# Patient Record
Sex: MALE | Race: BLACK OR AFRICAN AMERICAN | NOT HISPANIC OR LATINO | URBAN - METROPOLITAN AREA
[De-identification: names, ages, dates, MRNs, and addresses within clinical notes are randomized per-mention and may not be internally consistent; named-entity substitution may affect disease eponyms.]

---

## 2015-11-07 RX ORDER — NICOTINE POLACRILEX 2 MG
1 GUM BUCCAL
Qty: 0 | Refills: 0 | COMMUNITY
Start: 2015-11-07

## 2017-05-06 ENCOUNTER — EMERGENCY (EMERGENCY)
Facility: HOSPITAL | Age: 32
LOS: 1 days | Discharge: HOME | End: 2017-05-06
Attending: EMERGENCY MEDICINE | Admitting: EMERGENCY MEDICINE
Payer: MEDICAID

## 2017-05-06 VITALS
TEMPERATURE: 98 F | HEART RATE: 70 BPM | RESPIRATION RATE: 20 BRPM | DIASTOLIC BLOOD PRESSURE: 67 MMHG | OXYGEN SATURATION: 99 % | SYSTOLIC BLOOD PRESSURE: 127 MMHG

## 2017-05-06 VITALS
TEMPERATURE: 98 F | RESPIRATION RATE: 18 BRPM | HEIGHT: 68 IN | WEIGHT: 164.91 LBS | OXYGEN SATURATION: 99 % | DIASTOLIC BLOOD PRESSURE: 85 MMHG | HEART RATE: 92 BPM | SYSTOLIC BLOOD PRESSURE: 117 MMHG

## 2017-05-06 DIAGNOSIS — M25.552 PAIN IN LEFT HIP: ICD-10-CM

## 2017-05-06 DIAGNOSIS — D57.1 SICKLE-CELL DISEASE WITHOUT CRISIS: ICD-10-CM

## 2017-05-06 DIAGNOSIS — M25.551 PAIN IN RIGHT HIP: ICD-10-CM

## 2017-05-06 DIAGNOSIS — Z88.5 ALLERGY STATUS TO NARCOTIC AGENT: ICD-10-CM

## 2017-05-06 DIAGNOSIS — Z79.899 OTHER LONG TERM (CURRENT) DRUG THERAPY: ICD-10-CM

## 2017-05-06 DIAGNOSIS — M54.5 LOW BACK PAIN: ICD-10-CM

## 2017-05-06 LAB
ALBUMIN SERPL ELPH-MCNC: 2.9 G/DL — LOW (ref 3.4–5)
ALP SERPL-CCNC: 102 U/L — SIGNIFICANT CHANGE UP (ref 40–120)
ALT FLD-CCNC: 19 U/L — SIGNIFICANT CHANGE UP (ref 12–42)
ANION GAP SERPL CALC-SCNC: 10 MMOL/L — SIGNIFICANT CHANGE UP (ref 9–16)
AST SERPL-CCNC: 18 U/L — SIGNIFICANT CHANGE UP (ref 15–37)
BASOPHILS NFR BLD AUTO: 0.4 % — SIGNIFICANT CHANGE UP (ref 0–2)
BILIRUB SERPL-MCNC: 0.4 MG/DL — SIGNIFICANT CHANGE UP (ref 0.2–1.2)
BUN SERPL-MCNC: 11 MG/DL — SIGNIFICANT CHANGE UP (ref 7–23)
CALCIUM SERPL-MCNC: 9.2 MG/DL — SIGNIFICANT CHANGE UP (ref 8.5–10.5)
CHLORIDE SERPL-SCNC: 105 MMOL/L — SIGNIFICANT CHANGE UP (ref 96–108)
CO2 SERPL-SCNC: 25 MMOL/L — SIGNIFICANT CHANGE UP (ref 22–31)
CREAT SERPL-MCNC: 0.83 MG/DL — SIGNIFICANT CHANGE UP (ref 0.5–1.3)
EOSINOPHIL NFR BLD AUTO: 7.2 % — HIGH (ref 0–6)
GLUCOSE SERPL-MCNC: 89 MG/DL — SIGNIFICANT CHANGE UP (ref 70–99)
HCT VFR BLD CALC: 24 % — LOW (ref 39–50)
HGB BLD-MCNC: 8.1 G/DL — LOW (ref 13–17)
IMM GRANULOCYTES NFR BLD AUTO: 1.7 % — HIGH (ref 0–1.5)
LYMPHOCYTES # BLD AUTO: 25.3 % — SIGNIFICANT CHANGE UP (ref 13–44)
MCHC RBC-ENTMCNC: 22.9 PG — LOW (ref 27–34)
MCHC RBC-ENTMCNC: 33.8 G/DL — SIGNIFICANT CHANGE UP (ref 32–36)
MCV RBC AUTO: 67.8 FL — LOW (ref 80–100)
MONOCYTES NFR BLD AUTO: 5.3 % — SIGNIFICANT CHANGE UP (ref 2–14)
NEUTROPHILS NFR BLD AUTO: 60.1 % — SIGNIFICANT CHANGE UP (ref 43–77)
PLATELET # BLD AUTO: 139 K/UL — LOW (ref 150–400)
POTASSIUM SERPL-MCNC: 3.8 MMOL/L — SIGNIFICANT CHANGE UP (ref 3.5–5.3)
POTASSIUM SERPL-SCNC: 3.8 MMOL/L — SIGNIFICANT CHANGE UP (ref 3.5–5.3)
PROT SERPL-MCNC: 8.5 G/DL — HIGH (ref 6.4–8.2)
RBC # BLD: 3.54 M/UL — LOW (ref 4.2–5.8)
RBC # BLD: 3.54 M/UL — LOW (ref 4.2–5.8)
RBC # FLD: 20.8 % — HIGH (ref 10.3–16.9)
RETICS #: 145.5 K/UL — HIGH (ref 25–125)
RETICS/RBC NFR: 4.1 % — HIGH (ref 0.5–2.5)
SODIUM SERPL-SCNC: 140 MMOL/L — SIGNIFICANT CHANGE UP (ref 132–145)
WBC # BLD: 11.8 K/UL — HIGH (ref 3.8–10.5)
WBC # FLD AUTO: 11.8 K/UL — HIGH (ref 3.8–10.5)

## 2017-05-06 PROCEDURE — 99284 EMERGENCY DEPT VISIT MOD MDM: CPT | Mod: 25

## 2017-05-06 RX ORDER — ACETAMINOPHEN 500 MG
650 TABLET ORAL ONCE
Qty: 0 | Refills: 0 | Status: COMPLETED | OUTPATIENT
Start: 2017-05-06 | End: 2017-05-06

## 2017-05-06 RX ORDER — SODIUM CHLORIDE 9 MG/ML
1000 INJECTION INTRAMUSCULAR; INTRAVENOUS; SUBCUTANEOUS ONCE
Qty: 0 | Refills: 0 | Status: COMPLETED | OUTPATIENT
Start: 2017-05-06 | End: 2017-05-06

## 2017-05-06 RX ORDER — KETOROLAC TROMETHAMINE 30 MG/ML
60 SYRINGE (ML) INJECTION ONCE
Qty: 0 | Refills: 0 | Status: DISCONTINUED | OUTPATIENT
Start: 2017-05-06 | End: 2017-05-06

## 2017-05-06 RX ADMIN — Medication 60 MILLIGRAM(S): at 05:54

## 2017-05-06 RX ADMIN — Medication 650 MILLIGRAM(S): at 05:54

## 2017-05-06 NOTE — ED PROVIDER NOTE - PMH
Acute Chest Syndrome  2003 and possibly 2008, with intubation, exchange transfusion  Avascular Necrosis of Bone  hips  Personal History of PE (Pulmonary Embolism)  2008, post 6mo coumadin  Sickle Cell Anemia  SC

## 2017-05-06 NOTE — ED PROVIDER NOTE - MEDICAL DECISION MAKING DETAILS
Pt sleeping comfortably throughout ED stay. Labs reviewed; does not appear to be in crisis. Tylenol, Toradol and F/U with PMD this week. Strict return precautions reviewed with pt in which pt verbalizes understanding and agrees to.

## 2017-05-06 NOTE — ED ADULT TRIAGE NOTE - CHIEF COMPLAINT QUOTE
Walked in c/o lower back pain and b/l hip pain r/t his sickle cell, last took 8 mg dilaudid po with no relief. denies any sob/cp

## 2017-05-06 NOTE — ED PROVIDER NOTE - ATTENDING CONTRIBUTION TO CARE
Pt w hx of sickle cell anemia, here for MSK pains, Old chart review shows similar visits to our ED in the past for same. non focal exam. Labs done, baseline H/H, no significant increase in retic. Pt sleeping comfortably while in ED. no distress

## 2017-05-06 NOTE — ED PROVIDER NOTE - OBJECTIVE STATEMENT
32 y/o M with PMH of Sickle Cell Anemia, Acute Chest Syndrome, AVN of B/L Hips, PE, presents to ED c/o sickle cell pain to lower back and B/L hips x 1 day. States he took dilaudid 8mg PO at home without relief.     Denies fever, chills, dizziness, vision changes, CP, SOB, palpitations, abdo pain, N/V/D, focal numbness or weakness

## 2017-05-11 ENCOUNTER — EMERGENCY (EMERGENCY)
Facility: HOSPITAL | Age: 32
LOS: 1 days | Discharge: LEFT BEFORE DISCHARGE | End: 2017-05-11
Attending: EMERGENCY MEDICINE | Admitting: EMERGENCY MEDICINE

## 2017-05-11 VITALS
WEIGHT: 160.06 LBS | OXYGEN SATURATION: 99 % | DIASTOLIC BLOOD PRESSURE: 75 MMHG | SYSTOLIC BLOOD PRESSURE: 111 MMHG | HEIGHT: 68 IN | RESPIRATION RATE: 17 BRPM | HEART RATE: 94 BPM | TEMPERATURE: 99 F

## 2017-05-11 DIAGNOSIS — M25.552 PAIN IN LEFT HIP: ICD-10-CM

## 2017-05-11 DIAGNOSIS — M25.551 PAIN IN RIGHT HIP: ICD-10-CM

## 2017-05-11 NOTE — ED ADULT NURSE NOTE - CHIEF COMPLAINT QUOTE
Pt c/o bilateral hip and back pain since 8am. Pt states he took Dilaudid 8mg at 8am and 12pm with no relief.

## 2017-05-11 NOTE — ED ADULT NURSE NOTE - OBJECTIVE STATEMENT
Pt received aox3, states he is having a sickle cell crisis with pain to bilateral hips and back starting 8am this morning. Pt took 8mg dialudid at 8am and 12p with no relief. Pt states history of PE. Vitals stable. Pt denies CP, denies SOB. Awaiting provider.

## 2017-07-08 ENCOUNTER — INPATIENT (INPATIENT)
Facility: HOSPITAL | Age: 32
LOS: 2 days | Discharge: LEFT BEFORE DISCHARGE | End: 2017-07-11
Attending: INTERNAL MEDICINE | Admitting: INTERNAL MEDICINE
Payer: MEDICAID

## 2017-07-08 VITALS
SYSTOLIC BLOOD PRESSURE: 124 MMHG | HEART RATE: 88 BPM | RESPIRATION RATE: 16 BRPM | DIASTOLIC BLOOD PRESSURE: 82 MMHG | OXYGEN SATURATION: 100 % | TEMPERATURE: 99 F

## 2017-07-08 LAB
ALBUMIN SERPL ELPH-MCNC: 3.6 G/DL — SIGNIFICANT CHANGE UP (ref 3.3–5)
ALP SERPL-CCNC: 86 U/L — SIGNIFICANT CHANGE UP (ref 40–120)
ALT FLD-CCNC: 10 U/L — SIGNIFICANT CHANGE UP (ref 4–41)
ANISOCYTOSIS BLD QL: SIGNIFICANT CHANGE UP
AST SERPL-CCNC: 15 U/L — SIGNIFICANT CHANGE UP (ref 4–40)
BASOPHILS # BLD AUTO: 0.08 K/UL — SIGNIFICANT CHANGE UP (ref 0–0.2)
BASOPHILS NFR BLD AUTO: 0.5 % — SIGNIFICANT CHANGE UP (ref 0–2)
BASOPHILS NFR SPEC: 1.7 % — SIGNIFICANT CHANGE UP (ref 0–2)
BILIRUB SERPL-MCNC: 0.4 MG/DL — SIGNIFICANT CHANGE UP (ref 0.2–1.2)
BLASTS # FLD: 0 % — SIGNIFICANT CHANGE UP (ref 0–0)
BUN SERPL-MCNC: 14 MG/DL — SIGNIFICANT CHANGE UP (ref 7–23)
CALCIUM SERPL-MCNC: 9.4 MG/DL — SIGNIFICANT CHANGE UP (ref 8.4–10.5)
CHLORIDE SERPL-SCNC: 100 MMOL/L — SIGNIFICANT CHANGE UP (ref 98–107)
CK MB BLD-MCNC: 0.5 — SIGNIFICANT CHANGE UP (ref 0–2.5)
CK MB BLD-MCNC: 1 NG/ML — SIGNIFICANT CHANGE UP (ref 1–6.6)
CK SERPL-CCNC: 220 U/L — HIGH (ref 30–200)
CO2 SERPL-SCNC: 25 MMOL/L — SIGNIFICANT CHANGE UP (ref 22–31)
CREAT SERPL-MCNC: 0.86 MG/DL — SIGNIFICANT CHANGE UP (ref 0.5–1.3)
EOSINOPHIL # BLD AUTO: 0.35 K/UL — SIGNIFICANT CHANGE UP (ref 0–0.5)
EOSINOPHIL NFR BLD AUTO: 2.3 % — SIGNIFICANT CHANGE UP (ref 0–6)
EOSINOPHIL NFR FLD: 0.9 % — SIGNIFICANT CHANGE UP (ref 0–6)
GIANT PLATELETS BLD QL SMEAR: PRESENT — SIGNIFICANT CHANGE UP
GLUCOSE SERPL-MCNC: 76 MG/DL — SIGNIFICANT CHANGE UP (ref 70–99)
HCT VFR BLD CALC: 24.7 % — LOW (ref 39–50)
HGB BLD-MCNC: 8.4 G/DL — LOW (ref 13–17)
IMM GRANULOCYTES # BLD AUTO: 0.05 # — SIGNIFICANT CHANGE UP
IMM GRANULOCYTES NFR BLD AUTO: 0.3 % — SIGNIFICANT CHANGE UP (ref 0–1.5)
LYMPHOCYTES # BLD AUTO: 18.8 % — SIGNIFICANT CHANGE UP (ref 13–44)
LYMPHOCYTES # BLD AUTO: 2.9 K/UL — SIGNIFICANT CHANGE UP (ref 1–3.3)
LYMPHOCYTES NFR SPEC AUTO: 17.5 % — SIGNIFICANT CHANGE UP (ref 13–44)
MCHC RBC-ENTMCNC: 21.8 PG — LOW (ref 27–34)
MCHC RBC-ENTMCNC: 34 % — SIGNIFICANT CHANGE UP (ref 32–36)
MCV RBC AUTO: 64.2 FL — LOW (ref 80–100)
METAMYELOCYTES # FLD: 0 % — SIGNIFICANT CHANGE UP (ref 0–1)
MICROCYTES BLD QL: SIGNIFICANT CHANGE UP
MONOCYTES # BLD AUTO: 0.77 K/UL — SIGNIFICANT CHANGE UP (ref 0–0.9)
MONOCYTES NFR BLD AUTO: 5 % — SIGNIFICANT CHANGE UP (ref 2–14)
MONOCYTES NFR BLD: 0.9 % — LOW (ref 2–9)
MYELOCYTES NFR BLD: 0 % — SIGNIFICANT CHANGE UP (ref 0–0)
NEUTROPHIL AB SER-ACNC: 77.2 % — HIGH (ref 43–77)
NEUTROPHILS # BLD AUTO: 11.27 K/UL — HIGH (ref 1.8–7.4)
NEUTROPHILS NFR BLD AUTO: 73.1 % — SIGNIFICANT CHANGE UP (ref 43–77)
NEUTS BAND # BLD: 0 % — SIGNIFICANT CHANGE UP (ref 0–6)
NRBC # FLD: 0 — SIGNIFICANT CHANGE UP
OTHER - HEMATOLOGY %: 0 — SIGNIFICANT CHANGE UP
PLATELET # BLD AUTO: 470 K/UL — HIGH (ref 150–400)
PLATELET COUNT - ESTIMATE: NORMAL — SIGNIFICANT CHANGE UP
PMV BLD: 9.3 FL — SIGNIFICANT CHANGE UP (ref 7–13)
POIKILOCYTOSIS BLD QL AUTO: SIGNIFICANT CHANGE UP
POLYCHROMASIA BLD QL SMEAR: SLIGHT — SIGNIFICANT CHANGE UP
POTASSIUM SERPL-MCNC: 4.1 MMOL/L — SIGNIFICANT CHANGE UP (ref 3.5–5.3)
POTASSIUM SERPL-SCNC: 4.1 MMOL/L — SIGNIFICANT CHANGE UP (ref 3.5–5.3)
PROMYELOCYTES # FLD: 0 % — SIGNIFICANT CHANGE UP (ref 0–0)
PROT SERPL-MCNC: 8.8 G/DL — HIGH (ref 6–8.3)
RBC # BLD: 3.85 M/UL — LOW (ref 4.2–5.8)
RBC # FLD: 19.6 % — HIGH (ref 10.3–14.5)
RETICS #: 101.5 10X3/UL — HIGH (ref 17–73)
RETICS/RBC NFR: 2.7 % — HIGH (ref 0.5–2.5)
REVIEW TO FOLLOW: YES — SIGNIFICANT CHANGE UP
SODIUM SERPL-SCNC: 143 MMOL/L — SIGNIFICANT CHANGE UP (ref 135–145)
TARGETS BLD QL SMEAR: SIGNIFICANT CHANGE UP
TROPONIN T SERPL-MCNC: < 0.06 NG/ML — SIGNIFICANT CHANGE UP (ref 0–0.06)
VARIANT LYMPHS # BLD: 1.8 % — SIGNIFICANT CHANGE UP
WBC # BLD: 15.42 K/UL — HIGH (ref 3.8–10.5)
WBC # FLD AUTO: 15.42 K/UL — HIGH (ref 3.8–10.5)

## 2017-07-08 RX ORDER — KETOROLAC TROMETHAMINE 30 MG/ML
30 SYRINGE (ML) INJECTION ONCE
Qty: 0 | Refills: 0 | Status: DISCONTINUED | OUTPATIENT
Start: 2017-07-08 | End: 2017-07-08

## 2017-07-08 RX ORDER — SODIUM CHLORIDE 9 MG/ML
1000 INJECTION INTRAMUSCULAR; INTRAVENOUS; SUBCUTANEOUS ONCE
Qty: 0 | Refills: 0 | Status: COMPLETED | OUTPATIENT
Start: 2017-07-08 | End: 2017-07-08

## 2017-07-08 RX ORDER — DIPHENHYDRAMINE HCL 50 MG
50 CAPSULE ORAL ONCE
Qty: 0 | Refills: 0 | Status: COMPLETED | OUTPATIENT
Start: 2017-07-08 | End: 2017-07-08

## 2017-07-08 RX ORDER — HYDROMORPHONE HYDROCHLORIDE 2 MG/ML
2 INJECTION INTRAMUSCULAR; INTRAVENOUS; SUBCUTANEOUS ONCE
Qty: 0 | Refills: 0 | Status: DISCONTINUED | OUTPATIENT
Start: 2017-07-08 | End: 2017-07-08

## 2017-07-08 RX ADMIN — SODIUM CHLORIDE 1000 MILLILITER(S): 9 INJECTION INTRAMUSCULAR; INTRAVENOUS; SUBCUTANEOUS at 22:36

## 2017-07-08 RX ADMIN — Medication 30 MILLIGRAM(S): at 22:36

## 2017-07-08 RX ADMIN — HYDROMORPHONE HYDROCHLORIDE 200 MILLIGRAM(S): 2 INJECTION INTRAMUSCULAR; INTRAVENOUS; SUBCUTANEOUS at 23:26

## 2017-07-08 RX ADMIN — Medication 30 MILLIGRAM(S): at 22:50

## 2017-07-08 RX ADMIN — Medication 50 MILLIGRAM(S): at 23:26

## 2017-07-08 RX ADMIN — HYDROMORPHONE HYDROCHLORIDE 2 MILLIGRAM(S): 2 INJECTION INTRAMUSCULAR; INTRAVENOUS; SUBCUTANEOUS at 23:45

## 2017-07-08 NOTE — ED ADULT NURSE REASSESSMENT NOTE - NS ED NURSE REASSESS COMMENT FT1
pt. states still in pain, appears more comfortable after the toradol. followed-up from Pharmacy the Dilaudid drip. as per Pharmacist, they're still preparing the med. will  when ready. will continue to monitor

## 2017-07-08 NOTE — ED PROVIDER NOTE - ATTENDING CONTRIBUTION TO CARE
DR Bloch- Patient with pain ot managed with 8  of dilaudid every 2-4 hours, started today. Hx of acute chest 2015, doesn't feel the same. no fever. no cough no SOB. complex care note noted. Patient appears in acute pain, HEENT nml, lungs clear, mild right sided chest wall tenderness, s1s2, abd soft nontender. moving all extrem, pain on flexion of hips. no skin rashes

## 2017-07-08 NOTE — ED ADULT TRIAGE NOTE - CHIEF COMPLAINT QUOTE
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 pt has hx of acute chest. pt took 8 mg of Dilaudid and noon and 2 pm  without relief.  pt VSS NAD EKG performed in triage.

## 2017-07-08 NOTE — ED PROVIDER NOTE - OBJECTIVE STATEMENT
32M h/o hgb SS, AVN b/l hips and L shoulder p/w b/l hip pain and R-sided chest pain since 12pm today. The pain is reminiscent of prior VOC. No fevers, cough, SOB, or recent injury. Took PO dilaudid 8mg at 12pm and 2pm without relief. Hx of acute chest in 2001 and 2003, hx PE in 2008.  Hematology: Dr Francois Laguna  Home meds: dilaudid 8mg q 6 hrs PRN, folic acid multivitamin 32M h/o hgb SS, AVN b/l hips and L shoulder p/w b/l hip pain and R-sided chest pain since 12pm today. The pain is reminiscent of prior VOC. No fevers, cough, SOB, or recent injury. Took PO dilaudid 8mg at 12pm and 2pm without relief. Hx of acute chest in 2001 and 2003, hx PE in 2008. No calf pain/swelling, hemoptysis, or recent trauma/surgery/immobilization.   Hematology: Dr Francois Laguna  Home meds: dilaudid 8mg q 6 hrs PRN, folic acid multivitamin

## 2017-07-08 NOTE — ED ADULT NURSE NOTE - FAMILY HISTORY
<<-----Click on this checkbox to enter Family History Family history of sickle cell trait in father     Mother  Still living? Yes, Estimated age: Age Unknown  Family history of sickle cell trait in mother, Age at diagnosis: Age Unknown

## 2017-07-08 NOTE — ED PROVIDER NOTE - MEDICAL DECISION MAKING DETAILS
32M h/o Hgb SS p/w typical SCC. Low suspicion for acute chest or ACS. Presentation not consistent with PE (Wells score = 1.5 for prior PE). Plan: labs, retic count, CXR, EKG, fluids, pain meds, reassess. Aware of Complex Care Note recs.

## 2017-07-09 DIAGNOSIS — Z29.9 ENCOUNTER FOR PROPHYLACTIC MEASURES, UNSPECIFIED: ICD-10-CM

## 2017-07-09 DIAGNOSIS — R07.89 OTHER CHEST PAIN: ICD-10-CM

## 2017-07-09 DIAGNOSIS — D57.00 HB-SS DISEASE WITH CRISIS, UNSPECIFIED: ICD-10-CM

## 2017-07-09 DIAGNOSIS — D72.829 ELEVATED WHITE BLOOD CELL COUNT, UNSPECIFIED: ICD-10-CM

## 2017-07-09 PROCEDURE — 99223 1ST HOSP IP/OBS HIGH 75: CPT

## 2017-07-09 PROCEDURE — 71020: CPT | Mod: 26

## 2017-07-09 RX ORDER — HYDROMORPHONE HYDROCHLORIDE 2 MG/ML
2 INJECTION INTRAMUSCULAR; INTRAVENOUS; SUBCUTANEOUS EVERY 4 HOURS
Qty: 0 | Refills: 0 | Status: DISCONTINUED | OUTPATIENT
Start: 2017-07-09 | End: 2017-07-09

## 2017-07-09 RX ORDER — NALOXONE HYDROCHLORIDE 4 MG/.1ML
0.1 SPRAY NASAL
Qty: 0 | Refills: 0 | Status: DISCONTINUED | OUTPATIENT
Start: 2017-07-09 | End: 2017-07-11

## 2017-07-09 RX ORDER — HYDROMORPHONE HYDROCHLORIDE 2 MG/ML
2 INJECTION INTRAMUSCULAR; INTRAVENOUS; SUBCUTANEOUS
Qty: 0 | Refills: 0 | Status: DISCONTINUED | OUTPATIENT
Start: 2017-07-09 | End: 2017-07-09

## 2017-07-09 RX ORDER — HYDROMORPHONE HYDROCHLORIDE 2 MG/ML
2 INJECTION INTRAMUSCULAR; INTRAVENOUS; SUBCUTANEOUS ONCE
Qty: 0 | Refills: 0 | Status: DISCONTINUED | OUTPATIENT
Start: 2017-07-09 | End: 2017-07-09

## 2017-07-09 RX ORDER — HYDROMORPHONE HYDROCHLORIDE 2 MG/ML
30 INJECTION INTRAMUSCULAR; INTRAVENOUS; SUBCUTANEOUS
Qty: 0 | Refills: 0 | Status: DISCONTINUED | OUTPATIENT
Start: 2017-07-09 | End: 2017-07-10

## 2017-07-09 RX ORDER — SODIUM CHLORIDE 9 MG/ML
1000 INJECTION, SOLUTION INTRAVENOUS
Qty: 0 | Refills: 0 | Status: DISCONTINUED | OUTPATIENT
Start: 2017-07-09 | End: 2017-07-11

## 2017-07-09 RX ORDER — ONDANSETRON 8 MG/1
4 TABLET, FILM COATED ORAL EVERY 6 HOURS
Qty: 0 | Refills: 0 | Status: DISCONTINUED | OUTPATIENT
Start: 2017-07-09 | End: 2017-07-11

## 2017-07-09 RX ORDER — FOLIC ACID 0.8 MG
1 TABLET ORAL DAILY
Qty: 0 | Refills: 0 | Status: DISCONTINUED | OUTPATIENT
Start: 2017-07-09 | End: 2017-07-11

## 2017-07-09 RX ORDER — KETOROLAC TROMETHAMINE 30 MG/ML
30 SYRINGE (ML) INJECTION EVERY 8 HOURS
Qty: 0 | Refills: 0 | Status: DISCONTINUED | OUTPATIENT
Start: 2017-07-09 | End: 2017-07-10

## 2017-07-09 RX ORDER — HYDROMORPHONE HYDROCHLORIDE 2 MG/ML
1 INJECTION INTRAMUSCULAR; INTRAVENOUS; SUBCUTANEOUS
Qty: 0 | Refills: 0 | Status: DISCONTINUED | OUTPATIENT
Start: 2017-07-09 | End: 2017-07-09

## 2017-07-09 RX ORDER — HYDROMORPHONE HYDROCHLORIDE 2 MG/ML
4 INJECTION INTRAMUSCULAR; INTRAVENOUS; SUBCUTANEOUS ONCE
Qty: 0 | Refills: 0 | Status: DISCONTINUED | OUTPATIENT
Start: 2017-07-09 | End: 2017-07-09

## 2017-07-09 RX ORDER — DIPHENHYDRAMINE HCL 50 MG
50 CAPSULE ORAL EVERY 6 HOURS
Qty: 0 | Refills: 0 | Status: DISCONTINUED | OUTPATIENT
Start: 2017-07-09 | End: 2017-07-11

## 2017-07-09 RX ORDER — ENOXAPARIN SODIUM 100 MG/ML
40 INJECTION SUBCUTANEOUS EVERY 24 HOURS
Qty: 0 | Refills: 0 | Status: DISCONTINUED | OUTPATIENT
Start: 2017-07-09 | End: 2017-07-11

## 2017-07-09 RX ADMIN — HYDROMORPHONE HYDROCHLORIDE 200 MILLIGRAM(S): 2 INJECTION INTRAMUSCULAR; INTRAVENOUS; SUBCUTANEOUS at 04:00

## 2017-07-09 RX ADMIN — HYDROMORPHONE HYDROCHLORIDE 2 MILLIGRAM(S): 2 INJECTION INTRAMUSCULAR; INTRAVENOUS; SUBCUTANEOUS at 07:13

## 2017-07-09 RX ADMIN — SODIUM CHLORIDE 125 MILLILITER(S): 9 INJECTION, SOLUTION INTRAVENOUS at 16:39

## 2017-07-09 RX ADMIN — Medication 30 MILLIGRAM(S): at 14:01

## 2017-07-09 RX ADMIN — HYDROMORPHONE HYDROCHLORIDE 2 MILLIGRAM(S): 2 INJECTION INTRAMUSCULAR; INTRAVENOUS; SUBCUTANEOUS at 13:59

## 2017-07-09 RX ADMIN — Medication 50 MILLIGRAM(S): at 10:44

## 2017-07-09 RX ADMIN — HYDROMORPHONE HYDROCHLORIDE 200 MILLIGRAM(S): 2 INJECTION INTRAMUSCULAR; INTRAVENOUS; SUBCUTANEOUS at 01:30

## 2017-07-09 RX ADMIN — ENOXAPARIN SODIUM 40 MILLIGRAM(S): 100 INJECTION SUBCUTANEOUS at 11:26

## 2017-07-09 RX ADMIN — HYDROMORPHONE HYDROCHLORIDE 30 MILLILITER(S): 2 INJECTION INTRAMUSCULAR; INTRAVENOUS; SUBCUTANEOUS at 20:25

## 2017-07-09 RX ADMIN — HYDROMORPHONE HYDROCHLORIDE 30 MILLILITER(S): 2 INJECTION INTRAMUSCULAR; INTRAVENOUS; SUBCUTANEOUS at 16:34

## 2017-07-09 RX ADMIN — HYDROMORPHONE HYDROCHLORIDE 2 MILLIGRAM(S): 2 INJECTION INTRAMUSCULAR; INTRAVENOUS; SUBCUTANEOUS at 06:58

## 2017-07-09 RX ADMIN — HYDROMORPHONE HYDROCHLORIDE 2 MILLIGRAM(S): 2 INJECTION INTRAMUSCULAR; INTRAVENOUS; SUBCUTANEOUS at 14:14

## 2017-07-09 RX ADMIN — SODIUM CHLORIDE 125 MILLILITER(S): 9 INJECTION, SOLUTION INTRAVENOUS at 08:31

## 2017-07-09 RX ADMIN — HYDROMORPHONE HYDROCHLORIDE 2 MILLIGRAM(S): 2 INJECTION INTRAMUSCULAR; INTRAVENOUS; SUBCUTANEOUS at 10:56

## 2017-07-09 RX ADMIN — Medication 30 MILLIGRAM(S): at 14:16

## 2017-07-09 RX ADMIN — HYDROMORPHONE HYDROCHLORIDE 2 MILLIGRAM(S): 2 INJECTION INTRAMUSCULAR; INTRAVENOUS; SUBCUTANEOUS at 10:41

## 2017-07-09 RX ADMIN — HYDROMORPHONE HYDROCHLORIDE 4 MILLIGRAM(S): 2 INJECTION INTRAMUSCULAR; INTRAVENOUS; SUBCUTANEOUS at 01:49

## 2017-07-09 RX ADMIN — SODIUM CHLORIDE 100 MILLILITER(S): 9 INJECTION, SOLUTION INTRAVENOUS at 04:00

## 2017-07-09 RX ADMIN — HYDROMORPHONE HYDROCHLORIDE 2 MILLIGRAM(S): 2 INJECTION INTRAMUSCULAR; INTRAVENOUS; SUBCUTANEOUS at 04:15

## 2017-07-09 RX ADMIN — Medication 1 MILLIGRAM(S): at 11:26

## 2017-07-09 RX ADMIN — Medication 1 TABLET(S): at 11:26

## 2017-07-09 NOTE — ED ADULT NURSE REASSESSMENT NOTE - NS ED NURSE REASSESS COMMENT FT1
pt. a&ox3, appears in NAD at this time, states pain partially relieved. still c/o pain on b/l hips, denies any pain on chest. MD Gollogly made aware. will continue to monitor

## 2017-07-09 NOTE — H&P ADULT - NSHPSOCIALHISTORY_GEN_ALL_CORE
Single, 1 daughter  Lives with sister  Substance use: never used  Social ETOH use Single, 1 daughter  Lives with sister  Substance use: never used  Social ETOH use  Student

## 2017-07-09 NOTE — H&P ADULT - ASSESSMENT
29 y/o male with hx of sickle cell disease, acute chest syndrome in 2003/2008 requiring intubation and exchange transfusion, avascular necrosis of hips,  PE in 2008 (s/p 6 months of coumadin, per prior H&P) a/w sickle cell anemia crisis c/b reactive leukocytosis, b/l hip pain and Rt sided chest pain;

## 2017-07-09 NOTE — H&P ADULT - HISTORY OF PRESENT ILLNESS
29 y/o male with hx of sickle cell disease, acute chest syndrome in 2003/2008 requiring intubation and exchange transfusion, avascular necrosis of hips,  PE in 2008 (s/p 6 months of coumadin, per prior H&P) c/o b/l hip pain and Rt sided chest pain since 12pm today. The pain is reminiscent of prior VOC. Reports no fevers, cough, SOB, or recent injury. Took PO dilaudid 8mg at 12pm and 2pm without relief. No calf pain/swelling, hemoptysis, or recent trauma/surgery/immobilization.    ED course: NS 1L, Benadryl PO x 1 dose, multiple Dilaudid IVP doses;

## 2017-07-09 NOTE — PROGRESS NOTE ADULT - PROBLEM SELECTOR PLAN 1
-Now that patient on floor with start IV dilaudid PCA for better pain control  -c/w aggressive hydration, 1/2NS@125cc/hr  -c/w folate supplementation  -Incentive spirometer  -c/w bowel regimen  -Trend daily hemolysis labs

## 2017-07-09 NOTE — PROGRESS NOTE ADULT - ASSESSMENT
33 yo M with SCD, hx of acute chest syndrome in 2003/2008 requiring intubation and exchange transfusion, avascular necrosis of hips,  PE in 2008 (s/p 6 months of coumadin) p/w hip and right arm pain 2/2 sickle cell pain crisis.

## 2017-07-09 NOTE — H&P ADULT - FAMILY HISTORY
<<-----Click on this checkbox to enter Family History Family history of sickle cell trait in mother     Father  Still living? Unknown  Family history of sickle cell trait in father, Age at diagnosis: Age Unknown

## 2017-07-09 NOTE — ED ADULT NURSE REASSESSMENT NOTE - NS ED NURSE REASSESS COMMENT FT1
pt. asleep but arousable, appears in NAD at this time, still c/o pain. Dilaudid ordered from Pharmacy. spoke to Jose from Pharmacy and they will call back when Dilaudid IVPB is ready for pick-up. will continue to monitor

## 2017-07-09 NOTE — PROGRESS NOTE ADULT - SUBJECTIVE AND OBJECTIVE BOX
Patient is a 32y old  Male who presents with a chief complaint of Bilateral hips and Rt chest pain (09 Jul 2017 04:22)      SUBJECTIVE / OVERNIGHT EVENTS: No acute events. Patient still endorses pain in his right arm. Denies any CP, SOB, palpitations.    MEDICATIONS  (STANDING):  sodium chloride 0.45%. 1000 milliLiter(s) (125 mL/Hr) IV Continuous <Continuous>  enoxaparin Injectable 40 milliGRAM(s) SubCutaneous every 24 hours  multivitamin 1 Tablet(s) Oral daily  folic acid 1 milliGRAM(s) Oral daily  HYDROmorphone PCA (1 mG/mL) 30 milliLiter(s) PCA Continuous PCA Continuous  ketorolac   Injectable 30 milliGRAM(s) IV Push every 8 hours    MEDICATIONS  (PRN):  diphenhydrAMINE   Capsule 50 milliGRAM(s) Oral every 6 hours PRN Rash and/or Itching  naloxone Injectable 0.1 milliGRAM(s) IV Push every 3 minutes PRN For ANY of the following changes in patient status:  A. RR LESS THAN 10 breaths per minute, B. Oxygen saturation LESS THAN 90%, C. Sedation score of 6  ondansetron Injectable 4 milliGRAM(s) IV Push every 6 hours PRN Nausea      Vital Signs Last 24 Hrs  T(C): 36.6 (07-09-17 @ 14:01), Max: 37.2 (07-08-17 @ 21:05)  HR: 71 (07-09-17 @ 14:01) (68 - 93)  BP: 120/82 (07-09-17 @ 14:01) (113/74 - 126/71)  RR: 18 (07-09-17 @ 10:17) (16 - 19)  SpO2: 100% (07-09-17 @ 10:17) (99% - 100%)  CAPILLARY BLOOD GLUCOSE        I&O's Summary      PHYSICAL EXAM:  GENERAL: NAD, well-developed  HEAD:  Atraumatic, Normocephalic  EYES: EOMI, PERRLA, conjunctiva and sclera clear  NECK: Supple, No JVD  CHEST/LUNG: Clear to auscultation bilaterally; No wheeze  HEART: Regular rate and rhythm; No murmurs, rubs, or gallops  ABDOMEN: Soft, Nontender, Nondistended; Bowel sounds present  EXTREMITIES:  2+ Peripheral Pulses, No clubbing, cyanosis, or edema  PSYCH: AAOx3  NEUROLOGY: non-focal  SKIN: No rashes or lesions    LABS:                        8.4    15.42 )-----------( 470      ( 08 Jul 2017 22:20 )             24.7     07-08    143  |  100  |  14  ----------------------------<  76  4.1   |  25  |  0.86    Ca    9.4      08 Jul 2017 22:20    TPro  8.8<H>  /  Alb  3.6  /  TBili  0.4  /  DBili  x   /  AST  15  /  ALT  10  /  AlkPhos  86  07-08      CARDIAC MARKERS ( 08 Jul 2017 22:20 )  x     / < 0.06 ng/mL / 220 u/L / 1.00 ng/mL / x          RADIOLOGY & ADDITIONAL TESTS:    Imaging Personally Reviewed:  < from: Xray Chest 2 Views PA/Lat (07.09.17 @ 00:44) >  IMPRESSION:    There are bibasilar areas of linear atelectasis or scarring as on the   prior studies.  The cardiomediastinal silhouette is unremarkable.  The visualized osseous structures are unremarkable for age.      Consultant(s) Notes Reviewed:      Care Discussed with Consultants/Other Providers:    Assessment and Plan:

## 2017-07-09 NOTE — H&P ADULT - LYMPHATIC
supraclavicular L/anterior cervical L/posterior cervical R/posterior cervical L/supraclavicular R/anterior cervical R

## 2017-07-10 LAB
ALBUMIN SERPL ELPH-MCNC: 3.1 G/DL — LOW (ref 3.3–5)
ALP SERPL-CCNC: 73 U/L — SIGNIFICANT CHANGE UP (ref 40–120)
ALT FLD-CCNC: 8 U/L — SIGNIFICANT CHANGE UP (ref 4–41)
AST SERPL-CCNC: 15 U/L — SIGNIFICANT CHANGE UP (ref 4–40)
BILIRUB SERPL-MCNC: 0.3 MG/DL — SIGNIFICANT CHANGE UP (ref 0.2–1.2)
BUN SERPL-MCNC: 13 MG/DL — SIGNIFICANT CHANGE UP (ref 7–23)
CALCIUM SERPL-MCNC: 9 MG/DL — SIGNIFICANT CHANGE UP (ref 8.4–10.5)
CHLORIDE SERPL-SCNC: 103 MMOL/L — SIGNIFICANT CHANGE UP (ref 98–107)
CO2 SERPL-SCNC: 22 MMOL/L — SIGNIFICANT CHANGE UP (ref 22–31)
CREAT SERPL-MCNC: 0.69 MG/DL — SIGNIFICANT CHANGE UP (ref 0.5–1.3)
GLUCOSE SERPL-MCNC: 72 MG/DL — SIGNIFICANT CHANGE UP (ref 70–99)
LDH SERPL L TO P-CCNC: 187 U/L — SIGNIFICANT CHANGE UP (ref 135–225)
POTASSIUM SERPL-MCNC: 4.5 MMOL/L — SIGNIFICANT CHANGE UP (ref 3.5–5.3)
POTASSIUM SERPL-SCNC: 4.5 MMOL/L — SIGNIFICANT CHANGE UP (ref 3.5–5.3)
PROT SERPL-MCNC: 8 G/DL — SIGNIFICANT CHANGE UP (ref 6–8.3)
SODIUM SERPL-SCNC: 142 MMOL/L — SIGNIFICANT CHANGE UP (ref 135–145)

## 2017-07-10 PROCEDURE — 99233 SBSQ HOSP IP/OBS HIGH 50: CPT | Mod: GC

## 2017-07-10 RX ORDER — SENNA PLUS 8.6 MG/1
2 TABLET ORAL AT BEDTIME
Qty: 0 | Refills: 0 | Status: DISCONTINUED | OUTPATIENT
Start: 2017-07-10 | End: 2017-07-11

## 2017-07-10 RX ORDER — DOCUSATE SODIUM 100 MG
100 CAPSULE ORAL THREE TIMES A DAY
Qty: 0 | Refills: 0 | Status: DISCONTINUED | OUTPATIENT
Start: 2017-07-10 | End: 2017-07-11

## 2017-07-10 RX ORDER — NICOTINE POLACRILEX 2 MG
1 GUM BUCCAL DAILY
Qty: 0 | Refills: 0 | Status: DISCONTINUED | OUTPATIENT
Start: 2017-07-10 | End: 2017-07-11

## 2017-07-10 RX ORDER — HYDROMORPHONE HYDROCHLORIDE 2 MG/ML
30 INJECTION INTRAMUSCULAR; INTRAVENOUS; SUBCUTANEOUS
Qty: 0 | Refills: 0 | Status: DISCONTINUED | OUTPATIENT
Start: 2017-07-10 | End: 2017-07-11

## 2017-07-10 RX ORDER — PANTOPRAZOLE SODIUM 20 MG/1
40 TABLET, DELAYED RELEASE ORAL
Qty: 0 | Refills: 0 | Status: DISCONTINUED | OUTPATIENT
Start: 2017-07-10 | End: 2017-07-11

## 2017-07-10 RX ORDER — KETOROLAC TROMETHAMINE 30 MG/ML
15 SYRINGE (ML) INJECTION EVERY 6 HOURS
Qty: 0 | Refills: 0 | Status: DISCONTINUED | OUTPATIENT
Start: 2017-07-10 | End: 2017-07-11

## 2017-07-10 RX ADMIN — Medication 1 MILLIGRAM(S): at 11:34

## 2017-07-10 RX ADMIN — SODIUM CHLORIDE 125 MILLILITER(S): 9 INJECTION, SOLUTION INTRAVENOUS at 02:36

## 2017-07-10 RX ADMIN — Medication 50 MILLIGRAM(S): at 09:16

## 2017-07-10 RX ADMIN — Medication 1 PATCH: at 11:34

## 2017-07-10 RX ADMIN — Medication 50 MILLIGRAM(S): at 21:40

## 2017-07-10 RX ADMIN — HYDROMORPHONE HYDROCHLORIDE 30 MILLILITER(S): 2 INJECTION INTRAMUSCULAR; INTRAVENOUS; SUBCUTANEOUS at 20:15

## 2017-07-10 RX ADMIN — HYDROMORPHONE HYDROCHLORIDE 30 MILLILITER(S): 2 INJECTION INTRAMUSCULAR; INTRAVENOUS; SUBCUTANEOUS at 11:58

## 2017-07-10 RX ADMIN — Medication 50 MILLIGRAM(S): at 15:39

## 2017-07-10 RX ADMIN — ENOXAPARIN SODIUM 40 MILLIGRAM(S): 100 INJECTION SUBCUTANEOUS at 11:34

## 2017-07-10 RX ADMIN — HYDROMORPHONE HYDROCHLORIDE 30 MILLILITER(S): 2 INJECTION INTRAMUSCULAR; INTRAVENOUS; SUBCUTANEOUS at 08:29

## 2017-07-10 RX ADMIN — SODIUM CHLORIDE 125 MILLILITER(S): 9 INJECTION, SOLUTION INTRAVENOUS at 09:17

## 2017-07-10 RX ADMIN — SENNA PLUS 2 TABLET(S): 8.6 TABLET ORAL at 21:36

## 2017-07-10 RX ADMIN — Medication 1 TABLET(S): at 11:34

## 2017-07-10 RX ADMIN — Medication 100 MILLIGRAM(S): at 21:36

## 2017-07-10 NOTE — PROGRESS NOTE ADULT - SUBJECTIVE AND OBJECTIVE BOX
31 y/o male with hx of sickle cell disease, acute chest syndrome in 2003/2008 requiring intubation and exchange transfusion, avascular necrosis of hips,  PE in 2008 (s/p 6 months of coumadin, per prior H&P) a/w sickle cell anemia crisis c/b reactive leukocytosis, b/l hip pain and Rt sided chest pain presenting with b/l hip pain and R chest pain.    SUBJECTIVE / OVERNIGHT EVENTS:      MEDICATIONS  (STANDING):  sodium chloride 0.45%. 1000 milliLiter(s) (125 mL/Hr) IV Continuous <Continuous>  enoxaparin Injectable 40 milliGRAM(s) SubCutaneous every 24 hours  multivitamin 1 Tablet(s) Oral daily  folic acid 1 milliGRAM(s) Oral daily  HYDROmorphone PCA (1 mG/mL) 30 milliLiter(s) PCA Continuous PCA Continuous  ketorolac   Injectable 30 milliGRAM(s) IV Push every 8 hours    MEDICATIONS  (PRN):  diphenhydrAMINE   Capsule 50 milliGRAM(s) Oral every 6 hours PRN Rash and/or Itching  naloxone Injectable 0.1 milliGRAM(s) IV Push every 3 minutes PRN For ANY of the following changes in patient status:  A. RR LESS THAN 10 breaths per minute, B. Oxygen saturation LESS THAN 90%, C. Sedation score of 6  ondansetron Injectable 4 milliGRAM(s) IV Push every 6 hours PRN Nausea      Vital Signs Last 24 Hrs  T(C): 36.8 (07-10-17 @ 04:48), Max: 37.1 (07-09-17 @ 16:30)  HR: 59 (07-10-17 @ 04:48) (59 - 73)  BP: 130/60 (07-10-17 @ 04:48) (108/72 - 130/60)  RR: 17 (07-10-17 @ 04:48) (17 - 18)  SpO2: 100% (07-10-17 @ 04:48) (100% - 100%)  CAPILLARY BLOOD GLUCOSE        I&O's Summary    09 Jul 2017 07:01  -  10 Jul 2017 07:00  --------------------------------------------------------  IN: 0 mL / OUT: 400 mL / NET: -400 mL        PHYSICAL EXAM  GENERAL: NAD, well-developed  HEAD:  Atraumatic, Normocephalic  EYES: EOMI, PERRLA, conjunctiva and sclera clear  NECK: Supple, No JVD  CHEST/LUNG: Clear to auscultation bilaterally; No wheeze  HEART: Regular rate and rhythm; No murmurs, rubs, or gallops  ABDOMEN: Soft, Nontender, Nondistended; Bowel sounds present  EXTREMITIES:  2+ Peripheral Pulses, No clubbing, cyanosis, or edema  PSYCH: AAOx3  SKIN: No rashes or lesions    LABS:                        8.4    15.42 )-----------( 470      ( 08 Jul 2017 22:20 )             24.7     07-08    143  |  100  |  14  ----------------------------<  76  4.1   |  25  |  0.86    Ca    9.4      08 Jul 2017 22:20    TPro  8.8<H>  /  Alb  3.6  /  TBili  0.4  /  DBili  x   /  AST  15  /  ALT  10  /  AlkPhos  86  07-08      CARDIAC MARKERS ( 08 Jul 2017 22:20 )  x     / < 0.06 ng/mL / 220 u/L / 1.00 ng/mL / x              RADIOLOGY & ADDITIONAL TESTS:    Imaging Personally Reviewed:  Consultant(s) Notes Reviewed:    Care Discussed with Consultants/Other Providers: 31 y/o male with hx of sickle cell disease, acute chest syndrome in 2003/2008 requiring intubation and exchange transfusion, avascular necrosis of hips,  PE in 2008 (s/p 6 months of coumadin, per prior H&P) a/w sickle cell anemia crisis c/b reactive leukocytosis, b/l hip pain and Rt sided chest pain presenting with b/l hip pain and R chest pain.    SUBJECTIVE / OVERNIGHT EVENTS:  --pt requested a Dilaudid bolus, but hadn't used PCA pump  -Pt used PCA x 3 (0.2mg dilaudid IV x 3) with inadequate pain control  -Continues to have pain b/l hips 7/10 severity, mild pain R chest 4/10      MEDICATIONS  (STANDING):  sodium chloride 0.45%. 1000 milliLiter(s) (125 mL/Hr) IV Continuous <Continuous>  enoxaparin Injectable 40 milliGRAM(s) SubCutaneous every 24 hours  multivitamin 1 Tablet(s) Oral daily  folic acid 1 milliGRAM(s) Oral daily  HYDROmorphone PCA (1 mG/mL) 30 milliLiter(s) PCA Continuous PCA Continuous  ketorolac   Injectable 30 milliGRAM(s) IV Push every 8 hours    MEDICATIONS  (PRN):  diphenhydrAMINE   Capsule 50 milliGRAM(s) Oral every 6 hours PRN Rash and/or Itching  naloxone Injectable 0.1 milliGRAM(s) IV Push every 3 minutes PRN For ANY of the following changes in patient status:  A. RR LESS THAN 10 breaths per minute, B. Oxygen saturation LESS THAN 90%, C. Sedation score of 6  ondansetron Injectable 4 milliGRAM(s) IV Push every 6 hours PRN Nausea      Vital Signs Last 24 Hrs  T(C): 36.8 (07-10-17 @ 04:48), Max: 37.1 (07-09-17 @ 16:30)  HR: 59 (07-10-17 @ 04:48) (59 - 73)  BP: 130/60 (07-10-17 @ 04:48) (108/72 - 130/60)  RR: 17 (07-10-17 @ 04:48) (17 - 18)  SpO2: 100% (07-10-17 @ 04:48) (100% - 100%)  CAPILLARY BLOOD GLUCOSE        I&O's Summary    09 Jul 2017 07:01  -  10 Jul 2017 07:00  --------------------------------------------------------  IN: 0 mL / OUT: 400 mL / NET: -400 mL        PHYSICAL EXAM  GENERAL: uncomfortable, well-developed  HEAD:  Atraumatic, Normocephalic  EYES: EOMI, PERRLA, conjunctiva and sclera clear  NECK: Supple, No JVD  CHEST/LUNG: Clear to auscultation bilaterally; No wheeze, chest wall nontender  HEART: Regular rate and rhythm; No murmurs, rubs, or gallops  ABDOMEN: Soft, Nontender, Nondistended; Bowel sounds present  EXTREMITIES:  2+ Peripheral Pulses, No clubbing, cyanosis, or edema  PSYCH: AAOx3  SKIN: No rashes or lesions    LABS:                        8.4    15.42 )-----------( 470      ( 08 Jul 2017 22:20 )             24.7     07-08    143  |  100  |  14  ----------------------------<  76  4.1   |  25  |  0.86    Ca    9.4      08 Jul 2017 22:20    TPro  8.8<H>  /  Alb  3.6  /  TBili  0.4  /  DBili  x   /  AST  15  /  ALT  10  /  AlkPhos  86  07-08      CARDIAC MARKERS ( 08 Jul 2017 22:20 )  x     / < 0.06 ng/mL / 220 u/L / 1.00 ng/mL / x          EKG 7/8: NSR      RADIOLOGY & ADDITIONAL TESTS:    EXAM:  RAD CHEST PA LAT    PROCEDURE DATE:  Jul 9 2017     IMPRESSION:    There are bibasilar areas of linear atelectasis or scarring as on the   prior studies.  The cardiomediastinal silhouette is unremarkable.  The visualized osseous structures are unremarkable for age.      Consults: none 31 y/o male with hx of sickle cell disease, acute chest syndrome in 2003/2008 requiring intubation and exchange transfusion, avascular necrosis of hips,  PE in 2008 (s/p 6 months of coumadin, per prior H&P) a/w sickle cell anemia crisis c/b reactive leukocytosis, b/l hip pain and Rt sided chest pain presenting with b/l hip pain and R chest pain.    SUBJECTIVE / OVERNIGHT EVENTS:  -Rib pain gone with current pain med regimen, b/l hip pain feels a little better but still 7/10. Doesn't think toradol helps  -denies F/C, SOB, CP  -Last BM this AM was nl color and consistency      MEDICATIONS  (STANDING):  sodium chloride 0.45%. 1000 milliLiter(s) (125 mL/Hr) IV Continuous <Continuous>  enoxaparin Injectable 40 milliGRAM(s) SubCutaneous every 24 hours  multivitamin 1 Tablet(s) Oral daily  folic acid 1 milliGRAM(s) Oral daily  HYDROmorphone PCA (1 mG/mL) 30 milliLiter(s) PCA Continuous PCA Continuous  ketorolac   Injectable 30 milliGRAM(s) IV Push every 8 hours    MEDICATIONS  (PRN):  diphenhydrAMINE   Capsule 50 milliGRAM(s) Oral every 6 hours PRN Rash and/or Itching  naloxone Injectable 0.1 milliGRAM(s) IV Push every 3 minutes PRN For ANY of the following changes in patient status:  A. RR LESS THAN 10 breaths per minute, B. Oxygen saturation LESS THAN 90%, C. Sedation score of 6  ondansetron Injectable 4 milliGRAM(s) IV Push every 6 hours PRN Nausea      Vital Signs Last 24 Hrs  T(C): 36.8 (07-10-17 @ 04:48), Max: 37.1 (07-09-17 @ 16:30)  HR: 59 (07-10-17 @ 04:48) (59 - 73)  BP: 130/60 (07-10-17 @ 04:48) (108/72 - 130/60)  RR: 17 (07-10-17 @ 04:48) (17 - 18)  SpO2: 100% (07-10-17 @ 04:48) (100% - 100%)  CAPILLARY BLOOD GLUCOSE        I&O's Summary    09 Jul 2017 07:01  -  10 Jul 2017 07:00  --------------------------------------------------------  IN: 0 mL / OUT: 400 mL / NET: -400 mL        PHYSICAL EXAM  GENERAL: uncomfortable, well-developed  HEAD:  Atraumatic, Normocephalic  EYES: EOMI, PERRLA, conjunctiva and sclera clear  NECK: Supple, No JVD  CHEST/LUNG: Clear to auscultation bilaterally; No wheeze, chest wall nontender  HEART: Regular rate and rhythm; No murmurs, rubs, or gallops  ABDOMEN: Soft, Nontender, Nondistended; Bowel sounds present  EXTREMITIES:  2+ Peripheral Pulses, No clubbing, cyanosis, or edema  PSYCH: AAOx3  SKIN: No rashes or lesions    LABS:                        8.4    15.42 )-----------( 470      ( 08 Jul 2017 22:20 )             24.7     07-08    143  |  100  |  14  ----------------------------<  76  4.1   |  25  |  0.86    Ca    9.4      08 Jul 2017 22:20    TPro  8.8<H>  /  Alb  3.6  /  TBili  0.4  /  DBili  x   /  AST  15  /  ALT  10  /  AlkPhos  86  07-08     wnl    CARDIAC MARKERS ( 08 Jul 2017 22:20 )  x     / < 0.06 ng/mL / 220 u/L / 1.00 ng/mL / x            RADIOLOGY & ADDITIONAL TESTS:    EXAM:  RAD CHEST PA LAT    PROCEDURE DATE:  Jul 9 2017     IMPRESSION:    There are bibasilar areas of linear atelectasis or scarring as on the   prior studies.  The cardiomediastinal silhouette is unremarkable.  The visualized osseous structures are unremarkable for age.      Consults: none 31 y/o male with hx of sickle cell disease, acute chest syndrome in 2003/2008 requiring intubation and exchange transfusion, avascular necrosis of hips,  PE in 2008 (s/p 6 months of coumadin, per prior H&P) a/w sickle cell anemia crisis c/b reactive leukocytosis, b/l hip pain and Rt sided chest pain presenting with b/l hip pain and R chest pain.    SUBJECTIVE / OVERNIGHT EVENTS:  -pt requested a Dilaudid bolus, but hadn't used PCA pump  -Pt used PCA x 3 (0.2mg dilaudid IV x 3) with adequate pain control  -Continues to have pain b/l hips 7/10 severity, mild pain R chest 4/10    MEDICATIONS  (STANDING):  sodium chloride 0.45%. 1000 milliLiter(s) (125 mL/Hr) IV Continuous <Continuous>  enoxaparin Injectable 40 milliGRAM(s) SubCutaneous every 24 hours  multivitamin 1 Tablet(s) Oral daily  folic acid 1 milliGRAM(s) Oral daily  HYDROmorphone PCA (1 mG/mL) 30 milliLiter(s) PCA Continuous PCA Continuous  ketorolac   Injectable 30 milliGRAM(s) IV Push every 8 hours    MEDICATIONS  (PRN):  diphenhydrAMINE   Capsule 50 milliGRAM(s) Oral every 6 hours PRN Rash and/or Itching  naloxone Injectable 0.1 milliGRAM(s) IV Push every 3 minutes PRN For ANY of the following changes in patient status:  A. RR LESS THAN 10 breaths per minute, B. Oxygen saturation LESS THAN 90%, C. Sedation score of 6  ondansetron Injectable 4 milliGRAM(s) IV Push every 6 hours PRN Nausea      Vital Signs Last 24 Hrs  T(C): 36.8 (07-10-17 @ 04:48), Max: 37.1 (07-09-17 @ 16:30)  HR: 59 (07-10-17 @ 04:48) (59 - 73)  BP: 130/60 (07-10-17 @ 04:48) (108/72 - 130/60)  RR: 17 (07-10-17 @ 04:48) (17 - 18)  SpO2: 100% (07-10-17 @ 04:48) (100% - 100%)  CAPILLARY BLOOD GLUCOSE        I&O's Summary    09 Jul 2017 07:01  -  10 Jul 2017 07:00  --------------------------------------------------------  IN: 0 mL / OUT: 400 mL / NET: -400 mL        PHYSICAL EXAM  GENERAL: uncomfortable, well-developed  HEAD:  Atraumatic, Normocephalic  EYES: EOMI, PERRLA, conjunctiva and sclera clear  NECK: Supple, No JVD  CHEST/LUNG: Clear to auscultation bilaterally; No wheeze, chest wall nontender  HEART: Regular rate and rhythm; No murmurs, rubs, or gallops  ABDOMEN: Soft, Nontender, Nondistended; Bowel sounds present  EXTREMITIES:  2+ Peripheral Pulses, No clubbing, cyanosis, or edema  PSYCH: AAOx3  SKIN: No rashes or lesions    LABS:                        8.4    15.42 )-----------( 470      ( 08 Jul 2017 22:20 )             24.7     07-08    143  |  100  |  14  ----------------------------<  76  4.1   |  25  |  0.86    Ca    9.4      08 Jul 2017 22:20    TPro  8.8<H>  /  Alb  3.6  /  TBili  0.4  /  DBili  x   /  AST  15  /  ALT  10  /  AlkPhos  86  07-08     wnl    CARDIAC MARKERS ( 08 Jul 2017 22:20 )  x     / < 0.06 ng/mL / 220 u/L / 1.00 ng/mL / x            RADIOLOGY & ADDITIONAL TESTS:    EXAM:  RAD CHEST PA LAT    PROCEDURE DATE:  Jul 9 2017     IMPRESSION:    There are bibasilar areas of linear atelectasis or scarring as on the   prior studies.  The cardiomediastinal silhouette is unremarkable.  The visualized osseous structures are unremarkable for age.      Consults: none

## 2017-07-10 NOTE — PROGRESS NOTE ADULT - ASSESSMENT
33 y/o male with hx of sickle cell disease, acute chest syndrome in 2003/2008 requiring intubation and exchange transfusion, avascular necrosis of hips,  PE in 2008 (s/p 6 months of coumadin, per prior H&P) a/w sickle cell anemia crisis c/b reactive leukocytosis, b/l hip pain and Rt sided chest pain; 33 y/o male with hx of sickle cell disease, acute chest syndrome in 2003/2008 requiring intubation and exchange transfusion, avascular necrosis of hips, PE in 2008 (s/p 6 months of coumadin) presenting with sickle cell anemia crisis c/b reactive leukocytosis, b/l hip pain and Rt sided chest pain.

## 2017-07-10 NOTE — PROGRESS NOTE ADULT - PROBLEM SELECTOR PLAN 1
IV hydration 1/2 NS  Dilaudid IV PRN  Benadryl  PCA pump once on medicine floor   Hemolysis labs Pt presenting with severe pain of hips where already has avascular necrosis of hips. CXR showed no sign of acute chest syndrome as no b/l pulmonary edema.  -0.5 NS IVF @ 125 ml/hr  -Pain control: Dilaudid PCA, inc injection dose to 0.6ml, ketorolac 30mg IV q8h  -Breathing on RA Pt presenting with severe pain of hips where already has avascular necrosis of hips. CXR showed no sign of acute chest syndrome as no b/l pulmonary edema.  -0.5 NS IVF @ 125 ml/hr  -Pain control: Dilaudid PCA, inc injection dose to 0.6ml, ketorolac 30mg IV q8h  -Breathing on RA  -ppi as on standing nsaid  -bowel regimen senna and colace Pt presenting with severe pain of hips where already has avascular necrosis of hips. CXR showed no sign of acute chest syndrome as no b/l pulmonary edema.  -0.5 NS IVF @ 125 ml/hr  -Pain control: Dilaudid PCA (inj 0.5mg, 4h limit 8mg), ketorolac 30mg IV q8h  -Breathing on RA  -ppi as on standing nsaid  -bowel regimen senna and colace

## 2017-07-10 NOTE — PROGRESS NOTE ADULT - PROBLEM SELECTOR PLAN 2
Due to VOC  CXR - clear lungs, f/u official report  Screening EKG pending Low suspicion for acute chest syndrome as pt not hypoxic, no signs of it on CXR. CP likely musculoskeletal. Pt breathing on room air, HD stable.  -ctm

## 2017-07-11 ENCOUNTER — TRANSCRIPTION ENCOUNTER (OUTPATIENT)
Age: 32
End: 2017-07-11

## 2017-07-11 VITALS
OXYGEN SATURATION: 100 % | HEART RATE: 98 BPM | DIASTOLIC BLOOD PRESSURE: 86 MMHG | RESPIRATION RATE: 17 BRPM | SYSTOLIC BLOOD PRESSURE: 131 MMHG

## 2017-07-11 PROCEDURE — 99233 SBSQ HOSP IP/OBS HIGH 50: CPT | Mod: GC

## 2017-07-11 RX ADMIN — Medication 50 MILLIGRAM(S): at 06:22

## 2017-07-11 RX ADMIN — Medication 100 MILLIGRAM(S): at 06:18

## 2017-07-11 RX ADMIN — Medication 1 PATCH: at 13:11

## 2017-07-11 RX ADMIN — Medication 100 MILLIGRAM(S): at 13:11

## 2017-07-11 RX ADMIN — SODIUM CHLORIDE 125 MILLILITER(S): 9 INJECTION, SOLUTION INTRAVENOUS at 02:08

## 2017-07-11 RX ADMIN — Medication 1 TABLET(S): at 13:11

## 2017-07-11 RX ADMIN — PANTOPRAZOLE SODIUM 40 MILLIGRAM(S): 20 TABLET, DELAYED RELEASE ORAL at 06:18

## 2017-07-11 RX ADMIN — Medication 1 MILLIGRAM(S): at 13:11

## 2017-07-11 RX ADMIN — Medication 50 MILLIGRAM(S): at 13:10

## 2017-07-11 RX ADMIN — HYDROMORPHONE HYDROCHLORIDE 30 MILLILITER(S): 2 INJECTION INTRAMUSCULAR; INTRAVENOUS; SUBCUTANEOUS at 08:31

## 2017-07-11 RX ADMIN — HYDROMORPHONE HYDROCHLORIDE 30 MILLILITER(S): 2 INJECTION INTRAMUSCULAR; INTRAVENOUS; SUBCUTANEOUS at 09:10

## 2017-07-11 RX ADMIN — Medication 1 PATCH: at 13:00

## 2017-07-11 RX ADMIN — ENOXAPARIN SODIUM 40 MILLIGRAM(S): 100 INJECTION SUBCUTANEOUS at 13:10

## 2017-07-11 NOTE — DISCHARGE NOTE ADULT - CARE PLAN
Principal Discharge DX:	Sickle cell anemia with crisis  Goal:	Pain control  Instructions for follow-up, activity and diet:	Please followup with your primary care doctor within the next 1 week for further evaluation and management.

## 2017-07-11 NOTE — PROGRESS NOTE ADULT - PROBLEM SELECTOR PLAN 1
Pt presenting with severe pain of hips where already has avascular necrosis of hips. CXR showed no sign of acute chest syndrome as no b/l pulmonary edema.  -continue 0.5 NS IVF @ 125 ml/hr  -Pain control: Dilaudid PCA (inj 0.5mg, 4h limit 8mg), ketorolac 30mg IV q8h  -Breathing on RA  -ppi as on standing nsaid  -bowel regimen senna and colace Pt presenting with severe pain of hips where already has avascular necrosis of hips. CXR showed no sign of acute chest syndrome as no b/l pulmonary edema and pt not hypoxic; low suspicion of acute chest syndrome.  -continue 0.5 NS IVF @ 125 ml/hr  -Pain control: Dilaudid PCA (inj 0.5mg, 4h limit 8mg), ketorolac 30mg IV q8h  -Encourage use of O2 NC  -ppi as on standing nsaid  -bowel regimen senna and colace

## 2017-07-11 NOTE — DISCHARGE NOTE ADULT - PATIENT PORTAL LINK FT
“You can access the Follow[employer] Patient Portal, offered by [facility], by registering with the following website: [email or web address]”

## 2017-07-11 NOTE — DISCHARGE NOTE ADULT - ADDITIONAL INSTRUCTIONS
Please followup with your primary care doctor within the next 1 week for further evaluation and management.

## 2017-07-11 NOTE — PROGRESS NOTE ADULT - SUBJECTIVE AND OBJECTIVE BOX
Patient is a 32y old  Male who presents with a chief complaint of Bilateral hips and Rt chest pain (09 Jul 2017 04:22)      SUBJECTIVE / OVERNIGHT EVENTS:          MEDICATIONS  (STANDING):  sodium chloride 0.45%. 1000 milliLiter(s) (125 mL/Hr) IV Continuous <Continuous>  enoxaparin Injectable 40 milliGRAM(s) SubCutaneous every 24 hours  multivitamin 1 Tablet(s) Oral daily  folic acid 1 milliGRAM(s) Oral daily  nicotine -  14 mG/24Hr(s) Patch 1 patch Transdermal daily  HYDROmorphone PCA (1 mG/mL) 30 milliLiter(s) PCA Continuous PCA Continuous  pantoprazole    Tablet 40 milliGRAM(s) Oral before breakfast  docusate sodium 100 milliGRAM(s) Oral three times a day  senna 2 Tablet(s) Oral at bedtime    MEDICATIONS  (PRN):  diphenhydrAMINE   Capsule 50 milliGRAM(s) Oral every 6 hours PRN Rash and/or Itching  naloxone Injectable 0.1 milliGRAM(s) IV Push every 3 minutes PRN For ANY of the following changes in patient status:  A. RR LESS THAN 10 breaths per minute, B. Oxygen saturation LESS THAN 90%, C. Sedation score of 6  ondansetron Injectable 4 milliGRAM(s) IV Push every 6 hours PRN Nausea  ketorolac   Injectable 15 milliGRAM(s) IV Push every 6 hours PRN Moderate Pain (4 - 6)      Vital Signs Last 24 Hrs  T(C): 36.4 (07-11-17 @ 05:06), Max: 36.8 (07-10-17 @ 21:31)  HR: 65 (07-11-17 @ 05:06) (65 - 74)  BP: 120/67 (07-11-17 @ 05:06) (111/68 - 120/67)  RR: 18 (07-11-17 @ 05:06) (18 - 18)  SpO2: 100% (07-11-17 @ 05:06) (99% - 100%)  CAPILLARY BLOOD GLUCOSE        I&O's Summary    10 Jul 2017 07:01  -  11 Jul 2017 07:00  --------------------------------------------------------  IN: 0 mL / OUT: 650 mL / NET: -650 mL        PHYSICAL EXAM  GENERAL: NAD, well-developed  HEAD:  Atraumatic, Normocephalic  EYES: EOMI, PERRLA, conjunctiva and sclera clear  NECK: Supple, No JVD  CHEST/LUNG: Clear to auscultation bilaterally; No wheeze  HEART: Regular rate and rhythm; No murmurs, rubs, or gallops  ABDOMEN: Soft, Nontender, Nondistended; Bowel sounds present  EXTREMITIES:  2+ Peripheral Pulses, No clubbing, cyanosis, or edema  PSYCH: AAOx3  SKIN: No rashes or lesions    LABS:    07-10    142  |  103  |  13  ----------------------------<  72  4.5   |  22  |  0.69    Ca    9.0      10 Jul 2017 06:02    TPro  8.0  /  Alb  3.1<L>  /  TBili  0.3  /  DBili  x   /  AST  15  /  ALT  8   /  AlkPhos  73  07-10              RADIOLOGY & ADDITIONAL TESTS:     MICROBIOLOGY:    ANTIMICROBIALS:    CONSULTS: Patient is a 32y old  Male who presents with a chief complaint of Bilateral hips and Rt chest pain (09 Jul 2017 04:22)      SUBJECTIVE / OVERNIGHT EVENTS:  -pt refused AM labs  -R chest pain resolved, hip pain is better but still 7/10 severity  -used 10-11 pushes dilaudid PCA over a couple shifts (0.5mg dilaudid/push)  -denies F/C, CP, SOB    MEDICATIONS  (STANDING):  sodium chloride 0.45%. 1000 milliLiter(s) (125 mL/Hr) IV Continuous <Continuous>  enoxaparin Injectable 40 milliGRAM(s) SubCutaneous every 24 hours  multivitamin 1 Tablet(s) Oral daily  folic acid 1 milliGRAM(s) Oral daily  nicotine -  14 mG/24Hr(s) Patch 1 patch Transdermal daily  HYDROmorphone PCA (1 mG/mL) 30 milliLiter(s) PCA Continuous PCA Continuous  pantoprazole    Tablet 40 milliGRAM(s) Oral before breakfast  docusate sodium 100 milliGRAM(s) Oral three times a day  senna 2 Tablet(s) Oral at bedtime    MEDICATIONS  (PRN):  diphenhydrAMINE   Capsule 50 milliGRAM(s) Oral every 6 hours PRN Rash and/or Itching  naloxone Injectable 0.1 milliGRAM(s) IV Push every 3 minutes PRN For ANY of the following changes in patient status:  A. RR LESS THAN 10 breaths per minute, B. Oxygen saturation LESS THAN 90%, C. Sedation score of 6  ondansetron Injectable 4 milliGRAM(s) IV Push every 6 hours PRN Nausea  ketorolac   Injectable 15 milliGRAM(s) IV Push every 6 hours PRN Moderate Pain (4 - 6)      Vital Signs Last 24 Hrs  T(C): 36.4 (07-11-17 @ 05:06), Max: 36.8 (07-10-17 @ 21:31)  HR: 65 (07-11-17 @ 05:06) (65 - 74)  BP: 120/67 (07-11-17 @ 05:06) (111/68 - 120/67)  RR: 18 (07-11-17 @ 05:06) (18 - 18)  SpO2: 100% (07-11-17 @ 05:06) (99% - 100%)  CAPILLARY BLOOD GLUCOSE        I&O's Summary    10 Jul 2017 07:01  -  11 Jul 2017 07:00  --------------------------------------------------------  IN: 0 mL / OUT: 650 mL / NET: -650 mL      PHYSICAL EXAM  GENERAL: uncomfortable, well-developed  HEAD:  Atraumatic, Normocephalic  EYES: EOMI, PERRLA, conjunctiva and sclera clear  NECK: Supple, No JVD  CHEST/LUNG: Clear to auscultation bilaterally; No wheeze, chest wall nontender  HEART: Regular rate and rhythm; No murmurs, rubs, or gallops  ABDOMEN: Soft, Nontender, Nondistended; Bowel sounds present  EXTREMITIES:  2+ Peripheral Pulses, No clubbing, cyanosis, or edema, hips are TTP  PSYCH: AAOx3  SKIN: No rashes or lesions    LABS:    07-10    142  |  103  |  13  ----------------------------<  72  4.5   |  22  |  0.69    Ca    9.0      10 Jul 2017 06:02    TPro  8.0  /  Alb  3.1<L>  /  TBili  0.3  /  DBili  x   /  AST  15  /  ALT  8   /  AlkPhos  73  07-10      RADIOLOGY & ADDITIONAL TESTS: none     CONSULTS: none Patient is a 32y old  Male who presents with a chief complaint of Bilateral hips and Rt chest pain (09 Jul 2017 04:22)      SUBJECTIVE / OVERNIGHT EVENTS:  -pt refused AM labs  -R chest pain resolved, hip pain is better but still 7/10 severity  -used 10-11 inj dilaudid PCA over 4h window while awake (0.5mg dilaudid/push)  -denies F/C, CP, SOB    MEDICATIONS  (STANDING):  sodium chloride 0.45%. 1000 milliLiter(s) (125 mL/Hr) IV Continuous <Continuous>  enoxaparin Injectable 40 milliGRAM(s) SubCutaneous every 24 hours  multivitamin 1 Tablet(s) Oral daily  folic acid 1 milliGRAM(s) Oral daily  nicotine -  14 mG/24Hr(s) Patch 1 patch Transdermal daily  HYDROmorphone PCA (1 mG/mL) 30 milliLiter(s) PCA Continuous PCA Continuous  pantoprazole    Tablet 40 milliGRAM(s) Oral before breakfast  docusate sodium 100 milliGRAM(s) Oral three times a day  senna 2 Tablet(s) Oral at bedtime    MEDICATIONS  (PRN):  diphenhydrAMINE   Capsule 50 milliGRAM(s) Oral every 6 hours PRN Rash and/or Itching  naloxone Injectable 0.1 milliGRAM(s) IV Push every 3 minutes PRN For ANY of the following changes in patient status:  A. RR LESS THAN 10 breaths per minute, B. Oxygen saturation LESS THAN 90%, C. Sedation score of 6  ondansetron Injectable 4 milliGRAM(s) IV Push every 6 hours PRN Nausea  ketorolac   Injectable 15 milliGRAM(s) IV Push every 6 hours PRN Moderate Pain (4 - 6)      Vital Signs Last 24 Hrs  T(C): 36.4 (07-11-17 @ 05:06), Max: 36.8 (07-10-17 @ 21:31)  HR: 65 (07-11-17 @ 05:06) (65 - 74)  BP: 120/67 (07-11-17 @ 05:06) (111/68 - 120/67)  RR: 18 (07-11-17 @ 05:06) (18 - 18)  SpO2: 100% (07-11-17 @ 05:06) (99% - 100%)  CAPILLARY BLOOD GLUCOSE        I&O's Summary    10 Jul 2017 07:01  -  11 Jul 2017 07:00  --------------------------------------------------------  IN: 0 mL / OUT: 650 mL / NET: -650 mL      PHYSICAL EXAM  GENERAL: uncomfortable, well-developed  HEAD:  Atraumatic, Normocephalic  EYES: EOMI, PERRLA, conjunctiva and sclera clear  NECK: Supple, No JVD  CHEST/LUNG: Clear to auscultation bilaterally; No wheeze, chest wall nontender  HEART: Regular rate and rhythm; No murmurs, rubs, or gallops  ABDOMEN: Soft, Nontender, Nondistended; Bowel sounds present  EXTREMITIES:  2+ Peripheral Pulses, No clubbing, cyanosis, or edema, hips are TTP  PSYCH: AAOx3  SKIN: No rashes or lesions    LABS:    07-10    142  |  103  |  13  ----------------------------<  72  4.5   |  22  |  0.69    Ca    9.0      10 Jul 2017 06:02    TPro  8.0  /  Alb  3.1<L>  /  TBili  0.3  /  DBili  x   /  AST  15  /  ALT  8   /  AlkPhos  73  07-10      RADIOLOGY & ADDITIONAL TESTS: none     CONSULTS: none

## 2017-07-11 NOTE — PROGRESS NOTE ADULT - ASSESSMENT
31 y/o male with hx of sickle cell disease, acute chest syndrome in 2003/2008 requiring intubation and exchange transfusion, avascular necrosis of hips, PE in 2008 (s/p 6 months of coumadin) presenting with sickle cell crisis c/o b/l hip pain and Rt sided chest pain, acute chest syndrome ruled out.

## 2017-07-11 NOTE — DISCHARGE NOTE ADULT - MEDICATION SUMMARY - MEDICATIONS TO TAKE
I will START or STAY ON the medications listed below when I get home from the hospital:    Dilaudid 8 mg oral tablet  -- 1 tab(s) by mouth every 4 hours, As Needed - for mild pain, for moderate pain   -- Indication: For Sickle cell anemia with crisis    nicotine 7 mg/24 hr transdermal film, extended release  -- 1 patch by transdermal patch once a day  -- Indication: For Need for prophylactic measure    multivitamin  -- 1 tab(s) by mouth once a day  -- Indication: For Sickle cell anemia with crisis    folic acid 1 mg oral tablet  -- 1 tab(s) by mouth once a day  -- Indication: For Sickle cell anemia with crisis

## 2017-07-11 NOTE — CHART NOTE - NSCHARTNOTEFT_GEN_A_CORE
Patient insists on leaving the hospital AMA. I explained to the patient the risks of leaving AMA such as potential worsening of his sickle cell crisis and inadequate pain control. Patient signed AMA form and expressed understanding.    Mitzi Edwards, PGY1  [phone]

## 2017-07-11 NOTE — DISCHARGE NOTE ADULT - HOSPITAL COURSE
29 y/o male with hx of sickle cell disease, acute chest syndrome in 2003/2008 requiring intubation and exchange transfusion, avascular necrosis of hips,  PE in 2008 (s/p 6 months of coumadin, per prior H&P) c/o b/l hip pain and Rt sided chest pain since 12pm, 7/11. The pain is reminiscent of prior VOC. Reports no fevers, cough, SOB, or recent injury. 7/09. Pt took dilaudid 8mg PO at 12pm and 2pm without relief. He went to the ED. ED course: NS 1L, Benadryl PO x 1 dose, multiple Dilaudid IVP doses. CXR showed no acute process suggesting acute chest syndrome, and pt had no hypoxia. Pt was admitted for sickle cell crisis, and started on dilaudid PCA (4 hour limit 8mg) and ketorolac IV with improving pain control. On 7/11, pt insisted on leaving the hospital AMA to visit his daughter in the hospital. Pt was explained the risks of leaving AMA such as worsening of his condition or inadequate pain control and pt expressed understanding. AMA form signed. Pt left before he could be given discharge papers.

## 2017-07-11 NOTE — DISCHARGE NOTE ADULT - PLAN OF CARE
Pain control Please followup with your primary care doctor within the next 1 week for further evaluation and management.

## 2017-07-11 NOTE — PROGRESS NOTE ADULT - PROBLEM SELECTOR PLAN 2
Low suspicion for acute chest syndrome as pt not hypoxic, no signs of it on CXR. CP likely musculoskeletal. Pt breathing on room air, HD stable.  -ctm DVT ppx: Lovenox sq  GI: PPI

## 2017-07-11 NOTE — DISCHARGE NOTE ADULT - CONDITIONS AT DISCHARGE
Patient sign out AMA, no c/o pain at times of leaving, iv discontinue, pt refused to wait for paperwork.

## 2017-12-19 ENCOUNTER — INPATIENT (INPATIENT)
Facility: HOSPITAL | Age: 32
LOS: 3 days | Discharge: HOME | End: 2017-12-23
Attending: INTERNAL MEDICINE

## 2017-12-19 DIAGNOSIS — Z86.2 PERSONAL HISTORY OF DISEASES OF THE BLOOD AND BLOOD-FORMING ORGANS AND CERTAIN DISORDERS INVOLVING THE IMMUNE MECHANISM: ICD-10-CM

## 2017-12-27 DIAGNOSIS — F17.210 NICOTINE DEPENDENCE, CIGARETTES, UNCOMPLICATED: ICD-10-CM

## 2017-12-27 DIAGNOSIS — R07.9 CHEST PAIN, UNSPECIFIED: ICD-10-CM

## 2017-12-27 DIAGNOSIS — D57.01 HB-SS DISEASE WITH ACUTE CHEST SYNDROME: ICD-10-CM

## 2017-12-27 DIAGNOSIS — M87.9 OSTEONECROSIS, UNSPECIFIED: ICD-10-CM

## 2017-12-27 DIAGNOSIS — F11.20 OPIOID DEPENDENCE, UNCOMPLICATED: ICD-10-CM

## 2017-12-27 DIAGNOSIS — R00.1 BRADYCARDIA, UNSPECIFIED: ICD-10-CM

## 2018-01-26 ENCOUNTER — INPATIENT (INPATIENT)
Facility: HOSPITAL | Age: 33
LOS: 4 days | Discharge: HOME | End: 2018-01-31
Attending: INTERNAL MEDICINE

## 2018-02-02 DIAGNOSIS — D57.1 SICKLE-CELL DISEASE WITHOUT CRISIS: ICD-10-CM

## 2018-02-02 DIAGNOSIS — Z86.711 PERSONAL HISTORY OF PULMONARY EMBOLISM: ICD-10-CM

## 2018-02-02 DIAGNOSIS — M90.559: ICD-10-CM

## 2018-02-02 DIAGNOSIS — Z72.0 TOBACCO USE: ICD-10-CM

## 2018-02-02 DIAGNOSIS — M90.519: ICD-10-CM

## 2018-02-04 DIAGNOSIS — Z86.2 PERSONAL HISTORY OF DISEASES OF THE BLOOD AND BLOOD-FORMING ORGANS AND CERTAIN DISORDERS INVOLVING THE IMMUNE MECHANISM: ICD-10-CM

## 2018-02-28 NOTE — ED ADULT NURSE REASSESSMENT NOTE - NS ED NURSE REASSESS COMMENT FT1
pt. was c/o pain on b/l hip (9/10), MD was aware and pt. was given Dilaudid. upon reassessment, pt. is asleep but arousable, states pain partially relieved. IVF infusing at this time. will continue to monitor negative...

## 2018-11-21 NOTE — ED ADULT TRIAGE NOTE - PRO INTERPRETER NEED 2
Reason For Visit  ROSITA NGUYỄN is here today for a nurse visit for TB Placement.      Current Meds   1. No Reported Medications Recorded    Allergies  No Known Drug Allergies    Screening  TB Screening:   See scanned screening form in the patients chart.   Screening questions and answeres reviewed by the Provider.   Symptoms the patient presents with today: no symptoms   Reason TB test is being done today? work     Have you ever had a positive TB skin test or TB blood test? No.   Have you had a sever reaction to a TB skin test? No.   Have you ever taken medication for tuberculosis? No.   What country were you born in[place]   Have you traveled or lived ouside the [place] in the last 2 years? No.   Have you been in contact with someone who has TB disease? No.   Have you ever used injection drugs? No.   Do you have HIV/AIDS? No.   Do you have any diseases that could affect your immunes system such as cancer, leukemia or other? No.   Do you have diabetes? No.   Do you have severe kidney disease? No.   Are you underweight or do you have a disease which affects how you absorb food and nutrients? No.   Have you had the BCG vaccine? No.   Do you take any prescription medications? No.      Assessment   1. Encounter for preventive health examination (Z00.00)    Plan   1. PPD, tuberculin skin test; purified protein derivative solution, intradermal    Patient Instructions Patient here requesting TB placement  Tubersol Protein Solution 0.1 ML placed in left lower arm.   Return to Clinic in 2-3 days days for TB reading, 12/29/2017, after 1530 or 12/30/2017 before 1530 for TB reading.      Signatures   Electronically signed by : JOSE HERNANDEZ, ; Dec 27 2017  3:31PM CST     English

## 2019-03-29 ENCOUNTER — EMERGENCY (EMERGENCY)
Facility: HOSPITAL | Age: 34
LOS: 1 days | Discharge: HOME | End: 2019-03-29
Attending: EMERGENCY MEDICINE | Admitting: EMERGENCY MEDICINE
Payer: MEDICAID

## 2019-03-29 VITALS
RESPIRATION RATE: 18 BRPM | HEART RATE: 115 BPM | OXYGEN SATURATION: 99 % | DIASTOLIC BLOOD PRESSURE: 70 MMHG | SYSTOLIC BLOOD PRESSURE: 112 MMHG

## 2019-03-29 VITALS
WEIGHT: 160.06 LBS | DIASTOLIC BLOOD PRESSURE: 85 MMHG | TEMPERATURE: 98 F | HEIGHT: 68 IN | RESPIRATION RATE: 18 BRPM | SYSTOLIC BLOOD PRESSURE: 126 MMHG | HEART RATE: 137 BPM | OXYGEN SATURATION: 100 %

## 2019-03-29 PROCEDURE — 99283 EMERGENCY DEPT VISIT LOW MDM: CPT | Mod: 25

## 2019-03-29 PROCEDURE — 99283 EMERGENCY DEPT VISIT LOW MDM: CPT

## 2019-03-29 RX ORDER — HYDROMORPHONE HYDROCHLORIDE 2 MG/ML
16 INJECTION INTRAMUSCULAR; INTRAVENOUS; SUBCUTANEOUS ONCE
Qty: 0 | Refills: 0 | Status: DISCONTINUED | OUTPATIENT
Start: 2019-03-29 | End: 2019-03-29

## 2019-03-29 RX ADMIN — HYDROMORPHONE HYDROCHLORIDE 16 MILLIGRAM(S): 2 INJECTION INTRAMUSCULAR; INTRAVENOUS; SUBCUTANEOUS at 04:01

## 2019-03-29 RX ADMIN — HYDROMORPHONE HYDROCHLORIDE 16 MILLIGRAM(S): 2 INJECTION INTRAMUSCULAR; INTRAVENOUS; SUBCUTANEOUS at 03:32

## 2019-03-29 RX ADMIN — HYDROMORPHONE HYDROCHLORIDE 16 MILLIGRAM(S): 2 INJECTION INTRAMUSCULAR; INTRAVENOUS; SUBCUTANEOUS at 04:56

## 2019-03-29 RX ADMIN — HYDROMORPHONE HYDROCHLORIDE 16 MILLIGRAM(S): 2 INJECTION INTRAMUSCULAR; INTRAVENOUS; SUBCUTANEOUS at 04:25

## 2019-03-29 NOTE — ED PROVIDER NOTE - ATTENDING CONTRIBUTION TO CARE
33M hx sickle cell, AVN, c/o back and hip pain. pt states took dilaudid at home without relief.  no chest pain. no palipations. no SOB. no n/v.  no fevers.   gen- nad  heent- icteric, clear conj  cv -rrr  lungs -ctab  abd - soft, nt, nd  ext -wwp, +lower back TTP, ambulates with cane  neuro -aox3,  thurman  given oral pain medication, recommend f/u with hematologist

## 2019-03-29 NOTE — ED PROVIDER NOTE - OBJECTIVE STATEMENT
lower back  and hip pain secondary to SSD -> pain meds at home 8 mg dilauid without significant relief and to ED as does not want to " double up"  at home as runs out meds no fever no CP

## 2019-03-29 NOTE — ED ADULT TRIAGE NOTE - CHIEF COMPLAINT QUOTE
Pt states " I am having sickle pain in my back and hips." Pt denies trauma. Pt noted with fast heart rate, states that it is secondary to his pain. Denies CP/palpitations

## 2019-03-29 NOTE — ED ADULT NURSE NOTE - OBJECTIVE STATEMENT
pt c/o low back and bilateral hip pain since 8pm similar to his sickle cell pain.  took his dilaudid with no relief

## 2019-03-29 NOTE — ED PROVIDER NOTE - CLINICAL SUMMARY MEDICAL DECISION MAKING FREE TEXT BOX
PO dilaudid care + hydration encouraged with expectant management and f/u hematologist PO dilaudid care + hydration encouraged with expectant management and f/u hematologist ( appreciate tachy -> patient aware and requesting d/c )

## 2019-04-02 DIAGNOSIS — D57.1 SICKLE-CELL DISEASE WITHOUT CRISIS: ICD-10-CM

## 2019-04-02 DIAGNOSIS — Z79.899 OTHER LONG TERM (CURRENT) DRUG THERAPY: ICD-10-CM

## 2019-04-02 DIAGNOSIS — M25.552 PAIN IN LEFT HIP: ICD-10-CM

## 2019-04-02 DIAGNOSIS — M54.5 LOW BACK PAIN: ICD-10-CM

## 2019-04-02 DIAGNOSIS — Z79.891 LONG TERM (CURRENT) USE OF OPIATE ANALGESIC: ICD-10-CM

## 2019-04-02 DIAGNOSIS — M25.551 PAIN IN RIGHT HIP: ICD-10-CM

## 2019-04-02 DIAGNOSIS — M54.9 DORSALGIA, UNSPECIFIED: ICD-10-CM

## 2019-04-03 ENCOUNTER — INPATIENT (INPATIENT)
Facility: HOSPITAL | Age: 34
LOS: 7 days | Discharge: HOME | DRG: 812 | End: 2019-04-11
Attending: HOSPITALIST | Admitting: HOSPITALIST
Payer: COMMERCIAL

## 2019-04-03 VITALS
HEART RATE: 100 BPM | DIASTOLIC BLOOD PRESSURE: 84 MMHG | OXYGEN SATURATION: 100 % | HEIGHT: 68 IN | WEIGHT: 160.06 LBS | RESPIRATION RATE: 18 BRPM | TEMPERATURE: 98 F | SYSTOLIC BLOOD PRESSURE: 126 MMHG

## 2019-04-03 LAB
ALBUMIN SERPL ELPH-MCNC: 3.2 G/DL — LOW (ref 3.3–5.2)
ALP SERPL-CCNC: 111 U/L — SIGNIFICANT CHANGE UP (ref 40–120)
ALT FLD-CCNC: 15 U/L — SIGNIFICANT CHANGE UP
ANION GAP SERPL CALC-SCNC: 15 MMOL/L — SIGNIFICANT CHANGE UP (ref 5–17)
APPEARANCE UR: CLEAR — SIGNIFICANT CHANGE UP
AST SERPL-CCNC: 16 U/L — SIGNIFICANT CHANGE UP
BACTERIA # UR AUTO: ABNORMAL
BILIRUB SERPL-MCNC: 0.3 MG/DL — LOW (ref 0.4–2)
BILIRUB UR-MCNC: NEGATIVE — SIGNIFICANT CHANGE UP
BUN SERPL-MCNC: 15 MG/DL — SIGNIFICANT CHANGE UP (ref 8–20)
CALCIUM SERPL-MCNC: 9.4 MG/DL — SIGNIFICANT CHANGE UP (ref 8.6–10.2)
CHLORIDE SERPL-SCNC: 105 MMOL/L — SIGNIFICANT CHANGE UP (ref 98–107)
CO2 SERPL-SCNC: 21 MMOL/L — LOW (ref 22–29)
COLOR SPEC: YELLOW — SIGNIFICANT CHANGE UP
CREAT SERPL-MCNC: 0.61 MG/DL — SIGNIFICANT CHANGE UP (ref 0.5–1.3)
DIFF PNL FLD: ABNORMAL
EPI CELLS # UR: NEGATIVE — SIGNIFICANT CHANGE UP
GLUCOSE SERPL-MCNC: 96 MG/DL — SIGNIFICANT CHANGE UP (ref 70–115)
GLUCOSE UR QL: NEGATIVE MG/DL — SIGNIFICANT CHANGE UP
KETONES UR-MCNC: ABNORMAL
LDH SERPL L TO P-CCNC: 122 U/L — SIGNIFICANT CHANGE UP (ref 98–192)
LEUKOCYTE ESTERASE UR-ACNC: NEGATIVE — SIGNIFICANT CHANGE UP
NITRITE UR-MCNC: NEGATIVE — SIGNIFICANT CHANGE UP
PH UR: 6.5 — SIGNIFICANT CHANGE UP (ref 5–8)
POTASSIUM SERPL-MCNC: 3.3 MMOL/L — LOW (ref 3.5–5.3)
POTASSIUM SERPL-SCNC: 3.3 MMOL/L — LOW (ref 3.5–5.3)
PROT SERPL-MCNC: 8.7 G/DL — SIGNIFICANT CHANGE UP (ref 6.6–8.7)
PROT UR-MCNC: 30 MG/DL
RBC CASTS # UR COMP ASSIST: SIGNIFICANT CHANGE UP /HPF (ref 0–4)
SODIUM SERPL-SCNC: 141 MMOL/L — SIGNIFICANT CHANGE UP (ref 135–145)
SP GR SPEC: 1.01 — SIGNIFICANT CHANGE UP (ref 1.01–1.02)
UROBILINOGEN FLD QL: 1 MG/DL
WBC UR QL: SIGNIFICANT CHANGE UP

## 2019-04-03 PROCEDURE — 71045 X-RAY EXAM CHEST 1 VIEW: CPT | Mod: 26

## 2019-04-03 PROCEDURE — 99285 EMERGENCY DEPT VISIT HI MDM: CPT

## 2019-04-03 RX ORDER — HYDROMORPHONE HYDROCHLORIDE 2 MG/ML
1 INJECTION INTRAMUSCULAR; INTRAVENOUS; SUBCUTANEOUS ONCE
Qty: 0 | Refills: 0 | Status: DISCONTINUED | OUTPATIENT
Start: 2019-04-03 | End: 2019-04-03

## 2019-04-03 RX ORDER — SODIUM CHLORIDE 9 MG/ML
1000 INJECTION INTRAMUSCULAR; INTRAVENOUS; SUBCUTANEOUS ONCE
Qty: 0 | Refills: 0 | Status: COMPLETED | OUTPATIENT
Start: 2019-04-03 | End: 2019-04-03

## 2019-04-03 RX ORDER — SODIUM CHLORIDE 9 MG/ML
3 INJECTION INTRAMUSCULAR; INTRAVENOUS; SUBCUTANEOUS ONCE
Qty: 0 | Refills: 0 | Status: COMPLETED | OUTPATIENT
Start: 2019-04-03 | End: 2019-04-03

## 2019-04-03 RX ADMIN — HYDROMORPHONE HYDROCHLORIDE 1 MILLIGRAM(S): 2 INJECTION INTRAMUSCULAR; INTRAVENOUS; SUBCUTANEOUS at 22:05

## 2019-04-03 RX ADMIN — SODIUM CHLORIDE 3 MILLILITER(S): 9 INJECTION INTRAMUSCULAR; INTRAVENOUS; SUBCUTANEOUS at 22:18

## 2019-04-03 RX ADMIN — SODIUM CHLORIDE 1000 MILLILITER(S): 9 INJECTION INTRAMUSCULAR; INTRAVENOUS; SUBCUTANEOUS at 22:18

## 2019-04-03 RX ADMIN — HYDROMORPHONE HYDROCHLORIDE 1 MILLIGRAM(S): 2 INJECTION INTRAMUSCULAR; INTRAVENOUS; SUBCUTANEOUS at 23:59

## 2019-04-03 NOTE — ED PROVIDER NOTE - TIMING
none O-L Flap Text: The defect edges were debeveled with a #15 scalpel blade.  Given the location of the defect, shape of the defect and the proximity to free margins an O-L flap was deemed most appropriate.  Using a sterile surgical marker, an appropriate advancement flap was drawn incorporating the defect and placing the expected incisions within the relaxed skin tension lines where possible.    The area thus outlined was incised deep to adipose tissue with a #15 scalpel blade.  The skin margins were undermined to an appropriate distance in all directions utilizing iris scissors.

## 2019-04-03 NOTE — ED PROVIDER NOTE - CONSTITUTIONAL, MLM
normal... awake, alert, oriented to person, place, time/situation and in moderate distress and pain.

## 2019-04-03 NOTE — ED PROVIDER NOTE - OBJECTIVE STATEMENT
32 y/o M, with PMHx of, sickle chest disease, presents to ED complaining of pain crisis at his hips and back, which occurs 10 times a year. States that he gets this pain when it is cold out. Has an AVN at hip. Denies chest pain, n/v/d, and SOB, no fever or chills. 32 y/o M, with PMHx of, sickle cell disease, presents to ED complaining of pain in BL hips and back. States he believes this is a sickle cell crisis, which occurs 10 times a year. States that he gets this pain when it is cold out. Has an AVN at hip. Denies chest pain, n/v/d, and SOB, no fever or chills.

## 2019-04-03 NOTE — ED ADULT NURSE NOTE - CHPI ED NUR SYMPTOMS NEG
no vomiting/no dizziness/no weakness/no tingling/no decreased eating/drinking/no fever/no chills/no nausea

## 2019-04-03 NOTE — ED ADULT NURSE NOTE - OBJECTIVE STATEMENT
Patient presents to ED BIBA for sickle cell crisis. Pt reported " I took 6mg of Dilaudid and had no relief". Pt reports pain in lower back and bilateral hips.

## 2019-04-03 NOTE — ED PROVIDER NOTE - CARE PLAN
Principal Discharge DX:	Pain crisis  Secondary Diagnosis:	Sickle cell anemia  Secondary Diagnosis:	Avascular necrosis of bone

## 2019-04-03 NOTE — ED ADULT NURSE REASSESSMENT NOTE - NS ED NURSE REASSESS COMMENT FT1
multiple attempts at iv insertion by 2 different nurses unsuccessfully, MD Ye made aware of attempts.

## 2019-04-04 DIAGNOSIS — R52 PAIN, UNSPECIFIED: ICD-10-CM

## 2019-04-04 LAB
ANISOCYTOSIS BLD QL: SIGNIFICANT CHANGE UP
BASOPHILS # BLD AUTO: 0.1 K/UL — SIGNIFICANT CHANGE UP (ref 0–0.2)
BASOPHILS NFR BLD AUTO: 0.4 % — SIGNIFICANT CHANGE UP (ref 0–2)
BLD GP AB SCN SERPL QL: SIGNIFICANT CHANGE UP
DACRYOCYTES BLD QL SMEAR: SLIGHT — SIGNIFICANT CHANGE UP
ELLIPTOCYTES BLD QL SMEAR: SLIGHT — SIGNIFICANT CHANGE UP
EOSINOPHIL # BLD AUTO: 0.3 K/UL — SIGNIFICANT CHANGE UP (ref 0–0.5)
EOSINOPHIL NFR BLD AUTO: 2 % — SIGNIFICANT CHANGE UP (ref 0–6)
GIANT PLATELETS BLD QL SMEAR: PRESENT — SIGNIFICANT CHANGE UP
HCT VFR BLD CALC: 25.4 % — LOW (ref 42–52)
HGB BLD-MCNC: 8.5 G/DL — LOW (ref 14–18)
HYPERCHROMIA BLD QL AUTO: SLIGHT — SIGNIFICANT CHANGE UP
HYPOCHROMIA BLD QL: SLIGHT — SIGNIFICANT CHANGE UP
LYMPHOCYTES # BLD AUTO: 17 % — LOW (ref 20–55)
LYMPHOCYTES # BLD AUTO: 2.7 K/UL — SIGNIFICANT CHANGE UP (ref 1–4.8)
MACROCYTES BLD QL: SLIGHT — SIGNIFICANT CHANGE UP
MCHC RBC-ENTMCNC: 21.7 PG — LOW (ref 27–31)
MCHC RBC-ENTMCNC: 33.5 G/DL — SIGNIFICANT CHANGE UP (ref 32–36)
MCV RBC AUTO: 64.8 FL — LOW (ref 80–94)
MICROCYTES BLD QL: SLIGHT — SIGNIFICANT CHANGE UP
MONOCYTES # BLD AUTO: 0.9 K/UL — HIGH (ref 0–0.8)
MONOCYTES NFR BLD AUTO: 2 % — LOW (ref 3–10)
NEUTROPHILS # BLD AUTO: 13.5 K/UL — HIGH (ref 1.8–8)
NEUTROPHILS NFR BLD AUTO: 78 % — HIGH (ref 37–73)
OVALOCYTES BLD QL SMEAR: SLIGHT — SIGNIFICANT CHANGE UP
PLAT MORPH BLD: NORMAL — SIGNIFICANT CHANGE UP
PLATELET # BLD AUTO: 411 K/UL — HIGH (ref 150–400)
POIKILOCYTOSIS BLD QL AUTO: SLIGHT — SIGNIFICANT CHANGE UP
POLYCHROMASIA BLD QL SMEAR: SIGNIFICANT CHANGE UP
RBC # BLD: 3.92 M/UL — LOW (ref 4.6–6.2)
RBC # BLD: 3.92 M/UL — LOW (ref 4.6–6.2)
RBC # FLD: 23.6 % — HIGH (ref 11–15.6)
RBC BLD AUTO: ABNORMAL
RETICS #: 8.8 K/UL — LOW (ref 25–125)
RETICS/RBC NFR: 2.2 % — SIGNIFICANT CHANGE UP (ref 0.5–2.5)
SPHEROCYTES BLD QL SMEAR: SLIGHT — SIGNIFICANT CHANGE UP
TARGETS BLD QL SMEAR: SIGNIFICANT CHANGE UP
VARIANT LYMPHS # BLD: 1 % — SIGNIFICANT CHANGE UP (ref 0–6)
WBC # BLD: 17.5 K/UL — HIGH (ref 4.8–10.8)
WBC # FLD AUTO: 17.5 K/UL — HIGH (ref 4.8–10.8)

## 2019-04-04 PROCEDURE — 99223 1ST HOSP IP/OBS HIGH 75: CPT

## 2019-04-04 PROCEDURE — 12345: CPT | Mod: NC

## 2019-04-04 RX ORDER — SODIUM CHLORIDE 9 MG/ML
1000 INJECTION, SOLUTION INTRAVENOUS
Qty: 0 | Refills: 0 | Status: DISCONTINUED | OUTPATIENT
Start: 2019-04-04 | End: 2019-04-05

## 2019-04-04 RX ORDER — HYDROMORPHONE HYDROCHLORIDE 2 MG/ML
2 INJECTION INTRAMUSCULAR; INTRAVENOUS; SUBCUTANEOUS EVERY 4 HOURS
Qty: 0 | Refills: 0 | Status: DISCONTINUED | OUTPATIENT
Start: 2019-04-04 | End: 2019-04-09

## 2019-04-04 RX ORDER — DOCUSATE SODIUM 100 MG
100 CAPSULE ORAL
Qty: 0 | Refills: 0 | Status: DISCONTINUED | OUTPATIENT
Start: 2019-04-04 | End: 2019-04-11

## 2019-04-04 RX ORDER — APIXABAN 2.5 MG/1
5 TABLET, FILM COATED ORAL EVERY 12 HOURS
Qty: 0 | Refills: 0 | Status: DISCONTINUED | OUTPATIENT
Start: 2019-04-04 | End: 2019-04-11

## 2019-04-04 RX ORDER — POLYETHYLENE GLYCOL 3350 17 G/17G
17 POWDER, FOR SOLUTION ORAL DAILY
Qty: 0 | Refills: 0 | Status: DISCONTINUED | OUTPATIENT
Start: 2019-04-04 | End: 2019-04-11

## 2019-04-04 RX ORDER — NICOTINE POLACRILEX 2 MG
1 GUM BUCCAL DAILY
Qty: 0 | Refills: 0 | Status: DISCONTINUED | OUTPATIENT
Start: 2019-04-04 | End: 2019-04-04

## 2019-04-04 RX ORDER — HYDROMORPHONE HYDROCHLORIDE 2 MG/ML
1 INJECTION INTRAMUSCULAR; INTRAVENOUS; SUBCUTANEOUS EVERY 4 HOURS
Qty: 0 | Refills: 0 | Status: DISCONTINUED | OUTPATIENT
Start: 2019-04-04 | End: 2019-04-11

## 2019-04-04 RX ORDER — NICOTINE POLACRILEX 2 MG
1 GUM BUCCAL DAILY
Qty: 0 | Refills: 0 | Status: DISCONTINUED | OUTPATIENT
Start: 2019-04-04 | End: 2019-04-11

## 2019-04-04 RX ORDER — POTASSIUM CHLORIDE 20 MEQ
40 PACKET (EA) ORAL ONCE
Qty: 0 | Refills: 0 | Status: COMPLETED | OUTPATIENT
Start: 2019-04-04 | End: 2019-04-04

## 2019-04-04 RX ORDER — FOLIC ACID 0.8 MG
1 TABLET ORAL DAILY
Qty: 0 | Refills: 0 | Status: DISCONTINUED | OUTPATIENT
Start: 2019-04-04 | End: 2019-04-11

## 2019-04-04 RX ORDER — SENNA PLUS 8.6 MG/1
2 TABLET ORAL AT BEDTIME
Qty: 0 | Refills: 0 | Status: DISCONTINUED | OUTPATIENT
Start: 2019-04-04 | End: 2019-04-11

## 2019-04-04 RX ORDER — HYDROMORPHONE HYDROCHLORIDE 2 MG/ML
1 INJECTION INTRAMUSCULAR; INTRAVENOUS; SUBCUTANEOUS
Qty: 0 | Refills: 0 | COMMUNITY

## 2019-04-04 RX ADMIN — HYDROMORPHONE HYDROCHLORIDE 2 MILLIGRAM(S): 2 INJECTION INTRAMUSCULAR; INTRAVENOUS; SUBCUTANEOUS at 21:27

## 2019-04-04 RX ADMIN — Medication 1 PATCH: at 11:22

## 2019-04-04 RX ADMIN — Medication 40 MILLIEQUIVALENT(S): at 04:04

## 2019-04-04 RX ADMIN — HYDROMORPHONE HYDROCHLORIDE 2 MILLIGRAM(S): 2 INJECTION INTRAMUSCULAR; INTRAVENOUS; SUBCUTANEOUS at 04:20

## 2019-04-04 RX ADMIN — HYDROMORPHONE HYDROCHLORIDE 1 MILLIGRAM(S): 2 INJECTION INTRAMUSCULAR; INTRAVENOUS; SUBCUTANEOUS at 11:21

## 2019-04-04 RX ADMIN — HYDROMORPHONE HYDROCHLORIDE 2 MILLIGRAM(S): 2 INJECTION INTRAMUSCULAR; INTRAVENOUS; SUBCUTANEOUS at 17:00

## 2019-04-04 RX ADMIN — HYDROMORPHONE HYDROCHLORIDE 1 MILLIGRAM(S): 2 INJECTION INTRAMUSCULAR; INTRAVENOUS; SUBCUTANEOUS at 19:43

## 2019-04-04 RX ADMIN — SODIUM CHLORIDE 150 MILLILITER(S): 9 INJECTION, SOLUTION INTRAVENOUS at 04:32

## 2019-04-04 RX ADMIN — HYDROMORPHONE HYDROCHLORIDE 1 MILLIGRAM(S): 2 INJECTION INTRAMUSCULAR; INTRAVENOUS; SUBCUTANEOUS at 15:25

## 2019-04-04 RX ADMIN — HYDROMORPHONE HYDROCHLORIDE 2 MILLIGRAM(S): 2 INJECTION INTRAMUSCULAR; INTRAVENOUS; SUBCUTANEOUS at 22:02

## 2019-04-04 RX ADMIN — HYDROMORPHONE HYDROCHLORIDE 2 MILLIGRAM(S): 2 INJECTION INTRAMUSCULAR; INTRAVENOUS; SUBCUTANEOUS at 12:58

## 2019-04-04 RX ADMIN — HYDROMORPHONE HYDROCHLORIDE 1 MILLIGRAM(S): 2 INJECTION INTRAMUSCULAR; INTRAVENOUS; SUBCUTANEOUS at 15:40

## 2019-04-04 RX ADMIN — HYDROMORPHONE HYDROCHLORIDE 1 MILLIGRAM(S): 2 INJECTION INTRAMUSCULAR; INTRAVENOUS; SUBCUTANEOUS at 11:36

## 2019-04-04 RX ADMIN — Medication 100 MILLIGRAM(S): at 18:22

## 2019-04-04 RX ADMIN — Medication 1 PATCH: at 12:58

## 2019-04-04 RX ADMIN — SODIUM CHLORIDE 150 MILLILITER(S): 9 INJECTION, SOLUTION INTRAVENOUS at 13:15

## 2019-04-04 RX ADMIN — APIXABAN 5 MILLIGRAM(S): 2.5 TABLET, FILM COATED ORAL at 18:22

## 2019-04-04 RX ADMIN — HYDROMORPHONE HYDROCHLORIDE 2 MILLIGRAM(S): 2 INJECTION INTRAMUSCULAR; INTRAVENOUS; SUBCUTANEOUS at 04:05

## 2019-04-04 RX ADMIN — HYDROMORPHONE HYDROCHLORIDE 1 MILLIGRAM(S): 2 INJECTION INTRAMUSCULAR; INTRAVENOUS; SUBCUTANEOUS at 20:30

## 2019-04-04 RX ADMIN — HYDROMORPHONE HYDROCHLORIDE 2 MILLIGRAM(S): 2 INJECTION INTRAMUSCULAR; INTRAVENOUS; SUBCUTANEOUS at 08:42

## 2019-04-04 RX ADMIN — HYDROMORPHONE HYDROCHLORIDE 2 MILLIGRAM(S): 2 INJECTION INTRAMUSCULAR; INTRAVENOUS; SUBCUTANEOUS at 13:17

## 2019-04-04 RX ADMIN — Medication 1 PATCH: at 19:50

## 2019-04-04 RX ADMIN — APIXABAN 5 MILLIGRAM(S): 2.5 TABLET, FILM COATED ORAL at 05:19

## 2019-04-04 RX ADMIN — HYDROMORPHONE HYDROCHLORIDE 2 MILLIGRAM(S): 2 INJECTION INTRAMUSCULAR; INTRAVENOUS; SUBCUTANEOUS at 08:27

## 2019-04-04 NOTE — H&P ADULT - HISTORY OF PRESENT ILLNESS
33yoM hx sickle cell disease complicated by acute chest syndrome (2003, ?2008), requiring intubation and exchange transfusion, PE (2008), s/p coumadin, LUE DVT (10/2018), now on Eliquis presenting with worsening lower back pain and bilateral hip and leg pain.  Reports pain is characteristic of his prior sickle cell crisis and wasn't relieved with his PO dilaudid at home.  Pain is severe to the point where it's affecting his ability to ambulate and he required a cane to walk.  Denies any fevers, chills, chest pain, dyspnea, abdominal pain, nausea, vomiting. Received IV Dilaudid 1mg in ED with partial relief.  CXR on my read appears clear.  iSTOP confirms pt on dilaudid 8mg PO.

## 2019-04-04 NOTE — H&P ADULT - ASSESSMENT
33yoM hx sickle cell disease complicated by acute chest syndrome (2003, ?2008), requiring intubation and exchange transfusion, PE (2008), s/p coumadin, LUE DVT (10/2018), now on Eliquis presenting with worsening lower back pain and bilateral hip and leg pain, being admitted for vasoocclusive sickle cell crisis    #Vasoocclusive sickle cells crisis- Reticulocyte count low but does not always correlate with pain crisis.  Will start IV Dilaudid 2mg q4hr for moderate to severe pain with Dilaudid 1mg PRN for breakthrough IVF with 1/2NS.  Folic acid and MVI resumed.  Consider pain management consult in AM for possible PCA pump if symptoms not improved.  Hemolysis labs ordered for AM.    #Leukocytosis- suspect reactive from sickle cells crisis.  Afebrile, no signs of acute chest syndrome.  Repeat CBC in AM.  Monitoring off abx.  Check procalcitonin.     #Anemia- in setting of sickle cell disease. Hgb at 8, which has been baseline since 2017.  Folic acid resumed. Monitor.     #LUE DVT- Eliquis resumed.    #Hypokalemia- PO supplementation ordered.  Repeat BMP in AM.     #Smoker-  Nicotine patch ordered.    #Prophylactic measure- On Eliquis.

## 2019-04-04 NOTE — H&P ADULT - NSHPLABSRESULTS_GEN_ALL_CORE
8.5    17.5  )-----------( 411      ( 04 Apr 2019 00:14 )             25.4       04-03    141  |  105  |  15.0  ----------------------------<  96  3.3<L>   |  21.0<L>  |  0.61    Ca    9.4      03 Apr 2019 23:13    TPro  8.7  /  Alb  3.2<L>  /  TBili  0.3<L>  /  DBili  x   /  AST  16  /  ALT  15  /  AlkPhos  111  04-03      CXR (my read): no acute disease

## 2019-04-04 NOTE — H&P ADULT - NSHPPHYSICALEXAM_GEN_ALL_CORE
Vital Signs Last 24 Hrs  T(C): 36.9 (03 Apr 2019 16:56), Max: 36.9 (03 Apr 2019 16:56)  T(F): 98.5 (03 Apr 2019 16:56), Max: 98.5 (03 Apr 2019 16:56)  HR: 89 (04 Apr 2019 02:15) (89 - 100)  BP: 113/70 (04 Apr 2019 02:15) (113/70 - 126/84)  BP(mean): --  RR: 20 (04 Apr 2019 02:15) (18 - 20)  SpO2: 99% (04 Apr 2019 02:15) (99% - 100%)    GENERAL:  Tired appearing, appears somewhat uncomfortable due to pain   EYES:  Clear conjuctiva, extraocular movement intact  ENT: Moist mucous membranes  RESP:  Non-labored breathing pattern, lungs clear to ausculation   CV: Regular rate and rhythm, no murmurs appreciated, no lower extremity edema  GI: Soft, non-tender, non-distended  NEURO: Awake, alert, conversant, upper extremity strength 5/5, unable to assess leg strength as pt declines to move them due to pain  PSYCH: Calm, cooperative  SKIN: No rash or lesions, warm and dry

## 2019-04-04 NOTE — H&P ADULT - NSICDXPASTMEDICALHX_GEN_ALL_CORE_FT
PAST MEDICAL HISTORY:  Acute Chest Syndrome 2003 and possibly 2008, with intubation, exchange transfusion    Avascular Necrosis of Bone hips    Personal History of PE (Pulmonary Embolism) 2008, post 6mo coumadin    Sickle Cell Anemia SC

## 2019-04-04 NOTE — H&P ADULT - NSICDXFAMILYHX_GEN_ALL_CORE_FT
FAMILY HISTORY:  Family history of sickle cell trait in father  Family history of sickle cell trait in mother

## 2019-04-05 PROCEDURE — 99232 SBSQ HOSP IP/OBS MODERATE 35: CPT

## 2019-04-05 RX ORDER — SODIUM CHLORIDE 9 MG/ML
1000 INJECTION, SOLUTION INTRAVENOUS
Qty: 0 | Refills: 0 | Status: DISCONTINUED | OUTPATIENT
Start: 2019-04-05 | End: 2019-04-11

## 2019-04-05 RX ADMIN — HYDROMORPHONE HYDROCHLORIDE 2 MILLIGRAM(S): 2 INJECTION INTRAMUSCULAR; INTRAVENOUS; SUBCUTANEOUS at 11:18

## 2019-04-05 RX ADMIN — Medication 1 PATCH: at 08:08

## 2019-04-05 RX ADMIN — HYDROMORPHONE HYDROCHLORIDE 2 MILLIGRAM(S): 2 INJECTION INTRAMUSCULAR; INTRAVENOUS; SUBCUTANEOUS at 05:49

## 2019-04-05 RX ADMIN — APIXABAN 5 MILLIGRAM(S): 2.5 TABLET, FILM COATED ORAL at 17:10

## 2019-04-05 RX ADMIN — HYDROMORPHONE HYDROCHLORIDE 2 MILLIGRAM(S): 2 INJECTION INTRAMUSCULAR; INTRAVENOUS; SUBCUTANEOUS at 20:33

## 2019-04-05 RX ADMIN — APIXABAN 5 MILLIGRAM(S): 2.5 TABLET, FILM COATED ORAL at 05:50

## 2019-04-05 RX ADMIN — HYDROMORPHONE HYDROCHLORIDE 2 MILLIGRAM(S): 2 INJECTION INTRAMUSCULAR; INTRAVENOUS; SUBCUTANEOUS at 01:13

## 2019-04-05 RX ADMIN — HYDROMORPHONE HYDROCHLORIDE 1 MILLIGRAM(S): 2 INJECTION INTRAMUSCULAR; INTRAVENOUS; SUBCUTANEOUS at 09:20

## 2019-04-05 RX ADMIN — HYDROMORPHONE HYDROCHLORIDE 1 MILLIGRAM(S): 2 INJECTION INTRAMUSCULAR; INTRAVENOUS; SUBCUTANEOUS at 08:20

## 2019-04-05 RX ADMIN — Medication 1 TABLET(S): at 11:24

## 2019-04-05 RX ADMIN — Medication 1 PATCH: at 11:29

## 2019-04-05 RX ADMIN — Medication 1 PATCH: at 11:24

## 2019-04-05 RX ADMIN — HYDROMORPHONE HYDROCHLORIDE 2 MILLIGRAM(S): 2 INJECTION INTRAMUSCULAR; INTRAVENOUS; SUBCUTANEOUS at 15:39

## 2019-04-05 RX ADMIN — HYDROMORPHONE HYDROCHLORIDE 2 MILLIGRAM(S): 2 INJECTION INTRAMUSCULAR; INTRAVENOUS; SUBCUTANEOUS at 16:39

## 2019-04-05 RX ADMIN — HYDROMORPHONE HYDROCHLORIDE 2 MILLIGRAM(S): 2 INJECTION INTRAMUSCULAR; INTRAVENOUS; SUBCUTANEOUS at 02:03

## 2019-04-05 RX ADMIN — HYDROMORPHONE HYDROCHLORIDE 2 MILLIGRAM(S): 2 INJECTION INTRAMUSCULAR; INTRAVENOUS; SUBCUTANEOUS at 06:02

## 2019-04-05 RX ADMIN — HYDROMORPHONE HYDROCHLORIDE 1 MILLIGRAM(S): 2 INJECTION INTRAMUSCULAR; INTRAVENOUS; SUBCUTANEOUS at 21:55

## 2019-04-05 RX ADMIN — HYDROMORPHONE HYDROCHLORIDE 2 MILLIGRAM(S): 2 INJECTION INTRAMUSCULAR; INTRAVENOUS; SUBCUTANEOUS at 01:27

## 2019-04-05 RX ADMIN — SODIUM CHLORIDE 150 MILLILITER(S): 9 INJECTION, SOLUTION INTRAVENOUS at 17:11

## 2019-04-05 RX ADMIN — Medication 100 MILLIGRAM(S): at 05:49

## 2019-04-05 RX ADMIN — SENNA PLUS 2 TABLET(S): 8.6 TABLET ORAL at 21:40

## 2019-04-05 RX ADMIN — HYDROMORPHONE HYDROCHLORIDE 1 MILLIGRAM(S): 2 INJECTION INTRAMUSCULAR; INTRAVENOUS; SUBCUTANEOUS at 21:40

## 2019-04-05 RX ADMIN — Medication 1 MILLIGRAM(S): at 11:24

## 2019-04-05 RX ADMIN — Medication 100 MILLIGRAM(S): at 17:12

## 2019-04-05 RX ADMIN — HYDROMORPHONE HYDROCHLORIDE 2 MILLIGRAM(S): 2 INJECTION INTRAMUSCULAR; INTRAVENOUS; SUBCUTANEOUS at 12:18

## 2019-04-05 RX ADMIN — Medication 1 PATCH: at 20:22

## 2019-04-05 RX ADMIN — HYDROMORPHONE HYDROCHLORIDE 2 MILLIGRAM(S): 2 INJECTION INTRAMUSCULAR; INTRAVENOUS; SUBCUTANEOUS at 20:18

## 2019-04-06 PROCEDURE — 99233 SBSQ HOSP IP/OBS HIGH 50: CPT

## 2019-04-06 RX ADMIN — HYDROMORPHONE HYDROCHLORIDE 2 MILLIGRAM(S): 2 INJECTION INTRAMUSCULAR; INTRAVENOUS; SUBCUTANEOUS at 23:00

## 2019-04-06 RX ADMIN — SODIUM CHLORIDE 150 MILLILITER(S): 9 INJECTION, SOLUTION INTRAVENOUS at 05:12

## 2019-04-06 RX ADMIN — HYDROMORPHONE HYDROCHLORIDE 1 MILLIGRAM(S): 2 INJECTION INTRAMUSCULAR; INTRAVENOUS; SUBCUTANEOUS at 16:19

## 2019-04-06 RX ADMIN — HYDROMORPHONE HYDROCHLORIDE 2 MILLIGRAM(S): 2 INJECTION INTRAMUSCULAR; INTRAVENOUS; SUBCUTANEOUS at 17:51

## 2019-04-06 RX ADMIN — HYDROMORPHONE HYDROCHLORIDE 2 MILLIGRAM(S): 2 INJECTION INTRAMUSCULAR; INTRAVENOUS; SUBCUTANEOUS at 13:42

## 2019-04-06 RX ADMIN — Medication 1 PATCH: at 08:31

## 2019-04-06 RX ADMIN — HYDROMORPHONE HYDROCHLORIDE 1 MILLIGRAM(S): 2 INJECTION INTRAMUSCULAR; INTRAVENOUS; SUBCUTANEOUS at 21:00

## 2019-04-06 RX ADMIN — HYDROMORPHONE HYDROCHLORIDE 2 MILLIGRAM(S): 2 INJECTION INTRAMUSCULAR; INTRAVENOUS; SUBCUTANEOUS at 22:22

## 2019-04-06 RX ADMIN — Medication 1 MILLIGRAM(S): at 11:28

## 2019-04-06 RX ADMIN — Medication 1 PATCH: at 20:29

## 2019-04-06 RX ADMIN — HYDROMORPHONE HYDROCHLORIDE 2 MILLIGRAM(S): 2 INJECTION INTRAMUSCULAR; INTRAVENOUS; SUBCUTANEOUS at 10:28

## 2019-04-06 RX ADMIN — APIXABAN 5 MILLIGRAM(S): 2.5 TABLET, FILM COATED ORAL at 17:57

## 2019-04-06 RX ADMIN — HYDROMORPHONE HYDROCHLORIDE 1 MILLIGRAM(S): 2 INJECTION INTRAMUSCULAR; INTRAVENOUS; SUBCUTANEOUS at 20:23

## 2019-04-06 RX ADMIN — HYDROMORPHONE HYDROCHLORIDE 2 MILLIGRAM(S): 2 INJECTION INTRAMUSCULAR; INTRAVENOUS; SUBCUTANEOUS at 00:58

## 2019-04-06 RX ADMIN — HYDROMORPHONE HYDROCHLORIDE 2 MILLIGRAM(S): 2 INJECTION INTRAMUSCULAR; INTRAVENOUS; SUBCUTANEOUS at 05:27

## 2019-04-06 RX ADMIN — HYDROMORPHONE HYDROCHLORIDE 2 MILLIGRAM(S): 2 INJECTION INTRAMUSCULAR; INTRAVENOUS; SUBCUTANEOUS at 05:12

## 2019-04-06 RX ADMIN — Medication 100 MILLIGRAM(S): at 05:12

## 2019-04-06 RX ADMIN — SODIUM CHLORIDE 150 MILLILITER(S): 9 INJECTION, SOLUTION INTRAVENOUS at 20:28

## 2019-04-06 RX ADMIN — HYDROMORPHONE HYDROCHLORIDE 2 MILLIGRAM(S): 2 INJECTION INTRAMUSCULAR; INTRAVENOUS; SUBCUTANEOUS at 14:42

## 2019-04-06 RX ADMIN — Medication 1 TABLET(S): at 11:28

## 2019-04-06 RX ADMIN — APIXABAN 5 MILLIGRAM(S): 2.5 TABLET, FILM COATED ORAL at 05:11

## 2019-04-06 RX ADMIN — HYDROMORPHONE HYDROCHLORIDE 2 MILLIGRAM(S): 2 INJECTION INTRAMUSCULAR; INTRAVENOUS; SUBCUTANEOUS at 09:28

## 2019-04-06 RX ADMIN — HYDROMORPHONE HYDROCHLORIDE 1 MILLIGRAM(S): 2 INJECTION INTRAMUSCULAR; INTRAVENOUS; SUBCUTANEOUS at 17:00

## 2019-04-06 RX ADMIN — Medication 1 PATCH: at 11:29

## 2019-04-06 RX ADMIN — HYDROMORPHONE HYDROCHLORIDE 2 MILLIGRAM(S): 2 INJECTION INTRAMUSCULAR; INTRAVENOUS; SUBCUTANEOUS at 18:40

## 2019-04-06 RX ADMIN — Medication 1 PATCH: at 11:27

## 2019-04-06 RX ADMIN — SENNA PLUS 2 TABLET(S): 8.6 TABLET ORAL at 20:28

## 2019-04-06 RX ADMIN — HYDROMORPHONE HYDROCHLORIDE 1 MILLIGRAM(S): 2 INJECTION INTRAMUSCULAR; INTRAVENOUS; SUBCUTANEOUS at 07:00

## 2019-04-06 NOTE — PROGRESS NOTE ADULT - ASSESSMENT
33yoM hx sickle cell disease complicated by acute chest syndrome (2003, ?2008), requiring intubation and exchange transfusion, PE (2008), s/p coumadin, LUE DVT (10/2018), now on Eliquis presenting with worsening lower back pain and bilateral hip and leg pain.  Reports pain is characteristic of his prior sickle cell crisis and wasn't relieved with his PO dilaudid at home.  Pain is severe to the point where it's affecting his ability to ambulate and he required a cane to walk.  Denies any fevers, chills, chest pain, dyspnea, abdominal pain, nausea, vomiting. Received IV Dilaudid 1mg in ED with partial relief.  CXR on my read appears clear.  iSTOP confirms pt on dilaudid 8mg PO. (04 Apr 2019 03:48)    > Acute Sickle Cell Crisis - continue  IVF, pain control with Dilaudid.  Suplemental oxygen. O2.     > Chronic LUE DVT	- Continue anticoagulation with apixaban    > Smoker - Nicotine Patch.  Counselled to t to quit smoking.  I outlined the extensive negative impact of smoking on quality of life and the numerous consequences of continued smoking including lung disease, heart attack, stroke, vascular disease, malignancy, increased mortality, etc.  The patient understood these effects and agreed on smoking cessation.    Supportive care

## 2019-04-07 PROCEDURE — 99233 SBSQ HOSP IP/OBS HIGH 50: CPT

## 2019-04-07 RX ADMIN — Medication 1 PATCH: at 12:02

## 2019-04-07 RX ADMIN — Medication 1 PATCH: at 12:01

## 2019-04-07 RX ADMIN — HYDROMORPHONE HYDROCHLORIDE 1 MILLIGRAM(S): 2 INJECTION INTRAMUSCULAR; INTRAVENOUS; SUBCUTANEOUS at 21:00

## 2019-04-07 RX ADMIN — SODIUM CHLORIDE 150 MILLILITER(S): 9 INJECTION, SOLUTION INTRAVENOUS at 03:07

## 2019-04-07 RX ADMIN — HYDROMORPHONE HYDROCHLORIDE 2 MILLIGRAM(S): 2 INJECTION INTRAMUSCULAR; INTRAVENOUS; SUBCUTANEOUS at 21:34

## 2019-04-07 RX ADMIN — Medication 1 TABLET(S): at 12:01

## 2019-04-07 RX ADMIN — Medication 1 PATCH: at 19:30

## 2019-04-07 RX ADMIN — SENNA PLUS 2 TABLET(S): 8.6 TABLET ORAL at 21:33

## 2019-04-07 RX ADMIN — Medication 100 MILLIGRAM(S): at 17:24

## 2019-04-07 RX ADMIN — HYDROMORPHONE HYDROCHLORIDE 2 MILLIGRAM(S): 2 INJECTION INTRAMUSCULAR; INTRAVENOUS; SUBCUTANEOUS at 08:26

## 2019-04-07 RX ADMIN — HYDROMORPHONE HYDROCHLORIDE 2 MILLIGRAM(S): 2 INJECTION INTRAMUSCULAR; INTRAVENOUS; SUBCUTANEOUS at 17:24

## 2019-04-07 RX ADMIN — HYDROMORPHONE HYDROCHLORIDE 2 MILLIGRAM(S): 2 INJECTION INTRAMUSCULAR; INTRAVENOUS; SUBCUTANEOUS at 22:00

## 2019-04-07 RX ADMIN — HYDROMORPHONE HYDROCHLORIDE 2 MILLIGRAM(S): 2 INJECTION INTRAMUSCULAR; INTRAVENOUS; SUBCUTANEOUS at 17:39

## 2019-04-07 RX ADMIN — HYDROMORPHONE HYDROCHLORIDE 2 MILLIGRAM(S): 2 INJECTION INTRAMUSCULAR; INTRAVENOUS; SUBCUTANEOUS at 02:48

## 2019-04-07 RX ADMIN — APIXABAN 5 MILLIGRAM(S): 2.5 TABLET, FILM COATED ORAL at 06:10

## 2019-04-07 RX ADMIN — HYDROMORPHONE HYDROCHLORIDE 1 MILLIGRAM(S): 2 INJECTION INTRAMUSCULAR; INTRAVENOUS; SUBCUTANEOUS at 16:06

## 2019-04-07 RX ADMIN — HYDROMORPHONE HYDROCHLORIDE 1 MILLIGRAM(S): 2 INJECTION INTRAMUSCULAR; INTRAVENOUS; SUBCUTANEOUS at 16:21

## 2019-04-07 RX ADMIN — SODIUM CHLORIDE 150 MILLILITER(S): 9 INJECTION, SOLUTION INTRAVENOUS at 08:26

## 2019-04-07 RX ADMIN — Medication 100 MILLIGRAM(S): at 06:10

## 2019-04-07 RX ADMIN — HYDROMORPHONE HYDROCHLORIDE 1 MILLIGRAM(S): 2 INJECTION INTRAMUSCULAR; INTRAVENOUS; SUBCUTANEOUS at 20:09

## 2019-04-07 RX ADMIN — HYDROMORPHONE HYDROCHLORIDE 2 MILLIGRAM(S): 2 INJECTION INTRAMUSCULAR; INTRAVENOUS; SUBCUTANEOUS at 13:20

## 2019-04-07 RX ADMIN — Medication 1 PATCH: at 07:59

## 2019-04-07 RX ADMIN — HYDROMORPHONE HYDROCHLORIDE 2 MILLIGRAM(S): 2 INJECTION INTRAMUSCULAR; INTRAVENOUS; SUBCUTANEOUS at 01:33

## 2019-04-07 RX ADMIN — SODIUM CHLORIDE 150 MILLILITER(S): 9 INJECTION, SOLUTION INTRAVENOUS at 22:45

## 2019-04-07 RX ADMIN — HYDROMORPHONE HYDROCHLORIDE 1 MILLIGRAM(S): 2 INJECTION INTRAMUSCULAR; INTRAVENOUS; SUBCUTANEOUS at 06:13

## 2019-04-07 RX ADMIN — Medication 1 MILLIGRAM(S): at 12:01

## 2019-04-07 RX ADMIN — APIXABAN 5 MILLIGRAM(S): 2.5 TABLET, FILM COATED ORAL at 17:24

## 2019-04-07 RX ADMIN — HYDROMORPHONE HYDROCHLORIDE 2 MILLIGRAM(S): 2 INJECTION INTRAMUSCULAR; INTRAVENOUS; SUBCUTANEOUS at 08:41

## 2019-04-08 PROCEDURE — 99233 SBSQ HOSP IP/OBS HIGH 50: CPT

## 2019-04-08 RX ADMIN — Medication 1 PATCH: at 14:54

## 2019-04-08 RX ADMIN — Medication 100 MILLIGRAM(S): at 18:33

## 2019-04-08 RX ADMIN — APIXABAN 5 MILLIGRAM(S): 2.5 TABLET, FILM COATED ORAL at 18:38

## 2019-04-08 RX ADMIN — SENNA PLUS 2 TABLET(S): 8.6 TABLET ORAL at 21:14

## 2019-04-08 RX ADMIN — HYDROMORPHONE HYDROCHLORIDE 2 MILLIGRAM(S): 2 INJECTION INTRAMUSCULAR; INTRAVENOUS; SUBCUTANEOUS at 01:38

## 2019-04-08 RX ADMIN — Medication 1 MILLIGRAM(S): at 14:55

## 2019-04-08 RX ADMIN — HYDROMORPHONE HYDROCHLORIDE 1 MILLIGRAM(S): 2 INJECTION INTRAMUSCULAR; INTRAVENOUS; SUBCUTANEOUS at 13:35

## 2019-04-08 RX ADMIN — HYDROMORPHONE HYDROCHLORIDE 1 MILLIGRAM(S): 2 INJECTION INTRAMUSCULAR; INTRAVENOUS; SUBCUTANEOUS at 22:58

## 2019-04-08 RX ADMIN — HYDROMORPHONE HYDROCHLORIDE 1 MILLIGRAM(S): 2 INJECTION INTRAMUSCULAR; INTRAVENOUS; SUBCUTANEOUS at 13:20

## 2019-04-08 RX ADMIN — HYDROMORPHONE HYDROCHLORIDE 2 MILLIGRAM(S): 2 INJECTION INTRAMUSCULAR; INTRAVENOUS; SUBCUTANEOUS at 02:00

## 2019-04-08 RX ADMIN — HYDROMORPHONE HYDROCHLORIDE 2 MILLIGRAM(S): 2 INJECTION INTRAMUSCULAR; INTRAVENOUS; SUBCUTANEOUS at 20:09

## 2019-04-08 RX ADMIN — SODIUM CHLORIDE 150 MILLILITER(S): 9 INJECTION, SOLUTION INTRAVENOUS at 06:02

## 2019-04-08 RX ADMIN — HYDROMORPHONE HYDROCHLORIDE 2 MILLIGRAM(S): 2 INJECTION INTRAMUSCULAR; INTRAVENOUS; SUBCUTANEOUS at 15:14

## 2019-04-08 RX ADMIN — HYDROMORPHONE HYDROCHLORIDE 2 MILLIGRAM(S): 2 INJECTION INTRAMUSCULAR; INTRAVENOUS; SUBCUTANEOUS at 06:03

## 2019-04-08 RX ADMIN — SODIUM CHLORIDE 150 MILLILITER(S): 9 INJECTION, SOLUTION INTRAVENOUS at 20:10

## 2019-04-08 RX ADMIN — HYDROMORPHONE HYDROCHLORIDE 1 MILLIGRAM(S): 2 INJECTION INTRAMUSCULAR; INTRAVENOUS; SUBCUTANEOUS at 23:58

## 2019-04-08 RX ADMIN — HYDROMORPHONE HYDROCHLORIDE 2 MILLIGRAM(S): 2 INJECTION INTRAMUSCULAR; INTRAVENOUS; SUBCUTANEOUS at 10:15

## 2019-04-08 RX ADMIN — Medication 1 PATCH: at 19:00

## 2019-04-08 RX ADMIN — HYDROMORPHONE HYDROCHLORIDE 2 MILLIGRAM(S): 2 INJECTION INTRAMUSCULAR; INTRAVENOUS; SUBCUTANEOUS at 21:09

## 2019-04-08 RX ADMIN — HYDROMORPHONE HYDROCHLORIDE 2 MILLIGRAM(S): 2 INJECTION INTRAMUSCULAR; INTRAVENOUS; SUBCUTANEOUS at 10:30

## 2019-04-08 RX ADMIN — Medication 100 MILLIGRAM(S): at 06:02

## 2019-04-08 RX ADMIN — Medication 1 TABLET(S): at 14:55

## 2019-04-08 RX ADMIN — HYDROMORPHONE HYDROCHLORIDE 2 MILLIGRAM(S): 2 INJECTION INTRAMUSCULAR; INTRAVENOUS; SUBCUTANEOUS at 15:29

## 2019-04-08 RX ADMIN — APIXABAN 5 MILLIGRAM(S): 2.5 TABLET, FILM COATED ORAL at 06:02

## 2019-04-08 RX ADMIN — HYDROMORPHONE HYDROCHLORIDE 2 MILLIGRAM(S): 2 INJECTION INTRAMUSCULAR; INTRAVENOUS; SUBCUTANEOUS at 06:20

## 2019-04-08 NOTE — PROGRESS NOTE ADULT - ASSESSMENT
33yoM hx sickle cell disease complicated by acute chest syndrome (2003, ?2008), requiring intubation and exchange transfusion, PE (2008), s/p coumadin, LUE DVT (10/2018), now on Eliquis presenting with worsening lower back pain and bilateral hip and leg pain.  Reports pain is characteristic of his prior sickle cell crisis and wasn't relieved with his PO dilaudid at home.  Pain is severe to the point where it's affecting his ability to ambulate and he required a cane to walk.  Denies any fevers, chills, chest pain, dyspnea, abdominal pain, nausea, vomiting. Received IV Dilaudid 1mg in ED with partial relief.  CXR on my read appears clear.  iSTOP confirms pt on dilaudid 8mg PO. (04 Apr 2019 03:48)    > Acute Sickle Cell Crisis - continue  IVF, pain control with Dilaudid.  Suplemental oxygen. O2.     > Chronic LUE DVT	- Continue anticoagulation with apixaban    > Smoker - Nicotine Patch.  Counselled to t to quit smoking.  The patient understood these effects and agreed on smoking cessation.    Supportive care

## 2019-04-09 LAB
ANION GAP SERPL CALC-SCNC: 13 MMOL/L — SIGNIFICANT CHANGE UP (ref 5–17)
BUN SERPL-MCNC: 14 MG/DL — SIGNIFICANT CHANGE UP (ref 8–20)
CALCIUM SERPL-MCNC: 8.9 MG/DL — SIGNIFICANT CHANGE UP (ref 8.6–10.2)
CHLORIDE SERPL-SCNC: 102 MMOL/L — SIGNIFICANT CHANGE UP (ref 98–107)
CO2 SERPL-SCNC: 23 MMOL/L — SIGNIFICANT CHANGE UP (ref 22–29)
CREAT SERPL-MCNC: 0.53 MG/DL — SIGNIFICANT CHANGE UP (ref 0.5–1.3)
GLUCOSE SERPL-MCNC: 86 MG/DL — SIGNIFICANT CHANGE UP (ref 70–115)
HCT VFR BLD CALC: 28.5 % — LOW (ref 42–52)
HGB BLD-MCNC: 9.3 G/DL — LOW (ref 14–18)
INR BLD: 1.44 RATIO — HIGH (ref 0.88–1.16)
MCHC RBC-ENTMCNC: 21.4 PG — LOW (ref 27–31)
MCHC RBC-ENTMCNC: 32.6 G/DL — SIGNIFICANT CHANGE UP (ref 32–36)
MCV RBC AUTO: 65.7 FL — LOW (ref 80–94)
PLATELET # BLD AUTO: 394 K/UL — SIGNIFICANT CHANGE UP (ref 150–400)
POTASSIUM SERPL-MCNC: 4 MMOL/L — SIGNIFICANT CHANGE UP (ref 3.5–5.3)
POTASSIUM SERPL-SCNC: 4 MMOL/L — SIGNIFICANT CHANGE UP (ref 3.5–5.3)
PROTHROM AB SERPL-ACNC: 16.7 SEC — HIGH (ref 10–12.9)
RBC # BLD: 4.34 M/UL — LOW (ref 4.6–6.2)
RBC # FLD: 22.9 % — HIGH (ref 11–15.6)
SODIUM SERPL-SCNC: 138 MMOL/L — SIGNIFICANT CHANGE UP (ref 135–145)
WBC # BLD: 8.4 K/UL — SIGNIFICANT CHANGE UP (ref 4.8–10.8)
WBC # FLD AUTO: 8.4 K/UL — SIGNIFICANT CHANGE UP (ref 4.8–10.8)

## 2019-04-09 PROCEDURE — 99233 SBSQ HOSP IP/OBS HIGH 50: CPT

## 2019-04-09 RX ORDER — HYDROMORPHONE HYDROCHLORIDE 2 MG/ML
8 INJECTION INTRAMUSCULAR; INTRAVENOUS; SUBCUTANEOUS EVERY 6 HOURS
Qty: 0 | Refills: 0 | Status: DISCONTINUED | OUTPATIENT
Start: 2019-04-09 | End: 2019-04-10

## 2019-04-09 RX ADMIN — Medication 1 PATCH: at 13:05

## 2019-04-09 RX ADMIN — HYDROMORPHONE HYDROCHLORIDE 2 MILLIGRAM(S): 2 INJECTION INTRAMUSCULAR; INTRAVENOUS; SUBCUTANEOUS at 06:23

## 2019-04-09 RX ADMIN — Medication 1 MILLIGRAM(S): at 11:52

## 2019-04-09 RX ADMIN — Medication 100 MILLIGRAM(S): at 18:19

## 2019-04-09 RX ADMIN — HYDROMORPHONE HYDROCHLORIDE 8 MILLIGRAM(S): 2 INJECTION INTRAMUSCULAR; INTRAVENOUS; SUBCUTANEOUS at 13:40

## 2019-04-09 RX ADMIN — Medication 100 MILLIGRAM(S): at 04:46

## 2019-04-09 RX ADMIN — HYDROMORPHONE HYDROCHLORIDE 1 MILLIGRAM(S): 2 INJECTION INTRAMUSCULAR; INTRAVENOUS; SUBCUTANEOUS at 05:45

## 2019-04-09 RX ADMIN — HYDROMORPHONE HYDROCHLORIDE 8 MILLIGRAM(S): 2 INJECTION INTRAMUSCULAR; INTRAVENOUS; SUBCUTANEOUS at 21:02

## 2019-04-09 RX ADMIN — HYDROMORPHONE HYDROCHLORIDE 8 MILLIGRAM(S): 2 INJECTION INTRAMUSCULAR; INTRAVENOUS; SUBCUTANEOUS at 13:41

## 2019-04-09 RX ADMIN — APIXABAN 5 MILLIGRAM(S): 2.5 TABLET, FILM COATED ORAL at 04:46

## 2019-04-09 RX ADMIN — Medication 1 PATCH: at 17:35

## 2019-04-09 RX ADMIN — SODIUM CHLORIDE 150 MILLILITER(S): 9 INJECTION, SOLUTION INTRAVENOUS at 02:46

## 2019-04-09 RX ADMIN — SODIUM CHLORIDE 150 MILLILITER(S): 9 INJECTION, SOLUTION INTRAVENOUS at 18:19

## 2019-04-09 RX ADMIN — HYDROMORPHONE HYDROCHLORIDE 8 MILLIGRAM(S): 2 INJECTION INTRAMUSCULAR; INTRAVENOUS; SUBCUTANEOUS at 22:02

## 2019-04-09 RX ADMIN — HYDROMORPHONE HYDROCHLORIDE 2 MILLIGRAM(S): 2 INJECTION INTRAMUSCULAR; INTRAVENOUS; SUBCUTANEOUS at 07:00

## 2019-04-09 RX ADMIN — HYDROMORPHONE HYDROCHLORIDE 1 MILLIGRAM(S): 2 INJECTION INTRAMUSCULAR; INTRAVENOUS; SUBCUTANEOUS at 08:56

## 2019-04-09 RX ADMIN — HYDROMORPHONE HYDROCHLORIDE 2 MILLIGRAM(S): 2 INJECTION INTRAMUSCULAR; INTRAVENOUS; SUBCUTANEOUS at 10:39

## 2019-04-09 RX ADMIN — Medication 1 PATCH: at 11:52

## 2019-04-09 RX ADMIN — HYDROMORPHONE HYDROCHLORIDE 1 MILLIGRAM(S): 2 INJECTION INTRAMUSCULAR; INTRAVENOUS; SUBCUTANEOUS at 04:45

## 2019-04-09 RX ADMIN — SENNA PLUS 2 TABLET(S): 8.6 TABLET ORAL at 21:02

## 2019-04-09 RX ADMIN — HYDROMORPHONE HYDROCHLORIDE 2 MILLIGRAM(S): 2 INJECTION INTRAMUSCULAR; INTRAVENOUS; SUBCUTANEOUS at 00:37

## 2019-04-09 RX ADMIN — APIXABAN 5 MILLIGRAM(S): 2.5 TABLET, FILM COATED ORAL at 18:19

## 2019-04-09 RX ADMIN — SODIUM CHLORIDE 150 MILLILITER(S): 9 INJECTION, SOLUTION INTRAVENOUS at 07:49

## 2019-04-09 RX ADMIN — Medication 1 PATCH: at 07:47

## 2019-04-09 RX ADMIN — Medication 1 TABLET(S): at 11:52

## 2019-04-09 RX ADMIN — HYDROMORPHONE HYDROCHLORIDE 2 MILLIGRAM(S): 2 INJECTION INTRAMUSCULAR; INTRAVENOUS; SUBCUTANEOUS at 01:37

## 2019-04-09 NOTE — PHYSICAL THERAPY INITIAL EVALUATION ADULT - CRITERIA FOR SKILLED THERAPEUTIC INTERVENTIONS
impairments found/predicted duration of therapy intervention/rehab potential/functional limitations in following categories/therapy frequency/anticipated discharge recommendation

## 2019-04-09 NOTE — PROGRESS NOTE ADULT - ASSESSMENT
· Assessment	  33yoM hx sickle cell disease complicated by acute chest syndrome (2003, ?2008), requiring intubation and exchange transfusion, PE (2008), s/p coumadin, LUE DVT (10/2018), now on Eliquis presenting with worsening lower back pain and bilateral hip and leg pain.  Reports pain is characteristic of his prior sickle cell crisis and wasn't relieved with his PO dilaudid at home.  Pain is severe to the point where it's affecting his ability to ambulate and he required a cane to walk.  Received IV Dilaudid 1mg in ED with partial relief.  CXR on my read appears clear.  iSTOP confirms pt on dilaudid 8mg PO. (04 Apr 2019 03:48)      Impact of smoking on quality of life and the numerous consequences of continued smoking including lung disease, heart attack, stroke, vascular disease, malignancy, increased mortality, etc.  The patient understood these effects and agreed on smoking cessation.    Supportive care     > Acute Sickle Cell Crisis - continue  IVF, pain control with dilaudid 8mg PO with breakthrough prn.  Supplemental oxygen. O2.     > Chronic LUE DVT- Continue anticoagulation with apixaban    >Disp - Pain control. PT following, stairs TBD.

## 2019-04-09 NOTE — PHYSICAL THERAPY INITIAL EVALUATION ADULT - GAIT PATTERN USED, PT EVAL
pt declining further ambulation due to pain and fatigue despite encouragement, antalgic gait pattern, decreased gait velocity, verbal cues for energy conservation techniques

## 2019-04-09 NOTE — PHYSICAL THERAPY INITIAL EVALUATION ADULT - PERTINENT HX OF CURRENT PROBLEM, REHAB EVAL
pt presents to [facility] due to sickle cell crisis, anemia, lower back and rubio hip pain

## 2019-04-10 PROCEDURE — 99232 SBSQ HOSP IP/OBS MODERATE 35: CPT

## 2019-04-10 RX ORDER — HYDROMORPHONE HYDROCHLORIDE 2 MG/ML
8 INJECTION INTRAMUSCULAR; INTRAVENOUS; SUBCUTANEOUS EVERY 4 HOURS
Qty: 0 | Refills: 0 | Status: DISCONTINUED | OUTPATIENT
Start: 2019-04-10 | End: 2019-04-11

## 2019-04-10 RX ADMIN — Medication 1 TABLET(S): at 11:19

## 2019-04-10 RX ADMIN — HYDROMORPHONE HYDROCHLORIDE 8 MILLIGRAM(S): 2 INJECTION INTRAMUSCULAR; INTRAVENOUS; SUBCUTANEOUS at 16:13

## 2019-04-10 RX ADMIN — HYDROMORPHONE HYDROCHLORIDE 8 MILLIGRAM(S): 2 INJECTION INTRAMUSCULAR; INTRAVENOUS; SUBCUTANEOUS at 07:30

## 2019-04-10 RX ADMIN — HYDROMORPHONE HYDROCHLORIDE 8 MILLIGRAM(S): 2 INJECTION INTRAMUSCULAR; INTRAVENOUS; SUBCUTANEOUS at 21:25

## 2019-04-10 RX ADMIN — APIXABAN 5 MILLIGRAM(S): 2.5 TABLET, FILM COATED ORAL at 06:49

## 2019-04-10 RX ADMIN — HYDROMORPHONE HYDROCHLORIDE 8 MILLIGRAM(S): 2 INJECTION INTRAMUSCULAR; INTRAVENOUS; SUBCUTANEOUS at 11:18

## 2019-04-10 RX ADMIN — Medication 1 MILLIGRAM(S): at 11:19

## 2019-04-10 RX ADMIN — Medication 1 PATCH: at 11:19

## 2019-04-10 RX ADMIN — Medication 100 MILLIGRAM(S): at 18:33

## 2019-04-10 RX ADMIN — APIXABAN 5 MILLIGRAM(S): 2.5 TABLET, FILM COATED ORAL at 18:33

## 2019-04-10 RX ADMIN — HYDROMORPHONE HYDROCHLORIDE 8 MILLIGRAM(S): 2 INJECTION INTRAMUSCULAR; INTRAVENOUS; SUBCUTANEOUS at 06:49

## 2019-04-10 RX ADMIN — Medication 100 MILLIGRAM(S): at 06:49

## 2019-04-10 RX ADMIN — HYDROMORPHONE HYDROCHLORIDE 8 MILLIGRAM(S): 2 INJECTION INTRAMUSCULAR; INTRAVENOUS; SUBCUTANEOUS at 20:25

## 2019-04-10 RX ADMIN — Medication 1 PATCH: at 18:33

## 2019-04-10 RX ADMIN — Medication 1 PATCH: at 08:53

## 2019-04-10 RX ADMIN — SENNA PLUS 2 TABLET(S): 8.6 TABLET ORAL at 21:29

## 2019-04-10 RX ADMIN — Medication 1 PATCH: at 11:18

## 2019-04-10 RX ADMIN — HYDROMORPHONE HYDROCHLORIDE 8 MILLIGRAM(S): 2 INJECTION INTRAMUSCULAR; INTRAVENOUS; SUBCUTANEOUS at 11:20

## 2019-04-11 ENCOUNTER — TRANSCRIPTION ENCOUNTER (OUTPATIENT)
Age: 34
End: 2019-04-11

## 2019-04-11 VITALS — WEIGHT: 159.84 LBS

## 2019-04-11 PROCEDURE — 99239 HOSP IP/OBS DSCHRG MGMT >30: CPT

## 2019-04-11 RX ORDER — NICOTINE POLACRILEX 2 MG
1 GUM BUCCAL
Qty: 14 | Refills: 0 | OUTPATIENT
Start: 2019-04-11 | End: 2019-04-24

## 2019-04-11 RX ORDER — NICOTINE POLACRILEX 2 MG
1 GUM BUCCAL
Qty: 14 | Refills: 0
Start: 2019-04-11 | End: 2019-04-24

## 2019-04-11 RX ADMIN — HYDROMORPHONE HYDROCHLORIDE 8 MILLIGRAM(S): 2 INJECTION INTRAMUSCULAR; INTRAVENOUS; SUBCUTANEOUS at 04:58

## 2019-04-11 RX ADMIN — HYDROMORPHONE HYDROCHLORIDE 8 MILLIGRAM(S): 2 INJECTION INTRAMUSCULAR; INTRAVENOUS; SUBCUTANEOUS at 09:09

## 2019-04-11 RX ADMIN — Medication 1 PATCH: at 07:56

## 2019-04-11 RX ADMIN — HYDROMORPHONE HYDROCHLORIDE 8 MILLIGRAM(S): 2 INJECTION INTRAMUSCULAR; INTRAVENOUS; SUBCUTANEOUS at 00:58

## 2019-04-11 RX ADMIN — Medication 100 MILLIGRAM(S): at 04:58

## 2019-04-11 RX ADMIN — Medication 1 PATCH: at 13:13

## 2019-04-11 RX ADMIN — HYDROMORPHONE HYDROCHLORIDE 8 MILLIGRAM(S): 2 INJECTION INTRAMUSCULAR; INTRAVENOUS; SUBCUTANEOUS at 05:30

## 2019-04-11 RX ADMIN — Medication 1 MILLIGRAM(S): at 13:13

## 2019-04-11 RX ADMIN — HYDROMORPHONE HYDROCHLORIDE 8 MILLIGRAM(S): 2 INJECTION INTRAMUSCULAR; INTRAVENOUS; SUBCUTANEOUS at 13:14

## 2019-04-11 RX ADMIN — HYDROMORPHONE HYDROCHLORIDE 8 MILLIGRAM(S): 2 INJECTION INTRAMUSCULAR; INTRAVENOUS; SUBCUTANEOUS at 00:23

## 2019-04-11 RX ADMIN — HYDROMORPHONE HYDROCHLORIDE 8 MILLIGRAM(S): 2 INJECTION INTRAMUSCULAR; INTRAVENOUS; SUBCUTANEOUS at 13:12

## 2019-04-11 RX ADMIN — Medication 1 PATCH: at 13:14

## 2019-04-11 RX ADMIN — APIXABAN 5 MILLIGRAM(S): 2.5 TABLET, FILM COATED ORAL at 04:59

## 2019-04-11 RX ADMIN — Medication 1 TABLET(S): at 13:13

## 2019-04-11 NOTE — DIETITIAN INITIAL EVALUATION ADULT. - OTHER INFO
Pt admitted with pain crisis, avascular necrosis of bone and sickle cell anemia. Pt h/o PE and acute chest syndrome. Met with pt at bedside, pt reports his wt has been stable PTA. Pt reports a good appetite and po intake >75% of all meals at this time. Pt denies chewing/swallowing difficulties. Pt reports eating well PTA. Pt did not have any questions for me at this time. RD to remain available.

## 2019-04-11 NOTE — PROGRESS NOTE ADULT - ASSESSMENT
· Assessment	  33yoM hx sickle cell disease complicated by acute chest syndrome (2003, ?2008), requiring intubation and exchange transfusion, PE (2008), s/p coumadin, LUE DVT (10/2018), now on Eliquis presenting with worsening lower back pain and bilateral hip and leg pain.  Reports pain is characteristic of his prior sickle cell crisis and wasn't relieved with his PO dilaudid at home.  Pain is severe to the point where it's affecting his ability to ambulate and he required a cane to walk.  Received IV Dilaudid 1mg in ED with partial relief.  iSTOP confirms pt on dilaudid 8mg PO. (04 Apr 2019 03:48)      Impact of smoking on quality of life and the numerous consequences of continued smoking including lung disease, heart attack, stroke, vascular disease, malignancy, increased mortality, etc.  The patient understood these effects and agreed on smoking cessation.    Supportive care     > Acute Sickle Cell Crisis - continue  IVF, pain control with dilaudid 8mg PO with breakthrough prn.  Supplemental oxygen. O2.     > Chronic LUE DVT- Continue anticoagulation with apixaban    >Disp - Discharge to home today.

## 2019-04-11 NOTE — PROGRESS NOTE ADULT - REASON FOR ADMISSION
vasoocclusive crisis

## 2019-04-11 NOTE — DISCHARGE NOTE PROVIDER - HOSPITAL COURSE
33yoM hx sickle cell disease complicated by acute chest syndrome (2003, ?2008), requiring intubation and exchange transfusion, PE (2008), s/p coumadin, LUE DVT (10/2018), now on Eliquis presenting with worsening lower back pain and bilateral hip and leg pain.  Reports pain is characteristic of his prior sickle cell crisis and wasn't relieved with his PO dilaudid at home.  Pain is severe to the point where it's affecting his ability to ambulate and he required a cane to walk.  Received IV Dilaudid 1mg in ED with partial relief.  iSTOP confirms pt on Dilaudid 8mg PO. Patient received IVF and pain medications IV.  Pain controlled and transitioned to home doses.  PT consulted and recommended Home.  Patient stable for discharge to [facility] today.

## 2019-04-11 NOTE — DISCHARGE NOTE PROVIDER - NSDCCPCAREPLAN_GEN_ALL_CORE_FT
PRINCIPAL DISCHARGE DIAGNOSIS  Diagnosis: Pain crisis  Assessment and Plan of Treatment: Continue current medications as prescribed.   Follow up with primary doctor.      SECONDARY DISCHARGE DIAGNOSES  Diagnosis: Avascular necrosis of bone  Assessment and Plan of Treatment:     Diagnosis: Sickle cell anemia  Assessment and Plan of Treatment: Continue current medications as prescribed.  Follow up with primary doctor.

## 2019-04-11 NOTE — DISCHARGE NOTE NURSING/CASE MANAGEMENT/SOCIAL WORK - NSDCDPATPORTLINK_GEN_ALL_CORE
You can access the [facility] Patient Portal, offered by [facility], by registering with the following website: [email or web address]

## 2019-04-11 NOTE — PROGRESS NOTE ADULT - SUBJECTIVE AND OBJECTIVE BOX
CC: SSD in painful crisis.    HPI:  33yoM hx sickle cell disease complicated by acute chest syndrome (2003, ?2008), requiring intubation and exchange transfusion, PE (2008), s/p coumadin, LUE DVT (10/2018), now on Eliquis presenting with worsening lower back pain and bilateral hip and leg pain.  Reports pain is characteristic of his prior sickle cell crisis and wasn't relieved with his PO dilaudid at home.  Pain is severe to the point where it's affecting his ability to ambulate and he required a cane to walk.  Denies any fevers, chills, chest pain, dyspnea, abdominal pain, nausea, vomiting. Received IV Dilaudid 1mg in ED with partial relief.  CXR on my read appears clear.  iSTOP confirms pt on dilaudid 8mg PO. (04 Apr 2019 03:48)    REVIEW OF SYSTEMS:    Patient denied fever, chills, abdominal pain, nausea, vomiting, cough, shortness of breath, chest pain or palpitations    Vital Signs Last 24 Hrs  T(C): 37 (06 Apr 2019 09:49), Max: 37 (06 Apr 2019 09:49)  T(F): 98.6 (06 Apr 2019 09:49), Max: 98.6 (06 Apr 2019 09:49)  HR: 58 (06 Apr 2019 09:49) (58 - 75)  BP: 120/76 (06 Apr 2019 09:49) (117/73 - 120/76)  BP(mean): --  RR: 18 (06 Apr 2019 09:49) (18 - 18)  SpO2: 100% (05 Apr 2019 23:45) (100% - 100%)I&O's Summary    05 Apr 2019 07:01  -  06 Apr 2019 07:00  --------------------------------------------------------  IN: 150 mL / OUT: 0 mL / NET: 150 mL      PHYSICAL EXAM:  GENERAL: NAD, well-groomed  HEENT: PERRL, +EOMI, anicteric, no [place]  NECK: Supple, No JVD   CHEST/LUNG: CTA bilaterally; Normal effort  HEART: S1S2 Normal intensity, no murmurs, gallops or rubs noted  ABDOMEN: Soft, BS Normoactive, NT, ND, no HSM noted  EXTREMITIES:  2+ radial and DP pulses noted, no clubbing, cyanosis, or edema noted, Limited mobility   SKIN: No rashes or lesions noted  NEURO: A&Ox3, no focal deficits noted, CN II-XII intact  PSYCH: normal mood and affect; insight/judgement appropriate  LABS:              RADIOLOGY & ADDITIONAL TESTS:    MEDICATIONS:  MEDICATIONS  (STANDING):  apixaban 5 milliGRAM(s) Oral every 12 hours  docusate sodium 100 milliGRAM(s) Oral two times a day  folic acid 1 milliGRAM(s) Oral daily  multivitamin 1 Tablet(s) Oral daily  nicotine - 21 mG/24Hr(s) Patch 1 patch Transdermal daily  senna 2 Tablet(s) Oral at bedtime  sodium chloride 0.45%. 1000 milliLiter(s) (150 mL/Hr) IV Continuous <Continuous>    MEDICATIONS  (PRN):  HYDROmorphone  Injectable 2 milliGRAM(s) IV Push every 4 hours PRN Moderate to Severe Pain  HYDROmorphone  Injectable 1 milliGRAM(s) IV Push every 4 hours PRN Breakthrough Pain  polyethylene glycol 3350 17 Gram(s) Oral daily PRN Constipation
CC: vasoocclusive crisis (09 Apr 2019 12:08)    HPI:  33yoM hx sickle cell disease complicated by acute chest syndrome (2003, ?2008), requiring intubation and exchange transfusion, PE (2008), s/p coumadin, LUE DVT (10/2018), now on Eliquis presented with worsening lower back pain and bilateral hip and leg pain.  Reports pain is characteristic of his prior sickle cell crisis and wasn't relieved with his PO dilaudid at home.  Pain is severe to the point where it's affecting his ability to ambulate and he required a cane to walk.   Received IV Dilaudid 1mg in ED with partial relief.  iSTOP confirms pt on dilaudid 8mg PO. (04 Apr 2019 03:48)    INTERVAL HPI/OVERNIGHT EVENTS: Patient seen and examined lying in bed.  Pain controlled.  Patient denies any headache, dizziness, SOB, CP, abdominal pain, nausea, vomiting, dysuria.  Other ROS viewed and are negative.    Vital Signs Last 24 Hrs  T(C): 36.2 (11 Apr 2019 08:35), Max: 36.6 (11 Apr 2019 00:41)  T(F): 97.2 (11 Apr 2019 08:35), Max: 97.8 (11 Apr 2019 00:41)  HR: 52 (11 Apr 2019 08:35) (52 - 82)  BP: 110/68 (11 Apr 2019 08:35) (107/68 - 113/68)  BP(mean): --  RR: 18 (11 Apr 2019 08:35) (18 - 18)  SpO2: 100% (11 Apr 2019 08:35) (97% - 100%)  I&O's Detail    PHYSICAL EXAM:  GENERAL: NAD, well-groomed, well-developed  HEAD:  Atraumatic, Normocephalic  NECK: Supple, No JVD, Normal thyroid  NERVOUS SYSTEM:  Alert & Oriented X3, Good concentration; Motor Strength 5/5 B/L upper and lower extremities  CHEST/LUNG: Clear to auscultation bilaterally; No rales, rhonchi, wheezing, or rubs  HEART: Regular rate and rhythm; No murmurs, rubs, or gallops  ABDOMEN: Soft, Nontender, Nondistended; Bowel sounds present  EXTREMITIES:  2+ Peripheral Pulses, No clubbing, cyanosis, or edema        MEDICATIONS  (STANDING):  apixaban 5 milliGRAM(s) Oral every 12 hours  docusate sodium 100 milliGRAM(s) Oral two times a day  folic acid 1 milliGRAM(s) Oral daily  multivitamin 1 Tablet(s) Oral daily  nicotine - 21 mG/24Hr(s) Patch 1 patch Transdermal daily  senna 2 Tablet(s) Oral at bedtime  sodium chloride 0.45%. 1000 milliLiter(s) (150 mL/Hr) IV Continuous <Continuous>    MEDICATIONS  (PRN):  HYDROmorphone   Tablet 8 milliGRAM(s) Oral every 4 hours PRN Severe Pain (7 - 10)  HYDROmorphone  Injectable 1 milliGRAM(s) IV Push every 4 hours PRN Breakthrough Pain  polyethylene glycol 3350 17 Gram(s) Oral daily PRN Constipation
CC: SSD in painful crisis.  Patient said low back pain is resolving.  Cristian thigh pains persist.  Left upper ext DVT.   HPI:  33yoM hx sickle cell disease complicated by acute chest syndrome (2003, ?2008), requiring intubation and exchange transfusion, PE (2008), s/p coumadin, LUE DVT (10/2018), now on Eliquis presenting with worsening lower back pain and bilateral hip and leg pain.  Reports pain is characteristic of his prior sickle cell crisis and wasn't relieved with his PO dilaudid at home.  Pain is severe to the point where it's affecting his ability to ambulate and he required a cane to walk.  Denies any fevers, chills, chest pain, dyspnea, abdominal pain, nausea, vomiting. Received IV Dilaudid 1mg in ED with partial relief.  CXR on my read appears clear.  iSTOP confirms pt on dilaudid 8mg PO. (04 Apr 2019 03:48)    REVIEW OF SYSTEMS:    Patient denied fever, chills, abdominal pain, nausea, vomiting, cough, shortness of breath, chest pain or palpitations    Vital Signs Last 24 Hrs  T(C): 36.7 (08 Apr 2019 08:00), Max: 36.8 (07 Apr 2019 15:39)  T(F): 98 (08 Apr 2019 08:00), Max: 98.2 (07 Apr 2019 15:39)  HR: 53 (08 Apr 2019 08:00) (50 - 53)  BP: 129/77 (08 Apr 2019 08:00) (122/68 - 134/70)  BP(mean): --  RR: 18 (08 Apr 2019 08:00) (18 - 18)  SpO2: 98% (08 Apr 2019 08:00) (98% - 100%)I&O's Summary    08 Apr 2019 07:01  -  08 Apr 2019 15:05  --------------------------------------------------------  IN: 450 mL / OUT: 500 mL / NET: -50 mL      PHYSICAL EXAM:  GENERAL: NAD, well-groomed  HEENT: PERRL, +EOMI, anicteric, no [place]  NECK: Supple, No JVD   CHEST/LUNG: CTA bilaterally; Normal effort  HEART: S1S2 Normal intensity, no murmurs, gallops or rubs noted  ABDOMEN: Soft, BS Normoactive, NT, ND, no HSM noted  EXTREMITIES:  Left upper ext 2+ radial and DP pulses noted, no clubbing, cyanosis, or edema noted, FROM x 4  SKIN: No rashes or lesions noted  NEURO: A&Ox3, no focal deficits noted, CN II-XII intact  PSYCH: normal mood and affect; insight/judgement appropriate  LABS:              RADIOLOGY & ADDITIONAL TESTS:    MEDICATIONS:  MEDICATIONS  (STANDING):  apixaban 5 milliGRAM(s) Oral every 12 hours  docusate sodium 100 milliGRAM(s) Oral two times a day  folic acid 1 milliGRAM(s) Oral daily  multivitamin 1 Tablet(s) Oral daily  nicotine - 21 mG/24Hr(s) Patch 1 patch Transdermal daily  senna 2 Tablet(s) Oral at bedtime  sodium chloride 0.45%. 1000 milliLiter(s) (150 mL/Hr) IV Continuous <Continuous>    MEDICATIONS  (PRN):  HYDROmorphone  Injectable 2 milliGRAM(s) IV Push every 4 hours PRN Moderate to Severe Pain  HYDROmorphone  Injectable 1 milliGRAM(s) IV Push every 4 hours PRN Breakthrough Pain  polyethylene glycol 3350 17 Gram(s) Oral daily PRN Constipation
CC: Sickle cell disease in painful crisis. Modest response to pain control medication .   HPI:  33yoM hx sickle cell disease complicated by acute chest syndrome (2003, ?2008), requiring intubation and exchange transfusion, PE (2008), s/p coumadin, LUE DVT (10/2018), now on Eliquis presenting with worsening lower back pain and bilateral hip and leg pain.  Reports pain is characteristic of his prior sickle cell crisis and wasn't relieved with his PO dilaudid at home.  Pain is severe to the point where it's affecting his ability to ambulate and he required a cane to walk.  Denies any fevers, chills, chest pain, dyspnea, abdominal pain, nausea, vomiting. Received IV Dilaudid 1mg in ED with partial relief.  CXR on my read appears clear.  iSTOP confirms pt on dilaudid 8mg PO. (04 Apr 2019 03:48)    REVIEW OF SYSTEMS:    Patient denied fever, chills, abdominal pain, nausea, vomiting, cough, shortness of breath, chest pain or palpitations    Vital Signs Last 24 Hrs  T(C): 36.6 (07 Apr 2019 08:02), Max: 36.9 (06 Apr 2019 16:24)  T(F): 97.8 (07 Apr 2019 08:02), Max: 98.4 (06 Apr 2019 16:24)  HR: 57 (07 Apr 2019 08:02) (57 - 66)  BP: 120/71 (07 Apr 2019 08:02) (105/69 - 121/71)  BP(mean): --  RR: 18 (07 Apr 2019 08:02) (18 - 18)  SpO2: 100% (07 Apr 2019 08:02) (97% - 100%)I&O's Summary    06 Apr 2019 07:01  -  07 Apr 2019 07:00  --------------------------------------------------------  IN: 1500 mL / OUT: 2200 mL / NET: -700 mL      PHYSICAL EXAM:  GENERAL: NAD,   HEENT: PERRL, +EOMI, anicteric, no [place]  NECK: Supple, No JVD   CHEST/LUNG: CTA bilaterally; Normal effort  HEART: S1S2 Normal intensity, no murmurs, gallops or rubs noted  ABDOMEN: Soft, BS Normoactive, NT, ND, no HSM noted  EXTREMITIES:  2+ radial and DP pulses noted, no clubbing, cyanosis, or edema noted, Limited mobility   SKIN: No rashes or lesions noted  NEURO: A&Ox3, no focal deficits noted, CN II-XII intact  PSYCH: normal mood and affect; insight/judgement appropriate  LABS:              RADIOLOGY & ADDITIONAL TESTS:    MEDICATIONS:  MEDICATIONS  (STANDING):  apixaban 5 milliGRAM(s) Oral every 12 hours  docusate sodium 100 milliGRAM(s) Oral two times a day  folic acid 1 milliGRAM(s) Oral daily  multivitamin 1 Tablet(s) Oral daily  nicotine - 21 mG/24Hr(s) Patch 1 patch Transdermal daily  senna 2 Tablet(s) Oral at bedtime  sodium chloride 0.45%. 1000 milliLiter(s) (150 mL/Hr) IV Continuous <Continuous>    MEDICATIONS  (PRN):  HYDROmorphone  Injectable 2 milliGRAM(s) IV Push every 4 hours PRN Moderate to Severe Pain  HYDROmorphone  Injectable 1 milliGRAM(s) IV Push every 4 hours PRN Breakthrough Pain  polyethylene glycol 3350 17 Gram(s) Oral daily PRN Constipation
CC: vasoocclusive crisis (08 Apr 2019 15:04)    HPI:  33yoM hx sickle cell disease complicated by acute chest syndrome (2003, ?2008), requiring intubation and exchange transfusion, PE (2008), s/p coumadin, LUE DVT (10/2018), now on Eliquis presenting with worsening lower back pain and bilateral hip and leg pain.  Reports pain is characteristic of his prior sickle cell crisis and wasn't relieved with his PO dilaudid at home.  Pain is severe to the point where it's affecting his ability to ambulate and he required a cane to walk.  Received IV Dilaudid 1mg in ED with partial relief.  CXR on my read appears clear.  iSTOP confirms pt on dilaudid 8mg PO. (04 Apr 2019 03:48)    INTERVAL HPI/OVERNIGHT EVENTS: no overnight events . Patient reports low back pain is resolving patients reports pain level 7/10.patient states his pain is in his bilateral hips .      Vital Signs Last 24 Hrs  T(C): 36.8 (09 Apr 2019 08:46), Max: 36.9 (08 Apr 2019 23:10)  T(F): 98.2 (09 Apr 2019 08:46), Max: 98.5 (08 Apr 2019 23:10)  HR: 46 (09 Apr 2019 08:46) (46 - 63)  BP: 144/87 (09 Apr 2019 08:46) (112/67 - 144/87)  BP(mean): --  RR: 18 (09 Apr 2019 08:46) (18 - 18)  SpO2: 96% (08 Apr 2019 23:10) (96% - 100%)  I&O's Detail    08 Apr 2019 07:01  -  09 Apr 2019 07:00  --------------------------------------------------------  IN:    sodium chloride 0.45%.: 2250 mL  Total IN: 2250 mL    OUT:    Voided: 2700 mL  Total OUT: 2700 mL    Total NET: -450 mL      PHYSICAL EXAM:  GENERAL: NAD, well-groomed  HEENT: PERRL,   NECK: Supple, No JVD   CHEST/LUNG: CTA bilaterally; Normal effort  HEART: S1,s2,, no murmurs, gallops or rubs noted  ABDOMEN: Soft, BS present in all areas   EXTREMITIES:  Left upper ext 2+ radial and DP pulses noted, no clubbing, cyanosis, or edema noted, FROM x 4  SKIN: No rashes or lesions noted  NEURO: A&Ox3, no focal deficits noted,  PSYCH: normal mood and affect                               9.3    8.4   )-----------( 394      ( 09 Apr 2019 11:22 )             28.5     09 Apr 2019 11:22    LABS:    138    |  102    |  14.0   ----------------------------<  86     4.0     |  23.0   |  0.53     Ca    8.9        09 Apr 2019 11:22      PT/INR - ( 09 Apr 2019 11:21 )   PT: 16.7 sec;   INR: 1.44 ratio          MEDICATIONS  (STANDING):  apixaban 5 milliGRAM(s) Oral every 12 hours  docusate sodium 100 milliGRAM(s) Oral two times a day  folic acid 1 milliGRAM(s) Oral daily  multivitamin 1 Tablet(s) Oral daily  nicotine - 21 mG/24Hr(s) Patch 1 patch Transdermal daily  senna 2 Tablet(s) Oral at bedtime  sodium chloride 0.45%. 1000 milliLiter(s) (150 mL/Hr) IV Continuous <Continuous>    MEDICATIONS  (PRN):  HYDROmorphone   Tablet 8 milliGRAM(s) Oral every 6 hours PRN Severe Pain (7 - 10)  HYDROmorphone  Injectable 1 milliGRAM(s) IV Push every 4 hours PRN Breakthrough Pain  polyethylene glycol 3350 17 Gram(s) Oral daily PRN Constipation      RADIOLOGY & ADDITIONAL TESTS:
CHIEF COMPLAINT/INTERVAL HISTORY:  Pt. seen and evaluated for sickle cell crisis.  Pt. reports having pain in lower back and hips which is common for his sickle cell crisis.  Pt. reports dilaudid is helping control his pain.     REVIEW OF SYSTEMS:  No fever, CP, SOB, or abdominal pain.     Vital Signs Last 24 Hrs  T(C): 36.7 (2019 11:42), Max: 36.9 (2019 16:56)  T(F): 98 (2019 11:42), Max: 98.5 (2019 16:56)  HR: 45 (2019 11:42) (45 - 100)  BP: 112/69 (2019 11:42) (111/76 - 126/84)  BP(mean): --  RR: 18 (2019 11:42) (18 - 20)  SpO2: 100% (2019 11:42) (99% - 100%)    PHYSICAL EXAM:  GENERAL: NAD  HEENT: EOMI, hearing normal, conjunctiva and sclera clear  Chest: CTA bilaterally, no wheezing  CV: S1S2, RRR,   GI: soft, +BS, NT/ND  Musculoskeletal: no edema  Psychiatric: affect nL, mood nL  Skin: warm and dry    LABS:                        8.5    17.5  )-----------( 411      ( 2019 00:14 )             25.4         141  |  105  |  15.0  ----------------------------<  96  3.3<L>   |  21.0<L>  |  0.61    Ca    9.4      2019 23:13    TPro  8.7  /  Alb  3.2<L>  /  TBili  0.3<L>  /  DBili  x   /  AST  16  /  ALT  15  /  AlkPhos  111  -03      Urinalysis Basic - ( 2019 23:39 )    Color: Yellow / Appearance: Clear / S.010 / pH: x  Gluc: x / Ketone: Trace  / Bili: Negative / Urobili: 1 mg/dL   Blood: x / Protein: 30 mg/dL / Nitrite: Negative   Leuk Esterase: Negative / RBC: 0-2 /HPF / WBC 0-2   Sq Epi: x / Non Sq Epi: Negative / Bacteria: Few        Assessment and Plan:  33yoM hx sickle cell disease complicated by acute chest syndrome (, ?), requiring intubation and exchange transfusion, PE (), s/p coumadin, LUE DVT (10/2018), now on Eliquis presenting with worsening lower back pain and bilateral hip and leg pain, being admitted for vasoocclusive sickle cell crisis  -Sickle cell crisis:  continue Dilaudid PRN and 1/2NS@150cc/hr x 24 hours.  Continue MVI and folic acid.   -Leukocytosis:  likely reactive 2/2 sickle cell crisis.    -Anemia:  stable at baseline hbg 8.    -LUE DVT:  continue Eliquis 5mg PO Q12h  -Hypokalemia:  s/p KCl supplement  -Smoker:  continue nicotine 21mg patch daily  -VTE ppx:  On Eliquis
Patient: VALERIY TOLEDO [id number] 33y Male                           Internal Medicine Hospitalist Progress Note    Initial HPI:  33yoM hx sickle cell disease complicated by acute chest syndrome (, ?), requiring intubation and exchange transfusion, PE (), s/p coumadin, LUE DVT (10/2018), now on Eliquis presenting with worsening lower back pain and bilateral hip and leg pain.  Reports pain is characteristic of his prior sickle cell crisis and wasn't relieved with his PO dilaudid at home.  Pain is severe to the point where it's affecting his ability to ambulate and he required a cane to walk.  Denies any fevers, chills, chest pain, dyspnea, abdominal pain, nausea, vomiting. Received IV Dilaudid 1mg in ED with partial relief.  CXR on my read appears clear.  iSTOP confirms pt on dilaudid 8mg PO. (2019 03:48)    Interval History:  Started on IVF and pain control.  Reports some improvement in control.  No SOB.  No chest pain / palpitations.  + BM.  No additional complaints.     ____________________PHYSICAL EXAM:  Vitals reviewed as indicated below  GENERAL:  NAD Alert and Oriented x 3   HEENT: NCAT  CARDIOVASCULAR:  S1, S2  LUNGS: CTAB  ABDOMEN:  soft, (-) tenderness, (-) distension, (+) bowel sounds, (-) guarding, (-) rebound (-) rigidity  EXTREMITIES:  no cyanosis / clubbing / edema.   ____________________      BACKGROUND:  HEALTH ISSUES - PROBLEM Dx:        Allergies    No Known Allergies    Intolerances      PAST MEDICAL & SURGICAL HISTORY:  Avascular Necrosis of Bone: hips  Acute Chest Syndrome:  and possibly , with intubation, exchange transfusion  Personal History of PE (Pulmonary Embolism): , post 6mo coumadin  Sickle Cell Anemia: SC  No Past Surgical History        VITALS:  Vital Signs Last 24 Hrs  T(C): 37.3 (2019 14:02), Max: 37.3 (2019 14:02)  T(F): 99.1 (2019 14:02), Max: 99.1 (2019 14:02)  HR: 78 (2019 14:02) (65 - 81)  BP: 124/74 (2019 14:02) (108/74 - 124/74)  BP(mean): --  RR: 18 (2019 14:02) (18 - 18)  SpO2: 100% (2019 14:02) (98% - 100%) Daily     Daily   CAPILLARY BLOOD GLUCOSE        I&O's Summary        LABS:                        8.5    17.5  )-----------( 411      ( 2019 00:14 )             25.4     04-    141  |  105  |  15.0  ----------------------------<  96  3.3<L>   |  21.0<L>  |  0.61    Ca    9.4      2019 23:13    TPro  8.7  /  Alb  3.2<L>  /  TBili  0.3<L>  /  DBili  x   /  AST  16  /  ALT  15  /  AlkPhos  111  04-03      LIVER FUNCTIONS - ( 2019 23:13 )  Alb: 3.2 g/dL / Pro: 8.7 g/dL / ALK PHOS: 111 U/L / ALT: 15 U/L / AST: 16 U/L / GGT: x           Urinalysis Basic - ( 2019 23:39 )    Color: Yellow / Appearance: Clear / S.010 / pH: x  Gluc: x / Ketone: Trace  / Bili: Negative / Urobili: 1 mg/dL   Blood: x / Protein: 30 mg/dL / Nitrite: Negative   Leuk Esterase: Negative / RBC: 0-2 /HPF / WBC 0-2   Sq Epi: x / Non Sq Epi: Negative / Bacteria: Few              MEDICATIONS:  MEDICATIONS  (STANDING):  apixaban 5 milliGRAM(s) Oral every 12 hours  docusate sodium 100 milliGRAM(s) Oral two times a day  folic acid 1 milliGRAM(s) Oral daily  multivitamin 1 Tablet(s) Oral daily  nicotine - 21 mG/24Hr(s) Patch 1 patch Transdermal daily  senna 2 Tablet(s) Oral at bedtime  sodium chloride 0.45%. 1000 milliLiter(s) (150 mL/Hr) IV Continuous <Continuous>    MEDICATIONS  (PRN):  HYDROmorphone  Injectable 2 milliGRAM(s) IV Push every 4 hours PRN Moderate to Severe Pain  HYDROmorphone  Injectable 1 milliGRAM(s) IV Push every 4 hours PRN Breakthrough Pain  polyethylene glycol 3350 17 Gram(s) Oral daily PRN Constipation

## 2019-04-22 ENCOUNTER — EMERGENCY (EMERGENCY)
Facility: HOSPITAL | Age: 34
LOS: 1 days | Discharge: HOME | End: 2019-04-22
Attending: EMERGENCY MEDICINE | Admitting: EMERGENCY MEDICINE
Payer: MEDICAID

## 2019-04-22 VITALS
HEART RATE: 124 BPM | TEMPERATURE: 98 F | SYSTOLIC BLOOD PRESSURE: 133 MMHG | OXYGEN SATURATION: 99 % | DIASTOLIC BLOOD PRESSURE: 75 MMHG | WEIGHT: 160.06 LBS | RESPIRATION RATE: 18 BRPM | HEIGHT: 68 IN

## 2019-04-22 DIAGNOSIS — Z79.899 OTHER LONG TERM (CURRENT) DRUG THERAPY: ICD-10-CM

## 2019-04-22 DIAGNOSIS — D57.00 HB-SS DISEASE WITH CRISIS, UNSPECIFIED: ICD-10-CM

## 2019-04-22 DIAGNOSIS — F17.200 NICOTINE DEPENDENCE, UNSPECIFIED, UNCOMPLICATED: ICD-10-CM

## 2019-04-22 DIAGNOSIS — M54.5 LOW BACK PAIN: ICD-10-CM

## 2019-04-22 DIAGNOSIS — Z79.891 LONG TERM (CURRENT) USE OF OPIATE ANALGESIC: ICD-10-CM

## 2019-04-22 PROCEDURE — 99283 EMERGENCY DEPT VISIT LOW MDM: CPT | Mod: 25

## 2019-04-22 RX ORDER — HYDROMORPHONE HYDROCHLORIDE 2 MG/ML
8 INJECTION INTRAMUSCULAR; INTRAVENOUS; SUBCUTANEOUS ONCE
Qty: 0 | Refills: 0 | Status: DISCONTINUED | OUTPATIENT
Start: 2019-04-22 | End: 2019-04-22

## 2019-04-22 RX ORDER — DIPHENHYDRAMINE HCL 50 MG
50 CAPSULE ORAL ONCE
Qty: 0 | Refills: 0 | Status: COMPLETED | OUTPATIENT
Start: 2019-04-22 | End: 2019-04-22

## 2019-04-22 RX ORDER — ACETAMINOPHEN 500 MG
1000 TABLET ORAL ONCE
Qty: 0 | Refills: 0 | Status: COMPLETED | OUTPATIENT
Start: 2019-04-22 | End: 2019-04-22

## 2019-04-22 RX ADMIN — Medication 1000 MILLIGRAM(S): at 23:43

## 2019-04-22 RX ADMIN — Medication 50 MILLIGRAM(S): at 23:43

## 2019-04-22 RX ADMIN — HYDROMORPHONE HYDROCHLORIDE 8 MILLIGRAM(S): 2 INJECTION INTRAMUSCULAR; INTRAVENOUS; SUBCUTANEOUS at 23:42

## 2019-04-22 NOTE — ED ADULT NURSE NOTE - OBJECTIVE STATEMENT
Patient AOX4 with hx of sickle cell c/o of lower back pain unrelieved by home pain medications. Denies CP/SOB. Speaking in full, complete sentences.

## 2019-04-23 VITALS
SYSTOLIC BLOOD PRESSURE: 104 MMHG | OXYGEN SATURATION: 100 % | RESPIRATION RATE: 16 BRPM | HEART RATE: 77 BPM | DIASTOLIC BLOOD PRESSURE: 66 MMHG | TEMPERATURE: 98 F

## 2019-04-23 PROCEDURE — 99283 EMERGENCY DEPT VISIT LOW MDM: CPT

## 2019-04-23 RX ORDER — HYDROMORPHONE HYDROCHLORIDE 2 MG/ML
8 INJECTION INTRAMUSCULAR; INTRAVENOUS; SUBCUTANEOUS ONCE
Qty: 0 | Refills: 0 | Status: DISCONTINUED | OUTPATIENT
Start: 2019-04-23 | End: 2019-04-23

## 2019-04-23 RX ADMIN — Medication 1000 MILLIGRAM(S): at 00:00

## 2019-04-23 RX ADMIN — HYDROMORPHONE HYDROCHLORIDE 8 MILLIGRAM(S): 2 INJECTION INTRAMUSCULAR; INTRAVENOUS; SUBCUTANEOUS at 00:50

## 2019-04-23 RX ADMIN — HYDROMORPHONE HYDROCHLORIDE 8 MILLIGRAM(S): 2 INJECTION INTRAMUSCULAR; INTRAVENOUS; SUBCUTANEOUS at 00:00

## 2019-04-23 RX ADMIN — HYDROMORPHONE HYDROCHLORIDE 8 MILLIGRAM(S): 2 INJECTION INTRAMUSCULAR; INTRAVENOUS; SUBCUTANEOUS at 02:30

## 2019-04-23 RX ADMIN — HYDROMORPHONE HYDROCHLORIDE 8 MILLIGRAM(S): 2 INJECTION INTRAMUSCULAR; INTRAVENOUS; SUBCUTANEOUS at 02:15

## 2019-04-23 RX ADMIN — HYDROMORPHONE HYDROCHLORIDE 8 MILLIGRAM(S): 2 INJECTION INTRAMUSCULAR; INTRAVENOUS; SUBCUTANEOUS at 00:35

## 2019-04-23 NOTE — ED ADULT NURSE REASSESSMENT NOTE - NS ED NURSE REASSESS COMMENT FT1
report from Wilfrid ESCOBAR. pt is sleeping comfortably, respirations are symmetrical and unlabored. will monitor and reassess

## 2019-04-23 NOTE — ED PROVIDER NOTE - OBJECTIVE STATEMENT
34 y/o M w/hx SS w/frequent ED visits for SS crisis pain, hx acute chest, chronic hip pain, p/w exacerbation of his SS-type hip and lower back pain. Denies numbness/tingling/weakness. No trauma/falls. Normal stooling. No urinary sx/incontinence/hesitation/incomplete voiding. No fever/chills. Took 8gm PO dilaudid 2x today, noted only mild improvement, so presented to ED for eval and tx. No CP or SOB.

## 2019-04-23 NOTE — ED PROVIDER NOTE - CLINICAL SUMMARY MEDICAL DECISION MAKING FREE TEXT BOX
Pain well controlled in ED, HDS and comfortable, was able to sleep several hours in ED, ambulatory w/out assistance. Has appropriate f/u with heme-onc. Given return precautions and anticipatory guidance.

## 2019-04-23 NOTE — ED PROVIDER NOTE - PHYSICAL EXAMINATION
VITAL SIGNS: I have reviewed nursing notes and confirm.  CONSTITUTIONAL: Well-developed; well-nourished; in no acute distress.  SKIN: Agree with RN documentation regarding decubitus evaluation. Remainder of skin exam is warm and dry, no acute rash.  HEAD: Normocephalic; atraumatic.  EYES: PERRL, EOM intact; conjunctiva and sclera clear.  ENT: No nasal discharge; airway clear.  NECK: Supple; non tender.  CARD: S1, S2 normal; no murmurs, gallops, or rubs. Regular rate and rhythm.  RESP: No wheezes, rales or rhonchi.  ABD: Normal bowel sounds; soft; non-distended; non-tender  EXT: Normal ROM. No clubbing, cyanosis or edema, non-ttp all ext, pain elicited with flexing hips b/l though no pain w/micromovements or rolling  LYMPH: No acute cervical adenopathy.  NEURO: Alert, oriented. Grossly unremarkable.  PSYCH: Cooperative, appropriate.

## 2019-04-23 NOTE — ED PROVIDER NOTE - NSFOLLOWUPINSTRUCTIONS_ED_ALL_ED_FT
Log Out.    [employer] CareNotes®     :  [facility]             SICKLE CELL CRISIS - AfterCare(R) Instructions(ER/ED)     Sickle Cell Crisis    WHAT YOU NEED TO KNOW:    A sickle cell crisis is a painful episode that occurs in people who have sickle cell anemia. It happens when sickle-shaped red blood cells (RBCs) block blood vessels. Blood and oxygen cannot get to tissues, causing pain. A sickle cell crisis can also damage your tissues and cause organ failure, such liver or kidney failure. A sickle cell crisis can become life-threatening.    DISCHARGE INSTRUCTIONS:    Call 911 for any of the following:     You have shortness of breath or chest pain.      You are a man and have an erection that is painful and does not go away.       You lose vision in one or both eyes.    Return to the emergency department if:     You have a fever.      You feel like you cannot cope with your pain, or you feel like hurting yourself.       You have behavior changes, a seizure, or faint.       You have abdominal pain, nausea, or vomiting.      You have a headache that is worse or different from those that you have had in the past.       You have new weakness or numbness in your arm, leg, or face.      You have new pain in any part of your body.      Your urine is dark and you are urinating less than usual or not at all.       You are dizzy, lightheaded, or faint.     Contact your healthcare provider if:     You have any new signs or symptoms.       You have blood in your urine.       You are constipated or you have diarrhea.       You have changes in your vision.       You have increased fatigue.       You plan to travel by airplane or to a high [facility].       You have questions or concerns about your condition or care.    Medicines: You may need any of the following:     Prescription pain medicine may be given. Ask how to take this medicine safely. Medicine may also be given to decrease sickling of your RBCs. You may also need medicine to treat or prevent a bacterial infection.       NSAIDs, such as ibuprofen, help decrease swelling, pain, and fever. This medicine is available with or without a doctor's order. NSAIDs can cause stomach bleeding or kidney problems in certain people. If you take blood thinner medicine, always ask your healthcare provider if NSAIDs are safe for you. Always read the medicine label and follow directions.      Acetaminophen decreases pain and fever. It is available without a doctor's order. Ask how much to take and how often to take it. Follow directions. Acetaminophen can cause liver damage if not taken correctly.      Take your medicine as directed. Contact your healthcare provider if you think your medicine is not helping or if you have side effects. Tell him or her if you are allergic to any medicine. Keep a list of the medicines, vitamins, and herbs you take. Include the amounts, and when and why you take them. Bring the list or the pill bottles to follow-up visits. Carry your medicine list with you in case of an emergency.    Medical alert identification: Wear medical alert jewelry or carry a card that says you have sickle cell anemia. Ask your healthcare provider where to get these items. Medical Alert Jewelry         Prevent a sickle cell crisis:     Take vitamins and minerals as directed. Folic acid can help prevent blood vessel problems that can occur with sickle cell anemia. Zinc may decrease how often you have pain.       Drink liquids as directed. Dehydration can increase your risk for a sickle cell crisis. Ask how much liquid to drink each day and which liquids are best for you.      Balance rest and exercise. Rest during a sickle cell crisis. Over time, increase your activity to a moderate amount. Exercise regularly. Avoid exercise or activities that can cause injury, such as football. Ask about the best exercise plan for you.       Stay out of the cold. Do not go quickly from a warm place to a cold place. Do not go swimming in cold water. Stay warm in the winter.      Do not smoke cigarettes or drink alcohol. These increase your risk for a sickle cell crisis. Nicotine and other chemicals in cigarettes and cigars can cause lung damage. Ask your healthcare provider for information if you currently smoke and need help to quit. E-cigarettes or smokeless tobacco still contain nicotine. Talk to your healthcare provider before you use these products.       Ask about vaccinations you need. Vaccinations can help prevent a viral infection that may lead to a sickle cell crisis. Get a flu shot every year as directed. You may need a pneumonia vaccine every 5 years.     Follow up with your healthcare provider as directed: You may need ongoing screening for conditions that can develop because of sickle cell disease. Examples include kidney disease, hypertension (high blood pressure), retinopathy (eye problems), and problems with your lungs. Write down your questions so you remember to ask them during your visits.

## 2019-04-23 NOTE — ED ADULT NURSE REASSESSMENT NOTE - NS ED NURSE REASSESS COMMENT FT1
pt reports back and b/l hip pain reduced to a level 4/10 on the pain scale which is tolerable to the patient. dc papers reviewed. ambulatory w cane.

## 2019-04-29 ENCOUNTER — EMERGENCY (EMERGENCY)
Facility: HOSPITAL | Age: 34
LOS: 1 days | Discharge: HOME | End: 2019-04-29
Attending: EMERGENCY MEDICINE | Admitting: EMERGENCY MEDICINE
Payer: MEDICAID

## 2019-04-29 VITALS
HEIGHT: 66 IN | HEART RATE: 91 BPM | SYSTOLIC BLOOD PRESSURE: 134 MMHG | OXYGEN SATURATION: 99 % | TEMPERATURE: 98 F | WEIGHT: 139.99 LBS | RESPIRATION RATE: 20 BRPM | DIASTOLIC BLOOD PRESSURE: 83 MMHG

## 2019-04-29 VITALS
TEMPERATURE: 98 F | DIASTOLIC BLOOD PRESSURE: 85 MMHG | HEART RATE: 78 BPM | OXYGEN SATURATION: 99 % | RESPIRATION RATE: 18 BRPM | SYSTOLIC BLOOD PRESSURE: 128 MMHG

## 2019-04-29 PROCEDURE — 99283 EMERGENCY DEPT VISIT LOW MDM: CPT | Mod: 25

## 2019-04-29 PROCEDURE — 99283 EMERGENCY DEPT VISIT LOW MDM: CPT

## 2019-04-29 RX ORDER — HYDROMORPHONE HYDROCHLORIDE 2 MG/ML
16 INJECTION INTRAMUSCULAR; INTRAVENOUS; SUBCUTANEOUS ONCE
Qty: 0 | Refills: 0 | Status: DISCONTINUED | OUTPATIENT
Start: 2019-04-29 | End: 2019-04-29

## 2019-04-29 RX ADMIN — HYDROMORPHONE HYDROCHLORIDE 16 MILLIGRAM(S): 2 INJECTION INTRAMUSCULAR; INTRAVENOUS; SUBCUTANEOUS at 07:08

## 2019-04-29 RX ADMIN — HYDROMORPHONE HYDROCHLORIDE 16 MILLIGRAM(S): 2 INJECTION INTRAMUSCULAR; INTRAVENOUS; SUBCUTANEOUS at 05:58

## 2019-04-29 NOTE — ED PROVIDER NOTE - PMH
Acute Chest Syndrome  2003 and possibly 2008, with intubation, exchange transfusion  Avascular Necrosis of Bone  hips  DVT (deep venous thrombosis)    Personal History of PE (Pulmonary Embolism)  2008, post 6mo coumadin  Sickle Cell Anemia  [place]

## 2019-04-29 NOTE — ED ADULT TRIAGE NOTE - CHIEF COMPLAINT QUOTE
my sickle cell is flaring up;  w/hip and back pains- took Dilaudid 8mg po @ 8pm and another Dilaudid 8 mg po @ 9pm last night with no relief

## 2019-04-29 NOTE — ED PROVIDER NOTE - OBJECTIVE STATEMENT
33yoM hx sickle cell disease complicated by acute chest syndrome (2003, ?2008), requiring intubation and exchange transfusion, PE (2008), s/p coumadin, LUE DVT (10/2018), now on Eliquis presenting with worsening lower back pain and bilateral hip and leg pain. 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  No cp, palpitations, sob, fever, cough, abd pain, + nl po's.  Pain similar to his typical pain crises.

## 2019-04-29 NOTE — ED ADULT TRIAGE NOTE - BMI (KG/M2)
Date:  4/10/2018    Medication:  buprenorphine-naLOXone (SUBOXONE FILM) 8-2 MG sublingual film    Message to Prescriber: NEW PHARMACY     PA Company Name:  [employer] Phone/Fax Number:  [phone]    Pharmacy Name:  [facility]     Pharmacy Phone/Fax Number:  [phone]    Prior Auth (PA) form requested from insurance:  Yes  [x]    No  []     Demographics complete on Prior Auth (PA).  Yes  [x]    No  []     Prior Auth (PA) form placed in provider's (MD/NP) mailbox:  Yes  [x]    No  []     Date Prior Auth (PA) form placed in provider's (MD/NP) mailbox:  04-10-18    Date Prior Auth (PA) faxed to insurance company/pharmacy:  0         
RN completed PA and gave to PSAR to fax back to appropriate place.    Medication: Suboxone 8-2 mg SL film, place 0.5-1 film under the tongue daily.     Last appointment: 03/28/2018    Next appointment: 06/27/2018    PDMP query done for the patient:  []  No early refills: Per PDMP, 03/07/2018, 01/30/2018, 12/28/2017, 11/27/2017, 10/27/2017  [x] No prescriptions from outside providers for substances not previously addressed at MD appointments.  [x]  Patient consistently using one pharmacy for refills    Verified current dose as last ordered at last MD appointment above        
22.6

## 2019-04-29 NOTE — ED ADULT NURSE NOTE - NSIMPLEMENTINTERV_GEN_ALL_ED
Implemented All Universal Safety Interventions:  [place] to call system. Call bell, personal items and telephone within reach. Instruct patient to call for assistance. Room bathroom lighting operational. Non-slip footwear when patient is off stretcher. Physically safe environment: no spills, clutter or unnecessary equipment. Stretcher in lowest position, wheels locked, appropriate side rails in place.

## 2019-04-29 NOTE — ED PROVIDER NOTE - NSFOLLOWUPINSTRUCTIONS_ED_ALL_ED_FT
Sickle Cell Pain    Please continue your normal medications.  Rest.  Drink plenty of fluids.  Follow up with your pmd and/or hematologist.

## 2019-04-30 ENCOUNTER — EMERGENCY (EMERGENCY)
Facility: HOSPITAL | Age: 34
LOS: 1 days | Discharge: HOME | End: 2019-04-30
Admitting: EMERGENCY MEDICINE

## 2019-04-30 VITALS
SYSTOLIC BLOOD PRESSURE: 119 MMHG | HEART RATE: 98 BPM | RESPIRATION RATE: 18 BRPM | DIASTOLIC BLOOD PRESSURE: 75 MMHG | WEIGHT: 160.06 LBS | OXYGEN SATURATION: 99 % | HEIGHT: 68 IN | TEMPERATURE: 98 F

## 2019-05-03 ENCOUNTER — EMERGENCY (EMERGENCY)
Facility: HOSPITAL | Age: 34
LOS: 1 days | Discharge: HOME | End: 2019-05-03
Attending: EMERGENCY MEDICINE | Admitting: EMERGENCY MEDICINE
Payer: MEDICAID

## 2019-05-03 VITALS
WEIGHT: 160.06 LBS | HEIGHT: 68 IN | DIASTOLIC BLOOD PRESSURE: 71 MMHG | RESPIRATION RATE: 19 BRPM | OXYGEN SATURATION: 99 % | HEART RATE: 98 BPM | SYSTOLIC BLOOD PRESSURE: 127 MMHG | TEMPERATURE: 99 F

## 2019-05-03 VITALS
SYSTOLIC BLOOD PRESSURE: 122 MMHG | OXYGEN SATURATION: 99 % | HEART RATE: 84 BPM | RESPIRATION RATE: 18 BRPM | DIASTOLIC BLOOD PRESSURE: 70 MMHG | TEMPERATURE: 98 F

## 2019-05-03 DIAGNOSIS — D57.1 SICKLE-CELL DISEASE WITHOUT CRISIS: ICD-10-CM

## 2019-05-03 DIAGNOSIS — D57.00 HB-SS DISEASE WITH CRISIS, UNSPECIFIED: ICD-10-CM

## 2019-05-03 DIAGNOSIS — Z79.891 LONG TERM (CURRENT) USE OF OPIATE ANALGESIC: ICD-10-CM

## 2019-05-03 DIAGNOSIS — Z79.899 OTHER LONG TERM (CURRENT) DRUG THERAPY: ICD-10-CM

## 2019-05-03 DIAGNOSIS — M25.552 PAIN IN LEFT HIP: ICD-10-CM

## 2019-05-03 DIAGNOSIS — Z87.891 PERSONAL HISTORY OF NICOTINE DEPENDENCE: ICD-10-CM

## 2019-05-03 DIAGNOSIS — M54.5 LOW BACK PAIN: ICD-10-CM

## 2019-05-03 DIAGNOSIS — Z79.01 LONG TERM (CURRENT) USE OF ANTICOAGULANTS: ICD-10-CM

## 2019-05-03 PROBLEM — I82.409 ACUTE EMBOLISM AND THROMBOSIS OF UNSPECIFIED DEEP VEINS OF UNSPECIFIED LOWER EXTREMITY: Chronic | Status: ACTIVE | Noted: 2019-04-30

## 2019-05-03 PROCEDURE — 99283 EMERGENCY DEPT VISIT LOW MDM: CPT

## 2019-05-03 PROCEDURE — 99283 EMERGENCY DEPT VISIT LOW MDM: CPT | Mod: 25

## 2019-05-03 RX ORDER — HYDROMORPHONE HYDROCHLORIDE 2 MG/ML
16 INJECTION INTRAMUSCULAR; INTRAVENOUS; SUBCUTANEOUS ONCE
Qty: 0 | Refills: 0 | Status: DISCONTINUED | OUTPATIENT
Start: 2019-05-03 | End: 2019-05-03

## 2019-05-03 RX ORDER — HYDROMORPHONE HYDROCHLORIDE 2 MG/ML
8 INJECTION INTRAMUSCULAR; INTRAVENOUS; SUBCUTANEOUS ONCE
Qty: 0 | Refills: 0 | Status: DISCONTINUED | OUTPATIENT
Start: 2019-05-03 | End: 2019-05-03

## 2019-05-03 RX ADMIN — HYDROMORPHONE HYDROCHLORIDE 8 MILLIGRAM(S): 2 INJECTION INTRAMUSCULAR; INTRAVENOUS; SUBCUTANEOUS at 05:00

## 2019-05-03 RX ADMIN — HYDROMORPHONE HYDROCHLORIDE 16 MILLIGRAM(S): 2 INJECTION INTRAMUSCULAR; INTRAVENOUS; SUBCUTANEOUS at 04:23

## 2019-05-03 NOTE — ED PROVIDER NOTE - ATTENDING CONTRIBUTION TO CARE
33M hx sickle cell, c/o lower back pain and hip pain. states typical of crisis pain.  took dilaudid at home without relief.  no chest pain. no SOB. no fevers.   gen- nad  heent- ncat, clear conj  cv -rrr  lungs -ctab  abd - soft, nt, nd  ext -wwp  neuro -aox3, steady gait, thurman  given po pain medication, no focal neuro deficits.

## 2019-05-03 NOTE — ED PROVIDER NOTE - CLINICAL SUMMARY MEDICAL DECISION MAKING FREE TEXT BOX
PO protocol meds for patient well known to Ed _> no apparent acute change in status and stressed outpatient pain management MD follow up

## 2019-05-03 NOTE — ED PROVIDER NOTE - NSCAREINITIATED _GEN_ER
H/O abdominal hysterectomy    History of cholecystectomy    Status post transcatheter aortic valve replacement (TAVR) using bioprosthesis
Merced Mensah)

## 2019-05-03 NOTE — ED ADULT TRIAGE NOTE - OTHER COMPLAINTS
CC of sickle cell pain lower back and hips x yesterday took Dilaudid 8 mg PO 2000H yesterday but gave no relief. denies fevers.

## 2019-05-04 DIAGNOSIS — Z79.01 LONG TERM (CURRENT) USE OF ANTICOAGULANTS: ICD-10-CM

## 2019-05-04 DIAGNOSIS — Z79.899 OTHER LONG TERM (CURRENT) DRUG THERAPY: ICD-10-CM

## 2019-05-04 DIAGNOSIS — M25.559 PAIN IN UNSPECIFIED HIP: ICD-10-CM

## 2019-05-04 DIAGNOSIS — M54.5 LOW BACK PAIN: ICD-10-CM

## 2019-05-04 DIAGNOSIS — Z79.891 LONG TERM (CURRENT) USE OF OPIATE ANALGESIC: ICD-10-CM

## 2019-05-07 NOTE — ED PROVIDER NOTE - CPE EDP EYES NORM
normal... Epidermal Autograft Text: The defect edges were debeveled with a #15 scalpel blade.  Given the location of the defect, shape of the defect and the proximity to free margins an epidermal autograft was deemed most appropriate.  Using a sterile surgical marker, the primary defect shape was transferred to the donor site. The epidermal graft was then harvested.  The skin graft was then placed in the primary defect and oriented appropriately.

## 2019-06-01 ENCOUNTER — EMERGENCY (EMERGENCY)
Facility: HOSPITAL | Age: 34
LOS: 1 days | Discharge: HOME | End: 2019-06-01
Admitting: EMERGENCY MEDICINE
Payer: MEDICAID

## 2019-06-01 VITALS
SYSTOLIC BLOOD PRESSURE: 132 MMHG | RESPIRATION RATE: 19 BRPM | HEIGHT: 68 IN | OXYGEN SATURATION: 99 % | DIASTOLIC BLOOD PRESSURE: 71 MMHG | WEIGHT: 160.06 LBS | TEMPERATURE: 98 F | HEART RATE: 109 BPM

## 2019-06-01 DIAGNOSIS — Z79.01 LONG TERM (CURRENT) USE OF ANTICOAGULANTS: ICD-10-CM

## 2019-06-01 DIAGNOSIS — Z79.891 LONG TERM (CURRENT) USE OF OPIATE ANALGESIC: ICD-10-CM

## 2019-06-01 DIAGNOSIS — D57.00 HB-SS DISEASE WITH CRISIS, UNSPECIFIED: ICD-10-CM

## 2019-06-01 DIAGNOSIS — Z79.899 OTHER LONG TERM (CURRENT) DRUG THERAPY: ICD-10-CM

## 2019-06-01 PROCEDURE — 99283 EMERGENCY DEPT VISIT LOW MDM: CPT | Mod: 25

## 2019-06-01 PROCEDURE — 99283 EMERGENCY DEPT VISIT LOW MDM: CPT

## 2019-06-01 RX ORDER — HYDROMORPHONE HYDROCHLORIDE 2 MG/ML
16 INJECTION INTRAMUSCULAR; INTRAVENOUS; SUBCUTANEOUS ONCE
Refills: 0 | Status: DISCONTINUED | OUTPATIENT
Start: 2019-06-01 | End: 2019-06-01

## 2019-06-01 RX ORDER — HYDROMORPHONE HYDROCHLORIDE 2 MG/ML
8 INJECTION INTRAMUSCULAR; INTRAVENOUS; SUBCUTANEOUS ONCE
Refills: 0 | Status: DISCONTINUED | OUTPATIENT
Start: 2019-06-01 | End: 2019-06-01

## 2019-06-01 RX ORDER — KETOROLAC TROMETHAMINE 30 MG/ML
30 SYRINGE (ML) INJECTION ONCE
Refills: 0 | Status: DISCONTINUED | OUTPATIENT
Start: 2019-06-01 | End: 2019-06-01

## 2019-06-01 RX ADMIN — HYDROMORPHONE HYDROCHLORIDE 8 MILLIGRAM(S): 2 INJECTION INTRAMUSCULAR; INTRAVENOUS; SUBCUTANEOUS at 07:18

## 2019-06-01 RX ADMIN — HYDROMORPHONE HYDROCHLORIDE 16 MILLIGRAM(S): 2 INJECTION INTRAMUSCULAR; INTRAVENOUS; SUBCUTANEOUS at 07:00

## 2019-06-01 RX ADMIN — HYDROMORPHONE HYDROCHLORIDE 8 MILLIGRAM(S): 2 INJECTION INTRAMUSCULAR; INTRAVENOUS; SUBCUTANEOUS at 07:12

## 2019-06-01 RX ADMIN — HYDROMORPHONE HYDROCHLORIDE 16 MILLIGRAM(S): 2 INJECTION INTRAMUSCULAR; INTRAVENOUS; SUBCUTANEOUS at 05:56

## 2019-06-01 NOTE — ED PROVIDER NOTE - MUSCULOSKELETAL, MLM
Spine appears normal,  generalized tenderness to b/l hip joints, b/l upper thighs, diffuse LS spine tenderness,

## 2019-06-01 NOTE — ED PROVIDER NOTE - CLINICAL SUMMARY MEDICAL DECISION MAKING FREE TEXT BOX
32 yo male with h/o sickle cell disease, in the Er due to b/l hip and lower back pain. Typical for his crisis pains. afebrile, ambulatory, no pain to his chest or abdomen, no SOB, lungs-CTA. plan : pain management, re-evaluate. anticipate d/ home of improved.

## 2019-06-01 NOTE — ED PROVIDER NOTE - NSFOLLOWUPINSTRUCTIONS_ED_ALL_ED_FT
I have discussed the discharge plan with the patient. The patient agrees with the plan, as discussed.  The patient understands Emergency Department diagnosis is a preliminary diagnosis often based on limited information and that the patient must adhere to the follow-up plan as discussed.  The patient understands that if the symptoms worsen or if prescribed medications do not have the desired/planned effect that the patient may return to the Emergency Department at any time for further evaluation and treatment.      Sickle Cell Anemia, Adult  Image   Sickle cell anemia is a condition in which red blood cells have an abnormal “sickle” shape. Red blood cells carry oxygen through the body. Sickle-shaped red blood cells do not live as long as normal red blood cells. They also clump together and block blood from flowing through the blood vessels. This condition prevents the body from getting enough oxygen. Sickle cell anemia causes organ damage and pain. It also increases the risk of infection.    What are the causes?  This condition is caused by a gene that is passed from parent to child (inherited). Receiving two copies of the gene causes the disease. Receiving one copy causes the "trait," which means that symptoms are milder or not present.    What increases the risk?  This condition is more likely to develop if your ancestors were from [place], the [place], [place] or [place], the [place], [place], or the [place].    What are the signs or symptoms?  Symptoms of this condition include:  Episodes of pain (crises), especially in the hands and feet, joints, back, chest, or abdomen. The pain can be triggered by:  An illness, especially if there is dehydration.  Doing an activity with great effort (overexertion).  Exposure to extreme temperature changes.  High altitude.  Fatigue.  Shortness of breath or difficulty breathing.  Dizziness.  Pale skin or yellowed skin (jaundice).  Frequent bacterial infections.  Pain and swelling in the hands and feet (hand-food syndrome).  Prolonged, painful erection of the penis (priapism).   Acute chest syndrome. Symptoms of this include:  Chest pain.  Fever.  Cough.  Fast breathing.  Stroke.  Decreased activity.  Loss of appetite.  Change in behavior.  Headaches.  Seizures.  Vision changes.  Skin ulcers.  Heart disease.  High blood pressure.  Gallstones.  Liver and kidney problems.  How is this diagnosed?  This condition is diagnosed with blood tests that check for the gene that causes this condition.    How is this treated?  There is no cure for most cases of this condition. Treatment focuses on managing your symptoms and preventing complications of the disease. Your health care provider will work with you to identify the best treatment options for you based on an assessment of your condition. Treatment may include:  Medicines, including:  Pain medicines.  Antibiotic medicines for infection.  Medicines to increase the production of a protein in red blood cells that helps carry oxygen in the body (hemoglobin).  Fluids to treat pain and swelling.  Oxygen to treat acute chest syndrome.  Blood transfusions to treat symptoms such as fatigue, stroke, and acute chest syndrome.  Massage and physical therapy for pain.  Regular tests to monitor your condition, such as blood tests, X-rays, CT scans, MRI scans, ultrasounds, and lung function tests. These should be done every 3–12 months, depending on your age.  Hematopoietic stem cell transplant. This is a procedure to replace abnormal stem cells with healthy stem cells from a donor's bone marrow. Stem cells are cells that can develop into blood cells, and bone marrow is the spongy tissue inside the bones.  Follow these instructions at home:  Medicines     Take over-the-counter and prescription medicines only as told by your health care provider.  If you were prescribed an antibiotic medicine, take it as told by your health care provider. Do not stop taking the antibiotic even if you start to feel better.  If you develop a fever, do not take medicines to reduce the fever right away. This could cover up another problem. Notify your health care provider.  Managing pain, stiffness, and swelling     Try these methods to help ease your pain:  Using a heating pad.  Taking a warm bath.  Distracting yourself, such as by watching TV.  Eating and drinking     Drink enough fluid to keep your urine clear or pale yellow. Drink more in hot weather and during exercise.  Limit or avoid drinking alcohol.  Eat a balanced and nutritious diet. Eat plenty of fruits, vegetables, whole grains, and lean protein.  Take vitamins and supplements as directed by your health care provider.  Traveling     When traveling, keep these with you:  Your medical information.  The names of your health care providers.  Your medicines.  If you have to travel by air, ask about precautions you should take.  Activity     Get plenty of rest.  Avoid activities that will lower your oxygen levels, such as exercising vigorously.  General instructions     Do not use any products that contain nicotine or tobacco, such as cigarettes and e-cigarettes. They lower blood oxygen levels. If you need help quitting, ask your health care provider.  Consider wearing a medical alert bracelet.  Avoid high altitudes.  Avoid extreme temperatures and extreme temperature changes.  Keep all follow-up visits as told by your health care provider. This is important.  Contact a health care provider if:  You develop joint pain.  Your feet or hands swell or have pain.  You have fatigue.  Get help right away if:  You have symptoms of infection. These include:  Fever.  Chills.  Extreme tiredness.  Irritability.  Poor eating.  Vomiting.  You feel dizzy or faint.  You have new abdominal pain, especially on the left side near the stomach area.  You develop priapism.  You have numbness in your arms or legs or have trouble moving them.  You have trouble talking.  You develop pain that cannot be controlled with medicine.  You become short of breath.  You have rapid breathing.  You have a persistent cough.  You have pain in your chest.  You develop a severe headache or stiff neck.  You feel bloated without eating or after eating a small amount of food.  Your skin is pale.  You suddenly lose vision.  Summary  Sickle cell anemia is a condition in which red blood cells have an abnormal “sickle” shape. This disease can cause organ damage and chronic pain, and it can raise your risk of infection.  Sickle cell anemia is a genetic disorder.  Treatment focuses on managing your symptoms and preventing complications of the disease.  Get medical help right away if you have any signs of infection, such as a fever.  This information is not intended to replace advice given to you by your health care provider. Make sure you discuss any questions you have with your health care provider.    Document Released: 03/28/2007 Document Revised: 01/23/2018 Document Reviewed: 01/23/2018  [employer] Interactive Patient Education © 2019 [employer]        Log Out.    [employer] CareNotes®     :  [facility]             SICKLE CELL CRISIS - AfterCare(R) Instructions(ER/ED)     Sickle Cell Crisis    WHAT YOU NEED TO KNOW:    A sickle cell crisis is a painful episode that occurs in people who have sickle cell anemia. It happens when sickle-shaped red blood cells (RBCs) block blood vessels. Blood and oxygen cannot get to tissues, causing pain. A sickle cell crisis can also damage your tissues and cause organ failure, such liver or kidney failure. A sickle cell crisis can become life-threatening.    DISCHARGE INSTRUCTIONS:    Call 911 for any of the following:     You have shortness of breath or chest pain.      You are a man and have an erection that is painful and does not go away.       You lose vision in one or both eyes.    Return to the emergency department if:     You have a fever.      You feel like you cannot cope with your pain, or you feel like hurting yourself.       You have behavior changes, a seizure, or faint.       You have abdominal pain, nausea, or vomiting.      You have a headache that is worse or different from those that you have had in the past.       You have new weakness or numbness in your arm, leg, or face.      You have new pain in any part of your body.      Your urine is dark and you are urinating less than usual or not at all.       You are dizzy, lightheaded, or faint.     Contact your healthcare provider if:     You have any new signs or symptoms.       You have blood in your urine.       You are constipated or you have diarrhea.       You have changes in your vision.       You have increased fatigue.       You plan to travel by airplane or to a high elevation.       You have questions or concerns about your condition or care.    Medicines: You may need any of the following:     Prescription pain medicine may be given. Ask how to take this medicine safely. Medicine may also be given to decrease sickling of your RBCs. You may also need medicine to treat or prevent a bacterial infection.       NSAIDs, such as ibuprofen, help decrease swelling, pain, and fever. This medicine is available with or without a doctor's order. NSAIDs can cause stomach bleeding or kidney problems in certain people. If you take blood thinner medicine, always ask your healthcare provider if NSAIDs are safe for you. Always read the medicine label and follow directions.      Acetaminophen decreases pain and fever. It is available without a doctor's order. Ask how much to take and how often to take it. Follow directions. Acetaminophen can cause liver damage if not taken correctly.      Take your medicine as directed. Contact your healthcare provider if you think your medicine is not helping or if you have side effects. Tell him or her if you are allergic to any medicine. Keep a list of the medicines, vitamins, and herbs you take. Include the amounts, and when and why you take them. Bring the list or the pill bottles to follow-up visits. Carry your medicine list with you in case of an emergency.    Medical alert identification: Wear medical alert jewelry or carry a card that says you have sickle cell anemia. Ask your healthcare provider where to get these items. Medical Alert Jewelry         Prevent a sickle cell crisis:     Take vitamins and minerals as directed. Folic acid can help prevent blood vessel problems that can occur with sickle cell anemia. Zinc may decrease how often you have pain.       Drink liquids as directed. Dehydration can increase your risk for a sickle cell crisis. Ask how much liquid to drink each day and which liquids are best for you.      Balance rest and exercise. Rest during a sickle cell crisis. Over time, increase your activity to a moderate amount. Exercise regularly. Avoid exercise or activities that can cause injury, such as football. Ask about the best exercise plan for you.       Stay out of the cold. Do not go quickly from a warm place to a cold place. Do not go swimming in cold water. Stay warm in the winter.      Do not smoke cigarettes or drink alcohol. These increase your risk for a sickle cell crisis. Nicotine and other chemicals in cigarettes and cigars can cause lung damage. Ask your healthcare provider for information if you currently smoke and need help to quit. E-cigarettes or smokeless tobacco still contain nicotine. Talk to your healthcare provider before you use these products.       Ask about vaccinations you need. Vaccinations can help prevent a viral infection that may lead to a sickle cell crisis. Get a flu shot every year as directed. You may need a pneumonia vaccine every 5 years.     Follow up with your healthcare provider as directed: You may need ongoing screening for conditions that can develop because of sickle cell disease. Examples include kidney disease, hypertension (high blood pressure), retinopathy (eye problems), and problems with your lungs. Write down your questions so you remember to ask them during your visits.

## 2019-06-01 NOTE — ED ADULT TRIAGE NOTE - OTHER COMPLAINTS
CC of sickle cell pain bilateral hips and lower back x today midnight, took Dilaudid PO 8mg 0100H today but to no relief.

## 2019-06-01 NOTE — ED PROVIDER NOTE - OBJECTIVE STATEMENT
33 year old male with history of Sickle Cell Anemia presents to ED with concern for hip pain consistent with what he typically experiences with sick cell anemia flare.  He denies associated fever, chills, chest pain, abdominal pain, inability to ambulate, trauma or additional acute complaints or concerns at this time.  Patient states he took 8 mg PO Dilaudid earlier today and when it did not improved symptoms, decided to come to ED for more intensive pain management.  Patient states he last required ED visit last month at another area ED and was given 12 mg Dilaudid before symptom improvement. 33 year old male with history of Sickle Cell Anemia presents to ED with concern for hip pain consistent with what he typically experiences with sick cell anemia flare.  He denies associated fever, chills, chest pain, abdominal pain, inability to ambulate, trauma or additional acute complaints or concerns at this time.  Patient states he took 8 mg PO Dilaudid earlier today and when it did not improved symptoms, decided to come to ED for more intensive pain management.

## 2019-06-01 NOTE — ED ADULT NURSE NOTE - OBJECTIVE STATEMENT
pt c/o pain in low back and bilateral hips consistent with sickle cell crisis.  last took po dilaudid at 1am with no effect.

## 2019-06-06 ENCOUNTER — EMERGENCY (EMERGENCY)
Facility: HOSPITAL | Age: 34
LOS: 1 days | Discharge: HOME | End: 2019-06-06
Attending: EMERGENCY MEDICINE | Admitting: EMERGENCY MEDICINE
Payer: MEDICAID

## 2019-06-06 VITALS
DIASTOLIC BLOOD PRESSURE: 73 MMHG | RESPIRATION RATE: 18 BRPM | HEART RATE: 106 BPM | OXYGEN SATURATION: 100 % | WEIGHT: 160.94 LBS | SYSTOLIC BLOOD PRESSURE: 122 MMHG | HEIGHT: 68 IN | TEMPERATURE: 99 F

## 2019-06-06 VITALS
DIASTOLIC BLOOD PRESSURE: 74 MMHG | SYSTOLIC BLOOD PRESSURE: 120 MMHG | TEMPERATURE: 99 F | OXYGEN SATURATION: 100 % | HEART RATE: 100 BPM | RESPIRATION RATE: 18 BRPM

## 2019-06-06 PROCEDURE — 99283 EMERGENCY DEPT VISIT LOW MDM: CPT

## 2019-06-06 PROCEDURE — 99283 EMERGENCY DEPT VISIT LOW MDM: CPT | Mod: 25

## 2019-06-06 RX ORDER — HYDROMORPHONE HYDROCHLORIDE 2 MG/ML
8 INJECTION INTRAMUSCULAR; INTRAVENOUS; SUBCUTANEOUS ONCE
Refills: 0 | Status: DISCONTINUED | OUTPATIENT
Start: 2019-06-06 | End: 2019-06-06

## 2019-06-06 RX ORDER — IBUPROFEN 200 MG
600 TABLET ORAL ONCE
Refills: 0 | Status: COMPLETED | OUTPATIENT
Start: 2019-06-06 | End: 2019-06-06

## 2019-06-06 RX ORDER — HYDROMORPHONE HYDROCHLORIDE 2 MG/ML
16 INJECTION INTRAMUSCULAR; INTRAVENOUS; SUBCUTANEOUS ONCE
Refills: 0 | Status: DISCONTINUED | OUTPATIENT
Start: 2019-06-06 | End: 2019-06-06

## 2019-06-06 RX ADMIN — HYDROMORPHONE HYDROCHLORIDE 8 MILLIGRAM(S): 2 INJECTION INTRAMUSCULAR; INTRAVENOUS; SUBCUTANEOUS at 05:50

## 2019-06-06 RX ADMIN — Medication 600 MILLIGRAM(S): at 05:05

## 2019-06-06 RX ADMIN — Medication 600 MILLIGRAM(S): at 04:05

## 2019-06-06 RX ADMIN — HYDROMORPHONE HYDROCHLORIDE 16 MILLIGRAM(S): 2 INJECTION INTRAMUSCULAR; INTRAVENOUS; SUBCUTANEOUS at 05:05

## 2019-06-06 RX ADMIN — HYDROMORPHONE HYDROCHLORIDE 16 MILLIGRAM(S): 2 INJECTION INTRAMUSCULAR; INTRAVENOUS; SUBCUTANEOUS at 04:05

## 2019-06-06 NOTE — ED ADULT NURSE NOTE - NSIMPLEMENTINTERV_GEN_ALL_ED
Implemented All Universal Safety Interventions:  [place] to call system. Call bell, personal items and telephone within reach. Instruct patient to call for assistance. Room bathroom lighting operational. Non-slip footwear when patient is off stretcher. Physically safe environment: no spills, clutter or unnecessary equipment. Stretcher in lowest position, wheels locked, appropriate side rails in place.

## 2019-06-06 NOTE — ED PROVIDER NOTE - CLINICAL SUMMARY MEDICAL DECISION MAKING FREE TEXT BOX
sickle cell crisis pain, lower back pain and hip pain, no focal neuro deficits  -po pain policy, dilaudid, motrin

## 2019-06-06 NOTE — ED PROVIDER NOTE - OBJECTIVE STATEMENT
33M hx SCD, DVT, AVN, c/o lower back pain and b/l hip pain. pt states pain typical of crisis pain. states took dilaudid 8mg without relief.  pt states likely from sitting on long bus ride and the rain. no fevers. no LE swelling. no chest pain, no SOb. no vomiting. no incontinence.

## 2019-06-06 NOTE — ED ADULT NURSE NOTE - CHPI ED NUR SYMPTOMS NEG
no tingling/no motor function loss/no neck tenderness/no numbness/no bowel dysfunction/no constipation/no difficulty bearing weight

## 2019-06-06 NOTE — ED PROVIDER NOTE - PROGRESS NOTE DETAILS
pt resting comfortably, recommend f/u with hematologist  I have discussed the discharge plan with the patient. The patient agrees with the plan, as discussed.  The patient understands Emergency Department diagnosis is a preliminary diagnosis often based on limited information and that the patient must adhere to the follow-up plan as discussed.  The patient understands that if the symptoms worsen the patient may return to the Emergency Department at any time for further evaluation and treatment.

## 2019-06-10 DIAGNOSIS — M54.5 LOW BACK PAIN: ICD-10-CM

## 2019-06-10 DIAGNOSIS — D57.219 SICKLE-CELL/HB-C DISEASE WITH CRISIS, UNSPECIFIED: ICD-10-CM

## 2019-06-23 ENCOUNTER — EMERGENCY (EMERGENCY)
Facility: HOSPITAL | Age: 34
LOS: 1 days | Discharge: HOME | End: 2019-06-23
Attending: EMERGENCY MEDICINE | Admitting: EMERGENCY MEDICINE
Payer: MEDICAID

## 2019-06-23 VITALS
RESPIRATION RATE: 18 BRPM | OXYGEN SATURATION: 100 % | DIASTOLIC BLOOD PRESSURE: 77 MMHG | TEMPERATURE: 99 F | WEIGHT: 160.94 LBS | HEIGHT: 68 IN | SYSTOLIC BLOOD PRESSURE: 118 MMHG | HEART RATE: 129 BPM

## 2019-06-23 VITALS
DIASTOLIC BLOOD PRESSURE: 74 MMHG | OXYGEN SATURATION: 100 % | HEART RATE: 90 BPM | SYSTOLIC BLOOD PRESSURE: 123 MMHG | RESPIRATION RATE: 18 BRPM

## 2019-06-23 DIAGNOSIS — M54.5 LOW BACK PAIN: ICD-10-CM

## 2019-06-23 DIAGNOSIS — D57.00 HB-SS DISEASE WITH CRISIS, UNSPECIFIED: ICD-10-CM

## 2019-06-23 PROCEDURE — 99283 EMERGENCY DEPT VISIT LOW MDM: CPT

## 2019-06-23 RX ORDER — HYDROMORPHONE HYDROCHLORIDE 2 MG/ML
16 INJECTION INTRAMUSCULAR; INTRAVENOUS; SUBCUTANEOUS ONCE
Refills: 0 | Status: DISCONTINUED | OUTPATIENT
Start: 2019-06-23 | End: 2019-06-23

## 2019-06-23 RX ORDER — IBUPROFEN 200 MG
600 TABLET ORAL ONCE
Refills: 0 | Status: COMPLETED | OUTPATIENT
Start: 2019-06-23 | End: 2019-06-23

## 2019-06-23 RX ADMIN — HYDROMORPHONE HYDROCHLORIDE 16 MILLIGRAM(S): 2 INJECTION INTRAMUSCULAR; INTRAVENOUS; SUBCUTANEOUS at 23:36

## 2019-06-23 RX ADMIN — Medication 600 MILLIGRAM(S): at 23:37

## 2019-06-23 NOTE — ED ADULT NURSE NOTE - OBJECTIVE STATEMENT
pt to ER w/ report of sickle cell crisis, w/ pain in lower back and hip rubio.  Pt reports taking his po pain medication today w/o relief.  Pt denies cp/sob/f/c/n/v.  Breathing unlabored, skin warm and dry. Will continue to monitor.

## 2019-06-23 NOTE — ED PROVIDER NOTE - CLINICAL SUMMARY MEDICAL DECISION MAKING FREE TEXT BOX
sickle cell pain, multiple similar ed visits for the same, no evidence of acs or other life threatening dz on exam, tx with PO meds with success, tolerating PO and stable for DC.

## 2019-06-23 NOTE — ED PROVIDER NOTE - OBJECTIVE STATEMENT
34M with a hx SCD, DVT, AVN, who p/w c/o lower back pain and b/l hip pain c/w his sickle cell pain crisis. He thinks having the AC on all day triggered his pain. he is followed at [facility]. he took 8mg for dilaudid PO 11 hours ago and another 8mg 1 hour after that but he is still in pain. Also took motrin 11 hours ago. No f/c, no cp/sob, no n/v. no LE swelling. no chest pain, no SOb. no vomiting. no incontinence.

## 2019-06-23 NOTE — ED PROVIDER NOTE - NSFOLLOWUPINSTRUCTIONS_ED_ALL_ED_FT
Sickle Cell Disease    WHAT YOU NEED TO KNOW:    Sickle cell disease (SCD) causes your RBCs to be sickle (crescent) shaped. The sickle shape is caused by abnormal hemoglobin attached to the RBC. Hemoglobin carries oxygen to all tissues in your body. Sickle-shaped RBCs can get stuck to the walls of blood vessels. This can stop or slow blood flow, and prevent oxygen from getting to tissues. When this happens, it is called a sickle cell crisis. SCD may also cause low red blood cell (RBC) levels (anemia).     DISCHARGE INSTRUCTIONS:    Call 911 or have someone else call for any of the following:     You have any of the following signs of a stroke:   Numbness or drooping on one side of your face       Weakness in an arm or leg      Confusion or difficulty speaking      Dizziness, a severe headache, or vision loss      You have any of the following signs of a heart attack:   Squeezing, pressure, or pain in your chest      You may also have any of the following:   Discomfort or pain in your back, neck, jaw, stomach, or arm      Shortness of breath      Nausea or vomiting      Lightheadedness or a sudden cold sweat      You have a seizure.       You lose vision in one or both eyes.      You lose consciousness or cannot be woken.     Seek care immediately if:     You feel lightheaded, short of breath, and have chest pain.      You cough up blood.      You have a fever of 100.4°F (38°C) or higher.      You have a severe headache.       Your pain does not get better after you take pain medicine.      Your arm or leg is painful, red, and larger than usual.       You feel like you can no longer cope with your pain, or feel like harming yourself.       You have abdominal pain, nausea, and vomiting.      You are a man and have an erection that is painful and does not go away after 4 hours.       You cannot think clearly, or you feel like you are going to faint.      Your urine is dark, or you are urinating less than usual or not at all.       You see blood in your urine.       Your eyes or skin are yellow.       You have a cold or the flu.       You have a cough or are wheezing.     Contact your healthcare provider if:     You are more tired than usual during the day.      You are constipated or have diarrhea.      Your vision has changed in one or both eyes.       You feel anxious or depressed.       You have new or worse swelling in your joints.       You have an open sore on your skin that will not heal.       You are pregnant or think you are pregnant.       You have questions or concerns about your condition or care.    Medicines: You may need any of the following:     Antibiotics may be given to prevent a bacterial infection.       Hydroxyurea helps your body make red blood cells that are less likely to sickle. This may help decrease your pain and prevent sickle cell crisis.      Folic acid helps your body may healthy red blood cells.      Prescription pain medicine may be given. Ask your healthcare provider how to take this medicine safely. Some prescription pain medicines contain acetaminophen. Do not take other medicines that contain acetaminophen without talking to your healthcare provider. Too much acetaminophen may cause liver damage. Prescription pain medicine may cause constipation. Ask your healthcare provider how to prevent or treat constipation.       NSAIDs, such as ibuprofen, help decrease swelling, pain, and fever. This medicine is available with or without a doctor's order. NSAIDs can cause stomach bleeding or kidney problems in certain people. If you take blood thinner medicine, always ask your healthcare provider if NSAIDs are safe for you. Always read the medicine label and follow directions.      Acetaminophen decreases pain and fever. It is available without a doctor's order. Ask how much to take and how often to take it. Follow directions. Read the labels of all other medicines you are using to see if they also contain acetaminophen, or ask your doctor or pharmacist. Acetaminophen can cause liver damage if not taken correctly. Do not use more than 4 grams (4,000 milligrams) total of acetaminophen in one day.       Take your medicine as directed. Contact your healthcare provider if you think your medicine is not helping or if you have side effects. Tell him or her if you are allergic to any medicine. Keep a list of the medicines, vitamins, and herbs you take. Include the amounts, and when and why you take them. Bring the list or the pill bottles to follow-up visits. Carry your medicine list with you in case of an emergency.    Self-care:     Apply heat to areas of pain. Use a heating pad or take a warm bath. Do this for 20 to 30 minutes every 2 hours for as many days as directed.       Gently massage areas where you feel pain. A professional massage may also help with pain.       Balance rest and exercise. Rest during a sickle cell crisis. Over time, increase your activity. Exercise may help decrease pain. Take breaks during exercise and drink plenty of water. Ask your healthcare provider which activities are safe for you.       Get acupuncture treatment. Acupuncture may help decrease pain and help you relax. Ask your healthcare provider for more information about acupuncture.       Eat healthy foods. Healthy foods will improve your overall health and make it easier to manage SCD. Examples of healthy foods include fruits, vegetables, whole-grain breads, low-fat dairy products, beans, lean meats, and fish. Ask if you need to be on a special diet.     Prevent a sickle cell crisis: A sickle cell crisis may be caused by illness, changes in temperature, stress, dehydration, or being at high altitudes. Do the following to help prevent a sickle cell crisis:     Drink liquids as directed. Dehydration can increase your risk for a sickle cell crisis. Ask how much liquid to drink each day and which liquids are best for you.      Avoid quick changes in temperature. Do not go quickly from a warm place to a cold place. Get in a pool slowly instead of jumping in. Dress in light clothing in the summer and warm clothing in the winter.       Ask about vaccinations. Vaccinations can help prevent a viral infection. Get a flu shot every year as directed. You may also need a pneumonia vaccine.      Wash your hands frequently. Handwashing can help prevent illness. Wash your hands before you prepare or eat food, and after you use the bathroom.       Do not smoke. Nicotine and other chemicals in cigarettes and cigars can cause lung damage and sickle cell crisis. Ask your healthcare provider for information if you currently smoke and need help to quit. E-cigarettes or smokeless tobacco still contain nicotine. Talk to your healthcare provider before you use these products.      Limit or do not drink alcohol. Alcohol can cause dehydration and increase your risk for sickle cell crisis. If you drink alcohol, also drink plenty of water.       Manage your stress. Your healthcare provider may recommend relaxation techniques and deep breathing exercises to help decrease your stress. Your healthcare provider may recommend you talk to someone about your stress or anxiety, such as a counselor or a trusted friend.       Do not travel in an unpressurized plane or travel to high altitudes. These environments are low in oxygen and may cause a sickle cell crisis.     Wear medical alert identification: Wear medical alert jewelry or carry a card that says you have sickle cell anemia. Ask your healthcare provider where to get these items. Medical Alert Jewelry         What you need to know about family planning and pregnancy:     If you do not want to become pregnant, your healthcare provider may recommend birth control pills that contain only progestin. The pills will prevent pregnancy and make your periods lighter. Lighter periods may help treat low RBC levels.       Talk to your healthcare provider before you get pregnant. SCD increases a woman's risk for problems, such as a miscarriage and having a baby that weighs less than normal. You will need close monitoring during pregnancy. You may need to take certain vitamins and have 1 or more blood transfusions during pregnancy.       You will pass a gene for sickle cell disease to your child. Your partner should be tested for the sickle cell gene. This information can help predict your child's risk for sickle cell disease.     Follow up with your healthcare provider as directed: You may need ongoing screening for conditions that can develop because of sickle cell disease. Examples include kidney disease, hypertension (high blood pressure), retinopathy (eye problems), and problems with your lungs. Write down your questions so you remember to ask them during your visits.

## 2019-06-23 NOTE — ED ADULT NURSE NOTE - NSIMPLEMENTINTERV_GEN_ALL_ED
Implemented All Universal Safety Interventions:  [place] to call system. Call bell, personal items and telephone within reach. Instruct patient to call for assistance. Room bathroom lighting operational. Non-slip footwear when patient is off stretcher. Physically safe environment: no spills, clutter or unnecessary equipment. Stretcher in lowest position, wheels locked, appropriate side rails in place.

## 2019-06-24 PROCEDURE — 99283 EMERGENCY DEPT VISIT LOW MDM: CPT

## 2019-06-24 RX ORDER — HYDROMORPHONE HYDROCHLORIDE 2 MG/ML
8 INJECTION INTRAMUSCULAR; INTRAVENOUS; SUBCUTANEOUS ONCE
Refills: 0 | Status: DISCONTINUED | OUTPATIENT
Start: 2019-06-24 | End: 2019-06-24

## 2019-06-24 RX ADMIN — HYDROMORPHONE HYDROCHLORIDE 8 MILLIGRAM(S): 2 INJECTION INTRAMUSCULAR; INTRAVENOUS; SUBCUTANEOUS at 01:16

## 2019-06-30 ENCOUNTER — EMERGENCY (EMERGENCY)
Facility: HOSPITAL | Age: 34
LOS: 1 days | Discharge: HOME | End: 2019-06-30
Attending: EMERGENCY MEDICINE | Admitting: EMERGENCY MEDICINE
Payer: COMMERCIAL

## 2019-06-30 VITALS
WEIGHT: 160.94 LBS | TEMPERATURE: 99 F | OXYGEN SATURATION: 99 % | HEIGHT: 65 IN | HEART RATE: 99 BPM | SYSTOLIC BLOOD PRESSURE: 137 MMHG | RESPIRATION RATE: 19 BRPM | DIASTOLIC BLOOD PRESSURE: 81 MMHG

## 2019-06-30 PROCEDURE — 99283 EMERGENCY DEPT VISIT LOW MDM: CPT

## 2019-06-30 RX ORDER — IBUPROFEN 200 MG
600 TABLET ORAL ONCE
Refills: 0 | Status: COMPLETED | OUTPATIENT
Start: 2019-06-30 | End: 2019-06-30

## 2019-06-30 RX ORDER — HYDROMORPHONE HYDROCHLORIDE 2 MG/ML
16 INJECTION INTRAMUSCULAR; INTRAVENOUS; SUBCUTANEOUS ONCE
Refills: 0 | Status: DISCONTINUED | OUTPATIENT
Start: 2019-06-30 | End: 2019-06-30

## 2019-06-30 RX ORDER — DIPHENHYDRAMINE HCL 50 MG
50 CAPSULE ORAL ONCE
Refills: 0 | Status: COMPLETED | OUTPATIENT
Start: 2019-06-30 | End: 2019-06-30

## 2019-06-30 RX ORDER — HYDROMORPHONE HYDROCHLORIDE 2 MG/ML
8 INJECTION INTRAMUSCULAR; INTRAVENOUS; SUBCUTANEOUS ONCE
Refills: 0 | Status: DISCONTINUED | OUTPATIENT
Start: 2019-06-30 | End: 2019-06-30

## 2019-06-30 RX ORDER — METOCLOPRAMIDE HCL 10 MG
10 TABLET ORAL ONCE
Refills: 0 | Status: COMPLETED | OUTPATIENT
Start: 2019-06-30 | End: 2019-06-30

## 2019-06-30 RX ADMIN — Medication 600 MILLIGRAM(S): at 05:52

## 2019-06-30 RX ADMIN — Medication 50 MILLIGRAM(S): at 05:00

## 2019-06-30 RX ADMIN — Medication 600 MILLIGRAM(S): at 05:02

## 2019-06-30 RX ADMIN — Medication 10 MILLIGRAM(S): at 05:02

## 2019-06-30 RX ADMIN — HYDROMORPHONE HYDROCHLORIDE 8 MILLIGRAM(S): 2 INJECTION INTRAMUSCULAR; INTRAVENOUS; SUBCUTANEOUS at 05:51

## 2019-06-30 RX ADMIN — HYDROMORPHONE HYDROCHLORIDE 16 MILLIGRAM(S): 2 INJECTION INTRAMUSCULAR; INTRAVENOUS; SUBCUTANEOUS at 05:02

## 2019-06-30 RX ADMIN — HYDROMORPHONE HYDROCHLORIDE 16 MILLIGRAM(S): 2 INJECTION INTRAMUSCULAR; INTRAVENOUS; SUBCUTANEOUS at 05:52

## 2019-06-30 NOTE — ED ADULT TRIAGE NOTE - OTHER COMPLAINTS
CC of sickle cell pain bilat hips and back x yesterday took dilaudid 8mg 2000H yesterday but to no avail.

## 2019-06-30 NOTE — ED PROVIDER NOTE - OBJECTIVE STATEMENT
34yom with pmh of SCD, DVT, AVN to b/l hips and L shoulder c/o lower back pain and b/l hip pain c/w his sickle cell pain crisis c/o b/l hip and low back pain since 8pm last night. Pain typical of his crisis. Pt took 8mg of dilaudid at 8pm last night and tried to put some warm water on himself and wait it out but his pain did not improved. Pt states he did not want to double up on his meds because he didn't want to run out. Denies fever, chills, cough, cp, sob, abd pain, n/v. Pt states he has an appointment with his doctor at [facility] on Friday.

## 2019-06-30 NOTE — ED PROVIDER NOTE - PHYSICAL EXAMINATION
CONSTITUTIONAL: Well-appearing; well-nourished; in no apparent distress.   HEAD: Normocephalic; atraumatic.   EYES: PERRL; EOM intact; conjunctiva and sclera clear  ENT: normal nose; no rhinorrhea; normal pharynx with no erythema or lesions.   NECK: Supple; non-tender; no LAD  CARDIOVASCULAR: Normal S1, S2; no murmurs, rubs, or gallops. Regular rate and rhythm.   RESPIRATORY: Breathing easily; breath sounds clear and equal bilaterally; no wheezes, rhonchi, or rales.  GI: Soft; non-distended; non-tender; no palpable organomegaly.   MSK: FROM at all extremities, +tenderness to low back and b/l hips   EXT: No cyanosis or edema; N/V intact  SKIN: Normal for age and race; warm; dry; good turgor; no apparent lesions or rash.   NEURO: A & O x 3; face symmetric; grossly unremarkable.   PSYCHOLOGICAL: The patient’s mood and manner are appropriate.

## 2019-06-30 NOTE — ED ADULT NURSE NOTE - OBJECTIVE STATEMENT
33 y/o male c/o hip and low back pain. Pt reports he is having sickle cell crisis. Pt reports pain all over but more so to his back and hips. Pt is observed to walk w/ cane. Pt denies CP or SOB at this time. Pt appears sleepy and has eyes closed while awaiting medical evaluation. Pt speaks clear, MAEx4, ambulates steady, unlabored breathing. Abd soft ntnd. Skin dry warm.

## 2019-06-30 NOTE — ED PROVIDER NOTE - ATTENDING CONTRIBUTION TO CARE
35 yo male h/o sickle cell (+ chest crisis, last admit/rbc Feb or March, baseline hgb ~ 7), avn bilat hips, c/o sickle crisis w pain in shoulders, hips, legs, and back typical of his sickle pain.  No relief of pain w his dilaudid 8, did not double his dose bc he was concerned about running out so came to ed.  No cp, sob, uri sx, fever, cough, abd pain, n/v/d.  Pt trying to drink fluids.  Pain 10/10.  No numbness or weakness.  Pt follows w heme in [place].  Well appearing, nad, nc/at, lung cta, heart reg, abd soft, nt, ext no gross deformity, no gross neuro deficits   Pt w sickle cell pain, no sx concerning for chest crisis; plan pain meds, po fluids, reassess.

## 2019-06-30 NOTE — ED PROVIDER NOTE - CLINICAL SUMMARY MEDICAL DECISION MAKING FREE TEXT BOX
34yom with pmh of SCD, DVT, AVN to b/l hips and L shoulder c/o lower back pain and b/l hip pain c/w his sickle cell pain crisis c/o b/l hip and low back pain since 8pm last night. Pain typical of his crisis. Pt took 8mg of dilaudid at 8pm last night and tried to put some warm water on himself and wait it out but his pain did not improved. Pt states he did not want to double up on his meds because he didn't want to run out. Denies fever, chills, cough, cp, sob, abd pain, n/v. Pt states he has an appointment with his doctor at [facility] on Friday. Well appearing. VSS. NAD. +tenderness to low back and b/l hips

## 2019-07-03 ENCOUNTER — EMERGENCY (EMERGENCY)
Facility: HOSPITAL | Age: 34
LOS: 1 days | Discharge: HOME | End: 2019-07-03
Attending: EMERGENCY MEDICINE | Admitting: EMERGENCY MEDICINE
Payer: MEDICAID

## 2019-07-03 VITALS
HEIGHT: 68 IN | SYSTOLIC BLOOD PRESSURE: 128 MMHG | TEMPERATURE: 99 F | RESPIRATION RATE: 18 BRPM | WEIGHT: 160.94 LBS | DIASTOLIC BLOOD PRESSURE: 83 MMHG | HEART RATE: 95 BPM | OXYGEN SATURATION: 100 %

## 2019-07-03 VITALS
OXYGEN SATURATION: 100 % | HEART RATE: 88 BPM | DIASTOLIC BLOOD PRESSURE: 76 MMHG | SYSTOLIC BLOOD PRESSURE: 117 MMHG | RESPIRATION RATE: 16 BRPM | TEMPERATURE: 98 F

## 2019-07-03 DIAGNOSIS — D57.1 SICKLE-CELL DISEASE WITHOUT CRISIS: ICD-10-CM

## 2019-07-03 DIAGNOSIS — M54.9 DORSALGIA, UNSPECIFIED: ICD-10-CM

## 2019-07-03 PROCEDURE — 99283 EMERGENCY DEPT VISIT LOW MDM: CPT

## 2019-07-03 RX ORDER — HYDROMORPHONE HYDROCHLORIDE 2 MG/ML
8 INJECTION INTRAMUSCULAR; INTRAVENOUS; SUBCUTANEOUS ONCE
Refills: 0 | Status: DISCONTINUED | OUTPATIENT
Start: 2019-07-03 | End: 2019-07-03

## 2019-07-03 RX ADMIN — HYDROMORPHONE HYDROCHLORIDE 8 MILLIGRAM(S): 2 INJECTION INTRAMUSCULAR; INTRAVENOUS; SUBCUTANEOUS at 02:09

## 2019-07-03 RX ADMIN — HYDROMORPHONE HYDROCHLORIDE 8 MILLIGRAM(S): 2 INJECTION INTRAMUSCULAR; INTRAVENOUS; SUBCUTANEOUS at 01:45

## 2019-07-03 RX ADMIN — HYDROMORPHONE HYDROCHLORIDE 8 MILLIGRAM(S): 2 INJECTION INTRAMUSCULAR; INTRAVENOUS; SUBCUTANEOUS at 01:04

## 2019-07-03 NOTE — ED ADULT NURSE NOTE - NSIMPLEMENTINTERV_GEN_ALL_ED
Implemented All Universal Safety Interventions:  [place] to call system. Call bell, personal items and telephone within reach. Instruct patient to call for assistance. Room bathroom lighting operational. Non-slip footwear when patient is off stretcher. Physically safe environment: no spills, clutter or unnecessary equipment. Stretcher in lowest position, wheels locked, appropriate side rails in place.

## 2019-07-03 NOTE — ED PROVIDER NOTE - CLINICAL SUMMARY MEDICAL DECISION MAKING FREE TEXT BOX
no appreciable change from known baseline presentation -> PO protocol for patient with SSD -> d/c with improved status

## 2019-07-03 NOTE — ED ADULT NURSE NOTE - CHPI ED NUR SYMPTOMS NEG
no decreased eating/drinking/no chills/no vomiting/no dizziness/no fever/no weakness/no nausea/no tingling

## 2019-07-03 NOTE — ED PROVIDER NOTE - OBJECTIVE STATEMENT
reports back and joint pain typical of sickle cell pain -> denies cp or fever History limited by no exacting time frame of pain espoused or clarified

## 2019-07-03 NOTE — ED ADULT NURSE NOTE - OBJECTIVE STATEMENT
Presents to ED for b/l hip pain.  Patient reports chronic pain but worsening today.  Denies any numbness, tingling, loss of sensation to b/l legs, CP, abd pain, SOB, fevers, or chills.  Patient was able to walk into ED with no difficulty.  Sitting in chair with no signs or symptoms of distress noted.

## 2019-07-03 NOTE — ED PROVIDER NOTE - ATTENDING CONTRIBUTION TO CARE
33 y/o m with sickle cell anemia presents with pain crisis.  Reviewed hpi and pe as documented by pa.  PO protocol for pain mgmt of SCD initiated in ED with improvement in pain status and pt requesting discharge.  Pt instructed to follow up with hematologist.

## 2019-07-04 DIAGNOSIS — M54.5 LOW BACK PAIN: ICD-10-CM

## 2019-07-04 DIAGNOSIS — D57.00 HB-SS DISEASE WITH CRISIS, UNSPECIFIED: ICD-10-CM

## 2019-07-17 ENCOUNTER — EMERGENCY (EMERGENCY)
Facility: HOSPITAL | Age: 34
LOS: 1 days | Discharge: HOME | End: 2019-07-17
Attending: EMERGENCY MEDICINE | Admitting: EMERGENCY MEDICINE
Payer: MEDICAID

## 2019-07-17 VITALS
HEIGHT: 68 IN | HEART RATE: 129 BPM | SYSTOLIC BLOOD PRESSURE: 116 MMHG | OXYGEN SATURATION: 95 % | TEMPERATURE: 99 F | RESPIRATION RATE: 16 BRPM | DIASTOLIC BLOOD PRESSURE: 78 MMHG | WEIGHT: 162.04 LBS

## 2019-07-17 VITALS
SYSTOLIC BLOOD PRESSURE: 110 MMHG | TEMPERATURE: 98 F | OXYGEN SATURATION: 99 % | DIASTOLIC BLOOD PRESSURE: 70 MMHG | HEART RATE: 87 BPM | RESPIRATION RATE: 19 BRPM

## 2019-07-17 DIAGNOSIS — M54.5 LOW BACK PAIN: ICD-10-CM

## 2019-07-17 DIAGNOSIS — D57.00 HB-SS DISEASE WITH CRISIS, UNSPECIFIED: ICD-10-CM

## 2019-07-17 PROCEDURE — 99284 EMERGENCY DEPT VISIT MOD MDM: CPT

## 2019-07-17 PROCEDURE — 99283 EMERGENCY DEPT VISIT LOW MDM: CPT

## 2019-07-17 RX ORDER — IBUPROFEN 200 MG
600 TABLET ORAL ONCE
Refills: 0 | Status: COMPLETED | OUTPATIENT
Start: 2019-07-17 | End: 2019-07-17

## 2019-07-17 RX ORDER — HYDROMORPHONE HYDROCHLORIDE 2 MG/ML
8 INJECTION INTRAMUSCULAR; INTRAVENOUS; SUBCUTANEOUS ONCE
Refills: 0 | Status: DISCONTINUED | OUTPATIENT
Start: 2019-07-17 | End: 2019-07-17

## 2019-07-17 RX ORDER — HYDROMORPHONE HYDROCHLORIDE 2 MG/ML
16 INJECTION INTRAMUSCULAR; INTRAVENOUS; SUBCUTANEOUS ONCE
Refills: 0 | Status: DISCONTINUED | OUTPATIENT
Start: 2019-07-17 | End: 2019-07-17

## 2019-07-17 RX ADMIN — HYDROMORPHONE HYDROCHLORIDE 8 MILLIGRAM(S): 2 INJECTION INTRAMUSCULAR; INTRAVENOUS; SUBCUTANEOUS at 22:00

## 2019-07-17 RX ADMIN — Medication 600 MILLIGRAM(S): at 19:24

## 2019-07-17 RX ADMIN — HYDROMORPHONE HYDROCHLORIDE 16 MILLIGRAM(S): 2 INJECTION INTRAMUSCULAR; INTRAVENOUS; SUBCUTANEOUS at 20:12

## 2019-07-17 RX ADMIN — HYDROMORPHONE HYDROCHLORIDE 16 MILLIGRAM(S): 2 INJECTION INTRAMUSCULAR; INTRAVENOUS; SUBCUTANEOUS at 19:25

## 2019-07-17 RX ADMIN — HYDROMORPHONE HYDROCHLORIDE 8 MILLIGRAM(S): 2 INJECTION INTRAMUSCULAR; INTRAVENOUS; SUBCUTANEOUS at 21:53

## 2019-07-17 RX ADMIN — Medication 600 MILLIGRAM(S): at 20:12

## 2019-07-17 NOTE — ED ADULT NURSE REASSESSMENT NOTE - NS ED NURSE REASSESS COMMENT FT1
pt. resting quietly, lt. recumbent, assessment on-going, awaiting dispo.
pt. medicated for pain as ordered, provided with apple juice, per request, awaiting further orders
pt. now asking to be d/c home, Dr. Bryant aware
report was received from off-going RN, wil, resting at present, awaiting dispo.

## 2019-07-17 NOTE — ED PROVIDER NOTE - CLINICAL SUMMARY MEDICAL DECISION MAKING FREE TEXT BOX
34M PMH DVT/PE (no longer on AC), sickle cell (hx acute chest yrs ago), b/l hip AVN p/w pain to b/l lower back and b/l hips for sseveral hrs, similar to multiple prior episodes attributed to sickle crisis, ran out of home pain meds, no other systemic symptoms. Tachycardic (pt attributes this to pain), other vitals wnl. Exam as above.  ddx: Likely sickle crisis.  Pt prefers po meds and no labs w/ hope of going home. States he gets 16dilaudid PO.   Will reassess.

## 2019-07-17 NOTE — ED PROVIDER NOTE - OBJECTIVE STATEMENT
34M PMH DVT/PE (no longer on AC), sickle cell (hx acute chest yrs ago), b/l hip AVN p/w pain to b/l lower back and b/l hips. Began around noon, ran out oh home dilaudid (though states he has refill already sent in for tomorrow), pain is similar to multiple prior episodes attributed to sickle crisis. May have been triggered by weather. Denies SOB/CP, URI symptoms, focal weakness/numbness, NVD, abd pain, urinary complaints.

## 2019-07-17 NOTE — ED PROVIDER NOTE - PHYSICAL EXAMINATION
no LE edema, normal equal distal pulses.  b/l hip ROM slightly lmtd 2/2 pain, no joint warmth, no overlying skin changes. no spinal ttp. neck FROM, strength 5/5. no bony ttp.

## 2019-07-17 NOTE — ED PROVIDER NOTE - PROGRESS NOTE DETAILS
[place]: HR improved, currently sleeping, will reassess. [place]: Feeling much better, wants to go home, comfortable for dc.

## 2019-07-17 NOTE — ED PROVIDER NOTE - NSFOLLOWUPINSTRUCTIONS_ED_ALL_ED_FT
Can take tylenol or motrin every 6hrs as needed for pain.  Can take home medications as prescribed for more severe pain.  Stay well hydrated.  Return for fevers, persistent vomit, uncontrolled pain, worsening breathing, worsening lightheaded, spreading redness.  Follow up with primary doctor within 1-2 days.

## 2019-07-21 ENCOUNTER — EMERGENCY (EMERGENCY)
Facility: HOSPITAL | Age: 34
LOS: 1 days | Discharge: HOME | End: 2019-07-21
Attending: EMERGENCY MEDICINE | Admitting: EMERGENCY MEDICINE
Payer: MEDICAID

## 2019-07-21 VITALS
TEMPERATURE: 98 F | HEART RATE: 99 BPM | RESPIRATION RATE: 16 BRPM | DIASTOLIC BLOOD PRESSURE: 67 MMHG | OXYGEN SATURATION: 99 % | SYSTOLIC BLOOD PRESSURE: 115 MMHG

## 2019-07-21 VITALS
TEMPERATURE: 99 F | DIASTOLIC BLOOD PRESSURE: 91 MMHG | HEART RATE: 123 BPM | SYSTOLIC BLOOD PRESSURE: 149 MMHG | WEIGHT: 162.04 LBS | OXYGEN SATURATION: 98 % | HEIGHT: 68 IN | RESPIRATION RATE: 16 BRPM

## 2019-07-21 DIAGNOSIS — M54.5 LOW BACK PAIN: ICD-10-CM

## 2019-07-21 DIAGNOSIS — Z79.891 LONG TERM (CURRENT) USE OF OPIATE ANALGESIC: ICD-10-CM

## 2019-07-21 DIAGNOSIS — D57.219 SICKLE-CELL/HB-C DISEASE WITH CRISIS, UNSPECIFIED: ICD-10-CM

## 2019-07-21 DIAGNOSIS — Z79.899 OTHER LONG TERM (CURRENT) DRUG THERAPY: ICD-10-CM

## 2019-07-21 PROCEDURE — 99283 EMERGENCY DEPT VISIT LOW MDM: CPT

## 2019-07-21 PROCEDURE — 99284 EMERGENCY DEPT VISIT MOD MDM: CPT

## 2019-07-21 RX ORDER — HYDROMORPHONE HYDROCHLORIDE 2 MG/ML
16 INJECTION INTRAMUSCULAR; INTRAVENOUS; SUBCUTANEOUS ONCE
Refills: 0 | Status: DISCONTINUED | OUTPATIENT
Start: 2019-07-21 | End: 2019-07-21

## 2019-07-21 RX ORDER — IBUPROFEN 200 MG
600 TABLET ORAL ONCE
Refills: 0 | Status: COMPLETED | OUTPATIENT
Start: 2019-07-21 | End: 2019-07-21

## 2019-07-21 RX ORDER — HYDROMORPHONE HYDROCHLORIDE 2 MG/ML
8 INJECTION INTRAMUSCULAR; INTRAVENOUS; SUBCUTANEOUS ONCE
Refills: 0 | Status: DISCONTINUED | OUTPATIENT
Start: 2019-07-21 | End: 2019-07-21

## 2019-07-21 RX ADMIN — HYDROMORPHONE HYDROCHLORIDE 16 MILLIGRAM(S): 2 INJECTION INTRAMUSCULAR; INTRAVENOUS; SUBCUTANEOUS at 21:35

## 2019-07-21 RX ADMIN — Medication 600 MILLIGRAM(S): at 21:35

## 2019-07-21 RX ADMIN — HYDROMORPHONE HYDROCHLORIDE 8 MILLIGRAM(S): 2 INJECTION INTRAMUSCULAR; INTRAVENOUS; SUBCUTANEOUS at 23:35

## 2019-07-21 NOTE — ED PROVIDER NOTE - CLINICAL SUMMARY MEDICAL DECISION MAKING FREE TEXT BOX
exacerbation of typical sickle cell pain in back/ hips.  no fever to suggest infection.  denies trauma.  given dilaudid/ ibuprofen po with improvement.  requested additional dose and discharge.  to f/u with pmd

## 2019-07-21 NOTE — ED PROVIDER NOTE - MUSCULOSKELETAL, MLM
Spine appears normal, range of motion is not limited, pain with rom of hips and in low back.  gait non ataxic

## 2019-07-21 NOTE — ED PROVIDER NOTE - OBJECTIVE STATEMENT
history of sickle cell here with pain in low back and hips today.  Typical of prior pain.  Denies fever/chills, cough, shortness of breath, weakness.  Says he took dilaudid 8mg at home this afternoon without relief.  No trama.

## 2019-07-21 NOTE — ED PROVIDER NOTE - NSFOLLOWUPINSTRUCTIONS_ED_ALL_ED_FT
Please see your primary care provider in 2-3 days.  Call for appointment.  Return to the ER if symptoms worsen or other concerns.    Sickle Cell Crisis    WHAT YOU NEED TO KNOW:    A sickle cell crisis is a painful episode that occurs in people who have sickle cell anemia. It happens when sickle-shaped red blood cells (RBCs) block blood vessels. Blood and oxygen cannot get to tissues, causing pain. A sickle cell crisis can also damage your tissues and cause organ failure, such liver or kidney failure. A sickle cell crisis can become life-threatening.    DISCHARGE INSTRUCTIONS:    Call 911 for any of the following:     You have shortness of breath or chest pain.      You are a man and have an erection that is painful and does not go away.       You lose vision in one or both eyes.    Return to the emergency department if:     You have a fever.      You feel like you cannot cope with your pain, or you feel like hurting yourself.       You have behavior changes, a seizure, or faint.       You have abdominal pain, nausea, or vomiting.      You have a headache that is worse or different from those that you have had in the past.       You have new weakness or numbness in your arm, leg, or face.      You have new pain in any part of your body.      Your urine is dark and you are urinating less than usual or not at all.       You are dizzy, lightheaded, or faint.     Contact your healthcare provider if:     You have any new signs or symptoms.       You have blood in your urine.       You are constipated or you have diarrhea.       You have changes in your vision.       You have increased fatigue.       You plan to travel by airplane or to a high elevation.       You have questions or concerns about your condition or care.    Medicines: You may need any of the following:     Prescription pain medicine may be given. Ask how to take this medicine safely. Medicine may also be given to decrease sickling of your RBCs. You may also need medicine to treat or prevent a bacterial infection.       NSAIDs, such as ibuprofen, help decrease swelling, pain, and fever. This medicine is available with or without a doctor's order. NSAIDs can cause stomach bleeding or kidney problems in certain people. If you take blood thinner medicine, always ask your healthcare provider if NSAIDs are safe for you. Always read the medicine label and follow directions.      Acetaminophen decreases pain and fever. It is available without a doctor's order. Ask how much to take and how often to take it. Follow directions. Acetaminophen can cause liver damage if not taken correctly.      Take your medicine as directed. Contact your healthcare provider if you think your medicine is not helping or if you have side effects. Tell him or her if you are allergic to any medicine. Keep a list of the medicines, vitamins, and herbs you take. Include the amounts, and when and why you take them. Bring the list or the pill bottles to follow-up visits. Carry your medicine list with you in case of an emergency.    Medical alert identification: Wear medical alert jewelry or carry a card that says you have sickle cell anemia. Ask your healthcare provider where to get these items. Medical Alert Jewelry         Prevent a sickle cell crisis:     Take vitamins and minerals as directed. Folic acid can help prevent blood vessel problems that can occur with sickle cell anemia. Zinc may decrease how often you have pain.       Drink liquids as directed. Dehydration can increase your risk for a sickle cell crisis. Ask how much liquid to drink each day and which liquids are best for you.      Balance rest and exercise. Rest during a sickle cell crisis. Over time, increase your activity to a moderate amount. Exercise regularly. Avoid exercise or activities that can cause injury, such as football. Ask about the best exercise plan for you.       Stay out of the cold. Do not go quickly from a warm place to a cold place. Do not go swimming in cold water. Stay warm in the winter.      Do not smoke cigarettes or drink alcohol. These increase your risk for a sickle cell crisis. Nicotine and other chemicals in cigarettes and cigars can cause lung damage. Ask your healthcare provider for information if you currently smoke and need help to quit. E-cigarettes or smokeless tobacco still contain nicotine. Talk to your healthcare provider before you use these products.       Ask about vaccinations you need. Vaccinations can help prevent a viral infection that may lead to a sickle cell crisis. Get a flu shot every year as directed. You may need a pneumonia vaccine every 5 years.     Follow up with your healthcare provider as directed: You may need ongoing screening for conditions that can develop because of sickle cell disease. Examples include kidney disease, hypertension (high blood pressure), retinopathy (eye problems), and problems with your lungs. Write down your questions so you remember to ask them during your visits.

## 2019-08-02 ENCOUNTER — EMERGENCY (EMERGENCY)
Facility: HOSPITAL | Age: 34
LOS: 1 days | Discharge: HOME | End: 2019-08-02
Attending: EMERGENCY MEDICINE | Admitting: EMERGENCY MEDICINE
Payer: MEDICAID

## 2019-08-02 VITALS
HEIGHT: 68 IN | OXYGEN SATURATION: 97 % | WEIGHT: 160.94 LBS | SYSTOLIC BLOOD PRESSURE: 144 MMHG | TEMPERATURE: 98 F | DIASTOLIC BLOOD PRESSURE: 80 MMHG | RESPIRATION RATE: 18 BRPM | HEART RATE: 102 BPM

## 2019-08-02 PROCEDURE — 99283 EMERGENCY DEPT VISIT LOW MDM: CPT

## 2019-08-02 RX ORDER — HYDROMORPHONE HYDROCHLORIDE 2 MG/ML
16 INJECTION INTRAMUSCULAR; INTRAVENOUS; SUBCUTANEOUS ONCE
Refills: 0 | Status: DISCONTINUED | OUTPATIENT
Start: 2019-08-02 | End: 2019-08-02

## 2019-08-02 RX ADMIN — HYDROMORPHONE HYDROCHLORIDE 16 MILLIGRAM(S): 2 INJECTION INTRAMUSCULAR; INTRAVENOUS; SUBCUTANEOUS at 02:28

## 2019-08-02 NOTE — ED ADULT NURSE NOTE - NSIMPLEMENTINTERV_GEN_ALL_ED
Implemented All Universal Safety Interventions:  [place] to call system. Call bell, personal items and telephone within reach. Instruct patient to call for assistance. Room bathroom lighting operational. Non-slip footwear when patient is off stretcher. Physically safe environment: no spills, clutter or unnecessary equipment. Stretcher in lowest position, wheels locked, appropriate side rails in place.

## 2019-08-02 NOTE — ED PROVIDER NOTE - OBJECTIVE STATEMENT
reports exacerbation of sickle cell pain tonight albeit no fever no CP stating typical sickle cell exacerbation

## 2019-08-02 NOTE — ED PROVIDER NOTE - CPE EDP MUSC NORM
"Chief Complaint   Patient presents with     Derm Problem     c/o cyst on right great toe, has been there x 3 weeks, hurts to the touch and just hurts, rates pain 5/10, has increased in size       Initial /60 (BP Location: Left arm, Patient Position: Chair, Cuff Size: Adult Regular)  Pulse 79  Temp 97.8  F (36.6  C) (Tympanic)  Resp 18  Ht 5' 2\" (1.575 m)  Wt 115 lb (52.2 kg)  LMP 08/10/2013  BMI 21.03 kg/m2 Estimated body mass index is 21.03 kg/(m^2) as calculated from the following:    Height as of this encounter: 5' 2\" (1.575 m).    Weight as of this encounter: 115 lb (52.2 kg).  Medication Reconciliation: complete   Crystal Polo    " normal...

## 2019-08-02 NOTE — ED PROVIDER NOTE - ATTENDING CONTRIBUTION TO CARE
34M hx sickle cell, DVT/PE, c/o back pain and hip pain. pt states typical of crisis pain. no chest pain, no SOB, no fevers. no vomiting.  gen- nad  heent- ncat  cv -rrr  lungs -ctab  abd - soft, nt, nd  ext -wwp  neuro -aox3, steady gait, thurman  given po dilaudid, recommend pmd f/u

## 2019-08-06 DIAGNOSIS — D57.1 SICKLE-CELL DISEASE WITHOUT CRISIS: ICD-10-CM

## 2019-08-08 ENCOUNTER — EMERGENCY (EMERGENCY)
Facility: HOSPITAL | Age: 34
LOS: 1 days | Discharge: HOME | End: 2019-08-08
Attending: EMERGENCY MEDICINE | Admitting: EMERGENCY MEDICINE
Payer: MEDICAID

## 2019-08-08 VITALS
SYSTOLIC BLOOD PRESSURE: 123 MMHG | WEIGHT: 160.94 LBS | TEMPERATURE: 99 F | RESPIRATION RATE: 16 BRPM | OXYGEN SATURATION: 99 % | HEART RATE: 109 BPM | DIASTOLIC BLOOD PRESSURE: 71 MMHG

## 2019-08-08 PROCEDURE — 99283 EMERGENCY DEPT VISIT LOW MDM: CPT

## 2019-08-08 RX ORDER — HYDROMORPHONE HYDROCHLORIDE 2 MG/ML
16 INJECTION INTRAMUSCULAR; INTRAVENOUS; SUBCUTANEOUS ONCE
Refills: 0 | Status: DISCONTINUED | OUTPATIENT
Start: 2019-08-08 | End: 2019-08-08

## 2019-08-08 RX ORDER — IBUPROFEN 200 MG
600 TABLET ORAL ONCE
Refills: 0 | Status: COMPLETED | OUTPATIENT
Start: 2019-08-08 | End: 2019-08-08

## 2019-08-08 RX ADMIN — HYDROMORPHONE HYDROCHLORIDE 16 MILLIGRAM(S): 2 INJECTION INTRAMUSCULAR; INTRAVENOUS; SUBCUTANEOUS at 01:10

## 2019-08-08 RX ADMIN — Medication 600 MILLIGRAM(S): at 01:10

## 2019-08-08 NOTE — ED PROVIDER NOTE - PHYSICAL EXAMINATION
VITAL SIGNS: I have reviewed nursing notes and confirm.  CONSTITUTIONAL: Well-developed; well-nourished; in no acute distress.  SKIN: Skin exam is warm and dry, no acute rash.  HEAD: Normocephalic; atraumatic.  EYES: PERRL, EOM intact; conjunctiva and sclera clear.  ENT: No nasal discharge; airway clear.  NECK: Supple; non tender.  CARD: S1, S2 normal; no murmurs, gallops, or rubs. Regular rate and rhythm.  RESP: No wheezes, rales or rhonchi.  ABD: Normal bowel sounds; soft; non-distended; non-tender  EXT: Normal ROM. No clubbing, cyanosis or edema.  NEURO: Alert, oriented. Grossly unremarkable. Gait intact  PSYCH: Cooperative, appropriate.

## 2019-08-08 NOTE — ED PROVIDER NOTE - OBJECTIVE STATEMENT
35 y/o M w/hx SS, AV necrosis b/l hips, p/w low back and hips today.  Typical of prior pain.  Denies fever/chills, cough, shortness of breath, weakness.  Says he took dilaudid 8mg at home this afternoon without relief.  No trama. No CP/SOB.

## 2019-08-08 NOTE — ED ADULT NURSE NOTE - OBJECTIVE STATEMENT
Patient to the Ed with complaint of BL hip pain and lower back pain, has hx of sickle cell disease took at home at 8pm 8mg dilaudid.

## 2019-08-08 NOTE — ED ADULT TRIAGE NOTE - CHIEF COMPLAINT QUOTE
pt with hx sickle cell complaining of b/l hip and lower back pain since 8pm denies trauma/ injury numbness/ tingling reports he took 8mg Dilaudid PO at 8pm without relief

## 2019-08-08 NOTE — ED ADULT NURSE NOTE - NSIMPLEMENTINTERV_GEN_ALL_ED
Implemented All Universal Safety Interventions:  [place] to call system. Call bell, personal items and telephone within reach. Instruct patient to call for assistance. Room bathroom lighting operational. Non-slip footwear when patient is off stretcher. Physically safe environment: no spills, clutter or unnecessary equipment. Stretcher in lowest position, wheels locked, appropriate side rails in place.

## 2019-08-08 NOTE — ED PROVIDER NOTE - NSFOLLOWUPINSTRUCTIONS_ED_ALL_ED_FT
Log Out.    [employer] CareNotes®     :  [facility]             SICKLE CELL CRISIS - AfterCare(R) Instructions(ER/ED)     Sickle Cell Crisis    WHAT YOU NEED TO KNOW:    A sickle cell crisis is a painful episode that occurs in people who have sickle cell anemia. It happens when sickle-shaped red blood cells (RBCs) block blood vessels. Blood and oxygen cannot get to tissues, causing pain. A sickle cell crisis can also damage your tissues and cause organ failure, such liver or kidney failure. A sickle cell crisis can become life-threatening.    DISCHARGE INSTRUCTIONS:    Call 911 for any of the following:     You have shortness of breath or chest pain.      You are a man and have an erection that is painful and does not go away.       You lose vision in one or both eyes.    Return to the emergency department if:     You have a fever.      You feel like you cannot cope with your pain, or you feel like hurting yourself.       You have behavior changes, a seizure, or faint.       You have abdominal pain, nausea, or vomiting.      You have a headache that is worse or different from those that you have had in the past.       You have new weakness or numbness in your arm, leg, or face.      You have new pain in any part of your body.      Your urine is dark and you are urinating less than usual or not at all.       You are dizzy, lightheaded, or faint.     Contact your healthcare provider if:     You have any new signs or symptoms.       You have blood in your urine.       You are constipated or you have diarrhea.       You have changes in your vision.       You have increased fatigue.       You plan to travel by airplane or to a high [facility].       You have questions or concerns about your condition or care.    Medicines: You may need any of the following:     Prescription pain medicine may be given. Ask how to take this medicine safely. Medicine may also be given to decrease sickling of your RBCs. You may also need medicine to treat or prevent a bacterial infection.       NSAIDs, such as ibuprofen, help decrease swelling, pain, and fever. This medicine is available with or without a doctor's order. NSAIDs can cause stomach bleeding or kidney problems in certain people. If you take blood thinner medicine, always ask your healthcare provider if NSAIDs are safe for you. Always read the medicine label and follow directions.      Acetaminophen decreases pain and fever. It is available without a doctor's order. Ask how much to take and how often to take it. Follow directions. Acetaminophen can cause liver damage if not taken correctly.      Take your medicine as directed. Contact your healthcare provider if you think your medicine is not helping or if you have side effects. Tell him or her if you are allergic to any medicine. Keep a list of the medicines, vitamins, and herbs you take. Include the amounts, and when and why you take them. Bring the list or the pill bottles to follow-up visits. Carry your medicine list with you in case of an emergency.    Medical alert identification: Wear medical alert jewelry or carry a card that says you have sickle cell anemia. Ask your healthcare provider where to get these items. Medical Alert Jewelry         Prevent a sickle cell crisis:     Take vitamins and minerals as directed. Folic acid can help prevent blood vessel problems that can occur with sickle cell anemia. Zinc may decrease how often you have pain.       Drink liquids as directed. Dehydration can increase your risk for a sickle cell crisis. Ask how much liquid to drink each day and which liquids are best for you.      Balance rest and exercise. Rest during a sickle cell crisis. Over time, increase your activity to a moderate amount. Exercise regularly. Avoid exercise or activities that can cause injury, such as football. Ask about the best exercise plan for you.       Stay out of the cold. Do not go quickly from a warm place to a cold place. Do not go swimming in cold water. Stay warm in the winter.      Do not smoke cigarettes or drink alcohol. These increase your risk for a sickle cell crisis. Nicotine and other chemicals in cigarettes and cigars can cause lung damage. Ask your healthcare provider for information if you currently smoke and need help to quit. E-cigarettes or smokeless tobacco still contain nicotine. Talk to your healthcare provider before you use these products.       Ask about vaccinations you need. Vaccinations can help prevent a viral infection that may lead to a sickle cell crisis. Get a flu shot every year as directed. You may need a pneumonia vaccine every 5 years.     Follow up with your healthcare provider as directed: You may need ongoing screening for conditions that can develop because of sickle cell disease. Examples include kidney disease, hypertension (high blood pressure), retinopathy (eye problems), and problems with your lungs. Write down your questions so you remember to ask them during your visits.

## 2019-08-12 DIAGNOSIS — D57.00 HB-SS DISEASE WITH CRISIS, UNSPECIFIED: ICD-10-CM

## 2019-08-12 DIAGNOSIS — M25.551 PAIN IN RIGHT HIP: ICD-10-CM

## 2019-08-14 ENCOUNTER — EMERGENCY (EMERGENCY)
Facility: HOSPITAL | Age: 34
LOS: 1 days | Discharge: HOME | End: 2019-08-14
Attending: EMERGENCY MEDICINE | Admitting: EMERGENCY MEDICINE
Payer: MEDICAID

## 2019-08-14 VITALS
WEIGHT: 160.94 LBS | DIASTOLIC BLOOD PRESSURE: 81 MMHG | SYSTOLIC BLOOD PRESSURE: 138 MMHG | TEMPERATURE: 98 F | OXYGEN SATURATION: 98 % | RESPIRATION RATE: 18 BRPM | HEART RATE: 109 BPM | HEIGHT: 68 IN

## 2019-08-14 VITALS
HEART RATE: 82 BPM | OXYGEN SATURATION: 99 % | RESPIRATION RATE: 18 BRPM | DIASTOLIC BLOOD PRESSURE: 72 MMHG | TEMPERATURE: 98 F | SYSTOLIC BLOOD PRESSURE: 119 MMHG

## 2019-08-14 PROCEDURE — 99283 EMERGENCY DEPT VISIT LOW MDM: CPT

## 2019-08-14 RX ORDER — IBUPROFEN 200 MG
600 TABLET ORAL ONCE
Refills: 0 | Status: COMPLETED | OUTPATIENT
Start: 2019-08-14 | End: 2019-08-14

## 2019-08-14 RX ORDER — HYDROMORPHONE HYDROCHLORIDE 2 MG/ML
16 INJECTION INTRAMUSCULAR; INTRAVENOUS; SUBCUTANEOUS ONCE
Refills: 0 | Status: DISCONTINUED | OUTPATIENT
Start: 2019-08-14 | End: 2019-08-14

## 2019-08-14 RX ORDER — HYDROMORPHONE HYDROCHLORIDE 2 MG/ML
8 INJECTION INTRAMUSCULAR; INTRAVENOUS; SUBCUTANEOUS ONCE
Refills: 0 | Status: DISCONTINUED | OUTPATIENT
Start: 2019-08-14 | End: 2019-08-14

## 2019-08-14 RX ADMIN — HYDROMORPHONE HYDROCHLORIDE 8 MILLIGRAM(S): 2 INJECTION INTRAMUSCULAR; INTRAVENOUS; SUBCUTANEOUS at 09:48

## 2019-08-14 RX ADMIN — Medication 600 MILLIGRAM(S): at 07:59

## 2019-08-14 RX ADMIN — HYDROMORPHONE HYDROCHLORIDE 16 MILLIGRAM(S): 2 INJECTION INTRAMUSCULAR; INTRAVENOUS; SUBCUTANEOUS at 07:29

## 2019-08-14 RX ADMIN — Medication 600 MILLIGRAM(S): at 07:29

## 2019-08-14 RX ADMIN — HYDROMORPHONE HYDROCHLORIDE 16 MILLIGRAM(S): 2 INJECTION INTRAMUSCULAR; INTRAVENOUS; SUBCUTANEOUS at 07:59

## 2019-08-14 NOTE — ED ADULT TRIAGE NOTE - CHIEF COMPLAINT QUOTE
presents to ED for sickle cell pain.  reports pain to b/l hips and lower back which began at 12am today.  patient took dilaudid 8mg po as prescribed with no relief.

## 2019-08-14 NOTE — ED PROVIDER NOTE - NSFOLLOWUPINSTRUCTIONS_ED_ALL_ED_FT
Follow up with your doctor this week as scheduled.    Sickle Cell Anemia, Adult  Image   Sickle cell anemia is a condition in which red blood cells have an abnormal “sickle” shape. Red blood cells carry oxygen through the body. Sickle-shaped red blood cells do not live as long as normal red blood cells. They also clump together and block blood from flowing through the blood vessels. This condition prevents the body from getting enough oxygen. Sickle cell anemia causes organ damage and pain. It also increases the risk of infection.    What are the causes?  This condition is caused by a gene that is passed from parent to child (inherited). Receiving two copies of the gene causes the disease. Receiving one copy causes the "trait," which means that symptoms are milder or not present.    What increases the risk?  This condition is more likely to develop if your ancestors were from [place], the [place], [place] or [place], the [place], [place], or the [place].    What are the signs or symptoms?  Symptoms of this condition include:  Episodes of pain (crises), especially in the hands and feet, joints, back, chest, or abdomen. The pain can be triggered by:  An illness, especially if there is dehydration.  Doing an activity with great effort (overexertion).  Exposure to extreme temperature changes.  High altitude.  Fatigue.  Shortness of breath or difficulty breathing.  Dizziness.  Pale skin or yellowed skin (jaundice).  Frequent bacterial infections.  Pain and swelling in the hands and feet (hand-food syndrome).  Prolonged, painful erection of the penis (priapism).   Acute chest syndrome. Symptoms of this include:  Chest pain.  Fever.  Cough.  Fast breathing.  Stroke.  Decreased activity.  Loss of appetite.  Change in behavior.  Headaches.  Seizures.  Vision changes.  Skin ulcers.  Heart disease.  High blood pressure.  Gallstones.  Liver and kidney problems.  How is this diagnosed?  This condition is diagnosed with blood tests that check for the gene that causes this condition.    How is this treated?  There is no cure for most cases of this condition. Treatment focuses on managing your symptoms and preventing complications of the disease. Your health care provider will work with you to identify the best treatment options for you based on an assessment of your condition. Treatment may include:  Medicines, including:  Pain medicines.  Antibiotic medicines for infection.  Medicines to increase the production of a protein in red blood cells that helps carry oxygen in the body (hemoglobin).  Fluids to treat pain and swelling.  Oxygen to treat acute chest syndrome.  Blood transfusions to treat symptoms such as fatigue, stroke, and acute chest syndrome.  Massage and physical therapy for pain.  Regular tests to monitor your condition, such as blood tests, X-rays, CT scans, MRI scans, ultrasounds, and lung function tests. These should be done every 3–12 months, depending on your age.  Hematopoietic stem cell transplant. This is a procedure to replace abnormal stem cells with healthy stem cells from a donor's bone marrow. Stem cells are cells that can develop into blood cells, and bone marrow is the spongy tissue inside the bones.  Follow these instructions at home:  Medicines     Take over-the-counter and prescription medicines only as told by your health care provider.  If you were prescribed an antibiotic medicine, take it as told by your health care provider. Do not stop taking the antibiotic even if you start to feel better.  If you develop a fever, do not take medicines to reduce the fever right away. This could cover up another problem. Notify your health care provider.  Managing pain, stiffness, and swelling     Try these methods to help ease your pain:  Using a heating pad.  Taking a warm bath.  Distracting yourself, such as by watching TV.  Eating and drinking     Drink enough fluid to keep your urine clear or pale yellow. Drink more in hot weather and during exercise.  Limit or avoid drinking alcohol.  Eat a balanced and nutritious diet. Eat plenty of fruits, vegetables, whole grains, and lean protein.  Take vitamins and supplements as directed by your health care provider.  Traveling     When traveling, keep these with you:  Your medical information.  The names of your health care providers.  Your medicines.  If you have to travel by air, ask about precautions you should take.  Activity     Get plenty of rest.  Avoid activities that will lower your oxygen levels, such as exercising vigorously.  General instructions     Do not use any products that contain nicotine or tobacco, such as cigarettes and e-cigarettes. They lower blood oxygen levels. If you need help quitting, ask your health care provider.  Consider wearing a medical alert bracelet.  Avoid high altitudes.  Avoid extreme temperatures and extreme temperature changes.  Keep all follow-up visits as told by your health care provider. This is important.  Contact a health care provider if:  You develop joint pain.  Your feet or hands swell or have pain.  You have fatigue.  Get help right away if:  You have symptoms of infection. These include:  Fever.  Chills.  Extreme tiredness.  Irritability.  Poor eating.  Vomiting.  You feel dizzy or faint.  You have new abdominal pain, especially on the left side near the stomach area.  You develop priapism.  You have numbness in your arms or legs or have trouble moving them.  You have trouble talking.  You develop pain that cannot be controlled with medicine.  You become short of breath.  You have rapid breathing.  You have a persistent cough.  You have pain in your chest.  You develop a severe headache or stiff neck.  You feel bloated without eating or after eating a small amount of food.  Your skin is pale.  You suddenly lose vision.  Summary  Sickle cell anemia is a condition in which red blood cells have an abnormal “sickle” shape. This disease can cause organ damage and chronic pain, and it can raise your risk of infection.  Sickle cell anemia is a genetic disorder.  Treatment focuses on managing your symptoms and preventing complications of the disease.  Get medical help right away if you have any signs of infection, such as a fever.  This information is not intended to replace advice given to you by your health care provider. Make sure you discuss any questions you have with your health care provider.    Document Released: 03/28/2007 Document Revised: 01/23/2018 Document Reviewed: 01/23/2018  [employer] Interactive Patient Education © 2019 [employer]

## 2019-08-14 NOTE — ED PROVIDER NOTE - OBJECTIVE STATEMENT
33 yo m with hx of sickle cell disease, avn of bilateral hips, hx of PE no longer on eliquis (taken off in April 2019 after 6 months) presents with typical sickle cell pain including lower back pain and lower extremity pain.  Denies fever, chills, chest pain, shortness of breath, cough.  Pt states he ran out of his dilaudid.  [facility] checked and pt prescribed 30 days on 7/18/19.  Pt states he has appt with Dr. Laguna this Friday.

## 2019-08-14 NOTE — ED PROVIDER NOTE - CLINICAL SUMMARY MEDICAL DECISION MAKING FREE TEXT BOX
Pt with typical sickle cell pain requesting pain meds.  Pt denies fever, chills, chest pain, infectious symptoms.  Requests dilaudid 16mg and ibuprofen. Has appt with heme on Friday with Dr. Laguna. Pt with typical sickle cell pain requesting pain meds.  Pt denies fever, chills, chest pain, infectious symptoms.  Requests dilaudid 16mg and ibuprofen. Has appt with heme on Friday with Dr. Laguna.  Pt feels better after two doses of pain meds in ED.

## 2019-08-14 NOTE — ED ADULT NURSE REASSESSMENT NOTE - NS ED NURSE REASSESS COMMENT FT1
patient refused new shift VS. Wanted breakfast and is eating cereal .
pt received in a chair, awake, appears comfortable, breakfast provided.
Patient asleep in bed. Breathing easy on room air. No signs of pain noted.

## 2019-08-14 NOTE — ED ADULT NURSE NOTE - OBJECTIVE STATEMENT
Received a 34 year old male with a chief complaint of Received a 34 year old male with a chief complaint of pain to the bilateral lower extremities. Patient reports taking PO medications at home and reports no relief  with symptoms.

## 2019-08-18 DIAGNOSIS — M54.5 LOW BACK PAIN: ICD-10-CM

## 2019-08-18 DIAGNOSIS — D57.1 SICKLE-CELL DISEASE WITHOUT CRISIS: ICD-10-CM

## 2019-08-24 ENCOUNTER — EMERGENCY (EMERGENCY)
Facility: HOSPITAL | Age: 34
LOS: 1 days | Discharge: HOME | End: 2019-08-24
Attending: EMERGENCY MEDICINE | Admitting: EMERGENCY MEDICINE
Payer: MEDICAID

## 2019-08-24 VITALS
TEMPERATURE: 98 F | HEART RATE: 96 BPM | DIASTOLIC BLOOD PRESSURE: 73 MMHG | OXYGEN SATURATION: 100 % | RESPIRATION RATE: 18 BRPM | SYSTOLIC BLOOD PRESSURE: 123 MMHG

## 2019-08-24 VITALS
SYSTOLIC BLOOD PRESSURE: 129 MMHG | OXYGEN SATURATION: 99 % | RESPIRATION RATE: 17 BRPM | HEART RATE: 100 BPM | DIASTOLIC BLOOD PRESSURE: 70 MMHG | TEMPERATURE: 99 F

## 2019-08-24 DIAGNOSIS — M25.551 PAIN IN RIGHT HIP: ICD-10-CM

## 2019-08-24 DIAGNOSIS — D57.00 HB-SS DISEASE WITH CRISIS, UNSPECIFIED: ICD-10-CM

## 2019-08-24 DIAGNOSIS — Z79.01 LONG TERM (CURRENT) USE OF ANTICOAGULANTS: ICD-10-CM

## 2019-08-24 PROCEDURE — 99283 EMERGENCY DEPT VISIT LOW MDM: CPT

## 2019-08-24 RX ORDER — HYDROMORPHONE HYDROCHLORIDE 2 MG/ML
16 INJECTION INTRAMUSCULAR; INTRAVENOUS; SUBCUTANEOUS ONCE
Refills: 0 | Status: DISCONTINUED | OUTPATIENT
Start: 2019-08-24 | End: 2019-08-24

## 2019-08-24 RX ORDER — IBUPROFEN 200 MG
800 TABLET ORAL ONCE
Refills: 0 | Status: COMPLETED | OUTPATIENT
Start: 2019-08-24 | End: 2019-08-24

## 2019-08-24 RX ORDER — HYDROMORPHONE HYDROCHLORIDE 2 MG/ML
8 INJECTION INTRAMUSCULAR; INTRAVENOUS; SUBCUTANEOUS ONCE
Refills: 0 | Status: DISCONTINUED | OUTPATIENT
Start: 2019-08-24 | End: 2019-08-24

## 2019-08-24 RX ADMIN — HYDROMORPHONE HYDROCHLORIDE 16 MILLIGRAM(S): 2 INJECTION INTRAMUSCULAR; INTRAVENOUS; SUBCUTANEOUS at 17:09

## 2019-08-24 RX ADMIN — HYDROMORPHONE HYDROCHLORIDE 16 MILLIGRAM(S): 2 INJECTION INTRAMUSCULAR; INTRAVENOUS; SUBCUTANEOUS at 18:30

## 2019-08-24 RX ADMIN — Medication 800 MILLIGRAM(S): at 18:30

## 2019-08-24 RX ADMIN — Medication 800 MILLIGRAM(S): at 17:10

## 2019-08-24 RX ADMIN — HYDROMORPHONE HYDROCHLORIDE 8 MILLIGRAM(S): 2 INJECTION INTRAMUSCULAR; INTRAVENOUS; SUBCUTANEOUS at 18:38

## 2019-08-24 NOTE — ED PROVIDER NOTE - OBJECTIVE STATEMENT
35 y/o m hx sickle cell presents stating having pain to b/l hips and low back which is consistent with his sickle cell pain.  Pt stating he thinks his AC was too high which exacerbated his pain, took 8mg dilaudid at home without improvement and came to ED.  Denies CP, SOB, fever, chills, trauma, numbness/tingling to ext, weakness, all other ROS negative.

## 2019-08-24 NOTE — ED ADULT TRIAGE NOTE - ARRIVAL INFO ADDITIONAL COMMENTS
Pt walked into ED with c/o of pain in the lower back, b/l I  pain. Onset was 12 noon. took 8mg Dilaudid at 12 noon with no relief. Pt has hx of AVN of b/l hips. Denies CP, SOB, fever, chills

## 2019-08-24 NOTE — ED PROVIDER NOTE - ATTENDING CONTRIBUTION TO CARE
33 yo male w history of SS anemia presents to ED with concern for low back and bilateral hip pain - consistent with his typical sickle cell pain.  He took PO dilaudid and does not feel that his pain is improved.  No fever or chills.  No CP, no SOB.  On my face to face ED eval, patient is non toxic in appearance.  HRRR, lungs clear.  Abd soft, NT/ND.  Ambulating without difficulty in ED.  Patient will be given pain meds, re evaluated.  Will dispo accordingly.

## 2019-08-24 NOTE — ED PROVIDER NOTE - NSFOLLOWUPINSTRUCTIONS_ED_ALL_ED_FT
Sickle Cell Disease    AMBULATORY CARE:    Sickle cell disease (SCD) causes your RBCs to be sickle (crescent) shaped. The sickle shape is caused by abnormal hemoglobin attached to the RBC. Hemoglobin carries oxygen to all tissues in your body. Sickle-shaped RBCs can get stuck to the walls of blood vessels. This can stop or slow blood flow, and prevent oxygen from getting to tissues. When this happens, it is called a sickle cell crisis. SCD may also cause low red blood cell (RBC) levels (anemia).     Common symptoms include the following: The following symptoms may come and go, or may happen during a sickle cell crisis:     Low energy      Pain anywhere in your body      Pale skin      Headaches      Shortness of breath    Call 911 or have someone else call for any of the following:     You have any of the following signs of a stroke:   Numbness or drooping on one side of your face       Weakness in an arm or leg      Confusion or difficulty speaking      Dizziness, a severe headache, or vision loss      You have any of the following signs of a heart attack:   Squeezing, pressure, or pain in your chest      You may also have any of the following:   Discomfort or pain in your back, neck, jaw, stomach, or arm      Shortness of breath      Nausea or vomiting      Lightheadedness or a sudden cold sweat      You have a seizure.       You lose vision in one or both eyes.      You lose consciousness or cannot be woken.     Seek care immediately if:     You feel lightheaded, short of breath, and have chest pain.      You cough up blood.      You have a fever of 100.4°F (38°C) or higher.      You have a severe headache.       Your pain does not get better after you take pain medicine.      Your arm or leg is painful, red, and larger than usual.       You feel like you can no longer cope with your pain, or feel like harming yourself.       You have abdominal pain, nausea, and vomiting.      You are a man and have an erection that is painful and does not go away after 4 hours.       You cannot think clearly, or you feel like you are going to faint.      Your urine is dark, or you are urinating less than usual or not at all.       You see blood in your urine.       Your eyes or skin are yellow.       You have a cold or the flu.       You have a cough or are wheezing.     Contact your healthcare provider if:     You are more tired than usual during the day.      You are constipated or have diarrhea.      Your vision has changed in one or both eyes.       You feel anxious or depressed.       You have new or worse swelling in your joints.       You have an open sore on your skin that will not heal.       You are pregnant or think you are pregnant.       You have questions or concerns about your condition or care.    Risks of SCD: SCD increases your risk for the following:     Infection      Breathing problems      Damage to organs such as the heart, liver, kidney, bone marrow, or spleen      Stroke, heart attack, or blood clots in your lungs or limbs      Eye problems      Problems with your joints      Open sores on your legs      Depression    Treatment for sickle cell anemia may include any of the following:     Medicines may be given to decrease pain or sickling of your RBCs. You may also need medicine to treat or prevent a bacterial infection.       A blood transfusion increases the number of healthy RBCs in your blood.       Surgery may be done to remove your spleen or gallbladder. Surgery may be needed if RBCs often get stuck in your spleen, or you have gallstones.       A stem cell transplant, also called a bone marrow transplant, helps your body make new, healthy blood cells.     Self-care:     Apply heat to areas of pain. Use a heating pad or take a warm bath. Do this for 20 to 30 minutes every 2 hours for as many days as directed.       Gently massage areas where you feel pain. A professional massage may also help with pain.       Balance rest and exercise. Rest during a sickle cell crisis. Over time, increase your activity. Exercise may help decrease pain. Take breaks during exercise and drink plenty of water. Ask your healthcare provider which activities are safe for you.       Get acupuncture treatment. Acupuncture may help decrease pain and help you relax. Ask your healthcare provider for more information about acupuncture.       Eat healthy foods. Healthy foods will improve your overall health and make it easier to manage SCD. Examples of healthy foods include fruits, vegetables, whole-grain breads, low-fat dairy products, beans, lean meats, and fish. Ask if you need to be on a special diet.     Prevent a sickle cell crisis: A sickle cell crisis may be caused by illness, changes in temperature, stress, dehydration, or being at high altitudes. Do the following to help prevent a sickle cell crisis:     Drink liquids as directed. Dehydration can increase your risk for a sickle cell crisis. Ask how much liquid to drink each day and which liquids are best for you.      Avoid quick changes in temperature. Do not go quickly from a warm place to a cold place. Get in a pool slowly instead of jumping in. Dress in light clothing in the summer and warm clothing in the winter.       Ask about vaccinations. Vaccinations can help prevent a viral infection. Get a flu shot every year as directed. You may also need a pneumonia vaccine.      Wash your hands frequently. Handwashing can help prevent illness. Wash your hands before you prepare or eat food, and after you use the bathroom.       Do not smoke. Nicotine and other chemicals in cigarettes and cigars can cause lung damage and sickle cell crisis. Ask your healthcare provider for information if you currently smoke and need help to quit. E-cigarettes or smokeless tobacco still contain nicotine. Talk to your healthcare provider before you use these products.      Limit or do not drink alcohol. Alcohol can cause dehydration and increase your risk for sickle cell crisis. If you drink alcohol, also drink plenty of water.       Manage your stress. Your healthcare provider may recommend relaxation techniques and deep breathing exercises to help decrease your stress. Your healthcare provider may recommend you talk to someone about your stress or anxiety, such as a counselor or a trusted friend.       Do not travel in an unpressurized plane or travel to high altitudes. These environments are low in oxygen and may cause a sickle cell crisis.     Wear medical alert identification: Wear medical alert jewelry or carry a card that says you have sickle cell anemia. Ask your healthcare provider where to get these items. Medical Alert Jewelry         What you need to know about family planning and pregnancy:     If you do not want to become pregnant, your healthcare provider may recommend birth control pills that contain only progestin. The pills will prevent pregnancy and make your periods lighter. Lighter periods may help treat low RBC levels.       Talk to your healthcare provider before you get pregnant. SCD increases a woman's risk for problems, such as a miscarriage and having a baby that weighs less than normal. You will need close monitoring during pregnancy. You may need to take certain vitamins and have 1 or more blood transfusions during pregnancy.       You will pass a gene for sickle cell disease to your child. Your partner should be tested for the sickle cell gene. This information can help predict your child's risk for sickle cell disease.     Follow up with your healthcare provider as directed: You may need ongoing screening for conditions that can develop because of sickle cell disease. Examples include kidney disease, hypertension (high blood pressure), retinopathy (eye problems), and problems with your lungs. Write down your questions so you remember to ask them during your visits.

## 2019-08-24 NOTE — ED PROVIDER NOTE - CLINICAL SUMMARY MEDICAL DECISION MAKING FREE TEXT BOX
35 y/o m hx sickle cell presents with low back and b/l hip pain; vss, no sx to suggest acute chest syndrome, given pain meds per oral protocol with improvement, oral hydration, will d/c to f/u with his hematologist.

## 2019-08-24 NOTE — ED ADULT NURSE NOTE - OBJECTIVE STATEMENT
Pt presents to ED c/o hip pain. 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 Pt took 8mg dilaudid PO at noon which did not help. Pt denies f/c, cp/sob. Pt presents tearful though in NAD speaking full sentences ambulatory with cane through triage.

## 2019-08-25 ENCOUNTER — EMERGENCY (EMERGENCY)
Facility: HOSPITAL | Age: 34
LOS: 1 days | Discharge: HOME | End: 2019-08-25
Admitting: EMERGENCY MEDICINE
Payer: MEDICAID

## 2019-08-25 VITALS
HEART RATE: 84 BPM | DIASTOLIC BLOOD PRESSURE: 75 MMHG | SYSTOLIC BLOOD PRESSURE: 117 MMHG | TEMPERATURE: 98 F | OXYGEN SATURATION: 100 % | RESPIRATION RATE: 18 BRPM

## 2019-08-25 VITALS
TEMPERATURE: 98 F | RESPIRATION RATE: 20 BRPM | HEART RATE: 113 BPM | OXYGEN SATURATION: 99 % | HEIGHT: 68 IN | WEIGHT: 160.94 LBS | SYSTOLIC BLOOD PRESSURE: 119 MMHG | DIASTOLIC BLOOD PRESSURE: 71 MMHG

## 2019-08-25 PROCEDURE — 99283 EMERGENCY DEPT VISIT LOW MDM: CPT

## 2019-08-25 RX ORDER — IBUPROFEN 200 MG
600 TABLET ORAL ONCE
Refills: 0 | Status: COMPLETED | OUTPATIENT
Start: 2019-08-25 | End: 2019-08-25

## 2019-08-25 RX ORDER — HYDROMORPHONE HYDROCHLORIDE 2 MG/ML
16 INJECTION INTRAMUSCULAR; INTRAVENOUS; SUBCUTANEOUS ONCE
Refills: 0 | Status: DISCONTINUED | OUTPATIENT
Start: 2019-08-25 | End: 2019-08-25

## 2019-08-25 RX ADMIN — Medication 600 MILLIGRAM(S): at 10:29

## 2019-08-25 RX ADMIN — HYDROMORPHONE HYDROCHLORIDE 16 MILLIGRAM(S): 2 INJECTION INTRAMUSCULAR; INTRAVENOUS; SUBCUTANEOUS at 10:28

## 2019-08-25 NOTE — ED PROVIDER NOTE - NSFOLLOWUPINSTRUCTIONS_ED_ALL_ED_FT
Follow up with your physician in 1-2 days.     [facility] HospitalogTraditional ChineseVietnamese    Sickle Cell Anemia, Adult  Image   Sickle cell anemia is a condition in which red blood cells have an abnormal “sickle” shape. Red blood cells carry oxygen through the body. Sickle-shaped red blood cells do not live as long as normal red blood cells. They also clump together and block blood from flowing through the blood vessels. This condition prevents the body from getting enough oxygen. Sickle cell anemia causes organ damage and pain. It also increases the risk of infection.    What are the causes?  This condition is caused by a gene that is passed from parent to child (inherited). Receiving two copies of the gene causes the disease. Receiving one copy causes the "trait," which means that symptoms are milder or not present.    What increases the risk?  This condition is more likely to develop if your ancestors were from [place], the [place], [place] or [place], the [place], [place], or the [place].    What are the signs or symptoms?  Symptoms of this condition include:  Episodes of pain (crises), especially in the hands and feet, joints, back, chest, or abdomen. The pain can be triggered by:  An illness, especially if there is dehydration.  Doing an activity with great effort (overexertion).  Exposure to extreme temperature changes.  High altitude.  Fatigue.  Shortness of breath or difficulty breathing.  Dizziness.  Pale skin or yellowed skin (jaundice).  Frequent bacterial infections.  Pain and swelling in the hands and feet (hand-food syndrome).  Prolonged, painful erection of the penis (priapism).   Acute chest syndrome. Symptoms of this include:  Chest pain.  Fever.  Cough.  Fast breathing.  Stroke.  Decreased activity.  Loss of appetite.  Change in behavior.  Headaches.  Seizures.  Vision changes.  Skin ulcers.  Heart disease.  High blood pressure.  Gallstones.  Liver and kidney problems.  How is this diagnosed?  This condition is diagnosed with blood tests that check for the gene that causes this condition.    How is this treated?  There is no cure for most cases of this condition. Treatment focuses on managing your symptoms and preventing complications of the disease. Your health care provider will work with you to identify the best treatment options for you based on an assessment of your condition. Treatment may include:  Medicines, including:  Pain medicines.  Antibiotic medicines for infection.  Medicines to increase the production of a protein in red blood cells that helps carry oxygen in the body (hemoglobin).  Fluids to treat pain and swelling.  Oxygen to treat acute chest syndrome.  Blood transfusions to treat symptoms such as fatigue, stroke, and acute chest syndrome.  Massage and physical therapy for pain.  Regular tests to monitor your condition, such as blood tests, X-rays, CT scans, MRI scans, ultrasounds, and lung function tests. These should be done every 3–12 months, depending on your age.  Hematopoietic stem cell transplant. This is a procedure to replace abnormal stem cells with healthy stem cells from a donor's bone marrow. Stem cells are cells that can develop into blood cells, and bone marrow is the spongy tissue inside the bones.  Follow these instructions at home:  Medicines     Take over-the-counter and prescription medicines only as told by your health care provider.  If you were prescribed an antibiotic medicine, take it as told by your health care provider. Do not stop taking the antibiotic even if you start to feel better.  If you develop a fever, do not take medicines to reduce the fever right away. This could cover up another problem. Notify your health care provider.  Managing pain, stiffness, and swelling     Try these methods to help ease your pain:  Using a heating pad.  Taking a warm bath.  Distracting yourself, such as by watching TV.  Eating and drinking     Drink enough fluid to keep your urine clear or pale yellow. Drink more in hot weather and during exercise.  Limit or avoid drinking alcohol.  Eat a balanced and nutritious diet. Eat plenty of fruits, vegetables, whole grains, and lean protein.  Take vitamins and supplements as directed by your health care provider.  Traveling     When traveling, keep these with you:  Your medical information.  The names of your health care providers.  Your medicines.  If you have to travel by air, ask about precautions you should take.  Activity     Get plenty of rest.  Avoid activities that will lower your oxygen levels, such as exercising vigorously.  General instructions     Do not use any products that contain nicotine or tobacco, such as cigarettes and e-cigarettes. They lower blood oxygen levels. If you need help quitting, ask your health care provider.  Consider wearing a medical alert bracelet.  Avoid high altitudes.  Avoid extreme temperatures and extreme temperature changes.  Keep all follow-up visits as told by your health care provider. This is important.  Contact a health care provider if:  You develop joint pain.  Your feet or hands swell or have pain.  You have fatigue.  Get help right away if:  You have symptoms of infection. These include:  Fever.  Chills.  Extreme tiredness.  Irritability.  Poor eating.  Vomiting.  You feel dizzy or faint.  You have new abdominal pain, especially on the left side near the stomach area.  You develop priapism.  You have numbness in your arms or legs or have trouble moving them.  You have trouble talking.  You develop pain that cannot be controlled with medicine.  You become short of breath.  You have rapid breathing.  You have a persistent cough.  You have pain in your chest.  You develop a severe headache or stiff neck.  You feel bloated without eating or after eating a small amount of food.  Your skin is pale.  You suddenly lose vision.  Summary  Sickle cell anemia is a condition in which red blood cells have an abnormal “sickle” shape. This disease can cause organ damage and chronic pain, and it can raise your risk of infection.  Sickle cell anemia is a genetic disorder.  Treatment focuses on managing your symptoms and preventing complications of the disease.  Get medical help right away if you have any signs of infection, such as a fever.  This information is not intended to replace advice given to you by your health care provider. Make sure you discuss any questions you have with your health care provider.    Document Released: 03/28/2007 Document Revised: 01/23/2018 Document Reviewed: 01/23/2018  [employer] Patient Education © 2019 [employer]

## 2019-08-25 NOTE — ED ADULT NURSE NOTE - OBJECTIVE STATEMENT
Patient is a 35yo male reporting bilateral hip and lower back pain that started 1 hour ago. Reports pain feels related to sickle-cell. Ambulating with a cane due to pain. Denies injury, chest pain, shortness of breath, abdominal pain, n/v/d.

## 2019-08-25 NOTE — ED PROVIDER NOTE - PHYSICAL EXAMINATION
+ tenderness to b/l hips. no edema. no erythema. no bruising. FROM. distal NVI. back non tender. no deformity. strength 5/5 b/l LE. distal sensation intact.

## 2019-08-25 NOTE — ED PROVIDER NOTE - CLINICAL SUMMARY MEDICAL DECISION MAKING FREE TEXT BOX
b/l hip and low back pain consistent with previous sickle cell pain. tachy likely due to pain. improved with repeat. neuro exam intact. pain meds given in ED. f/u with hematology

## 2019-08-25 NOTE — ED PROVIDER NOTE - OBJECTIVE STATEMENT
35 y/o male with sickle cell disease, DVT/PE no longer on anticoagulation c/o low back pain and b/l hip pain x this am. pt seen for same yesterday in ED. pt states he left his pain meds on [place]. no trauma. similar pain in past. no fever or chills. no n/v/d. no cp, sob, abd pain. no incontinence. no numbness, tingling or weakness.

## 2019-08-29 DIAGNOSIS — D57.00 HB-SS DISEASE WITH CRISIS, UNSPECIFIED: ICD-10-CM

## 2019-08-29 DIAGNOSIS — M54.5 LOW BACK PAIN: ICD-10-CM

## 2019-09-04 ENCOUNTER — EMERGENCY (EMERGENCY)
Facility: HOSPITAL | Age: 34
LOS: 1 days | Discharge: HOME | End: 2019-09-04
Attending: EMERGENCY MEDICINE | Admitting: EMERGENCY MEDICINE
Payer: MEDICAID

## 2019-09-04 VITALS
OXYGEN SATURATION: 99 % | TEMPERATURE: 99 F | RESPIRATION RATE: 15 BRPM | HEART RATE: 88 BPM | SYSTOLIC BLOOD PRESSURE: 118 MMHG | DIASTOLIC BLOOD PRESSURE: 72 MMHG

## 2019-09-04 VITALS
WEIGHT: 160.94 LBS | RESPIRATION RATE: 18 BRPM | HEIGHT: 68 IN | SYSTOLIC BLOOD PRESSURE: 116 MMHG | OXYGEN SATURATION: 100 % | HEART RATE: 104 BPM | DIASTOLIC BLOOD PRESSURE: 74 MMHG | TEMPERATURE: 99 F

## 2019-09-04 PROCEDURE — 99283 EMERGENCY DEPT VISIT LOW MDM: CPT

## 2019-09-04 RX ORDER — HYDROMORPHONE HYDROCHLORIDE 2 MG/ML
16 INJECTION INTRAMUSCULAR; INTRAVENOUS; SUBCUTANEOUS ONCE
Refills: 0 | Status: DISCONTINUED | OUTPATIENT
Start: 2019-09-04 | End: 2019-09-04

## 2019-09-04 RX ORDER — IBUPROFEN 200 MG
600 TABLET ORAL ONCE
Refills: 0 | Status: COMPLETED | OUTPATIENT
Start: 2019-09-04 | End: 2019-09-04

## 2019-09-04 NOTE — ED ADULT NURSE NOTE - NSIMPLEMENTINTERV_GEN_ALL_ED
Implemented All Fall Risk Interventions:  [place] to call system. Call bell, personal items and telephone within reach. Instruct patient to call for assistance. Room bathroom lighting operational. Non-slip footwear when patient is off stretcher. Physically safe environment: no spills, clutter or unnecessary equipment. Stretcher in lowest position, wheels locked, appropriate side rails in place. Provide visual cue, wrist band, yellow gown, etc. Monitor gait and stability. Monitor for mental status changes and reorient to person, place, and time. Review medications for side effects contributing to fall risk. Reinforce activity limits and safety measures with patient and family.

## 2019-09-04 NOTE — ED ADULT NURSE NOTE - OBJECTIVE STATEMENT
Received a 34 year old male with a chief complaint of Lower extremity pain. Patient denies any trauma. [facility] to be able to ambulate with cane independently. Patient with a PMH of Sickle Cell. Patient reports that he took his won prescription of Dilaudid PO and Tyelnol and reports no relief in his discomfort. Patient initial encounter - received asleep. Easy to awake. Patient immediately asked for food upon waking up.

## 2019-09-04 NOTE — ED ADULT TRIAGE NOTE - CHIEF COMPLAINT QUOTE
Pt states "I have b/l hip and back pain since this am. I am in sickle cell crisis. I had a fever of 101.2 at 4pm and I took 2 tylenol".

## 2019-09-05 PROCEDURE — 99283 EMERGENCY DEPT VISIT LOW MDM: CPT

## 2019-09-05 RX ADMIN — Medication 600 MILLIGRAM(S): at 00:01

## 2019-09-05 RX ADMIN — HYDROMORPHONE HYDROCHLORIDE 16 MILLIGRAM(S): 2 INJECTION INTRAMUSCULAR; INTRAVENOUS; SUBCUTANEOUS at 00:01

## 2019-09-05 NOTE — ED PROVIDER NOTE - PATIENT PORTAL LINK FT
You can access the FollowMyHealth Patient Portal offered by [facility] by registering at the following website: [email or web address]. By joining [employer]’s FollowMyHealth portal, you will also be able to view your health information using other applications (apps) compatible with our system.

## 2019-09-05 NOTE — ED PROVIDER NOTE - OBJECTIVE STATEMENT
34M hx sickle cell disease, b/l AVN of hips, PE/DVT, c/o b/l hip pain and lower back pain. pt states ongoing since the morning.  states took dilaudid 8mg at home without relief. states had fever in the afternoon and took tylenol, however afebrile currently.  no chest pain. no cough, no SOB. no n/v/d. no abd pain. no LE swelling. no trauma. states pain worse with movement.  states pain feels typical of his sickle cell crisis pain.

## 2019-09-05 NOTE — ED PROVIDER NOTE - CLINICAL SUMMARY MEDICAL DECISION MAKING FREE TEXT BOX
b/l hip pain, lower back pain, TTP, pt with frequent visits for similar complaints.  home reported fever, however afebrile in ED  -po pain medication policy

## 2019-09-05 NOTE — ED ADULT NURSE REASSESSMENT NOTE - NS ED NURSE REASSESS COMMENT FT1
Patient ordered for discharge to home. Patient reports that he needs to leave because his daughter is in the hospital. Patient refused to have vitals taken and walked to exit.

## 2019-09-06 ENCOUNTER — EMERGENCY (EMERGENCY)
Facility: HOSPITAL | Age: 34
LOS: 1 days | Discharge: HOME | End: 2019-09-06
Attending: EMERGENCY MEDICINE | Admitting: EMERGENCY MEDICINE
Payer: MEDICAID

## 2019-09-06 VITALS
TEMPERATURE: 99 F | RESPIRATION RATE: 17 BRPM | DIASTOLIC BLOOD PRESSURE: 76 MMHG | HEART RATE: 92 BPM | OXYGEN SATURATION: 100 % | SYSTOLIC BLOOD PRESSURE: 115 MMHG

## 2019-09-06 VITALS
HEIGHT: 68 IN | RESPIRATION RATE: 19 BRPM | SYSTOLIC BLOOD PRESSURE: 130 MMHG | DIASTOLIC BLOOD PRESSURE: 77 MMHG | HEART RATE: 103 BPM | TEMPERATURE: 99 F | WEIGHT: 160.94 LBS

## 2019-09-06 PROCEDURE — 99283 EMERGENCY DEPT VISIT LOW MDM: CPT

## 2019-09-06 RX ORDER — IBUPROFEN 200 MG
600 TABLET ORAL ONCE
Refills: 0 | Status: COMPLETED | OUTPATIENT
Start: 2019-09-06 | End: 2019-09-06

## 2019-09-06 RX ORDER — HYDROMORPHONE HYDROCHLORIDE 2 MG/ML
16 INJECTION INTRAMUSCULAR; INTRAVENOUS; SUBCUTANEOUS ONCE
Refills: 0 | Status: DISCONTINUED | OUTPATIENT
Start: 2019-09-06 | End: 2019-09-06

## 2019-09-06 RX ADMIN — HYDROMORPHONE HYDROCHLORIDE 16 MILLIGRAM(S): 2 INJECTION INTRAMUSCULAR; INTRAVENOUS; SUBCUTANEOUS at 04:58

## 2019-09-06 RX ADMIN — Medication 600 MILLIGRAM(S): at 04:23

## 2019-09-06 RX ADMIN — Medication 600 MILLIGRAM(S): at 04:58

## 2019-09-06 RX ADMIN — HYDROMORPHONE HYDROCHLORIDE 16 MILLIGRAM(S): 2 INJECTION INTRAMUSCULAR; INTRAVENOUS; SUBCUTANEOUS at 04:23

## 2019-09-06 NOTE — ED PROVIDER NOTE - PATIENT PORTAL LINK FT
You can access the FollowMyHealth Patient Portal offered by [facility] by registering at the following website: [email or web address]. By joining [employer]’s FollowMyHealth portal, you will also be able to view your health information using other applications (apps) compatible with our system.

## 2019-09-06 NOTE — ED PROVIDER NOTE - PROGRESS NOTE DETAILS
recommend f/u with pain mgmt  I have discussed the discharge plan with the patient. The patient agrees with the plan, as discussed.  The patient understands Emergency Department diagnosis is a preliminary diagnosis often based on limited information and that the patient must adhere to the follow-up plan as discussed.  The patient understands that if the symptoms worsen the patient may return to the Emergency Department at any time for further evaluation and treatment.

## 2019-09-06 NOTE — ED PROVIDER NOTE - OBJECTIVE STATEMENT
34M hx sickle cell, AVN of hips b/l, c/o typical sickle cell crisis pain. pt states pain to both hips and lower back. pt was in ED yesterday for same pain. no fever today. no chest pain. no SOB. states took dilaudid 8mg at home without relief.

## 2019-09-06 NOTE — ED ADULT NURSE NOTE - OBJECTIVE STATEMENT
Patient to the ED with complaint of b/l hip and lower back pain took dilaudid 8mg PO around 8 pm with no relief.  HX of sickle cell disease.

## 2019-09-10 ENCOUNTER — EMERGENCY (EMERGENCY)
Facility: HOSPITAL | Age: 34
LOS: 1 days | Discharge: HOME | End: 2019-09-10
Attending: EMERGENCY MEDICINE | Admitting: EMERGENCY MEDICINE
Payer: MEDICAID

## 2019-09-10 VITALS
TEMPERATURE: 98 F | SYSTOLIC BLOOD PRESSURE: 125 MMHG | WEIGHT: 160.94 LBS | HEART RATE: 97 BPM | OXYGEN SATURATION: 100 % | RESPIRATION RATE: 18 BRPM | DIASTOLIC BLOOD PRESSURE: 76 MMHG

## 2019-09-10 DIAGNOSIS — D57.00 HB-SS DISEASE WITH CRISIS, UNSPECIFIED: ICD-10-CM

## 2019-09-10 DIAGNOSIS — M25.551 PAIN IN RIGHT HIP: ICD-10-CM

## 2019-09-10 DIAGNOSIS — M54.5 LOW BACK PAIN: ICD-10-CM

## 2019-09-10 PROCEDURE — 99283 EMERGENCY DEPT VISIT LOW MDM: CPT

## 2019-09-10 RX ORDER — HYDROMORPHONE HYDROCHLORIDE 2 MG/ML
16 INJECTION INTRAMUSCULAR; INTRAVENOUS; SUBCUTANEOUS ONCE
Refills: 0 | Status: DISCONTINUED | OUTPATIENT
Start: 2019-09-10 | End: 2019-09-10

## 2019-09-10 RX ORDER — IBUPROFEN 200 MG
600 TABLET ORAL ONCE
Refills: 0 | Status: COMPLETED | OUTPATIENT
Start: 2019-09-10 | End: 2019-09-10

## 2019-09-10 RX ADMIN — Medication 600 MILLIGRAM(S): at 23:49

## 2019-09-10 RX ADMIN — HYDROMORPHONE HYDROCHLORIDE 16 MILLIGRAM(S): 2 INJECTION INTRAMUSCULAR; INTRAVENOUS; SUBCUTANEOUS at 23:48

## 2019-09-10 NOTE — ED ADULT TRIAGE NOTE - CHIEF COMPLAINT QUOTE
pt. with pmh of SC, c/o hips and back pain since 4pm, pt. on 8mg of dilaudid at home, last dose at 4pm, no relief.

## 2019-09-10 NOTE — ED ADULT NURSE NOTE - NSIMPLEMENTINTERV_GEN_ALL_ED
Implemented All Universal Safety Interventions:  [place] to call system. Call bell, personal items and telephone within reach. Instruct patient to call for assistance. Room bathroom lighting operational. Non-slip footwear when patient is off stretcher. Physically safe environment: no spills, clutter or unnecessary equipment. Stretcher in lowest position, wheels locked, appropriate side rails in place.

## 2019-09-11 VITALS
RESPIRATION RATE: 16 BRPM | HEART RATE: 85 BPM | TEMPERATURE: 98 F | SYSTOLIC BLOOD PRESSURE: 122 MMHG | DIASTOLIC BLOOD PRESSURE: 67 MMHG | OXYGEN SATURATION: 99 %

## 2019-09-11 DIAGNOSIS — D57.00 HB-SS DISEASE WITH CRISIS, UNSPECIFIED: ICD-10-CM

## 2019-09-11 PROCEDURE — 99283 EMERGENCY DEPT VISIT LOW MDM: CPT

## 2019-09-11 RX ORDER — HYDROMORPHONE HYDROCHLORIDE 2 MG/ML
8 INJECTION INTRAMUSCULAR; INTRAVENOUS; SUBCUTANEOUS ONCE
Refills: 0 | Status: DISCONTINUED | OUTPATIENT
Start: 2019-09-11 | End: 2019-09-11

## 2019-09-11 RX ADMIN — HYDROMORPHONE HYDROCHLORIDE 8 MILLIGRAM(S): 2 INJECTION INTRAMUSCULAR; INTRAVENOUS; SUBCUTANEOUS at 01:26

## 2019-09-11 RX ADMIN — Medication 600 MILLIGRAM(S): at 00:32

## 2019-09-11 RX ADMIN — HYDROMORPHONE HYDROCHLORIDE 8 MILLIGRAM(S): 2 INJECTION INTRAMUSCULAR; INTRAVENOUS; SUBCUTANEOUS at 01:53

## 2019-09-11 RX ADMIN — HYDROMORPHONE HYDROCHLORIDE 16 MILLIGRAM(S): 2 INJECTION INTRAMUSCULAR; INTRAVENOUS; SUBCUTANEOUS at 00:32

## 2019-09-11 NOTE — ED PROVIDER NOTE - PHYSICAL EXAMINATION
GEN: Well appearing, well nourished, awake, alert, oriented to person, place, time/situation and appear somewhat uncomfortable 2/2 pain.  ENT: Airway patent, Nasal mucosa clear. Mouth with normal mucosa.  EYES: Clear bilaterally.  RESPIRATORY: Breathing comfortably with normal RR.  CARDIAC: Regular rate and rhythm  ABDOMEN: Soft, nontender, +bowel sounds, no rebound, rigidity, or guarding.  MSK: Range of motion is not limited, no deformities noted.  NEURO: Alert and oriented, no focal deficits.  SKIN: Skin normal color for race, warm, dry and intact. No evidence of rash.  PSYCH: Alert and oriented to person, place, time/situation. normal mood and affect. no apparent risk to self or others.

## 2019-09-11 NOTE — ED PROVIDER NOTE - PROGRESS NOTE DETAILS
pt feels better, but is requesting one more dose prior to DC as he has to travel (via public transport) back to . Will give 8mg Dilaudid PO. pt has plans to f/u with his heme-onc this week. Stable for DC.

## 2019-09-11 NOTE — ED PROVIDER NOTE - OBJECTIVE STATEMENT
34M hx sickle cell, AVN of hips b/l, c/o typical sickle cell crisis pain. pt states pain to both hips and lower back. pt was in ED several days ago for same pain. no fever today. no chest pain. no SOB. states took dilaudid 4mg at home at 4pm without relief. he is requesting PO Dilaudid and ibuprofen.

## 2019-09-11 NOTE — ED PROVIDER NOTE - NSFOLLOWUPINSTRUCTIONS_ED_ALL_ED_FT
Please follow up with your primary care physician/sickle cell doctor.   Return to the Emergency Department if you have any new or worsening symptoms, or for any other concerns. Please read below for further information.    Sickle Cell Anemia, Adult  Sickle cell anemia is a condition where your red blood cells are shaped like katya. Red blood cells carry oxygen through the body. Sickle-shaped cells do not live as long as normal red blood cells. They also clump together and block blood from flowing through the blood vessels. This prevents the body from getting enough oxygen. Sickle cell anemia causes organ damage and pain. It also increases the risk of infection.    Follow these instructions at home:  Medicines     Take over-the-counter and prescription medicines only as told by your doctor.  If you were prescribed an antibiotic medicine, take it as told by your doctor. Do not stop taking the antibiotic even if you start to feel better.  If you develop a fever, do not take medicines to lower the fever right away. Tell your doctor about the fever.  Managing pain, stiffness, and swelling     Try these methods to help with pain:  Use a heating pad.  Take a warm bath.  Distract yourself, such as by watching TV.  Eating and drinking     Drink enough fluid to keep your pee (urine) clear or pale yellow. Drink more in hot weather and during exercise.  Limit or avoid alcohol.  Eat a healthy diet. Eat plenty of fruits, vegetables, whole grains, and lean protein.  Take vitamins and supplements as told by your doctor.  Traveling     When traveling, keep these with you:  Your medical information.  The names of your doctors.  Your medicines.  If you need to take an airplane, talk to your doctor first.  Activity     Rest often.  Avoid exercises that make your heart beat much faster, such as jogging.  General instructions     Do not use products that have nicotine or tobacco, such as cigarettes and e-cigarettes. If you need help quitting, ask your doctor.  Consider wearing a medical alert bracelet.  Avoid being in high places (high altitudes), such as mountains.  Avoid very hot or cold temperatures.  Avoid places where the temperature changes a lot.  Keep all follow-up visits as told by your doctor. This is important.  Contact a doctor if:  A joint hurts.  Your feet or hands hurt or swell.  You feel tired (fatigued).  Get help right away if:  You have symptoms of infection. These include:  Fever.  Chills.  Being very tired.  Irritability.  Poor eating.  Throwing up (vomiting).  You feel dizzy or faint.  You have new stomach pain, especially on the left side.  You have a an erection (priapism) that lasts more than 4 hours.  You have numbness in your arms or legs.  You have a hard time moving your arms or legs.  You have trouble talking.  You have pain that does not go away when you take medicine.  You are short of breath.  You are breathing fast.  You have a long-term cough.  You have pain in your chest.  You have a bad headache.  You have a stiff neck.  Your stomach looks bloated even though you did not eat much.  Your skin is pale.  You suddenly cannot see well.  Summary  Sickle cell anemia is a condition where your red blood cells are shaped like katya.  Follow your doctor's advice on ways to manage pain, food to eat, activities to do, and steps to take for safe travel.  Get medical help right away if you have any signs of infection, such as a fever.  This information is not intended to replace advice given to you by your health care provider. Make sure you discuss any questions you have with your health care provider.

## 2019-09-11 NOTE — ED PROVIDER NOTE - PATIENT PORTAL LINK FT
You can access the FollowMyHealth Patient Portal offered by [facility] by registering at the following website: [email or web address]. By joining [employer]’s FollowMyHealth portal, you will also be able to view your health information using other applications (apps) compatible with our system.

## 2019-09-12 ENCOUNTER — EMERGENCY (EMERGENCY)
Facility: HOSPITAL | Age: 34
LOS: 1 days | Discharge: HOME | End: 2019-09-12
Attending: EMERGENCY MEDICINE | Admitting: EMERGENCY MEDICINE
Payer: MEDICAID

## 2019-09-12 VITALS
RESPIRATION RATE: 16 BRPM | DIASTOLIC BLOOD PRESSURE: 71 MMHG | SYSTOLIC BLOOD PRESSURE: 121 MMHG | HEART RATE: 78 BPM | OXYGEN SATURATION: 100 % | TEMPERATURE: 98 F

## 2019-09-12 VITALS
OXYGEN SATURATION: 100 % | SYSTOLIC BLOOD PRESSURE: 117 MMHG | HEART RATE: 110 BPM | HEIGHT: 68 IN | TEMPERATURE: 99 F | WEIGHT: 160.94 LBS | DIASTOLIC BLOOD PRESSURE: 66 MMHG | RESPIRATION RATE: 16 BRPM

## 2019-09-12 DIAGNOSIS — M25.551 PAIN IN RIGHT HIP: ICD-10-CM

## 2019-09-12 DIAGNOSIS — Z79.899 OTHER LONG TERM (CURRENT) DRUG THERAPY: ICD-10-CM

## 2019-09-12 DIAGNOSIS — M25.552 PAIN IN LEFT HIP: ICD-10-CM

## 2019-09-12 DIAGNOSIS — Z79.891 LONG TERM (CURRENT) USE OF OPIATE ANALGESIC: ICD-10-CM

## 2019-09-12 PROCEDURE — 99284 EMERGENCY DEPT VISIT MOD MDM: CPT

## 2019-09-12 PROCEDURE — 99283 EMERGENCY DEPT VISIT LOW MDM: CPT

## 2019-09-12 RX ORDER — HYDROMORPHONE HYDROCHLORIDE 2 MG/ML
8 INJECTION INTRAMUSCULAR; INTRAVENOUS; SUBCUTANEOUS ONCE
Refills: 0 | Status: DISCONTINUED | OUTPATIENT
Start: 2019-09-12 | End: 2019-09-12

## 2019-09-12 RX ORDER — IBUPROFEN 200 MG
600 TABLET ORAL ONCE
Refills: 0 | Status: COMPLETED | OUTPATIENT
Start: 2019-09-12 | End: 2019-09-12

## 2019-09-12 RX ADMIN — HYDROMORPHONE HYDROCHLORIDE 8 MILLIGRAM(S): 2 INJECTION INTRAMUSCULAR; INTRAVENOUS; SUBCUTANEOUS at 17:57

## 2019-09-12 RX ADMIN — Medication 600 MILLIGRAM(S): at 17:57

## 2019-09-12 RX ADMIN — HYDROMORPHONE HYDROCHLORIDE 8 MILLIGRAM(S): 2 INJECTION INTRAMUSCULAR; INTRAVENOUS; SUBCUTANEOUS at 18:01

## 2019-09-12 RX ADMIN — HYDROMORPHONE HYDROCHLORIDE 8 MILLIGRAM(S): 2 INJECTION INTRAMUSCULAR; INTRAVENOUS; SUBCUTANEOUS at 15:41

## 2019-09-12 RX ADMIN — Medication 600 MILLIGRAM(S): at 15:35

## 2019-09-12 NOTE — ED PROVIDER NOTE - OBJECTIVE STATEMENT
35 y/o male with a PMHx of SSD, AVN of b/l hip, DVT, PE is present here in the ED c/o b/l hip pain x1 day. Pt states atraumatic pain b/l with radiation of pain to his back. Pain is constant and similar to his previous exacerbation of his pain. He took 8mg of Dilaudid without improvement of his pain. He denies the following: fever, chills, numbness/tingling, urinary symptoms, loss of bm, fall/injury or recent spinal injections.

## 2019-09-12 NOTE — ED PROVIDER NOTE - CLINICAL SUMMARY MEDICAL DECISION MAKING FREE TEXT BOX
33 y/o male with a PMHx of SSC with AVN of the b/l hip is here who states he is having exacerbation of his pain. Pt ambulated in the ED with a cane. VS nml. LROM with b/l hip, reports pain increased with movement. Neurovascular intact. No injury, therefore do not suspect fx/dislocation. No fever, therefore do not suspect infection. As per pt, LROM is sim to previous exacerbation of pain, therefore septic joint with no fever with low clinical suspicion. Advised hydration, will treat acute SSD pain as protocol and reassess.

## 2019-09-12 NOTE — ED PROVIDER NOTE - ATTENDING CONTRIBUTION TO CARE
history of sickle cell with exacerbation of typical pain. no signs of infection, denies trauma, well appearing.  pain control with po meds in ed. vitals stable

## 2019-09-12 NOTE — ED PROVIDER NOTE - NSFOLLOWUPINSTRUCTIONS_ED_ALL_ED_FT
Sickle Cell Crisis    WHAT YOU NEED TO KNOW:    A sickle cell crisis is a painful episode that occurs in people who have sickle cell anemia. It happens when sickle-shaped red blood cells (RBCs) block blood vessels. Blood and oxygen cannot get to tissues, causing pain. A sickle cell crisis can also damage your tissues and cause organ failure, such liver or kidney failure. A sickle cell crisis can become life-threatening.    DISCHARGE INSTRUCTIONS:    Call 911 for any of the following:     You have shortness of breath or chest pain.      You are a man and have an erection that is painful and does not go away.       You lose vision in one or both eyes.    Return to the emergency department if:     You have a fever.      You feel like you cannot cope with your pain, or you feel like hurting yourself.       You have behavior changes, a seizure, or faint.       You have abdominal pain, nausea, or vomiting.      You have a headache that is worse or different from those that you have had in the past.       You have new weakness or numbness in your arm, leg, or face.      You have new pain in any part of your body.      Your urine is dark and you are urinating less than usual or not at all.       You are dizzy, lightheaded, or faint.     Contact your healthcare provider if:     You have any new signs or symptoms.       You have blood in your urine.       You are constipated or you have diarrhea.       You have changes in your vision.       You have increased fatigue.       You plan to travel by airplane or to a high elevation.       You have questions or concerns about your condition or care.    Medicines: You may need any of the following:     Prescription pain medicine may be given. Ask how to take this medicine safely. Medicine may also be given to decrease sickling of your RBCs. You may also need medicine to treat or prevent a bacterial infection.       NSAIDs, such as ibuprofen, help decrease swelling, pain, and fever. This medicine is available with or without a doctor's order. NSAIDs can cause stomach bleeding or kidney problems in certain people. If you take blood thinner medicine, always ask your healthcare provider if NSAIDs are safe for you. Always read the medicine label and follow directions.      Acetaminophen decreases pain and fever. It is available without a doctor's order. Ask how much to take and how often to take it. Follow directions. Acetaminophen can cause liver damage if not taken correctly.      Take your medicine as directed. Contact your healthcare provider if you think your medicine is not helping or if you have side effects. Tell him or her if you are allergic to any medicine. Keep a list of the medicines, vitamins, and herbs you take. Include the amounts, and when and why you take them. Bring the list or the pill bottles to follow-up visits. Carry your medicine list with you in case of an emergency.    Medical alert identification: Wear medical alert jewelry or carry a card that says you have sickle cell anemia. Ask your healthcare provider where to get these items. Medical Alert Jewelry         Prevent a sickle cell crisis:     Take vitamins and minerals as directed. Folic acid can help prevent blood vessel problems that can occur with sickle cell anemia. Zinc may decrease how often you have pain.       Drink liquids as directed. Dehydration can increase your risk for a sickle cell crisis. Ask how much liquid to drink each day and which liquids are best for you.      Balance rest and exercise. Rest during a sickle cell crisis. Over time, increase your activity to a moderate amount. Exercise regularly. Avoid exercise or activities that can cause injury, such as football. Ask about the best exercise plan for you.       Stay out of the cold. Do not go quickly from a warm place to a cold place. Do not go swimming in cold water. Stay warm in the winter.      Do not smoke cigarettes or drink alcohol. These increase your risk for a sickle cell crisis. Nicotine and other chemicals in cigarettes and cigars can cause lung damage. Ask your healthcare provider for information if you currently smoke and need help to quit. E-cigarettes or smokeless tobacco still contain nicotine. Talk to your healthcare provider before you use these products.       Ask about vaccinations you need. Vaccinations can help prevent a viral infection that may lead to a sickle cell crisis. Get a flu shot every year as directed. You may need a pneumonia vaccine every 5 years.     Follow up with your healthcare provider as directed: You may need ongoing screening for conditions that can develop because of sickle cell disease. Examples include kidney disease, hypertension (high blood pressure), retinopathy (eye problems), and problems with your lungs. Write down your questions so you remember to ask them during your visits.       © Copyright [employer] 2019 All illustrations and images included in CareNotes are the copyrighted property of [employer]. or [employer].      back to top                      © Copyright [employer] 2019

## 2019-09-12 NOTE — ED PROVIDER NOTE - PATIENT PORTAL LINK FT
You can access the FollowMyHealth Patient Portal offered by [facility] by registering at the following website: [email or web address]. By joining [employer]’s FollowMyHealth portal, you will also be able to view your health information using other applications (apps) compatible with our system.

## 2019-09-12 NOTE — ED ADULT NURSE NOTE - FAMILY HISTORY
Family history of sickle cell trait in father     Father  Still living? Unknown  Family history of sickle cell trait in mother, Age at diagnosis: Age Unknown

## 2019-09-12 NOTE — ED ADULT NURSE NOTE - OBJECTIVE STATEMENT
Pt presents complaining of bilateral hip and lower back pain. Pt states "It is my usual sick cell pain. I took Dilaudid before coming in and it hasn't helped". Pt denies any trauma, no fevers, no lightheadedness, no dizziness, no headache, no cp, no sob, no n/v, no changed to bowels, no urinary complaints.

## 2019-09-22 ENCOUNTER — EMERGENCY (EMERGENCY)
Facility: HOSPITAL | Age: 34
LOS: 1 days | Discharge: HOME | End: 2019-09-22
Attending: EMERGENCY MEDICINE | Admitting: EMERGENCY MEDICINE
Payer: MEDICAID

## 2019-09-22 VITALS
OXYGEN SATURATION: 99 % | RESPIRATION RATE: 15 BRPM | SYSTOLIC BLOOD PRESSURE: 122 MMHG | TEMPERATURE: 99 F | DIASTOLIC BLOOD PRESSURE: 79 MMHG | HEART RATE: 89 BPM

## 2019-09-22 VITALS
DIASTOLIC BLOOD PRESSURE: 81 MMHG | SYSTOLIC BLOOD PRESSURE: 135 MMHG | HEIGHT: 68 IN | TEMPERATURE: 98 F | WEIGHT: 155.21 LBS | RESPIRATION RATE: 16 BRPM | HEART RATE: 83 BPM | OXYGEN SATURATION: 100 %

## 2019-09-22 PROCEDURE — 99283 EMERGENCY DEPT VISIT LOW MDM: CPT

## 2019-09-22 RX ORDER — HYDROMORPHONE HYDROCHLORIDE 2 MG/ML
8 INJECTION INTRAMUSCULAR; INTRAVENOUS; SUBCUTANEOUS ONCE
Refills: 0 | Status: DISCONTINUED | OUTPATIENT
Start: 2019-09-22 | End: 2019-09-22

## 2019-09-22 RX ORDER — IBUPROFEN 200 MG
600 TABLET ORAL ONCE
Refills: 0 | Status: COMPLETED | OUTPATIENT
Start: 2019-09-22 | End: 2019-09-22

## 2019-09-22 RX ORDER — HYDROMORPHONE HYDROCHLORIDE 2 MG/ML
16 INJECTION INTRAMUSCULAR; INTRAVENOUS; SUBCUTANEOUS ONCE
Refills: 0 | Status: DISCONTINUED | OUTPATIENT
Start: 2019-09-22 | End: 2019-09-22

## 2019-09-22 RX ADMIN — HYDROMORPHONE HYDROCHLORIDE 16 MILLIGRAM(S): 2 INJECTION INTRAMUSCULAR; INTRAVENOUS; SUBCUTANEOUS at 12:50

## 2019-09-22 RX ADMIN — Medication 600 MILLIGRAM(S): at 12:50

## 2019-09-22 RX ADMIN — HYDROMORPHONE HYDROCHLORIDE 8 MILLIGRAM(S): 2 INJECTION INTRAMUSCULAR; INTRAVENOUS; SUBCUTANEOUS at 13:48

## 2019-09-22 RX ADMIN — Medication 600 MILLIGRAM(S): at 11:53

## 2019-09-22 RX ADMIN — HYDROMORPHONE HYDROCHLORIDE 16 MILLIGRAM(S): 2 INJECTION INTRAMUSCULAR; INTRAVENOUS; SUBCUTANEOUS at 11:53

## 2019-09-22 NOTE — ED ADULT NURSE NOTE - OBJECTIVE STATEMENT
Pt presents to ED with c/o constant lower back and b/l hip pain, hx sickle cell, states he took 8mg dilaudid PO (his usual dose q4h at home) at 5am with no relief. No CP/SOB/nv or fevers.

## 2019-09-22 NOTE — ED PROVIDER NOTE - CLINICAL SUMMARY MEDICAL DECISION MAKING FREE TEXT BOX
34M PMH DVT/PE (no longer on AC), sickle cell (hx acute chest yrs ago), b/l hip AVN p/w pain to b/l lower back and b/l hips since this morning, constant. Similar to multiple prior episodes attributed to sickle crisis. No other systemic symptoms. Vitals wnl, exam as above.  ddx: Likely sickle crisis.  Pt does not want labs. Pt is hoping for po pain control. Will give meds, reassess.

## 2019-09-22 NOTE — ED ADULT TRIAGE NOTE - CHIEF COMPLAINT QUOTE
Pt CO back to Bilat Hips and Lower back s/t Sickle Cell since 0500 this morning.  PT states "It's the same spot I always feel the pain."  Pt ambulating with cane.  Pt denies recent falls, trauma, dizziness, N/V/D, SOB, fevers and CP.

## 2019-09-22 NOTE — ED PROVIDER NOTE - PATIENT PORTAL LINK FT
You can access the FollowMyHealth Patient Portal offered by [facility] by registering at the following website: [email or web address]. By joining [employer]’s FollowMyHealth portal, you will also be able to view your health information using other applications (apps) compatible with our system.

## 2019-09-22 NOTE — ED PROVIDER NOTE - NSFOLLOWUPINSTRUCTIONS_ED_ALL_ED_FT
Can take tylenol or motrin every 6hrs as needed for pain.  Can take home medications as prescribed for more severe pain.  Stay well hydrated.  Return for fevers, persistent vomit, uncontrolled pain, worsening breathing, worsening lightheaded.  Follow up with primary doctor within 1-2 days.

## 2019-09-22 NOTE — ED PROVIDER NOTE - PHYSICAL EXAMINATION
No spinal ttp, neck FROM. Strength 5/5. No bony ttp, FROM all extremities. Normal equal distal pulses. Steady assisted gait w/ cane like at baseline. no LE edema, normal equal distal pulses.

## 2019-09-22 NOTE — ED PROVIDER NOTE - OBJECTIVE STATEMENT
34M PMH DVT/PE (no longer on AC), sickle cell (hx acute chest yrs ago), b/l hip AVN p/w pain to b/l lower back and b/l hips since this morning, constant. Similar to multiple prior episodes attributed to sickle crisis. Cannot think of inciting cause. Denies any focal weakness/numbness, urinary complaints, incontinence, f/c, trauma, SOB/CP, abd pain, NVD, recent weight loss or night sweats. No hx IVDU. Cannot recall any specific precipitating trauma. Still smokes but cutting back. Denies recent etoh use. Took 8mg PO dilaudid w/ no relief.

## 2019-09-26 DIAGNOSIS — D57.00 HB-SS DISEASE WITH CRISIS, UNSPECIFIED: ICD-10-CM

## 2019-09-26 DIAGNOSIS — M54.5 LOW BACK PAIN: ICD-10-CM

## 2019-10-03 ENCOUNTER — EMERGENCY (EMERGENCY)
Facility: HOSPITAL | Age: 34
LOS: 1 days | Discharge: HOME | End: 2019-10-03
Admitting: EMERGENCY MEDICINE
Payer: MEDICAID

## 2019-10-03 VITALS
HEART RATE: 104 BPM | DIASTOLIC BLOOD PRESSURE: 77 MMHG | OXYGEN SATURATION: 99 % | TEMPERATURE: 99 F | HEIGHT: 68 IN | SYSTOLIC BLOOD PRESSURE: 119 MMHG | WEIGHT: 160.94 LBS | RESPIRATION RATE: 18 BRPM

## 2019-10-03 VITALS
TEMPERATURE: 98 F | HEART RATE: 64 BPM | SYSTOLIC BLOOD PRESSURE: 105 MMHG | OXYGEN SATURATION: 100 % | DIASTOLIC BLOOD PRESSURE: 59 MMHG | RESPIRATION RATE: 18 BRPM

## 2019-10-03 PROCEDURE — 99283 EMERGENCY DEPT VISIT LOW MDM: CPT

## 2019-10-03 RX ORDER — HYDROMORPHONE HYDROCHLORIDE 2 MG/ML
16 INJECTION INTRAMUSCULAR; INTRAVENOUS; SUBCUTANEOUS ONCE
Refills: 0 | Status: DISCONTINUED | OUTPATIENT
Start: 2019-10-03 | End: 2019-10-03

## 2019-10-03 RX ORDER — IBUPROFEN 200 MG
800 TABLET ORAL ONCE
Refills: 0 | Status: COMPLETED | OUTPATIENT
Start: 2019-10-03 | End: 2019-10-03

## 2019-10-03 RX ADMIN — Medication 800 MILLIGRAM(S): at 17:31

## 2019-10-03 RX ADMIN — HYDROMORPHONE HYDROCHLORIDE 16 MILLIGRAM(S): 2 INJECTION INTRAMUSCULAR; INTRAVENOUS; SUBCUTANEOUS at 17:31

## 2019-10-03 NOTE — ED ADULT TRIAGE NOTE - ARRIVAL INFO ADDITIONAL COMMENTS
c.o lower back pain, and b.l hip pain since this morning. states he has hx of sickle cell and took 8 mg Dilaudid at 8am, with no relief. denies any other medical complaints

## 2019-10-03 NOTE — ED ADULT NURSE NOTE - NSFALLRSKASSESSDT_ED_ALL_ED
Ambulatory with steady gait.  No new distress and no further questions or concerns.  Expressed understanding of discharge information and medications.    
Ct was paged- Ct Temporal Bones  
Patient was oriented to the room. Call light in reach, instructed on use. Bed in lowest position and wheels are locked. Side rails are up for safety. Patient instructed not to get up without assistance.      
03-Oct-2019 16:16

## 2019-10-03 NOTE — ED PROVIDER NOTE - OBJECTIVE STATEMENT
33 y/o M with PMHx sickle cell, AVN, and PE presents to the ED complaining of bilateral hip pain and back pain. Pt notes these are common sites of his pain when his sickle cell flares up. Pt took Dilaudid 8mg at 8am this morning, but is in the ED because he felt the Dilaudid wasn't working. Denies fever, recent illness or fall. Pt sees a pain management doctor every 3 months, last in July. Pt recently had a prescription for Dilaudid filled 2 weeks ago. 35 y/o M with PMHx sickle cell, AVN, and PE presents to the ED complaining of bilateral hip pain and back pain. Pt notes these are common sites of his pain when his sickle cell flares up. Pt took Dilaudid 8mg at 8am this morning, but is in the ED because he felt the Dilaudid wasn't working. Denies fever, recent illness or fall. Pt sees a pain management doctor every 3 months, last visit  in July. Pt recently had a prescription for Dilaudid filled 2 weeks ago.

## 2019-10-03 NOTE — ED ADULT NURSE NOTE - NSIMPLEMENTINTERV_GEN_ALL_ED
Implemented All Universal Safety Interventions:  [place] to call system. Call bell, personal items and telephone within reach. Instruct patient to call for assistance. Room bathroom lighting operational. Non-slip footwear when patient is off stretcher. Physically safe environment: no spills, clutter or unnecessary equipment. Stretcher in lowest position, wheels locked, appropriate side rails in place.

## 2019-10-03 NOTE — ED PROVIDER NOTE - SKIN, MLM
Intact, no acute signs of infection, no redness no swelling. skin Intact, no acute signs of infection, no redness no swelling.

## 2019-10-03 NOTE — ED PROVIDER NOTE - NSFOLLOWUPINSTRUCTIONS_ED_ALL_ED_FT
Log Out.    [employer]® CareNotes®     :  [facility]             SICKLE CELL DISEASE - Discharge Care     Sickle Cell Disease    WHAT YOU NEED TO KNOW:    Sickle cell disease (SCD) causes your RBCs to be sickle (crescent) shaped. The sickle shape is caused by abnormal hemoglobin attached to the RBC. Hemoglobin carries oxygen to all tissues in your body. Sickle-shaped RBCs can get stuck to the walls of blood vessels. This can stop or slow blood flow, and prevent oxygen from getting to tissues. When this happens, it is called a sickle cell crisis. SCD may also cause low red blood cell (RBC) levels (anemia).     DISCHARGE INSTRUCTIONS:    Call 911 or have someone else call for any of the following:     You have any of the following signs of a stroke:   Numbness or drooping on one side of your face       Weakness in an arm or leg      Confusion or difficulty speaking      Dizziness, a severe headache, or vision loss      You have any of the following signs of a heart attack:   Squeezing, pressure, or pain in your chest      You may also have any of the following:   Discomfort or pain in your back, neck, jaw, stomach, or arm      Shortness of breath      Nausea or vomiting      Lightheadedness or a sudden cold sweat      You have a seizure.       You lose vision in one or both eyes.      You lose consciousness or cannot be woken.     Seek care immediately if:     You feel lightheaded, short of breath, and have chest pain.      You cough up blood.      You have a fever of 100.4°F (38°C) or higher.      You have a severe headache.       Your pain does not get better after you take pain medicine.      Your arm or leg is painful, red, and larger than usual.       You feel like you can no longer cope with your pain, or feel like harming yourself.       You have abdominal pain, nausea, and vomiting.      You are a man and have an erection that is painful and does not go away after 4 hours.       You cannot think clearly, or you feel like you are going to faint.      Your urine is dark, or you are urinating less than usual or not at all.       You see blood in your urine.       Your eyes or skin are yellow.       You have a cold or the flu.       You have a cough or are wheezing.     Contact your healthcare provider if:     You are more tired than usual during the day.      You are constipated or have diarrhea.      Your vision has changed in one or both eyes.       You feel anxious or depressed.       You have new or worse swelling in your joints.       You have an open sore on your skin that will not heal.       You are pregnant or think you are pregnant.       You have questions or concerns about your condition or care.    Medicines: You may need any of the following:     Antibiotics may be given to prevent a bacterial infection.       Hydroxyurea helps your body make red blood cells that are less likely to sickle. This may help decrease your pain and prevent sickle cell crisis.      Folic acid helps your body may healthy red blood cells.      Prescription pain medicine may be given. Ask your healthcare provider how to take this medicine safely. Some prescription pain medicines contain acetaminophen. Do not take other medicines that contain acetaminophen without talking to your healthcare provider. Too much acetaminophen may cause liver damage. Prescription pain medicine may cause constipation. Ask your healthcare provider how to prevent or treat constipation.       NSAIDs, such as ibuprofen, help decrease swelling, pain, and fever. This medicine is available with or without a doctor's order. NSAIDs can cause stomach bleeding or kidney problems in certain people. If you take blood thinner medicine, always ask your healthcare provider if NSAIDs are safe for you. Always read the medicine label and follow directions.      Acetaminophen decreases pain and fever. It is available without a doctor's order. Ask how much to take and how often to take it. Follow directions. Read the labels of all other medicines you are using to see if they also contain acetaminophen, or ask your doctor or pharmacist. Acetaminophen can cause liver damage if not taken correctly. Do not use more than 4 grams (4,000 milligrams) total of acetaminophen in one day.       Take your medicine as directed. Contact your healthcare provider if you think your medicine is not helping or if you have side effects. Tell him or her if you are allergic to any medicine. Keep a list of the medicines, vitamins, and herbs you take. Include the amounts, and when and why you take them. Bring the list or the pill bottles to follow-up visits. Carry your medicine list with you in case of an emergency.    Self-care:     Apply heat to areas of pain. Use a heating pad or take a warm bath. Do this for 20 to 30 minutes every 2 hours for as many days as directed.       Gently massage areas where you feel pain. A professional massage may also help with pain.       Balance rest and exercise. Rest during a sickle cell crisis. Over time, increase your activity. Exercise may help decrease pain. Take breaks during exercise and drink plenty of water. Ask your healthcare provider which activities are safe for you.       Get acupuncture treatment. Acupuncture may help decrease pain and help you relax. Ask your healthcare provider for more information about acupuncture.       Eat healthy foods. Healthy foods will improve your overall health and make it easier to manage SCD. Examples of healthy foods include fruits, vegetables, whole-grain breads, low-fat dairy products, beans, lean meats, and fish. Ask if you need to be on a special diet.     Prevent a sickle cell crisis: A sickle cell crisis may be caused by illness, changes in temperature, stress, dehydration, or being at high altitudes. Do the following to help prevent a sickle cell crisis:     Drink liquids as directed. Dehydration can increase your risk for a sickle cell crisis. Ask how much liquid to drink each day and which liquids are best for you.      Avoid quick changes in temperature. Do not go quickly from a warm place to a cold place. Get in a pool slowly instead of jumping in. Dress in light clothing in the summer and warm clothing in the winter.       Ask about vaccinations. Vaccinations can help prevent a viral infection. Get a flu shot every year as directed. You may also need a pneumonia vaccine.      Wash your hands frequently. Handwashing can help prevent illness. Wash your hands before you prepare or eat food, and after you use the bathroom.       Do not smoke. Nicotine and other chemicals in cigarettes and cigars can cause lung damage and sickle cell crisis. Ask your healthcare provider for information if you currently smoke and need help to quit. E-cigarettes or smokeless tobacco still contain nicotine. Talk to your healthcare provider before you use these products.      Limit or do not drink alcohol. Alcohol can cause dehydration and increase your risk for sickle cell crisis. If you drink alcohol, also drink plenty of water.       Manage your stress. Your healthcare provider may recommend relaxation techniques and deep breathing exercises to help decrease your stress. Your healthcare provider may recommend you talk to someone about your stress or anxiety, such as a counselor or a trusted friend.       Do not travel in an unpressurized plane or travel to high altitudes. These environments are low in oxygen and may cause a sickle cell crisis.     Wear medical alert identification: Wear medical alert jewelry or carry a card that says you have sickle cell anemia. Ask your healthcare provider where to get these items. Medical Alert Jewelry         What you need to know about family planning and pregnancy:     If you do not want to become pregnant, your healthcare provider may recommend birth control pills that contain only progestin. The pills will prevent pregnancy and make your periods lighter. Lighter periods may help treat low RBC levels.       Talk to your healthcare provider before you get pregnant. SCD increases a woman's risk for problems, such as a miscarriage and having a baby that weighs less than normal. You will need close monitoring during pregnancy. You may need to take certain vitamins and have 1 or more blood transfusions during pregnancy.       You will pass a gene for sickle cell disease to your child. Your partner should be tested for the sickle cell gene. This information can help predict your child's risk for sickle cell disease.     Follow up with your healthcare provider as directed: You may need ongoing screening for conditions that can develop because of sickle cell disease. Examples include kidney disease, hypertension (high blood pressure), retinopathy (eye problems), and problems with your lungs. Write down your questions so you remember to ask them during your visits.       © Copyright [employer] 2019 All illustrations and images included in CareNotes are the copyrighted property of [employer] or [employer].      back to top                      © Copyright [employer] 2019

## 2019-10-03 NOTE — ED PROVIDER NOTE - PATIENT PORTAL LINK FT
You can access the FollowMyHealth Patient Portal offered by [facility] by registering at the following website: [email or web address]. By joining [facility]’s FollowMyHealth portal, you will also be able to view your health information using other applications (apps) compatible with our system.

## 2019-10-03 NOTE — ED ADULT NURSE NOTE - OBJECTIVE STATEMENT
patient complains of low back and hip pain. denies fever, chills. states that he took 8mg PO dilaudid this morning with no relief.

## 2019-10-05 ENCOUNTER — EMERGENCY (EMERGENCY)
Facility: HOSPITAL | Age: 34
LOS: 1 days | Discharge: HOME | End: 2019-10-05
Admitting: EMERGENCY MEDICINE
Payer: MEDICAID

## 2019-10-05 VITALS
SYSTOLIC BLOOD PRESSURE: 109 MMHG | HEIGHT: 68 IN | WEIGHT: 160.94 LBS | TEMPERATURE: 98 F | RESPIRATION RATE: 18 BRPM | DIASTOLIC BLOOD PRESSURE: 71 MMHG | HEART RATE: 96 BPM | OXYGEN SATURATION: 99 %

## 2019-10-05 PROCEDURE — 99283 EMERGENCY DEPT VISIT LOW MDM: CPT

## 2019-10-05 RX ORDER — IBUPROFEN 200 MG
800 TABLET ORAL ONCE
Refills: 0 | Status: COMPLETED | OUTPATIENT
Start: 2019-10-05 | End: 2019-10-05

## 2019-10-05 RX ORDER — HYDROMORPHONE HYDROCHLORIDE 2 MG/ML
16 INJECTION INTRAMUSCULAR; INTRAVENOUS; SUBCUTANEOUS ONCE
Refills: 0 | Status: DISCONTINUED | OUTPATIENT
Start: 2019-10-05 | End: 2019-10-05

## 2019-10-05 RX ADMIN — HYDROMORPHONE HYDROCHLORIDE 16 MILLIGRAM(S): 2 INJECTION INTRAMUSCULAR; INTRAVENOUS; SUBCUTANEOUS at 03:43

## 2019-10-05 RX ADMIN — Medication 800 MILLIGRAM(S): at 04:12

## 2019-10-05 RX ADMIN — HYDROMORPHONE HYDROCHLORIDE 16 MILLIGRAM(S): 2 INJECTION INTRAMUSCULAR; INTRAVENOUS; SUBCUTANEOUS at 04:12

## 2019-10-05 RX ADMIN — Medication 800 MILLIGRAM(S): at 03:43

## 2019-10-05 NOTE — ED PROVIDER NOTE - PATIENT PORTAL LINK FT
You can access the FollowMyHealth Patient Portal offered by [facility] by registering at the following website: [email or web address]. By joining [facility]’s FollowMyHealth portal, you will also be able to view your health information using other applications (apps) compatible with our system.

## 2019-10-05 NOTE — ED PROVIDER NOTE - CLINICAL SUMMARY MEDICAL DECISION MAKING FREE TEXT BOX
33 yo male with h/o sickle cell disease, in the ER c/o pain to his back and hip joints, typical to his crisis pain. Pt asking for a medications to break his pain until he will f/u with his doctor. pt afebrile, non-toxic appearing, VSS. will give pain meds, re-evaluate, anticipate d/c home after. pt has h/o multiple visits with similar complaints.

## 2019-10-05 NOTE — ED PROVIDER NOTE - NSFOLLOWUPINSTRUCTIONS_ED_ALL_ED_FT
I have discussed the discharge plan with the patient. The patient agrees with the plan, as discussed.  The patient understands Emergency Department diagnosis is a preliminary diagnosis often based on limited information and that the patient must adhere to the follow-up plan as discussed.  The patient understands that if the symptoms worsen or if prescribed medications do not have the desired/planned effect that the patient may return to the Emergency Department at any time for further evaluation and treatment.    Log Out.    [employer]® CareNotes®     :  [facility]             SICKLE CELL CRISIS - General Information     Sickle Cell Crisis    WHAT YOU NEED TO KNOW:    What is a sickle cell crisis? A sickle cell crisis is a painful episode that occurs in people who have sickle cell anemia. It happens when sickle-shaped red blood cells (RBCs) block blood vessels. Blood and oxygen cannot get to your tissues, causing pain. A sickle cell crisis can also damage your tissues and cause organ failure, such liver or kidney failure. A sickle cell crisis can become life-threatening.    What are signs and symptoms of a sickle cell crisis? Your symptoms may change each time you have a crisis. They will depend on the area of your body where blood flow has been blocked.     Fever      Pain      Weakness or fatigue      Abdominal pain and swelling      Headaches      A painful, erect penis (priapism) in men      Fast heartbeats      Shortness of breath    What can trigger a sickle cell crisis?     Dehydration      Infection, such as a cold or the flu      Low oxygen levels from difficult exercise, flying, or high altitude       Getting cold or going from warm to cold quickly      Medical procedures, surgery, or having a baby      Strong emotions, such as anger or depression    How is pain managed during a sickle cell crisis?     Medicines may be given to decrease pain or to decrease sickling of your RBCs. You may also need medicine to prevent a bacterial infection or help you breathe more easily.       NSAIDs, such as ibuprofen, help decrease swelling, pain, and fever. This medicine is available with or without a doctor's order. NSAIDs can cause stomach bleeding or kidney problems in certain people. If you take blood thinner medicine, always ask your healthcare provider if NSAIDs are safe for you. Always read the medicine label and follow directions.      Acetaminophen decreases pain and fever. It is available without a doctor's order. Ask how much to take and how often to take it. Follow directions. Acetaminophen can cause liver damage if not taken correctly.    How else is a sickle cell crisis treated?     IV fluids treat dehydration and help reduce sickling of RBCs.       Oxygen helps increase oxygen levels in your blood and make it easier for you to breathe.      A blood transfusion replaces blood with RBCs that are not sickle shaped.      Surgery may be done to remove part of your spleen.     How can I prevent a sickle cell crisis?     Take vitamins and minerals as directed. Folic acid can help prevent blood vessel problems that can come with sickle cell anemia. Zinc may decrease how often you have pain.       Drink liquids as directed. Dehydration can increase your risk for a sick cell crisis. Ask how much liquid to drink each day and which liquids are best for you.      Balance rest and exercise. Rest during a sickle cell crisis. Over time, increase your activity to a moderate amount. Exercise regularly. Avoid exercise or activities that can cause injury, such as football. Ask about the best exercise plan for you.       Stay out of the cold. Do not go quickly from a warm place to a cold place. Do not go swimming in cold water. Stay warm in the winter.      Do not smoke cigarettes or drink alcohol. These increase your risk for a sickle cell crisis. Nicotine and other chemicals in cigarettes and cigars can cause lung damage. Ask your healthcare provider for information if you currently smoke and need help to quit. E-cigarettes or smokeless tobacco still contain nicotine. Talk to your healthcare provider before you use these products.       Ask about vaccinations you need. Vaccinations can help prevent a viral infection that may lead to a sickle cell crisis. Get a flu shot every year as directed. You may need pneumonia vaccines every 5 years.     Call 911 for any of the following:     You have shortness of breath or chest pain.      You are a man and have an erection that is painful and does not go away.       You lose vision in one or both eyes.    When should I seek immediate care?     You feel like you cannot cope with your pain, or you feel like hurting yourself.       You have behavior changes, a seizure, or faint.       You have a fever.      You have abdominal pain, nausea, or vomiting.      You have a different or worse headache.       You have new weakness or numbness in your arm, leg, or face.      You have new pain in any part of your body.      Your urine is dark and you are urinating less than usual or not at all.       You are dizzy, lightheaded, or faint.     When should I contact my healthcare provider?     You have any new signs or symptoms.       You have blood in your urine.       You are constipated or you have diarrhea.       You have changes in your vision.       You have increased fatigue.       You plan to travel by airplane or to a high elevation.       You have questions or concerns about your condition or care.    CARE AGREEMENT:    You have the right to help plan your care. Learn about your health condition and how it may be treated. Discuss treatment options with your healthcare providers to decide what care you want to receive. You always have the right to refuse treatment.        © Copyright [employer] 2019 All illustrations and images included in CareNotes are the copyrighted property of [employer] or [employer].      back to top                      © Copyright [employer] 2019

## 2019-10-05 NOTE — ED PROVIDER NOTE - MUSCULOSKELETAL, MLM
Spine and all extremities grossly appears normal,  diffuse tenderness to lower back and b/l  hip joints, no  joint deformities,

## 2019-10-05 NOTE — ED PROVIDER NOTE - OBJECTIVE STATEMENT
35 yo male with h/o sickle cell dz, in the ER c/o generalized back pain, b/l hip pain. Pt reports pain is typical for his sickle cell crisis pain. no injury, no fall, no fever or chills, no SOB or cough.

## 2019-10-05 NOTE — ED ADULT TRIAGE NOTE - OTHER COMPLAINTS
known case of sca, came today with lower back pain and rubio hip pain, took dilaudid 8mg PO yesterday 1800H.

## 2019-10-09 ENCOUNTER — EMERGENCY (EMERGENCY)
Facility: HOSPITAL | Age: 34
LOS: 1 days | Discharge: HOME | End: 2019-10-09
Attending: EMERGENCY MEDICINE | Admitting: EMERGENCY MEDICINE
Payer: MEDICAID

## 2019-10-09 VITALS
HEIGHT: 68 IN | WEIGHT: 160.94 LBS | RESPIRATION RATE: 18 BRPM | HEART RATE: 110 BPM | TEMPERATURE: 99 F | SYSTOLIC BLOOD PRESSURE: 114 MMHG | DIASTOLIC BLOOD PRESSURE: 75 MMHG | OXYGEN SATURATION: 99 %

## 2019-10-09 DIAGNOSIS — D57.00 HB-SS DISEASE WITH CRISIS, UNSPECIFIED: ICD-10-CM

## 2019-10-09 PROCEDURE — 99283 EMERGENCY DEPT VISIT LOW MDM: CPT

## 2019-10-09 RX ORDER — IBUPROFEN 200 MG
600 TABLET ORAL ONCE
Refills: 0 | Status: COMPLETED | OUTPATIENT
Start: 2019-10-09 | End: 2019-10-09

## 2019-10-09 RX ORDER — HYDROMORPHONE HYDROCHLORIDE 2 MG/ML
16 INJECTION INTRAMUSCULAR; INTRAVENOUS; SUBCUTANEOUS ONCE
Refills: 0 | Status: DISCONTINUED | OUTPATIENT
Start: 2019-10-09 | End: 2019-10-09

## 2019-10-09 RX ADMIN — Medication 600 MILLIGRAM(S): at 21:29

## 2019-10-09 RX ADMIN — HYDROMORPHONE HYDROCHLORIDE 16 MILLIGRAM(S): 2 INJECTION INTRAMUSCULAR; INTRAVENOUS; SUBCUTANEOUS at 21:28

## 2019-10-09 NOTE — ED PROVIDER NOTE - OBJECTIVE STATEMENT
34 year old male with history of SSD, bilateral avascular necrosis, DVT, PE presents to ED with concern for exacerbation of sick cell pain today.  Patient notes he was helping his grandmother move furniture and despite taking his prescribed 8 mg PO dilaudid at 1200 today, he is not feeling any relief of his pain.  He denies associated fever, chills, chest pain, shortness of breath, inability to ambulate, abdominal pain, nausea, emesis, changes to bowel movements or additional acute complaints or concerns at this time.  Patient states current hip pain feels similar to what he typically experiences with sick cell crisis type pain.

## 2019-10-09 NOTE — ED PROVIDER NOTE - PATIENT PORTAL LINK FT
You can access the FollowMyHealth Patient Portal offered by [facility] by registering at the following website: [email or web address]. By joining [facility]’s FollowMyHealth portal, you will also be able to view your health information using other applications (apps) compatible with our system.

## 2019-10-09 NOTE — ED PROVIDER NOTE - MUSCULOSKELETAL, MLM
Spine appears normal, range of motion is not limited, no significant reproducible pain on palpation of hips/pelvis.

## 2019-10-09 NOTE — ED PROVIDER NOTE - CPE EDP NEURO NORM
EXAM DESCRIPTION:  CHEST PA/LATERAL



COMPLETED DATE/TIME:  2017 10:59 am



REASON FOR STUDY:  BRONCHIOLITIS J21.9  ACUTE BRONCHIOLITIS, UNSPECIFIED J21.9  ACUTE BRONCHIOLITIS, 
UNSPECIFIED J21.9  ACUTE BRONCHIOLITIS, UNSPECIFIED



COMPARISON:  None.



NUMBER OF VIEWS:  Two view.



TECHNIQUE:  Frontal and lateral radiographic views of the chest acquired.



LIMITATIONS:  None.



FINDINGS:  LUNGS AND PLEURA: Peribronchial cuffing and interstitial changes.  No consolidation, effus
ion, or pneumothorax.  Mild hyperinflation with flattening the hemidiaphragms on the lateral view

MEDIASTINUM AND HILAR STRUCTURES: No masses.  No contour abnormalities.

HEART AND VASCULAR STRUCTURES: Heart normal in size and contour.  No evidence for failure.

BONES: No acute findings.

HARDWARE: None in the chest.

OTHER: No other significant finding.



IMPRESSION:  REACTIVE AIRWAY DISEASE VERSUS VIRAL SYNDROME.  NO CONSOLIDATION.



TECHNICAL DOCUMENTATION:  JOB ID:  [id number]

 2011 [employer]- All Rights Reserved normal...

## 2019-10-09 NOTE — ED ADULT NURSE REASSESSMENT NOTE - NS ED NURSE REASSESS COMMENT FT1
Pt reports he is a hard stick and refuses blood draw at this time. Blood refusal note conveyed to Dr. Navarro. Will re evaluate pt wishes after pain medication administration. Will continue to monitor.

## 2019-10-09 NOTE — ED ADULT NURSE NOTE - NS ED NURSE DISCH DISPOSITION
Hemostasis: Aluminum Chloride Anesthesia Volume In Cc: 0.1 Anesthesia Type: 1% lidocaine with epinephrine and a 1:10 solution of 8.4% sodium bicarbonate Price (Use Numbers Only, No Special Characters Or $): 75 Consent: Written consent obtained and the risks of skin tag removal was reviewed with the patient including but not limited to bleeding, pigmentary change, infection, pain, and remote possibility of scarring. Removed With: gradle excision Detail Level: Simple Eloped (saw a physician/midlevel provider but left without telling anyone)

## 2019-10-09 NOTE — ED PROVIDER NOTE - CROS ED MUSC ALL NEG
Agnes Reese is a 82 year old female who presents today for a subsequent Medicare wellness visit (see detailed note) as well as for follow up on her diagnoses of hypertension, hyperlipidemia and hypothyroidism.  The patient has a history of hypertension, on medication. No complaints of chest pains or palpitations.  She also has a history of hyperlipidemia, on medication. No complaints of muscle cramps. Recent cholesterol profile is in an acceptable range.  Education has a history of hypothyroidism, maintained on levothyroxine. Recent TSH was in a therapeutic range.    Past Medical History:   Diagnosis Date   • Asymptomatic varicose veins    • Bilateral cataracts 05/2017    Scheduled to undergo sequential cataract surgical procedures at [facility]   • Cholelithiasis     Patient has no gallstones and recently presented with right upper quadrant pain. Scheduled to undergo cholecystectomy on 7/18/16.   • Generalized osteoarthritis 2/3/2008   • History of colon polyps     His undergone serial colonoscopies by Dr. Redd Ortega in the past. Last performed on 11/3/09. Hyperplastic polyp removed. Now scheduled to undergo follow-up colonoscopy by Dr. Ji on 7/8/16.   • History of pneumonia 1988   • Hyperlipidemia    • Hypothyroidism    • Menopausal 8/10/2002   • Osteoporosis     On Fosamax in the past.   • Parathyroid cancer (CMS/[facility]) 1958, 1969    Status post parathyroidectomy in 1958 with excision of recurrent parathyroid tissue in 1969.   • PONV (postoperative nausea and vomiting)     per pt 7/18/16   • Radial styloid tenosynovitis 4/7/2009   • Tendinitis shoulders 2/28/2006    Underwent right shoulder acromioplasty       Current Outpatient Prescriptions   Medication Sig Dispense Refill   • Omega-3 Fatty Acids (FISH OIL) 1000 MG capsule Take 2 g by mouth daily.     • Calcium + D 600-200 MG-UNIT per tablet Take 1 tablet by mouth daily.     • aspirin EC (ECOTRIN) 81 MG EC tablet Take 81 mg by mouth daily.       • VITAMIN D 400  UNIT PO TABS 1 tablet daily 0 0   • LUTEIN 6 MG PO CAPS Taking 20mg daily 0 0   • MULTIVITAMINS TABS   PO 1 tablet q day 0 0   • [DISCONTINUED] CALCIUM + D TABS 600-200 MG-IU PO 2 TABLETS DAILY 0 0   • simvastatin (ZOCOR) 20 MG tablet TAKE 1 TABLET BY MOUTH  DAILY 90 tablet 0   • levothyroxine (SYNTHROID, LEVOTHROID) 50 MCG tablet TAKE 1 TABLET BY MOUTH  DAILY 90 tablet 0   • lisinopril (ZESTRIL) 20 MG tablet TAKE 1 TABLET BY MOUTH  DAILY 90 tablet 0     No current facility-administered medications for this visit.        Physical Exam:    General: No acute distress.  Vital Signs: Blood pressure 130/80, pulse 73, height 5' 6\" (1.676 m), weight 73 kg, SpO2 97 %.  HEENT: Neck supple without lymphadenopathy, thyromegaly or mass.  Coronary: Normal rhythm. No ectopy.  Lungs: Clear. No wheeze, rale, rhonchi or egophony.  Extremities: No edema.    Impression:  Hypertension, controlled.  Hyperlipidemia, controlled.  Hypothyroidism, controlled    Plan:  Reviewed recent laboratory tests.  Continue same medications as prescribed.  Follow-up 6 months.         - - -

## 2019-10-09 NOTE — ED PROVIDER NOTE - CLINICAL SUMMARY MEDICAL DECISION MAKING FREE TEXT BOX
Patient in ED w concern for sickle cell pain crisis today.  Patient notes pain to bilateral hips consistent with patient sickle cell pain crisises in the past. Patient states he needs 16 mg oral dilaudid and motrin.  Labs are ordered and patient received initial dose of pain medication.  Patient adamantly refusing lab draw and requesting discharge papers.  Patient is advised to follow up with his PCP in 1-2 days for re evaluation and instructed to return to ED immediately should his symptoms worsen or if he has any concern prior to this recommended follow up.  Patient is aware of plan and verbalizes his understanding.

## 2019-10-09 NOTE — ED PROVIDER NOTE - NSFOLLOWUPINSTRUCTIONS_ED_ALL_ED_FT
Please follow up with your primary physician in 1-2 days for re evaluation.  Please return to ER immediately should your symptoms worsen or if you have any concern prior to this recommended follow up.    Sickle Cell Crisis    WHAT YOU NEED TO KNOW:    A sickle cell crisis is a painful episode that occurs in people who have sickle cell anemia. It happens when sickle-shaped red blood cells (RBCs) block blood vessels. Blood and oxygen cannot get to tissues, causing pain. A sickle cell crisis can also damage your tissues and cause organ failure, such liver or kidney failure. A sickle cell crisis can become life-threatening.    DISCHARGE INSTRUCTIONS:    Call 911 for any of the following:     You have shortness of breath or chest pain.      You are a man and have an erection that is painful and does not go away.       You lose vision in one or both eyes.    Return to the emergency department if:     You have a fever.      You feel like you cannot cope with your pain, or you feel like hurting yourself.       You have behavior changes, a seizure, or faint.       You have abdominal pain, nausea, or vomiting.      You have a headache that is worse or different from those that you have had in the past.       You have new weakness or numbness in your arm, leg, or face.      You have new pain in any part of your body.      Your urine is dark and you are urinating less than usual or not at all.       You are dizzy, lightheaded, or faint.     Contact your healthcare provider if:     You have any new signs or symptoms.       You have blood in your urine.       You are constipated or you have diarrhea.       You have changes in your vision.       You have increased fatigue.       You plan to travel by airplane or to a high [facility].       You have questions or concerns about your condition or care.    Medicines: You may need any of the following:     Prescription pain medicine may be given. Ask how to take this medicine safely. Medicine may also be given to decrease sickling of your RBCs. You may also need medicine to treat or prevent a bacterial infection.       NSAIDs, such as ibuprofen, help decrease swelling, pain, and fever. This medicine is available with or without a doctor's order. NSAIDs can cause stomach bleeding or kidney problems in certain people. If you take blood thinner medicine, always ask your healthcare provider if NSAIDs are safe for you. Always read the medicine label and follow directions.      Acetaminophen decreases pain and fever. It is available without a doctor's order. Ask how much to take and how often to take it. Follow directions. Acetaminophen can cause liver damage if not taken correctly.      Take your medicine as directed. Contact your healthcare provider if you think your medicine is not helping or if you have side effects. Tell him or her if you are allergic to any medicine. Keep a list of the medicines, vitamins, and herbs you take. Include the amounts, and when and why you take them. Bring the list or the pill bottles to follow-up visits. Carry your medicine list with you in case of an emergency.    Medical alert identification: Wear medical alert jewelry or carry a card that says you have sickle cell anemia. Ask your healthcare provider where to get these items. Medical Alert Jewelry         Prevent a sickle cell crisis:     Take vitamins and minerals as directed. Folic acid can help prevent blood vessel problems that can occur with sickle cell anemia. Zinc may decrease how often you have pain.       Drink liquids as directed. Dehydration can increase your risk for a sickle cell crisis. Ask how much liquid to drink each day and which liquids are best for you.      Balance rest and exercise. Rest during a sickle cell crisis. Over time, increase your activity to a moderate amount. Exercise regularly. Avoid exercise or activities that can cause injury, such as football. Ask about the best exercise plan for you.       Stay out of the cold. Do not go quickly from a warm place to a cold place. Do not go swimming in cold water. Stay warm in the winter.      Do not smoke cigarettes or drink alcohol. These increase your risk for a sickle cell crisis. Nicotine and other chemicals in cigarettes and cigars can cause lung damage. Ask your healthcare provider for information if you currently smoke and need help to quit. E-cigarettes or smokeless tobacco still contain nicotine. Talk to your healthcare provider before you use these products.       Ask about vaccinations you need. Vaccinations can help prevent a viral infection that may lead to a sickle cell crisis. Get a flu shot every year as directed. You may need a pneumonia vaccine every 5 years.     Follow up with your healthcare provider as directed: You may need ongoing screening for conditions that can develop because of sickle cell disease. Examples include kidney disease, hypertension (high blood pressure), retinopathy (eye problems), and problems with your lungs. Write down your questions so you remember to ask them during your visits.       © Copyright [employer] 2019 All illustrations and images included in CareNotes are the copyrighted property of [employer] or [employer].      back to top                      © Copyright [employer] 2019

## 2019-10-10 DIAGNOSIS — Z88.5 ALLERGY STATUS TO NARCOTIC AGENT: ICD-10-CM

## 2019-10-10 DIAGNOSIS — D57.00 HB-SS DISEASE WITH CRISIS, UNSPECIFIED: ICD-10-CM

## 2019-10-10 DIAGNOSIS — M54.5 LOW BACK PAIN: ICD-10-CM

## 2019-10-10 DIAGNOSIS — Z79.899 OTHER LONG TERM (CURRENT) DRUG THERAPY: ICD-10-CM

## 2019-10-11 DIAGNOSIS — M54.5 LOW BACK PAIN: ICD-10-CM

## 2019-10-11 DIAGNOSIS — M25.551 PAIN IN RIGHT HIP: ICD-10-CM

## 2019-10-11 DIAGNOSIS — D57.1 SICKLE-CELL DISEASE WITHOUT CRISIS: ICD-10-CM

## 2019-10-17 NOTE — ED PROVIDER NOTE - OBJECTIVE STATEMENT
Phos low - replace and follow.   presents today with pain secondary to SSD denies fever or CP /SOB   pain similar to many visits in past  The patient is a 33y Male complaining of pain, hip.   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  No cp, palpitations, sob, fever, cough, abd pain, + nl po's.  Pain similar to his typical pain crises.

## 2019-10-21 ENCOUNTER — EMERGENCY (EMERGENCY)
Facility: HOSPITAL | Age: 34
LOS: 1 days | Discharge: HOME | End: 2019-10-21
Admitting: EMERGENCY MEDICINE
Payer: MEDICAID

## 2019-10-21 VITALS
DIASTOLIC BLOOD PRESSURE: 81 MMHG | OXYGEN SATURATION: 100 % | TEMPERATURE: 99 F | HEART RATE: 92 BPM | SYSTOLIC BLOOD PRESSURE: 130 MMHG | WEIGHT: 160.94 LBS | RESPIRATION RATE: 20 BRPM | HEIGHT: 68 IN

## 2019-10-21 DIAGNOSIS — D57.00 HB-SS DISEASE WITH CRISIS, UNSPECIFIED: ICD-10-CM

## 2019-10-21 DIAGNOSIS — M25.552 PAIN IN LEFT HIP: ICD-10-CM

## 2019-10-21 DIAGNOSIS — M54.9 DORSALGIA, UNSPECIFIED: ICD-10-CM

## 2019-10-21 DIAGNOSIS — M25.551 PAIN IN RIGHT HIP: ICD-10-CM

## 2019-10-21 PROCEDURE — 99283 EMERGENCY DEPT VISIT LOW MDM: CPT

## 2019-10-21 RX ORDER — HYDROMORPHONE HYDROCHLORIDE 2 MG/ML
16 INJECTION INTRAMUSCULAR; INTRAVENOUS; SUBCUTANEOUS ONCE
Refills: 0 | Status: DISCONTINUED | OUTPATIENT
Start: 2019-10-21 | End: 2019-10-21

## 2019-10-21 RX ORDER — IBUPROFEN 200 MG
600 TABLET ORAL ONCE
Refills: 0 | Status: COMPLETED | OUTPATIENT
Start: 2019-10-21 | End: 2019-10-21

## 2019-10-21 RX ADMIN — Medication 600 MILLIGRAM(S): at 14:16

## 2019-10-21 RX ADMIN — HYDROMORPHONE HYDROCHLORIDE 16 MILLIGRAM(S): 2 INJECTION INTRAMUSCULAR; INTRAVENOUS; SUBCUTANEOUS at 14:16

## 2019-10-21 NOTE — ED PROVIDER NOTE - PATIENT PORTAL LINK FT
You can access the FollowMyHealth Patient Portal offered by [facility] by registering at the following website: [email or web address]. By joining [facility]’s FollowMyHealth portal, you will also be able to view your health information using other applications (apps) compatible with our system.

## 2019-10-21 NOTE — ED ADULT TRIAGE NOTE - CHIEF COMPLAINT QUOTE
pt c/o bilateral hip and lower back pain. pt reports PMH sickle cell. took 8 mg PO Dilaudid at 5 am without relief.

## 2019-10-21 NOTE — ED ADULT NURSE NOTE - OBJECTIVE STATEMENT
Patient AOX4 ambulatory with steady gait c/o of bilat hip pain in the setting of sickle cell crisis--denies injury/trauma---no obvious deformity noted.

## 2019-10-21 NOTE — ED PROVIDER NOTE - CONSTITUTIONAL MENTATION
oriented to person, place, time/situation/awake/alert Skin normal color for race, warm, dry and intact. No evidence of rash.

## 2019-10-21 NOTE — ED ADULT NURSE NOTE - PAIN: PRESENCE, MLM
RN attempted to reach Nurse Rosy.  not set up. Unable to get more information or leave a message.    Routing to Dr. Mooney to address at appt tomorrow 6/5/19   complains of pain/discomfort

## 2019-10-21 NOTE — ED PROVIDER NOTE - NSFOLLOWUPINSTRUCTIONS_ED_ALL_ED_FT
Sickle Cell Disease    WHAT YOU NEED TO KNOW:    Sickle cell disease (SCD) causes your RBCs to be sickle (crescent) shaped. The sickle shape is caused by abnormal hemoglobin attached to the RBC. Hemoglobin carries oxygen to all tissues in your body. Sickle-shaped RBCs can get stuck to the walls of blood vessels. This can stop or slow blood flow, and prevent oxygen from getting to tissues. When this happens, it is called a sickle cell crisis. SCD may also cause low red blood cell (RBC) levels (anemia).   DISCHARGE INSTRUCTIONS:  Call 911 or have someone else call for any of the following:   You have any of the following signs of a stroke:   Numbness or drooping on one side of your face   Weakness in an arm or leg  Confusion or difficulty speaking  Dizziness, a severe headache, or vision loss  You have any of the following signs of a heart attack:   Squeezing, pressure, or pain in your chest  You may also have any of the following:   Discomfort or pain in your back, neck, jaw, stomach, or arm  Shortness of breath  Nausea or vomiting  Lightheadedness or a sudden cold sweat  You have a seizure.   You lose vision in one or both eyes.  You lose consciousness or cannot be woken.   Seek care immediately if:  You feel lightheaded, short of breath, and have chest pain.  You cough up blood.  You have a fever of 100.4°F (38°C) or higher.  You have a severe headache.   Your pain does not get better after you take pain medicine.  Your arm or leg is painful, red, and larger than usual.   You feel like you can no longer cope with your pain, or feel like harming yourself.   You have abdominal pain, nausea, and vomiting.  You are a man and have an erection that is painful and does not go away after 4 hours.   You cannot think clearly, or you feel like you are going to faint.  Your urine is dark, or you are urinating less than usual or not at all.   You see blood in your urine.   Your eyes or skin are yellow.   You have a cold or the flu.   You have a cough or are wheezing.   Contact your healthcare provider if:   You are more tired than usual during the day.  You are constipated or have diarrhea.  Your vision has changed in one or both eyes.   You feel anxious or depressed.   You have new or worse swelling in your joints.   You have an open sore on your skin that will not heal.   You are pregnant or think you are pregnant.   You have questions or concerns about your condition or care.  Medicines: You may need any of the following:   Antibiotics may be given to prevent a bacterial infection.   Hydroxyurea helps your body make red blood cells that are less likely to sickle. This may help decrease your pain and prevent sickle cell crisis.  Folic acid helps your body may healthy red blood cells.  Prescription pain medicine may be given. Ask your healthcare provider how to take this medicine safely. Some prescription pain medicines contain acetaminophen. Do not take other medicines that contain acetaminophen without talking to your healthcare provider. Too much acetaminophen may cause liver damage. Prescription pain medicine may cause constipation. Ask your healthcare provider how to prevent or treat constipation.   NSAIDs, such as ibuprofen, help decrease swelling, pain, and fever. This medicine is available with or without a doctor's order. NSAIDs can cause stomach bleeding or kidney problems in certain people. If you take blood thinner medicine, always ask your healthcare provider if NSAIDs are safe for you. Always read the medicine label and follow directions.  Acetaminophen decreases pain and fever. It is available without a doctor's order. Ask how much to take and how often to take it. Follow directions. Read the labels of all other medicines you are using to see if they also contain acetaminophen, or ask your doctor or pharmacist. Acetaminophen can cause liver damage if not taken correctly. Do not use more than 4 grams (4,000 milligrams) total of acetaminophen in one day.  Take your medicine as directed. Contact your healthcare provider if you think your medicine is not helping or if you have side effects. Tell him or her if you are allergic to any medicine. Keep a list of the medicines, vitamins, and herbs you take. Include the amounts, and when and why you take them. Bring the list or the pill bottles to follow-up visits. Carry your medicine list with you in case of an emergency  Self-care:   Apply heat to areas of pain. Use a heating pad or take a warm bath. Do this for 20 to 30 minutes every 2 hours for as many days as directed.   Gently massage areas where you feel pain. A professional massage may also help with pain.   Balance rest and exercise. Rest during a sickle cell crisis. Over time, increase your activity. Exercise may help decrease pain. Take breaks during exercise and drink plenty of water. Ask your healthcare provider which activities are safe for you.   Get acupuncture treatment. Acupuncture may help decrease pain and help you relax. Ask your healthcare provider for more information about acupuncture.  Eat healthy foods. Healthy foods will improve your overall health and make it easier to manage SCD. Examples of healthy foods include fruits, vegetables, whole-grain breads, low-fat dairy products, beans, lean meats, and fish. Ask if you need to be on a special diet.   Prevent a sickle cell crisis: A sickle cell crisis may be caused by illness, changes in temperature, stress, dehydration, or being at high altitudes. Do the following to help prevent a sickle cell crisis:   Drink liquids as directed. Dehydration can increase your risk for a sickle cell crisis. Ask how much liquid to drink each day and which liquids are best for you.  Avoid quick changes in temperature. Do not go quickly from a warm place to a cold place. Get in a pool slowly instead of jumping in. Dress in light clothing in the summer and warm clothing in the winter.   Ask about vaccinations. Vaccinations can help prevent a viral infection. Get a flu shot every year as directed. You may also need a pneumonia vaccine.  Wash your hands frequently. Handwashing can help prevent illness. Wash your hands before you prepare or eat food, and after you use the bathroom.   Do not smoke. Nicotine and other chemicals in cigarettes and cigars can cause lung damage and sickle cell crisis. Ask your healthcare provider for information if you currently smoke and need help to quit. E-cigarettes or smokeless tobacco still contain nicotine. Talk to your healthcare provider before you use these products.  Limit or do not drink alcohol. Alcohol can cause dehydration and increase your risk for sickle cell crisis. If you drink alcohol, also drink plenty of water.   Manage your stress. Your healthcare provider may recommend relaxation techniques and deep breathing exercises to help decrease your stress. Your healthcare provider may recommend you talk to someone about your stress or anxiety, such as a counselor or a trusted friend.   Do not travel in an unpressurized plane or travel to high altitudes. These environments are low in oxygen and may cause a sickle cell crisis.   Wear medical alert identification: Wear medical alert jewelry or carry a card that says you have sickle cell anemia. Ask your healthcare provider where to get these items. Medical Alert Jewelry  What you need to know about family planning and pregnancy:   If you do not want to become pregnant, your healthcare provider may recommend birth control pills that contain only progestin. The pills will prevent pregnancy and make your periods lighter. Lighter periods may help treat low RBC levels.   Talk to your healthcare provider before you get pregnant. SCD increases a woman's risk for problems, such as a miscarriage and having a baby that weighs less than normal. You will need close monitoring during pregnancy. You may need to take certain vitamins and have 1 or more blood transfusions during pregnancy.   You will pass a gene for sickle cell disease to your child. Your partner should be tested for the sickle cell gene. This information can help predict your child's risk for sickle cell disease.   Follow up with your healthcare provider as directed: You may need ongoing screening for conditions that can develop because of sickle cell disease. Examples include kidney disease, hypertension (high blood pressure), retinopathy (eye problems), and problems with your lungs. Write down your questions so you remember to ask them during your visits

## 2019-10-21 NOTE — ED ADULT NURSE NOTE - CHPI ED NUR SYMPTOMS NEG
no chills/no dizziness/no fever/no nausea/no weakness/no tingling/no decreased eating/drinking/no vomiting

## 2019-10-21 NOTE — ED PROVIDER NOTE - OBJECTIVE STATEMENT
The pt is a 35 y/o M, who presents to ED c/o hip pain since 5 am. Hx of sickle cell, took po dilaudid 8 mg at 5 am w/o much relief.  This is his typical crisis pain - both hips. Denies cp, sob, n/v/d, abd pain, fevers, chills, trauma.

## 2019-10-21 NOTE — ED PROVIDER NOTE - CLINICAL SUMMARY MEDICAL DECISION MAKING FREE TEXT BOX
pt w/sickle cell pain - both hips, this being his "typical" crisis pain, no fevers/chills/cp/sob, given po meds w/ pt w/sickle cell pain - both hips, this being his "typical" crisis pain, no fevers/chills/cp/sob, given po meds w/good relief - pt reports feeling better and wanting to go home, has f/u in place

## 2019-10-25 ENCOUNTER — EMERGENCY (EMERGENCY)
Facility: HOSPITAL | Age: 34
LOS: 1 days | Discharge: HOME | End: 2019-10-25
Admitting: EMERGENCY MEDICINE
Payer: MEDICAID

## 2019-10-25 VITALS
TEMPERATURE: 98 F | HEART RATE: 97 BPM | WEIGHT: 160.94 LBS | OXYGEN SATURATION: 100 % | RESPIRATION RATE: 16 BRPM | DIASTOLIC BLOOD PRESSURE: 76 MMHG | SYSTOLIC BLOOD PRESSURE: 132 MMHG

## 2019-10-25 PROCEDURE — 99283 EMERGENCY DEPT VISIT LOW MDM: CPT

## 2019-10-25 RX ORDER — HYDROMORPHONE HYDROCHLORIDE 2 MG/ML
16 INJECTION INTRAMUSCULAR; INTRAVENOUS; SUBCUTANEOUS ONCE
Refills: 0 | Status: DISCONTINUED | OUTPATIENT
Start: 2019-10-25 | End: 2019-10-25

## 2019-10-25 RX ORDER — IBUPROFEN 200 MG
600 TABLET ORAL ONCE
Refills: 0 | Status: COMPLETED | OUTPATIENT
Start: 2019-10-25 | End: 2019-10-25

## 2019-10-25 RX ADMIN — Medication 600 MILLIGRAM(S): at 10:24

## 2019-10-25 RX ADMIN — HYDROMORPHONE HYDROCHLORIDE 16 MILLIGRAM(S): 2 INJECTION INTRAMUSCULAR; INTRAVENOUS; SUBCUTANEOUS at 10:24

## 2019-10-25 NOTE — ED PROVIDER NOTE - CHPI ED SYMPTOMS NEG
no tingling/no fever/no numbness/no chills, no rash, no swelling, no lower extremity edema, no chest pain, no SOB, no difficulty breathing.

## 2019-10-25 NOTE — ED PROVIDER NOTE - OBJECTIVE STATEMENT
35 y/o M with PMHx sickle cell disease with DVT, PE and china of bilateral hips, ambulates with cane, presents to the ED complaining of bilateral hip pain, similar to his previous history of china discomforts. Denies fever, chills, numbness, tingling, falls, rash, swelling, lower extremity edema, chest pain, SOB, or difficulty breathing. 35 y/o M with PMHx sickle cell disease with DVT, PE and AVN of bilateral hips, ambulates with cane, presents to the ED complaining of bilateral hip pain, similar to his previous history of AVN/ sickle cell crisis. Pt reports he was out and forgot to take his medication today. Denies fever, chills, numbness, tingling, fall/injury, rash, swelling, lower extremity edema, chest pain, SOB, or difficulty breathing. 35 y/o M with PMHx sickle cell disease with DVT, PE and AVN of bilateral hips, ambulates with cane, presents to the ED complaining of bilateral hip pain, similar to his previous history of AVN/ sickle cell crisis. Pt reports he was out and forgot to take his medication today. Denies fever, chills, numbness, tingling, fall/injury, rash, swelling, lower extremity edema, chest pain, SOB, or difficulty breathing, urinary symptoms, scrotal/penile swelling or discharge.

## 2019-10-25 NOTE — ED PROVIDER NOTE - NSFOLLOWUPINSTRUCTIONS_ED_ALL_ED_FT
yes Please hydrate and follow up with your hematologist. Return to the Emergency Department if you have any new or worsening symptoms, or if you have any concerns.    Sickle Cell Crisis    WHAT YOU NEED TO KNOW:    A sickle cell crisis is a painful episode that occurs in people who have sickle cell anemia. It happens when sickle-shaped red blood cells (RBCs) block blood vessels. Blood and oxygen cannot get to tissues, causing pain. A sickle cell crisis can also damage your tissues and cause organ failure, such liver or kidney failure. A sickle cell crisis can become life-threatening.    DISCHARGE INSTRUCTIONS:    Call 911 for any of the following:     You have shortness of breath or chest pain.      You are a man and have an erection that is painful and does not go away.       You lose vision in one or both eyes.    Return to the emergency department if:     You have a fever.      You feel like you cannot cope with your pain, or you feel like hurting yourself.       You have behavior changes, a seizure, or faint.       You have abdominal pain, nausea, or vomiting.      You have a headache that is worse or different from those that you have had in the past.       You have new weakness or numbness in your arm, leg, or face.      You have new pain in any part of your body.      Your urine is dark and you are urinating less than usual or not at all.       You are dizzy, lightheaded, or faint.     Contact your healthcare provider if:     You have any new signs or symptoms.       You have blood in your urine.       You are constipated or you have diarrhea.       You have changes in your vision.       You have increased fatigue.       You plan to travel by airplane or to a [facility].       You have questions or concerns about your condition or care.    Medicines: You may need any of the following:     Prescription pain medicine may be given. Ask how to take this medicine safely. Medicine may also be given to decrease sickling of your RBCs. You may also need medicine to treat or prevent a bacterial infection.       NSAIDs, such as ibuprofen, help decrease swelling, pain, and fever. This medicine is available with or without a doctor's order. NSAIDs can cause stomach bleeding or kidney problems in certain people. If you take blood thinner medicine, always ask your healthcare provider if NSAIDs are safe for you. Always read the medicine label and follow directions.      Acetaminophen decreases pain and fever. It is available without a doctor's order. Ask how much to take and how often to take it. Follow directions. Acetaminophen can cause liver damage if not taken correctly.      Take your medicine as directed. Contact your healthcare provider if you think your medicine is not helping or if you have side effects. Tell him or her if you are allergic to any medicine. Keep a list of the medicines, vitamins, and herbs you take. Include the amounts, and when and why you take them. Bring the list or the pill bottles to follow-up visits. Carry your medicine list with you in case of an emergency.    Medical alert identification: Wear medical alert jewelry or carry a card that says you have sickle cell anemia. Ask your healthcare provider where to get these items. Medical Alert Jewelry         Prevent a sickle cell crisis:     Take vitamins and minerals as directed. Folic acid can help prevent blood vessel problems that can occur with sickle cell anemia. Zinc may decrease how often you have pain.       Drink liquids as directed. Dehydration can increase your risk for a sickle cell crisis. Ask how much liquid to drink each day and which liquids are best for you.      Balance rest and exercise. Rest during a sickle cell crisis. Over time, increase your activity to a moderate amount. Exercise regularly. Avoid exercise or activities that can cause injury, such as football. Ask about the best exercise plan for you.       Stay out of the cold. Do not go quickly from a warm place to a cold place. Do not go swimming in cold water. Stay warm in the winter.      Do not smoke cigarettes or drink alcohol. These increase your risk for a sickle cell crisis. Nicotine and other chemicals in cigarettes and cigars can cause lung damage. Ask your healthcare provider for information if you currently smoke and need help to quit. E-cigarettes or smokeless tobacco still contain nicotine. Talk to your healthcare provider before you use these products.       Ask about vaccinations you need. Vaccinations can help prevent a viral infection that may lead to a sickle cell crisis. Get a flu shot every year as directed. You may need a pneumonia vaccine every 5 years.     Follow up with your healthcare provider as directed: You may need ongoing screening for conditions that can develop because of sickle cell disease. Examples include kidney disease, hypertension (high blood pressure), retinopathy (eye problems), and problems with your lungs. Write down your questions so you remember to ask them during your visits.       © Copyright [employer] 2019 All illustrations and images included in CareNotes are the copyrighted property of [employer] or [employer].      back to top                      © Copyright [employer] 2019

## 2019-10-25 NOTE — ED PROVIDER NOTE - MUSCULOSKELETAL, MLM
Limited ROM with hip flexion/extension/abduction, legs R=L, reflex sensation intact to distal lower extremities. Limited ROM with hip flexion/extension/abduction secondary to pain, legs R=L, reflex sensation intact to distal lower extremities.

## 2019-10-25 NOTE — ED PROVIDER NOTE - PATIENT PORTAL LINK FT
You can access the FollowMyHealth Patient Portal offered by [facility] by registering at the following website: [email or web address]. By joining [facility]’s FollowMyHealth portal, you will also be able to view your health information using other applications (apps) compatible with our system.

## 2019-10-25 NOTE — ED ADULT NURSE NOTE - OBJECTIVE STATEMENT
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 Pt reportedly is out of medication. Denies cp, SOB, fever, chills, numbness, tingling or any other sx.

## 2019-10-25 NOTE — ED PROVIDER NOTE - CLINICAL SUMMARY MEDICAL DECISION MAKING FREE TEXT BOX
35 y/o male well and non-toxic appearing c/o chronic b/l hip pain. LROM secondary to pain. No increased warmth, rash with no recent illness. Do not suspect septic joint. No LE swelling, do not suspect DVT. No recent injury/fall, do not suspect fx. Pain endorses pain characteristic of past sickle cell pain crisis. Pain treated. 33 y/o male well and non-toxic appearing c/o chronic b/l hip pain. LROM secondary to pain. No increased warmth, rash with no recent illness. Do not suspect septic joint. No LE swelling, do not suspect DVT. No recent injury/fall, do not suspect fx. Pain endorses pain characteristic of past sickle cell pain crisis. Pain treated and improved. Given strict return precautions.

## 2019-10-27 ENCOUNTER — EMERGENCY (EMERGENCY)
Facility: HOSPITAL | Age: 34
LOS: 1 days | Discharge: HOME | End: 2019-10-27
Attending: EMERGENCY MEDICINE | Admitting: EMERGENCY MEDICINE
Payer: MEDICAID

## 2019-10-27 VITALS
RESPIRATION RATE: 18 BRPM | WEIGHT: 160.94 LBS | DIASTOLIC BLOOD PRESSURE: 68 MMHG | SYSTOLIC BLOOD PRESSURE: 117 MMHG | TEMPERATURE: 98 F | OXYGEN SATURATION: 97 % | HEART RATE: 112 BPM | HEIGHT: 68 IN

## 2019-10-27 VITALS — HEART RATE: 96 BPM

## 2019-10-27 PROCEDURE — 99283 EMERGENCY DEPT VISIT LOW MDM: CPT

## 2019-10-27 RX ORDER — HYDROMORPHONE HYDROCHLORIDE 2 MG/ML
16 INJECTION INTRAMUSCULAR; INTRAVENOUS; SUBCUTANEOUS ONCE
Refills: 0 | Status: DISCONTINUED | OUTPATIENT
Start: 2019-10-27 | End: 2019-10-27

## 2019-10-27 RX ORDER — IBUPROFEN 200 MG
600 TABLET ORAL ONCE
Refills: 0 | Status: COMPLETED | OUTPATIENT
Start: 2019-10-27 | End: 2019-10-27

## 2019-10-27 RX ADMIN — Medication 600 MILLIGRAM(S): at 19:14

## 2019-10-27 RX ADMIN — HYDROMORPHONE HYDROCHLORIDE 16 MILLIGRAM(S): 2 INJECTION INTRAMUSCULAR; INTRAVENOUS; SUBCUTANEOUS at 19:43

## 2019-10-27 RX ADMIN — HYDROMORPHONE HYDROCHLORIDE 16 MILLIGRAM(S): 2 INJECTION INTRAMUSCULAR; INTRAVENOUS; SUBCUTANEOUS at 19:13

## 2019-10-27 RX ADMIN — Medication 600 MILLIGRAM(S): at 19:43

## 2019-10-27 NOTE — ED ADULT TRIAGE NOTE - CHIEF COMPLAINT QUOTE
pt c/o lower back pain and bilateral hip pain, reports pmh sickle cell. took 8 mg Dilaudid at noon with no relief.

## 2019-10-27 NOTE — ED PROVIDER NOTE - OBJECTIVE STATEMENT
34M PMH DVT/PE (no longer on AC), sickle cell (hx acute chest yrs ago), b/l hip AVN p/w pain to b/l lower back and b/l hips since ~noon, gradual onset. Similar to multiple prior episodes attributed to sickle crisis. Thinks it was caused by weather/rain. Denies any focal weakness/numbness, urinary complaints, incontinence, f/c, trauma, SOB/CP, abd pain, NVD, recent weight loss or night sweats. No hx IVDU. Cannot recall any specific precipitating trauma. Still smokes but cutting back. Denies recent etoh use. Took 8mg PO dilaudid ~noon w/ no relief.

## 2019-10-27 NOTE — ED PROVIDER NOTE - NSFOLLOWUPINSTRUCTIONS_ED_ALL_ED_FT
Can take tylenol 650mg or motrin 600mg (May cause stomach irritation - take with food and avoid prolonged use) every 6hrs as needed for pain or fever.  Can also take your dilaudid as prescribed for more severe pain.  Stay well hydrated.  Return for fevers, persistent vomit, uncontrolled pain, worsening breathing, worsening lightheaded, focal weakness/numbness.  Follow up with primary doctor within 1-2 days.     Sickle Cell Crisis    WHAT YOU NEED TO KNOW:    A sickle cell crisis is a painful episode that occurs in people who have sickle cell anemia. It happens when sickle-shaped red blood cells (RBCs) block blood vessels. Blood and oxygen cannot get to tissues, causing pain. A sickle cell crisis can also damage your tissues and cause organ failure, such liver or kidney failure. A sickle cell crisis can become life-threatening.    DISCHARGE INSTRUCTIONS:    Call 911 for any of the following:   You have shortness of breath or chest pain.  You are a man and have an erection that is painful and does not go away.   You lose vision in one or both eyes.    Seek care immediately if:   You feel like you cannot cope with your pain, or you feel like hurting yourself.   You have behavior changes, a seizure, or faint.   You have a fever.  You have abdominal pain, nausea, or vomiting.  You have a different or worse headache.   You have new weakness or numbness in your arm, leg, or face.  You have new pain in any part of your body.  Your urine is dark and you are urinating less than usual or not at all.   You are dizzy, lightheaded, or faint.     Contact your healthcare provider if:   You have any new signs or symptoms.   You have blood in your urine.   You are constipated or you have diarrhea.   You have changes in your vision.   You have increased fatigue.   You plan to travel by airplane or to a high [facility].   You have questions or concerns about your condition or care.    Medicines: You may need any of the following:     Prescription pain medicine may be given. Ask how to take this medicine safely. Medicine may also be given to decrease sickling of your RBCs. You may also need medicine to treat or prevent a bacterial infection.     NSAIDs, such as ibuprofen, help decrease swelling, pain, and fever. This medicine is available with or without a doctor's order. NSAIDs can cause stomach bleeding or kidney problems in certain people. If you take blood thinner medicine, always ask your healthcare provider if NSAIDs are safe for you. Always read the medicine label and follow directions.    Acetaminophen decreases pain and fever. It is available without a doctor's order. Ask how much to take and how often to take it. Follow directions. Acetaminophen can cause liver damage if not taken correctly.    Take your medicine as directed. Contact your healthcare provider if you think your medicine is not helping or if you have side effects. Tell him or her if you are allergic to any medicine. Keep a list of the medicines, vitamins, and herbs you take. Include the amounts, and when and why you take them. Bring the list or the pill bottles to follow-up visits. Carry your medicine list with you in case of an emergency.    Medical alert identification: Wear medical alert jewelry or carry a card that says you have sickle cell anemia. Ask your healthcare provider where to get these items.    Prevent a sickle cell crisis:     Take vitamins and minerals as directed. Folic acid can help prevent blood vessel problems that can occur with sickle cell anemia. Zinc may decrease how often you have pain.     Drink liquids as directed. Dehydration can increase your risk for a sickle cell crisis. Ask how much liquid to drink each day and which liquids are best for you.    Balance rest and exercise. Rest during a sickle cell crisis. Over time, increase your activity to a moderate amount. Exercise regularly. Avoid exercise or activities that can cause injury, such as football. Ask about the best exercise plan for you.     Stay out of the cold. Do not go quickly from a warm place to a cold place. Do not go swimming in cold water. Stay warm in the winter.    Do not smoke cigarettes or drink alcohol. These increase your risk for a sickle cell crisis. Nicotine and other chemicals in cigarettes and cigars can cause lung damage. Ask your healthcare provider for information if you currently smoke and need help to quit. E-cigarettes or smokeless tobacco still contain nicotine. Talk to your healthcare provider before you use these products.     Ask about vaccinations you need. Vaccinations can help prevent a viral infection that may lead to a sickle cell crisis. Get a flu shot every year as directed. You may need a pneumonia vaccine every 5 years.

## 2019-10-27 NOTE — ED PROVIDER NOTE - PATIENT PORTAL LINK FT
You can access the FollowMyHealth Patient Portal offered by Health system by registering at the following website: [email or web address]. By joining [employer]’s FollowMyHealth portal, you will also be able to view your health information using other applications (apps) compatible with our system.

## 2019-10-27 NOTE — ED PROVIDER NOTE - CLINICAL SUMMARY MEDICAL DECISION MAKING FREE TEXT BOX
34M PMH DVT/PE (no longer on AC), sickle cell (hx acute chest yrs ago), b/l hip AVN p/w pain to b/l lower back and b/l hips since ~noon, gradual onset. Similar to multiple prior episodes attributed to sickle crisis. No other systemic symptoms. Tachycardic, other vitals wnl. Exam as above.  ddx: Likely sickle crisis.   Pt often tachy upon initial eval.   Will give regimen that usually helps pt's pain, reassess.

## 2019-10-27 NOTE — ED ADULT TRIAGE NOTE - NS ED NURSE BANDS TYPE
Name band;
(0) independent
belcher: improved.  xr no fracture.  contusion  f/u with pcp.  ice, tylenol for pain

## 2019-10-27 NOTE — ED ADULT NURSE NOTE - NSIMPLEMENTINTERV_GEN_ALL_ED
Implemented All Universal Safety Interventions:  [place] to call system. Call bell, personal items and telephone within reach. Instruct patient to call for assistance. Room bathroom lighting operational. Non-slip footwear when patient is off stretcher. Physically safe environment: no spills, clutter or unnecessary equipment. Stretcher in lowest position, wheels locked, appropriate side rails in place.

## 2019-10-27 NOTE — ED PROVIDER NOTE - PHYSICAL EXAMINATION
ambulates steadily w/ cane like at baseline  No spinal ttp, neck FROM. Strength 5/5. No bony ttp, FROM all extremities. Normal equal distal pulses. Steady unassisted gait.   no LE edema, normal equal distal pulses.

## 2019-10-27 NOTE — ED ADULT NURSE NOTE - OBJECTIVE STATEMENT
low back and hip pain from sickle cell-- took 8 mg Dilaudid at noon and can take it every 4 hours-- but did not take it at 4 pm because "I was trying to fight it off"

## 2019-10-29 ENCOUNTER — EMERGENCY (EMERGENCY)
Facility: HOSPITAL | Age: 34
LOS: 1 days | Discharge: HOME | End: 2019-10-29
Admitting: EMERGENCY MEDICINE
Payer: MEDICAID

## 2019-10-29 VITALS
SYSTOLIC BLOOD PRESSURE: 123 MMHG | HEART RATE: 90 BPM | RESPIRATION RATE: 18 BRPM | DIASTOLIC BLOOD PRESSURE: 79 MMHG | OXYGEN SATURATION: 100 % | TEMPERATURE: 98 F

## 2019-10-29 DIAGNOSIS — D57.00 HB-SS DISEASE WITH CRISIS, UNSPECIFIED: ICD-10-CM

## 2019-10-29 DIAGNOSIS — M54.9 DORSALGIA, UNSPECIFIED: ICD-10-CM

## 2019-10-29 DIAGNOSIS — Z79.01 LONG TERM (CURRENT) USE OF ANTICOAGULANTS: ICD-10-CM

## 2019-10-29 PROCEDURE — 99283 EMERGENCY DEPT VISIT LOW MDM: CPT

## 2019-10-29 RX ORDER — IBUPROFEN 200 MG
800 TABLET ORAL ONCE
Refills: 0 | Status: COMPLETED | OUTPATIENT
Start: 2019-10-29 | End: 2019-10-29

## 2019-10-29 RX ORDER — HYDROMORPHONE HYDROCHLORIDE 2 MG/ML
16 INJECTION INTRAMUSCULAR; INTRAVENOUS; SUBCUTANEOUS ONCE
Refills: 0 | Status: DISCONTINUED | OUTPATIENT
Start: 2019-10-29 | End: 2019-10-29

## 2019-10-29 RX ADMIN — Medication 800 MILLIGRAM(S): at 23:33

## 2019-10-29 RX ADMIN — HYDROMORPHONE HYDROCHLORIDE 16 MILLIGRAM(S): 2 INJECTION INTRAMUSCULAR; INTRAVENOUS; SUBCUTANEOUS at 23:33

## 2019-10-29 NOTE — ED PROVIDER NOTE - CLINICAL SUMMARY MEDICAL DECISION MAKING FREE TEXT BOX
pt c/o sickle cell crisis pain - claims to have taken 8mg dilaudid pta, nad, hemodynamically stable - of note, pt has been here every 2 d over past few wks claiming same and has been getting 16 mg dilaudid w/ibuprofen, today also requesting benadryl - pt advised that frequent ed visits are highly concerning for drug seeking behavior and he must f/u w/his pain management or hematologist and can not con't to use the ed as means of pain control, claims understanding and agreeable w/plan

## 2019-10-29 NOTE — ED PROVIDER NOTE - OBJECTIVE STATEMENT
The pt is a 33 y/o M, who presents to ED c/o sickle cell pain - lbp and hip pain, claims that took own dilaudid w/o relief, requesting 16 mg and ibuprofen plus benadryl -- pt has been to ed numerous times over past few wks for same c/o - has not f/u w/his pain management or heme. States lbp and hip pain is his "typical" crisis pain - attributes to weather changes. Denies fall, fevers, chills, cp, sob, n/v/d, abd pain

## 2019-10-29 NOTE — ED PROVIDER NOTE - CPE EDP PSYCH NORM
Chart review notes that patient was transferred to [facility] and Psychiatric for further treatment of wounds at this time.  Electronically signed by Mery Piedra RN on 8/20/2019 at 10:19 AM
normal...

## 2019-10-29 NOTE — ED PROVIDER NOTE - NSFOLLOWUPINSTRUCTIONS_ED_ALL_ED_FT
Sickle Cell Crisis  YOU MUST FOLLOW UP WITH YOUR PAIN MANAGEMENT DOCTOR  WHAT YOU NEED TO KNOW:  A sickle cell crisis is a painful episode that occurs in people who have sickle cell anemia. It happens when sickle-shaped red blood cells (RBCs) block blood vessels. Blood and oxygen cannot get to tissues, causing pain. A sickle cell crisis can also damage your tissues and cause organ failure, such liver or kidney failure. A sickle cell crisis can become life-threatening.  DISCHARGE INSTRUCTIONS:  Call 911 for any of the following:   You have shortness of breath or chest pain.  You are a man and have an erection that is painful and does not go away.   You lose vision in one or both eyes.  Return to the emergency department if:   You have a fever.  You feel like you cannot cope with your pain, or you feel like hurting yourself.   You have behavior changes, a seizure, or faint.   You have abdominal pain, nausea, or vomiting.  You have a headache that is worse or different from those that you have had in the past.   You have new weakness or numbness in your arm, leg, or face.  You have new pain in any part of your body.  Your urine is dark and you are urinating less than usual or not at all.   You are dizzy, lightheaded, or faint.   Contact your healthcare provider if:   You have any new signs or symptoms.   You have blood in your urine.   You are constipated or you have diarrhea.   You have changes in your vision.   You have increased fatigue.   You plan to travel by airplane or to a high elevation.   You have questions or concerns about your condition or care  Medicines: You may need any of the following:   Prescription pain medicine may be given. Ask how to take this medicine safely. Medicine may also be given to decrease sickling of your RBCs. You may also need medicine to treat or prevent a bacterial infection.   NSAIDs, such as ibuprofen, help decrease swelling, pain, and fever. This medicine is available with or without a doctor's order. NSAIDs can cause stomach bleeding or kidney problems in certain people. If you take blood thinner medicine, always ask your healthcare provider if NSAIDs are safe for you. Always read the medicine label and follow directions.  Acetaminophen decreases pain and fever. It is available without a doctor's order. Ask how much to take and how often to take it. Follow directions. Acetaminophen can cause liver damage if not taken correctly.  Take your medicine as directed. Contact your healthcare provider if you think your medicine is not helping or if you have side effects. Tell him or her if you are allergic to any medicine. Keep a list of the medicines, vitamins, and herbs you take. Include the amounts, and when and why you take them. Bring the list or the pill bottles to follow-up visits. Carry your medicine list with you in case of an emergency.  Medical alert identification: Wear medical alert jewelry or carry a card that says you have sickle cell anemia. Ask your healthcare provider where to get these items. Medical Alert Jewelry  Prevent a sickle cell crisis:   Take vitamins and minerals as directed. Folic acid can help prevent blood vessel problems that can occur with sickle cell anemia. Zinc may decrease how often you have pain.  Drink liquids as directed. Dehydration can increase your risk for a sickle cell crisis. Ask how much liquid to drink each day and which liquids are best for you.  Balance rest and exercise. Rest during a sickle cell crisis. Over time, increase your activity to a moderate amount. Exercise regularly. Avoid exercise or activities that can cause injury, such as football. Ask about the best exercise plan for you.   Stay out of the cold. Do not go quickly from a warm place to a cold place. Do not go swimming in cold water. Stay warm in the winter.  Do not smoke cigarettes or drink alcohol. These increase your risk for a sickle cell crisis. Nicotine and other chemicals in cigarettes and cigars can cause lung damage. Ask your healthcare provider for information if you currently smoke and need help to quit. E-cigarettes or smokeless tobacco still contain nicotine. Talk to your healthcare provider before you use these products.   Ask about vaccinations you need. Vaccinations can help prevent a viral infection that may lead to a sickle cell crisis. Get a flu shot every year as directed. You may need a pneumonia vaccine every 5 years.   Follow up with your healthcare provider as directed: You may need ongoing screening for conditions that can develop because of sickle cell disease. Examples include kidney disease, hypertension (high blood pressure), retinopathy (eye problems), and problems with your lungs. Write down your questions so you remember to ask them during your visits.

## 2019-10-29 NOTE — ED ADULT NURSE NOTE - PLAN OF CARE DISCUSSED WITH:
Visit Information Date & Time Provider Department Dept. Phone Encounter #  
 7/27/2017 12:00 PM Jb Bishop MD [facility] [phone] [id number] Your Appointments 8/31/2017  9:00 AM  
Any with Jb Bishop MD  
[address] [place] [address] and Primary Care [place]) Appt Note: follow up [address] [place] [address] Upcoming Health Maintenance Date Due DTaP/Tdap/Td series (1 - Tdap) 1/5/2006 PAP AKA CERVICAL CYTOLOGY 9/10/2015 INFLUENZA AGE 9 TO ADULT 8/1/2017 Allergies as of 7/27/2017  Review Complete On: 7/27/2017 By: Blayne Green Severity Noted Reaction Type Reactions Egg  12/08/2015    Nausea and Vomiting Flexeril [Cyclobenzaprine]  01/13/2015    Nausea and Vomiting Current Immunizations  Never Reviewed No immunizations on file. Not reviewed this visit You Were Diagnosed With   
  
 Codes Comments Hypothyroidism due to medication    -  Primary ICD-10-CM: E03.2 ICD-9-CM: 244.8, E980.5 Physical exam     ICD-10-CM: Z00.00 ICD-9-CM: V70.9 [facility] ([facility]     ICD-10-CM: D57.3 ICD-9-CM: 282.5 Vitals BP Pulse Temp Resp Height(growth percentile) Weight(growth percentile) 114/74 (BP 1 Location: Right arm, BP Patient Position: Sitting) 68 98 °F (36.7 °C) (Oral) 14 5' 10\" (1.778 m) 112 lb (50.8 kg) SpO2 BMI OB Status Smoking Status 96% 16.07 kg/m2 Having regular periods Never Smoker BMI and BSA Data Body Mass Index Body Surface Area 16.07 kg/m 2 1.58 m 2 Preferred Pharmacy Pharmacy Name Phone [facility] [address] [address] Your Updated Medication List  
  
   
This list is accurate as of: 7/27/17  2:13 PM.  Always use your most recent med list.  
  
  
  
  
 ferrous sulfate 325 mg (65 mg iron) tablet Take one tab po daily * levothyroxine 112 mcg tablet Commonly known as:  SYNTHROID Take 1 Tab by mouth Daily (before breakfast). * levothyroxine 137 mcg tablet Commonly known as:  SYNTHROID Take 137 mcg by mouth Daily (before breakfast). MIGRAINE PO Take  by mouth. ondansetron 4 mg disintegrating tablet Commonly known as:  ZOFRAN ODT Take 1 Tab by mouth every eight (8) hours as needed for Nausea. PRENATAL PO Take  by mouth. VITAMIN D2 PO Take  by mouth. * Notice: This list has 2 medication(s) that are the same as other medications prescribed for you. Read the directions carefully, and ask your doctor or other care provider to review them with you. We Performed the Following LIPID PANEL [27473 CPT(R)] METABOLIC PANEL, COMPREHENSIVE [67549 CPT(R)] ID COLLECTION VENOUS BLOOD,VENIPUNCTURE [id number] CPT(R)] TSH 3RD GENERATION [56569 CPT(R)] URINALYSIS W/ RFLX MICROSCOPIC [32144 CPT(R)] Introducing [address]! [facility] [place] [facility] introduces [employer] patient portal. Now you can access parts of your medical record, email your doctor's office, and request medication refills online. 1. In your internet browser, go to [email or web address] [email or web address] 2. Click on the First Time User? Click Here link in the Sign In box. You will see the New Member Sign Up page. 3. Enter your [employer] Access Code exactly as it appears below. You will not need to use this code after youve completed the sign-up process. If you do not sign up before the expiration date, you must request a new code. · [employer] Access Code: [email or web address] Expires: 10/25/2017  2:13 PM 
 
4. Enter the last four digits of your Social Security Number (xxxx) and Date of Birth (mm/dd/yyyy) as indicated and click Submit. You will be taken to the next sign-up page. 5. Create a [employer] ID. This will be your [employer] login ID and cannot be changed, so think of one that is secure and easy to remember. 6. Create a [employer] password. You can change your password at any time. 7. Enter your Password Reset Question and Answer. This can be used at a later time if you forget your password. 8. Enter your e-mail address. You will receive e-mail notification when new information is available in [address] 9. Click Sign Up. You can now view and download portions of your medical record. 10. Click the Download Summary menu link to download a portable copy of your medical information. If you have questions, please visit the Frequently Asked Questions section of the [employer] website. Remember, [employer] is NOT to be used for urgent needs. For medical emergencies, dial 911. Now available from your iPhone and Android! Please provide this summary of care documentation to your next provider. Your primary care clinician is listed as Bonney Boxer [place]. If you have any questions after today's visit, please call [phone]. Patient

## 2019-10-29 NOTE — ED PROVIDER NOTE - NS ED MD EM SELECTION
[FreeTextEntry1] : 55 year old male with history of childhood asthma and mild intermittent asthma at present referred  to clinic by Dr. Apolonia Philippe with cough for 2 months. Patient started to have cough 2 month back, which is persistent, predominantly dry, increased with exposure to cold air. Improved after using omeprazole and Symbicort for last few days but still persisting. Patient c/o occasional symptoms of GERD and wheezing. Denies fever, sore throat, chest pain or SOB. According to patient, CXR was done by Dr. Philippe and it was clear. He denies any sick contact or travel outside country. No Pets at home. He works in restaurant business.\par \par \par [3/1/19]\par Patient presents for a follow-up visit. Today, patient reports feeling better. He says that his cough is better but he still has dry persistent cough.\par \par [4/11/19]\par Patient presents for a follow-up visit. Today, patient reports feeling much better. He reports complete resolution of his cough. Denies nasal congestion. [id number] Exp Problem Focused - Low Complex

## 2019-10-29 NOTE — ED PROVIDER NOTE - PATIENT PORTAL LINK FT
You can access the FollowMyHealth Patient Portal offered by [facility] by registering at the following website: [email or web address]. By joining [employer]’s FollowMyHealth portal, you will also be able to view your health information using other applications (apps) compatible with our system.

## 2019-10-29 NOTE — ED ADULT NURSE NOTE - OBJECTIVE STATEMENT
Patient w/ Hx. of sickle cell, states hip , back and generalized body aches since this afternoon, no SOB.

## 2019-10-29 NOTE — ED ADULT TRIAGE NOTE - CHIEF COMPLAINT QUOTE
pt. with [facility] of [place] presents with c/o b/l hip and back pain since 5pm, pt. states he took 8mg of dilaudid with no relief.

## 2019-10-31 PROCEDURE — 86850 RBC ANTIBODY SCREEN: CPT

## 2019-10-31 PROCEDURE — 36415 COLL VENOUS BLD VENIPUNCTURE: CPT

## 2019-10-31 PROCEDURE — 99285 EMERGENCY DEPT VISIT HI MDM: CPT | Mod: 25

## 2019-10-31 PROCEDURE — 83615 LACTATE (LD) (LDH) ENZYME: CPT

## 2019-10-31 PROCEDURE — 85610 PROTHROMBIN TIME: CPT

## 2019-10-31 PROCEDURE — 96376 TX/PRO/DX INJ SAME DRUG ADON: CPT

## 2019-10-31 PROCEDURE — 97116 GAIT TRAINING THERAPY: CPT

## 2019-10-31 PROCEDURE — 81001 URINALYSIS AUTO W/SCOPE: CPT

## 2019-10-31 PROCEDURE — 86900 BLOOD TYPING SEROLOGIC ABO: CPT

## 2019-10-31 PROCEDURE — 80048 BASIC METABOLIC PNL TOTAL CA: CPT

## 2019-10-31 PROCEDURE — 86901 BLOOD TYPING SEROLOGIC RH(D): CPT

## 2019-10-31 PROCEDURE — 85045 AUTOMATED RETICULOCYTE COUNT: CPT

## 2019-10-31 PROCEDURE — 96374 THER/PROPH/DIAG INJ IV PUSH: CPT

## 2019-10-31 PROCEDURE — 71045 X-RAY EXAM CHEST 1 VIEW: CPT

## 2019-10-31 PROCEDURE — 80053 COMPREHEN METABOLIC PANEL: CPT

## 2019-10-31 PROCEDURE — 85027 COMPLETE CBC AUTOMATED: CPT

## 2019-10-31 PROCEDURE — 97163 PT EVAL HIGH COMPLEX 45 MIN: CPT

## 2019-10-31 PROCEDURE — 97110 THERAPEUTIC EXERCISES: CPT

## 2019-11-01 DIAGNOSIS — M54.5 LOW BACK PAIN: ICD-10-CM

## 2019-11-01 DIAGNOSIS — D57.00 HB-SS DISEASE WITH CRISIS, UNSPECIFIED: ICD-10-CM

## 2019-11-02 DIAGNOSIS — M54.5 LOW BACK PAIN: ICD-10-CM

## 2019-11-02 DIAGNOSIS — D57.00 HB-SS DISEASE WITH CRISIS, UNSPECIFIED: ICD-10-CM

## 2019-12-10 ENCOUNTER — EMERGENCY (EMERGENCY)
Facility: HOSPITAL | Age: 34
LOS: 1 days | Discharge: HOME | End: 2019-12-10
Attending: EMERGENCY MEDICINE | Admitting: EMERGENCY MEDICINE
Payer: MEDICAID

## 2019-12-10 VITALS
TEMPERATURE: 98 F | OXYGEN SATURATION: 100 % | DIASTOLIC BLOOD PRESSURE: 76 MMHG | HEART RATE: 122 BPM | WEIGHT: 160.94 LBS | RESPIRATION RATE: 18 BRPM | SYSTOLIC BLOOD PRESSURE: 121 MMHG | HEIGHT: 68 IN

## 2019-12-10 VITALS
DIASTOLIC BLOOD PRESSURE: 71 MMHG | SYSTOLIC BLOOD PRESSURE: 107 MMHG | TEMPERATURE: 98 F | RESPIRATION RATE: 18 BRPM | OXYGEN SATURATION: 99 % | HEART RATE: 97 BPM

## 2019-12-10 DIAGNOSIS — D57.00 HB-SS DISEASE WITH CRISIS, UNSPECIFIED: ICD-10-CM

## 2019-12-10 DIAGNOSIS — M25.551 PAIN IN RIGHT HIP: ICD-10-CM

## 2019-12-10 PROCEDURE — 99283 EMERGENCY DEPT VISIT LOW MDM: CPT

## 2019-12-10 PROCEDURE — 71046 X-RAY EXAM CHEST 2 VIEWS: CPT | Mod: 26

## 2019-12-10 PROCEDURE — 99284 EMERGENCY DEPT VISIT MOD MDM: CPT

## 2019-12-10 PROCEDURE — 71046 X-RAY EXAM CHEST 2 VIEWS: CPT

## 2019-12-10 RX ORDER — HYDROMORPHONE HYDROCHLORIDE 2 MG/ML
16 INJECTION INTRAMUSCULAR; INTRAVENOUS; SUBCUTANEOUS ONCE
Refills: 0 | Status: DISCONTINUED | OUTPATIENT
Start: 2019-12-10 | End: 2019-12-10

## 2019-12-10 RX ORDER — SODIUM CHLORIDE 9 MG/ML
1000 INJECTION INTRAMUSCULAR; INTRAVENOUS; SUBCUTANEOUS ONCE
Refills: 0 | Status: COMPLETED | OUTPATIENT
Start: 2019-12-10 | End: 2019-12-10

## 2019-12-10 RX ORDER — IBUPROFEN 200 MG
800 TABLET ORAL ONCE
Refills: 0 | Status: COMPLETED | OUTPATIENT
Start: 2019-12-10 | End: 2019-12-10

## 2019-12-10 RX ADMIN — HYDROMORPHONE HYDROCHLORIDE 16 MILLIGRAM(S): 2 INJECTION INTRAMUSCULAR; INTRAVENOUS; SUBCUTANEOUS at 16:55

## 2019-12-10 RX ADMIN — Medication 800 MILLIGRAM(S): at 16:55

## 2019-12-10 NOTE — ED PROVIDER NOTE - CLINICAL SUMMARY MEDICAL DECISION MAKING FREE TEXT BOX
35 y/o male with a hx of SCD is present with b/l hip pain. Pt ambulatory in the ED who is non-toxic appearing. Concerning ekg changes noted with elevated HR. CXR and Labs ordered, however pt adamantly refused. Pain treated with improvement. pt states he has to leave due to his daughter in another ed. discussed concern for cardiac to include death if pt leaves prior to completion of assessment. pt agreed to sign AMA, however left prior to signing AMA as other staff members noted pt eloped.

## 2019-12-10 NOTE — ED PROVIDER NOTE - NS ED ROS FT
General: no fever, chills, confusion  Cardiac: no chest pain, chest tightness, palpitations  Lungs: no sob, difficulty breathing  Abdomen: no abdominal pain, nausea, vomiting, diarrhea, constipation, nml BM  : no dysuria, urinary frequency/urgency    All other systems negative except as per HPI

## 2019-12-10 NOTE — ED PROVIDER NOTE - ATTENDING CONTRIBUTION TO CARE
33 yo m w hx of SSD, PE, DVT (not on anticoag) presents to ED with concern for sickle cell pain crisis.  He notes bilateral hip and lower back pain, similar in nature to what he has experienced with sickle cell pain crisis in the past.  On my face to face ED eval, patient is non toxic in appearance.  Tachycardic HR, reg rhythm.  Lungs clear.  Abd soft, NT/ND.  Ambulating without difficulty.  Will check labs, ekg, treat pain and dispo accordingly.

## 2019-12-10 NOTE — ED ADULT NURSE NOTE - OBJECTIVE STATEMENT
c.o lower back pain and b/l hip pain since this morning. denies any injuries, numbness/tingling. states he has hx of sickle cell dx. states he took 8mg dilaudid at 8am with no relief

## 2019-12-10 NOTE — ED PROVIDER NOTE - OBJECTIVE STATEMENT
33 y/o M with PMHx sickle cell disease with DVT, PE and AVN of bilateral hips, ambulates with cane, presents to the ED complaining of bilateral hip pain, similar to his previous history of AVN/ sickle cell crisis. Pt reports he took his medication today without improvement of his discomfort. Denies fever, chills, numbness, tingling, fall/injury, rash, swelling, lower extremity edema, chest pain, SOB, or difficulty breathing, urinary symptoms, scrotal/penile swelling or discharge.

## 2019-12-21 ENCOUNTER — EMERGENCY (EMERGENCY)
Facility: HOSPITAL | Age: 34
LOS: 1 days | Discharge: HOME | End: 2019-12-21
Admitting: EMERGENCY MEDICINE
Payer: MEDICAID

## 2019-12-21 VITALS
WEIGHT: 164.91 LBS | RESPIRATION RATE: 15 BRPM | SYSTOLIC BLOOD PRESSURE: 129 MMHG | DIASTOLIC BLOOD PRESSURE: 80 MMHG | OXYGEN SATURATION: 100 % | HEIGHT: 68 IN | HEART RATE: 105 BPM | TEMPERATURE: 98 F

## 2019-12-21 PROCEDURE — 99283 EMERGENCY DEPT VISIT LOW MDM: CPT

## 2019-12-21 RX ORDER — IBUPROFEN 200 MG
800 TABLET ORAL ONCE
Refills: 0 | Status: COMPLETED | OUTPATIENT
Start: 2019-12-21 | End: 2019-12-21

## 2019-12-21 RX ORDER — HYDROMORPHONE HYDROCHLORIDE 2 MG/ML
16 INJECTION INTRAMUSCULAR; INTRAVENOUS; SUBCUTANEOUS ONCE
Refills: 0 | Status: DISCONTINUED | OUTPATIENT
Start: 2019-12-21 | End: 2019-12-21

## 2019-12-21 RX ADMIN — HYDROMORPHONE HYDROCHLORIDE 16 MILLIGRAM(S): 2 INJECTION INTRAMUSCULAR; INTRAVENOUS; SUBCUTANEOUS at 18:37

## 2019-12-21 RX ADMIN — Medication 800 MILLIGRAM(S): at 18:36

## 2019-12-21 NOTE — ED PROVIDER NOTE - OBJECTIVE STATEMENT
35 y/o M with a PMHx of sickle cell disease presents to the ED with c/o lower back and hip pain. He comes to the ED looking for pain medication. Pt denies fevers, problems with urination, numbing, tingling, CP, SOB, difficulty breathing. During his previous visit, he left AMA. 35 y/o male with a PMHx of sickle cell disease presents to the ED with c/o lower back and hip pain who reports is his chronic pain. Pt denies fall/injury, fever, chills, problems with urination, numbing, tingling, CP, SOB, difficulty breathing. Pt reports he took his 8 mg of hydromorphone earlier this morning without improvement of his pain. Pt states he has been trying to get his hematologist to increase the mg of his pain medication, but does not have an appointment until next month. In the mean time he has been increasing his fluid intake and taking motrin to help with his pain.

## 2019-12-21 NOTE — ED ADULT TRIAGE NOTE - OTHER COMPLAINTS
pt c.o b/l hip pain and back pain since 12 noon today. hx sickle cell, admits to taking 8 mg of Dilaudid without relief.

## 2019-12-21 NOTE — ED PROVIDER NOTE - PHYSICAL EXAMINATION
VITAL SIGNS: I have reviewed nursing notes and confirm.  CONSTITUTIONAL: Well-developed; well-nourished; in no acute distress.   SKIN:  warm and dry, no acute rash.   HEAD:  normocephalic, atraumatic.  EYES: EOM intact; conjunctiva and sclera clear.  ENT: No nasal discharge; airway clear.   NECK: Supple; non tender.  CARD: S1, S2 normal; no murmurs, gallops, or rubs. Regular rate and rhythm.   RESP:  Clear to auscultation b/l, no wheezes, rales or rhonchi.  ABD: Normal bowel sounds; soft; non-distended; non-tender; no guarding/ rebound.  EXT: Normal ROM. No clubbing, cyanosis or edema. 2+ pulses to b/l ue/le.  NEURO: Alert, oriented, grossly unremarkable  PSYCH: Cooperative, mood and affect appropriate.

## 2019-12-21 NOTE — ED PROVIDER NOTE - PATIENT PORTAL LINK FT
You can access the FollowMyHealth Patient Portal offered by [facility] by registering at the following website: [email or web address]. By joining [employer]’s FollowMyHealth portal, you will also be able to view your health information using other applications (apps) compatible with our system.

## 2019-12-21 NOTE — ED ADULT NURSE NOTE - CHPI ED NUR SYMPTOMS NEG
no tingling/no stiffness/no numbness/no deformity/no weakness/no difficulty bearing weight/no fever/no abrasion

## 2019-12-21 NOTE — ED ADULT NURSE NOTE - OBJECTIVE STATEMENT
33 y/o male w/ hx of sickle cell c/o atraumatic bilateral hip pain unresolved by 8 mg Dilaudid PO PTA.

## 2019-12-21 NOTE — ED ADULT NURSE NOTE - NSIMPLEMENTINTERV_GEN_ALL_ED
Implemented All Universal Safety Interventions:  [place]illager to call system. Call bell, personal items and telephone within reach. Instruct patient to call for assistance. Room bathroom lighting operational. Non-slip footwear when patient is off stretcher. Physically safe environment: no spills, clutter or unnecessary equipment. Stretcher in lowest position, wheels locked, appropriate side rails in place.

## 2019-12-21 NOTE — ED PROVIDER NOTE - CLINICAL SUMMARY MEDICAL DECISION MAKING FREE TEXT BOX
normal...
35 y/o male here in the ED c/o chronic b/l hip pain. Good rom, do not suspect septic joint. Pt reports no fever as VS nml and pt ambulating in the ED. Pt signed out AMA last ED visit. Again addressed concerns regarding EKG. Pt adamant no pain and refuses additional intervention. Pt advised to follow-up with Hematologist and strongly stressed importance of trust with pt/provider relationship. Pt appears to understand he needs to be compliant with his care to avoid sickle cell crisis.

## 2019-12-21 NOTE — ED PROVIDER NOTE - NSFOLLOWUPINSTRUCTIONS_ED_ALL_ED_FT
PLEASE READ THE FOLLOWING INFORMATION REGARDING YOUR CONDITION. IT IS HIGHLY ADVISED YOU FOLLOW UP WITH YOUR HEMATOLOGIST ASAP.     Return to the Emergency Department if you have any new or worsening symptoms, or if you have any concerns.    Sickle Cell Crisis    WHAT YOU NEED TO KNOW:    A sickle cell crisis is a painful episode that occurs in people who have sickle cell anemia. It happens when sickle-shaped red blood cells (RBCs) block blood vessels. Blood and oxygen cannot get to tissues, causing pain. A sickle cell crisis can also damage your tissues and cause organ failure, such liver or kidney failure. A sickle cell crisis can become life-threatening.    DISCHARGE INSTRUCTIONS:    Call 911 for any of the following:     You have shortness of breath or chest pain.      You are a man and have an erection that is painful and does not go away.       You lose vision in one or both eyes.    Return to the emergency department if:     You have a fever.      You feel like you cannot cope with your pain, or you feel like hurting yourself.       You have behavior changes, a seizure, or faint.       You have abdominal pain, nausea, or vomiting.      You have a headache that is worse or different from those that you have had in the past.       You have new weakness or numbness in your arm, leg, or face.      You have new pain in any part of your body.      Your urine is dark and you are urinating less than usual or not at all.       You are dizzy, lightheaded, or faint.     Contact your healthcare provider if:     You have any new signs or symptoms.       You have blood in your urine.       You are constipated or you have diarrhea.       You have changes in your vision.       You have increased fatigue.       You plan to travel by airplane or to a high [facility].       You have questions or concerns about your condition or care.    Medicines: You may need any of the following:     Prescription pain medicine may be given. Ask how to take this medicine safely. Medicine may also be given to decrease sickling of your RBCs. You may also need medicine to treat or prevent a bacterial infection.       NSAIDs, such as ibuprofen, help decrease swelling, pain, and fever. This medicine is available with or without a doctor's order. NSAIDs can cause stomach bleeding or kidney problems in certain people. If you take blood thinner medicine, always ask your healthcare provider if NSAIDs are safe for you. Always read the medicine label and follow directions.      Acetaminophen decreases pain and fever. It is available without a doctor's order. Ask how much to take and how often to take it. Follow directions. Acetaminophen can cause liver damage if not taken correctly.      Take your medicine as directed. Contact your healthcare provider if you think your medicine is not helping or if you have side effects. Tell him or her if you are allergic to any medicine. Keep a list of the medicines, vitamins, and herbs you take. Include the amounts, and when and why you take them. Bring the list or the pill bottles to follow-up visits. Carry your medicine list with you in case of an emergency.    Medical alert identification: Wear medical alert jewelry or carry a card that says you have sickle cell anemia. Ask your healthcare provider where to get these items. Medical Alert Jewelry         Prevent a sickle cell crisis:     Take vitamins and minerals as directed. Folic acid can help prevent blood vessel problems that can occur with sickle cell anemia. Zinc may decrease how often you have pain.       Drink liquids as directed. Dehydration can increase your risk for a sickle cell crisis. Ask how much liquid to drink each day and which liquids are best for you.      Balance rest and exercise. Rest during a sickle cell crisis. Over time, increase your activity to a moderate amount. Exercise regularly. Avoid exercise or activities that can cause injury, such as football. Ask about the best exercise plan for you.       Stay out of the cold. Do not go quickly from a warm place to a cold place. Do not go swimming in cold water. Stay warm in the winter.      Do not smoke cigarettes or drink alcohol. These increase your risk for a sickle cell crisis. Nicotine and other chemicals in cigarettes and cigars can cause lung damage. Ask your healthcare provider for information if you currently smoke and need help to quit. E-cigarettes or smokeless tobacco still contain nicotine. Talk to your healthcare provider before you use these products.       Ask about vaccinations you need. Vaccinations can help prevent a viral infection that may lead to a sickle cell crisis. Get a flu shot every year as directed. You may need a pneumonia vaccine every 5 years.     Follow up with your healthcare provider as directed: You may need ongoing screening for conditions that can develop because of sickle cell disease. Examples include kidney disease, hypertension (high blood pressure), retinopathy (eye problems), and problems with your lungs. Write down your questions so you remember to ask them during your visits.       © Copyright [employer] 2019       back to top                      © Copyright [employer] 2019

## 2019-12-25 DIAGNOSIS — D57.00 HB-SS DISEASE WITH CRISIS, UNSPECIFIED: ICD-10-CM

## 2019-12-25 DIAGNOSIS — M25.559 PAIN IN UNSPECIFIED HIP: ICD-10-CM

## 2019-12-28 ENCOUNTER — EMERGENCY (EMERGENCY)
Facility: HOSPITAL | Age: 34
LOS: 1 days | Discharge: HOME | End: 2019-12-28
Admitting: EMERGENCY MEDICINE
Payer: MEDICAID

## 2019-12-28 VITALS
HEART RATE: 100 BPM | OXYGEN SATURATION: 99 % | SYSTOLIC BLOOD PRESSURE: 111 MMHG | TEMPERATURE: 98 F | HEIGHT: 68 IN | DIASTOLIC BLOOD PRESSURE: 66 MMHG | RESPIRATION RATE: 18 BRPM

## 2019-12-28 VITALS
RESPIRATION RATE: 18 BRPM | TEMPERATURE: 98 F | DIASTOLIC BLOOD PRESSURE: 76 MMHG | OXYGEN SATURATION: 99 % | SYSTOLIC BLOOD PRESSURE: 122 MMHG | HEART RATE: 100 BPM

## 2019-12-28 PROCEDURE — 99283 EMERGENCY DEPT VISIT LOW MDM: CPT

## 2019-12-28 RX ORDER — HYDROMORPHONE HYDROCHLORIDE 2 MG/ML
16 INJECTION INTRAMUSCULAR; INTRAVENOUS; SUBCUTANEOUS ONCE
Refills: 0 | Status: DISCONTINUED | OUTPATIENT
Start: 2019-12-28 | End: 2019-12-28

## 2019-12-28 RX ORDER — IBUPROFEN 200 MG
800 TABLET ORAL ONCE
Refills: 0 | Status: COMPLETED | OUTPATIENT
Start: 2019-12-28 | End: 2019-12-28

## 2019-12-28 RX ADMIN — HYDROMORPHONE HYDROCHLORIDE 16 MILLIGRAM(S): 2 INJECTION INTRAMUSCULAR; INTRAVENOUS; SUBCUTANEOUS at 13:55

## 2019-12-28 RX ADMIN — Medication 800 MILLIGRAM(S): at 13:56

## 2019-12-28 NOTE — ED ADULT TRIAGE NOTE - CHIEF COMPLAINT QUOTE
pt c/o sickle cell pain to low back and buttocks. denies chest pain/sob. pt took 8mg PO dilaudid this morning at 5am with no relief.

## 2019-12-28 NOTE — ED PROVIDER NOTE - NSFOLLOWUPINSTRUCTIONS_ED_ALL_ED_FT
Sickle Cell Crisis  WHAT YOU NEED TO KNOW:  A sickle cell crisis is a painful episode that occurs in people who have sickle cell anemia. It happens when sickle-shaped red blood cells (RBCs) block blood vessels. Blood and oxygen cannot get to tissues, causing pain. A sickle cell crisis can also damage your tissues and cause organ failure, such liver or kidney failure. A sickle cell crisis can become life-threatening.  DISCHARGE INSTRUCTIONS:  Call 911 for any of the following:   You have shortness of breath or chest pain.  You are a man and have an erection that is painful and does not go away.   You lose vision in one or both eyes.  Return to the emergency department if:   You have a fever.  You feel like you cannot cope with your pain, or you feel like hurting yourself.   You have behavior changes, a seizure, or faint.   You have abdominal pain, nausea, or vomiting.  You have a headache that is worse or different from those that you have had in the past.   You have new weakness or numbness in your arm, leg, or face.  You have new pain in any part of your body.  Your urine is dark and you are urinating less than usual or not at all.   You are dizzy, lightheaded, or faint.   Contact your healthcare provider if:   You have any new signs or symptoms.   You have blood in your urine.   You are constipated or you have diarrhea.  You have changes in your vision.   You have increased fatigue.   You plan to travel by airplane or to a high elevation.   You have questions or concerns about your condition or care.  Medicines: You may need any of the following:   Prescription pain medicine may be given. Ask how to take this medicine safely. Medicine may also be given to decrease sickling of your RBCs. You may also need medicine to treat or prevent a bacterial infection.   NSAIDs, such as ibuprofen, help decrease swelling, pain, and fever. This medicine is available with or without a doctor's order. NSAIDs can cause stomach bleeding or kidney problems in certain people. If you take blood thinner medicine, always ask your healthcare provider if NSAIDs are safe for you. Always read the medicine label and follow directions.  Acetaminophen decreases pain and fever. It is available without a doctor's order. Ask how much to take and how often to take it. Follow directions. Acetaminophen can cause liver damage if not taken correctly.  Take your medicine as directed. Contact your healthcare provider if you think your medicine is not helping or if you have side effects. Tell him or her if you are allergic to any medicine. Keep a list of the medicines, vitamins, and herbs you take. Include the amounts, and when and why you take them. Bring the list or the pill bottles to follow-up visits. Carry your medicine list with you in case of an emergency.  Medical alert identification: Wear medical alert jewelry or carry a card that says you have sickle cell anemia. Ask your healthcare provider where to get these items. Medical Alert Jewelry  Prevent a sickle cell crisis:   Take vitamins and minerals as directed. Folic acid can help prevent blood vessel problems that can occur with sickle cell anemia. Zinc may decrease how often you have pain.   Drink liquids as directed. Dehydration can increase your risk for a sickle cell crisis. Ask how much liquid to drink each day and which liquids are best for you.  Balance rest and exercise. Rest during a sickle cell crisis. Over time, increase your activity to a moderate amount. Exercise regularly. Avoid exercise or activities that can cause injury, such as football. Ask about the best exercise plan for you.   Stay out of the cold. Do not go quickly from a warm place to a cold place. Do not go swimming in cold water. Stay warm in the winter.  Do not smoke cigarettes or drink alcohol. These increase your risk for a sickle cell crisis. Nicotine and other chemicals in cigarettes and cigars can cause lung damage. Ask your healthcare provider for information if you currently smoke and need help to quit. E-cigarettes or smokeless tobacco still contain nicotine. Talk to your healthcare provider before you use these products.   Ask about vaccinations you need. Vaccinations can help prevent a viral infection that may lead to a sickle cell crisis. Get a flu shot every year as directed. You may need a pneumonia vaccine every 5 years.   Follow up with your healthcare provider as directed: You may need ongoing screening for conditions that can develop because of sickle cell disease. Examples include kidney disease, hypertension (high blood pressure), retinopathy (eye problems), and problems with your lungs. Write down your questions so you remember to ask them during your visits.

## 2019-12-28 NOTE — ED PROVIDER NOTE - CLINICAL SUMMARY MEDICAL DECISION MAKING FREE TEXT BOX
pt requesting a dose of po meds for his lbp/hip pain, hx of sickle cell w/frequent visits for pain control, pt nad, hemodynamically stable, states that has f/u w/his hematologist, also scheduled for hip surg, does not want anything besides po meds, states that will f/u as needed, no cp/sob/afebrile, no concern for septic joints or fx

## 2019-12-28 NOTE — ED PROVIDER NOTE - MUSCULOSKELETAL, MLM
Spine appears normal, range of motion is not limited, no muscle or joint tenderness, ambulatory w/o dif

## 2019-12-28 NOTE — ED PROVIDER NOTE - OBJECTIVE STATEMENT
The pt is a 33 y/o M, who is well known to ED for frequent visits for pain meds - hx of sickle cell, c/o lbp and hip pain, claims to have taken 8mg of po dilaudid this am and is here requesting po dilaudid and motrin, states that this is his "typical" crisis pain, is pending hip replacement. Denies fevers, chills, cp, sob, n/v/d, abd pain, falls or injury

## 2019-12-28 NOTE — ED PROVIDER NOTE - PATIENT PORTAL LINK FT
You can access the FollowMyHealth Patient Portal offered by [facility] by registering at the following website: [email or web address]. By joining [email or web address]’s FollowMyHealth portal, you will also be able to view your health information using other applications (apps) compatible with our system.

## 2019-12-28 NOTE — ED ADULT NURSE NOTE - OBJECTIVE STATEMENT
c.o lower back pain and b.l hip pain since 5am. states he has hx of sickle cell anemia and took 8mg dilaudid at 5am with no relief. denies any injuries, fever/chills, chest pain, sob.

## 2020-01-01 ENCOUNTER — EMERGENCY (EMERGENCY)
Facility: HOSPITAL | Age: 35
LOS: 1 days | Discharge: HOME | End: 2020-01-01
Admitting: EMERGENCY MEDICINE
Payer: MEDICAID

## 2020-01-01 ENCOUNTER — EMERGENCY (EMERGENCY)
Facility: HOSPITAL | Age: 35
LOS: 1 days | Discharge: HOME | End: 2020-01-01
Attending: EMERGENCY MEDICINE
Payer: SELF-PAY

## 2020-01-01 ENCOUNTER — EMERGENCY (EMERGENCY)
Facility: HOSPITAL | Age: 35
LOS: 1 days | Discharge: HOME | End: 2020-01-01
Attending: EMERGENCY MEDICINE | Admitting: EMERGENCY MEDICINE
Payer: SELF-PAY

## 2020-01-01 ENCOUNTER — EMERGENCY (EMERGENCY)
Facility: HOSPITAL | Age: 35
LOS: 1 days | Discharge: HOME | End: 2020-01-01
Attending: EMERGENCY MEDICINE | Admitting: EMERGENCY MEDICINE
Payer: MEDICAID

## 2020-01-01 VITALS
DIASTOLIC BLOOD PRESSURE: 81 MMHG | OXYGEN SATURATION: 99 % | SYSTOLIC BLOOD PRESSURE: 136 MMHG | HEART RATE: 134 BPM | HEIGHT: 68 IN | TEMPERATURE: 98 F | WEIGHT: 160.94 LBS | RESPIRATION RATE: 18 BRPM

## 2020-01-01 VITALS
SYSTOLIC BLOOD PRESSURE: 147 MMHG | DIASTOLIC BLOOD PRESSURE: 69 MMHG | HEIGHT: 68 IN | HEART RATE: 115 BPM | TEMPERATURE: 99 F | WEIGHT: 160.94 LBS | OXYGEN SATURATION: 97 % | RESPIRATION RATE: 18 BRPM

## 2020-01-01 VITALS
HEART RATE: 99 BPM | WEIGHT: 160.94 LBS | TEMPERATURE: 99 F | OXYGEN SATURATION: 99 % | SYSTOLIC BLOOD PRESSURE: 112 MMHG | DIASTOLIC BLOOD PRESSURE: 74 MMHG | RESPIRATION RATE: 16 BRPM | HEIGHT: 68 IN

## 2020-01-01 VITALS
OXYGEN SATURATION: 99 % | TEMPERATURE: 98 F | RESPIRATION RATE: 18 BRPM | HEIGHT: 68 IN | WEIGHT: 160.94 LBS | HEART RATE: 107 BPM | DIASTOLIC BLOOD PRESSURE: 77 MMHG | SYSTOLIC BLOOD PRESSURE: 128 MMHG

## 2020-01-01 VITALS
HEART RATE: 91 BPM | RESPIRATION RATE: 18 BRPM | DIASTOLIC BLOOD PRESSURE: 76 MMHG | OXYGEN SATURATION: 98 % | SYSTOLIC BLOOD PRESSURE: 139 MMHG

## 2020-01-01 VITALS
OXYGEN SATURATION: 99 % | DIASTOLIC BLOOD PRESSURE: 72 MMHG | SYSTOLIC BLOOD PRESSURE: 112 MMHG | RESPIRATION RATE: 18 BRPM | TEMPERATURE: 98 F | HEART RATE: 116 BPM

## 2020-01-01 VITALS
TEMPERATURE: 98 F | WEIGHT: 160.94 LBS | HEART RATE: 85 BPM | RESPIRATION RATE: 18 BRPM | OXYGEN SATURATION: 98 % | SYSTOLIC BLOOD PRESSURE: 122 MMHG | DIASTOLIC BLOOD PRESSURE: 71 MMHG | HEIGHT: 68 IN

## 2020-01-01 VITALS
HEART RATE: 128 BPM | HEIGHT: 68 IN | SYSTOLIC BLOOD PRESSURE: 150 MMHG | RESPIRATION RATE: 18 BRPM | TEMPERATURE: 99 F | WEIGHT: 160.94 LBS | DIASTOLIC BLOOD PRESSURE: 82 MMHG | OXYGEN SATURATION: 100 %

## 2020-01-01 VITALS
HEART RATE: 119 BPM | SYSTOLIC BLOOD PRESSURE: 121 MMHG | TEMPERATURE: 100 F | DIASTOLIC BLOOD PRESSURE: 75 MMHG | OXYGEN SATURATION: 100 % | RESPIRATION RATE: 18 BRPM

## 2020-01-01 DIAGNOSIS — Z79.1 LONG TERM (CURRENT) USE OF NON-STEROIDAL ANTI-INFLAMMATORIES (NSAID): ICD-10-CM

## 2020-01-01 DIAGNOSIS — Z79.899 OTHER LONG TERM (CURRENT) DRUG THERAPY: ICD-10-CM

## 2020-01-01 DIAGNOSIS — M54.5 LOW BACK PAIN: ICD-10-CM

## 2020-01-01 DIAGNOSIS — D57.1 SICKLE-CELL DISEASE WITHOUT CRISIS: ICD-10-CM

## 2020-01-01 DIAGNOSIS — Z79.2 LONG TERM (CURRENT) USE OF ANTIBIOTICS: ICD-10-CM

## 2020-01-01 DIAGNOSIS — D57.00 HB-SS DISEASE WITH CRISIS, UNSPECIFIED: ICD-10-CM

## 2020-01-01 DIAGNOSIS — F17.200 NICOTINE DEPENDENCE, UNSPECIFIED, UNCOMPLICATED: ICD-10-CM

## 2020-01-01 PROCEDURE — 99284 EMERGENCY DEPT VISIT MOD MDM: CPT

## 2020-01-01 PROCEDURE — 99283 EMERGENCY DEPT VISIT LOW MDM: CPT

## 2020-01-01 PROCEDURE — 96372 THER/PROPH/DIAG INJ SC/IM: CPT

## 2020-01-01 PROCEDURE — 99283 EMERGENCY DEPT VISIT LOW MDM: CPT | Mod: 25

## 2020-01-01 PROCEDURE — 99284 EMERGENCY DEPT VISIT MOD MDM: CPT | Mod: 25

## 2020-01-01 RX ORDER — HYDROMORPHONE HYDROCHLORIDE 2 MG/ML
8 INJECTION INTRAMUSCULAR; INTRAVENOUS; SUBCUTANEOUS ONCE
Refills: 0 | Status: DISCONTINUED | OUTPATIENT
Start: 2020-01-01 | End: 2020-01-01

## 2020-01-01 RX ORDER — IBUPROFEN 200 MG
600 TABLET ORAL ONCE
Refills: 0 | Status: COMPLETED | OUTPATIENT
Start: 2020-01-01 | End: 2020-01-01

## 2020-01-01 RX ORDER — ACETAMINOPHEN 500 MG
975 TABLET ORAL ONCE
Refills: 0 | Status: COMPLETED | OUTPATIENT
Start: 2020-01-01 | End: 2020-01-01

## 2020-01-01 RX ORDER — KETOROLAC TROMETHAMINE 30 MG/ML
30 SYRINGE (ML) INJECTION ONCE
Refills: 0 | Status: DISCONTINUED | OUTPATIENT
Start: 2020-01-01 | End: 2020-01-01

## 2020-01-01 RX ORDER — HYDROMORPHONE HYDROCHLORIDE 2 MG/ML
16 INJECTION INTRAMUSCULAR; INTRAVENOUS; SUBCUTANEOUS ONCE
Refills: 0 | Status: DISCONTINUED | OUTPATIENT
Start: 2020-01-01 | End: 2020-01-01

## 2020-01-01 RX ADMIN — HYDROMORPHONE HYDROCHLORIDE 8 MILLIGRAM(S): 2 INJECTION INTRAMUSCULAR; INTRAVENOUS; SUBCUTANEOUS at 10:26

## 2020-01-01 RX ADMIN — HYDROMORPHONE HYDROCHLORIDE 16 MILLIGRAM(S): 2 INJECTION INTRAMUSCULAR; INTRAVENOUS; SUBCUTANEOUS at 10:50

## 2020-01-01 RX ADMIN — Medication 600 MILLIGRAM(S): at 13:47

## 2020-01-01 RX ADMIN — Medication 600 MILLIGRAM(S): at 15:49

## 2020-01-01 RX ADMIN — Medication 30 MILLIGRAM(S): at 11:30

## 2020-01-01 RX ADMIN — Medication 30 MILLIGRAM(S): at 12:00

## 2020-01-01 RX ADMIN — HYDROMORPHONE HYDROCHLORIDE 16 MILLIGRAM(S): 2 INJECTION INTRAMUSCULAR; INTRAVENOUS; SUBCUTANEOUS at 19:13

## 2020-01-01 RX ADMIN — Medication 600 MILLIGRAM(S): at 19:12

## 2020-01-01 RX ADMIN — Medication 600 MILLIGRAM(S): at 09:51

## 2020-01-01 RX ADMIN — HYDROMORPHONE HYDROCHLORIDE 8 MILLIGRAM(S): 2 INJECTION INTRAMUSCULAR; INTRAVENOUS; SUBCUTANEOUS at 14:16

## 2020-01-01 RX ADMIN — HYDROMORPHONE HYDROCHLORIDE 8 MILLIGRAM(S): 2 INJECTION INTRAMUSCULAR; INTRAVENOUS; SUBCUTANEOUS at 13:46

## 2020-01-01 RX ADMIN — HYDROMORPHONE HYDROCHLORIDE 8 MILLIGRAM(S): 2 INJECTION INTRAMUSCULAR; INTRAVENOUS; SUBCUTANEOUS at 11:15

## 2020-01-01 RX ADMIN — Medication 600 MILLIGRAM(S): at 14:16

## 2020-01-01 RX ADMIN — HYDROMORPHONE HYDROCHLORIDE 8 MILLIGRAM(S): 2 INJECTION INTRAMUSCULAR; INTRAVENOUS; SUBCUTANEOUS at 12:00

## 2020-01-01 RX ADMIN — HYDROMORPHONE HYDROCHLORIDE 16 MILLIGRAM(S): 2 INJECTION INTRAMUSCULAR; INTRAVENOUS; SUBCUTANEOUS at 15:52

## 2020-01-01 RX ADMIN — Medication 975 MILLIGRAM(S): at 11:15

## 2020-01-01 RX ADMIN — HYDROMORPHONE HYDROCHLORIDE 16 MILLIGRAM(S): 2 INJECTION INTRAMUSCULAR; INTRAVENOUS; SUBCUTANEOUS at 09:51

## 2020-01-01 RX ADMIN — Medication 600 MILLIGRAM(S): at 10:50

## 2020-01-01 RX ADMIN — Medication 975 MILLIGRAM(S): at 10:26

## 2020-01-02 ENCOUNTER — EMERGENCY (EMERGENCY)
Facility: HOSPITAL | Age: 35
LOS: 1 days | Discharge: HOME | End: 2020-01-02
Attending: EMERGENCY MEDICINE | Admitting: EMERGENCY MEDICINE
Payer: MEDICAID

## 2020-01-02 VITALS
SYSTOLIC BLOOD PRESSURE: 135 MMHG | WEIGHT: 160.94 LBS | HEIGHT: 68 IN | DIASTOLIC BLOOD PRESSURE: 81 MMHG | RESPIRATION RATE: 18 BRPM | TEMPERATURE: 98 F | OXYGEN SATURATION: 99 % | HEART RATE: 109 BPM

## 2020-01-02 PROCEDURE — 99283 EMERGENCY DEPT VISIT LOW MDM: CPT

## 2020-01-02 NOTE — ED ADULT NURSE NOTE - OBJECTIVE STATEMENT
Patient aox3 and ambulatory upon arrival. Patient c/o bilateral hip and lower back pain [id number] due to sickle cell crisis per patient. Per patient, PMH: sickle cell dx A. Patient states pain starte Patient aox3 and ambulatory upon arrival. Patient c/o bilateral hip and lower back pain [id number] due to sickle cell crisis per patient. Per patient, PMH: sickle cell dx, AVN in bilateral hips and L shoulder. Patient states pain started at 4pm and has progressively gotten worse. Patient has clear and equal bilateral breath sounds. Patient denies SOB, CP, dizziness, N/V/D, fall/accident/injury, weakness. Patient has full ROM in all four extremities.

## 2020-01-02 NOTE — ED ADULT NURSE NOTE - CHPI ED NUR SYMPTOMS NEG
no chills/no decreased eating/drinking/no vomiting/no tingling/no weakness/no dizziness/no fever/no nausea

## 2020-01-03 VITALS
HEART RATE: 88 BPM | SYSTOLIC BLOOD PRESSURE: 112 MMHG | RESPIRATION RATE: 16 BRPM | DIASTOLIC BLOOD PRESSURE: 66 MMHG | OXYGEN SATURATION: 98 % | TEMPERATURE: 98 F

## 2020-01-03 PROCEDURE — 99283 EMERGENCY DEPT VISIT LOW MDM: CPT

## 2020-01-03 RX ORDER — IBUPROFEN 200 MG
800 TABLET ORAL ONCE
Refills: 0 | Status: COMPLETED | OUTPATIENT
Start: 2020-01-03 | End: 2020-01-03

## 2020-01-03 RX ORDER — HYDROMORPHONE HYDROCHLORIDE 2 MG/ML
16 INJECTION INTRAMUSCULAR; INTRAVENOUS; SUBCUTANEOUS ONCE
Refills: 0 | Status: DISCONTINUED | OUTPATIENT
Start: 2020-01-03 | End: 2020-01-03

## 2020-01-03 RX ADMIN — HYDROMORPHONE HYDROCHLORIDE 16 MILLIGRAM(S): 2 INJECTION INTRAMUSCULAR; INTRAVENOUS; SUBCUTANEOUS at 01:42

## 2020-01-03 RX ADMIN — Medication 800 MILLIGRAM(S): at 02:19

## 2020-01-03 RX ADMIN — HYDROMORPHONE HYDROCHLORIDE 16 MILLIGRAM(S): 2 INJECTION INTRAMUSCULAR; INTRAVENOUS; SUBCUTANEOUS at 02:19

## 2020-01-03 RX ADMIN — Medication 800 MILLIGRAM(S): at 01:42

## 2020-01-03 NOTE — ED PROVIDER NOTE - PATIENT PORTAL LINK FT
You can access the FollowMyHealth Patient Portal offered by [facility] by registering at the following website: [email or web address]. By joining [employer]’s FollowMyHealth portal, you will also be able to view your health information using other applications (apps) compatible with our system.

## 2020-01-03 NOTE — ED PROVIDER NOTE - CLINICAL SUMMARY MEDICAL DECISION MAKING FREE TEXT BOX
Patient in ED w concern for sickle cell pain crisis - requesting PO dilaudid and motrin.  Patient notes he has 8 mg dialudid q 4 hours PRN at home, however states he was not feeling improved with this medication.  He notes pain patterns typical of his sickle cell pain.  He is encouraged to allow lab draw to check labs, however refuses stating he had labs completed recently last month and "is a diffuclt stick."  He is non toxic appearing with benign physical exam and stable vitals - afebrile.  Patient is feeling improved in ED following po dilaudid, motrin.  He is agreeable to plan for discharge home with instruction for prompt PCP follow up in 1-2 days for re evaluation.  He is instructed to return to ED immediately should his symptoms worsen or if he has any concern prior to this recommended follow up.  Patient is aware of plan and verbalizes his understanding.  Will discharge home at this time.

## 2020-01-03 NOTE — ED PROVIDER NOTE - MUSCULOSKELETAL, MLM
Spine appears normal, range of motion is not limited, + mild ttp over bilateral hips, + ttp over low back.  No midline spine step off/deformity.

## 2020-01-03 NOTE — ED PROVIDER NOTE - NSFOLLOWUPINSTRUCTIONS_ED_ALL_ED_FT
Please follow up with your primary physician in 1-2 days for re evaluation.  Please return to ER immediately should your symptoms worsen or if you have any concern prior to this recommended follow up.    Sickle Cell Crisis    WHAT YOU NEED TO KNOW:    A sickle cell crisis is a painful episode that occurs in people who have sickle cell anemia. It happens when sickle-shaped red blood cells (RBCs) block blood vessels. Blood and oxygen cannot get to tissues, causing pain. A sickle cell crisis can also damage your tissues and cause organ failure, such liver or kidney failure. A sickle cell crisis can become life-threatening.    DISCHARGE INSTRUCTIONS:    Call 911 for any of the following:     You have shortness of breath or chest pain.      You are a man and have an erection that is painful and does not go away.       You lose vision in one or both eyes.    Return to the emergency department if:     You have a fever.      You feel like you cannot cope with your pain, or you feel like hurting yourself.       You have behavior changes, a seizure, or faint.       You have abdominal pain, nausea, or vomiting.      You have a headache that is worse or different from those that you have had in the past.       You have new weakness or numbness in your arm, leg, or face.      You have new pain in any part of your body.      Your urine is dark and you are urinating less than usual or not at all.       You are dizzy, lightheaded, or faint.     Contact your healthcare provider if:     You have any new signs or symptoms.       You have blood in your urine.       You are constipated or you have diarrhea.       You have changes in your vision.       You have increased fatigue.       You plan to travel by airplane or to a high [facility].       You have questions or concerns about your condition or care.    Medicines: You may need any of the following:     Prescription pain medicine may be given. Ask how to take this medicine safely. Medicine may also be given to decrease sickling of your RBCs. You may also need medicine to treat or prevent a bacterial infection.       NSAIDs, such as ibuprofen, help decrease swelling, pain, and fever. This medicine is available with or without a doctor's order. NSAIDs can cause stomach bleeding or kidney problems in certain people. If you take blood thinner medicine, always ask your healthcare provider if NSAIDs are safe for you. Always read the medicine label and follow directions.      Acetaminophen decreases pain and fever. It is available without a doctor's order. Ask how much to take and how often to take it. Follow directions. Acetaminophen can cause liver damage if not taken correctly.      Take your medicine as directed. Contact your healthcare provider if you think your medicine is not helping or if you have side effects. Tell him or her if you are allergic to any medicine. Keep a list of the medicines, vitamins, and herbs you take. Include the amounts, and when and why you take them. Bring the list or the pill bottles to follow-up visits. Carry your medicine list with you in case of an emergency.    Medical alert identification: Wear medical alert jewelry or carry a card that says you have sickle cell anemia. Ask your healthcare provider where to get these items. Medical Alert Jewelry         Prevent a sickle cell crisis:     Take vitamins and minerals as directed. Folic acid can help prevent blood vessel problems that can occur with sickle cell anemia. Zinc may decrease how often you have pain.       Drink liquids as directed. Dehydration can increase your risk for a sickle cell crisis. Ask how much liquid to drink each day and which liquids are best for you.      Balance rest and exercise. Rest during a sickle cell crisis. Over time, increase your activity to a moderate amount. Exercise regularly. Avoid exercise or activities that can cause injury, such as football. Ask about the best exercise plan for you.       Stay out of the cold. Do not go quickly from a warm place to a cold place. Do not go swimming in cold water. Stay warm in the winter.      Do not smoke cigarettes or drink alcohol. These increase your risk for a sickle cell crisis. Nicotine and other chemicals in cigarettes and cigars can cause lung damage. Ask your healthcare provider for information if you currently smoke and need help to quit. E-cigarettes or smokeless tobacco still contain nicotine. Talk to your healthcare provider before you use these products.       Ask about vaccinations you need. Vaccinations can help prevent a viral infection that may lead to a sickle cell crisis. Get a flu shot every year as directed. You may need a pneumonia vaccine every 5 years.     Follow up with your healthcare provider as directed: You may need ongoing screening for conditions that can develop because of sickle cell disease. Examples include kidney disease, hypertension (high blood pressure), retinopathy (eye problems), and problems with your lungs. Write down your questions so you remember to ask them during your visits.       © Copyright [employer] 2020       back to top                      © Copyright [employer] 2020

## 2020-01-03 NOTE — ED PROVIDER NOTE - OBJECTIVE STATEMENT
34 year old male with history of sickle cell disease presents to ED with concern for 34 year old male with history of sickle cell disease presents to ED with concern for sickle cell pain crisis.  Patient notes pain to back and bilateral hips, typical in nature of pain he experiences with sick cell pain crisis.  He denies associated fever, chills, chest pain, shortness of breath, headache, visual changes, extremity weakness, abdominal pain, nausea, emesis, changes to bowel movements, rashes, recent travel, sick contacts or any additional acute complaints or concerns at this time.

## 2020-01-07 ENCOUNTER — EMERGENCY (EMERGENCY)
Facility: HOSPITAL | Age: 35
LOS: 1 days | Discharge: HOME | End: 2020-01-07
Attending: EMERGENCY MEDICINE | Admitting: EMERGENCY MEDICINE
Payer: MEDICAID

## 2020-01-07 VITALS
HEIGHT: 68 IN | OXYGEN SATURATION: 100 % | DIASTOLIC BLOOD PRESSURE: 71 MMHG | SYSTOLIC BLOOD PRESSURE: 104 MMHG | RESPIRATION RATE: 16 BRPM | WEIGHT: 115.96 LBS | TEMPERATURE: 99 F | HEART RATE: 138 BPM

## 2020-01-07 VITALS
HEART RATE: 106 BPM | OXYGEN SATURATION: 98 % | SYSTOLIC BLOOD PRESSURE: 121 MMHG | RESPIRATION RATE: 18 BRPM | DIASTOLIC BLOOD PRESSURE: 85 MMHG | TEMPERATURE: 99 F

## 2020-01-07 DIAGNOSIS — D57.00 HB-SS DISEASE WITH CRISIS, UNSPECIFIED: ICD-10-CM

## 2020-01-07 DIAGNOSIS — M54.5 LOW BACK PAIN: ICD-10-CM

## 2020-01-07 PROCEDURE — 99283 EMERGENCY DEPT VISIT LOW MDM: CPT

## 2020-01-07 RX ORDER — IBUPROFEN 200 MG
800 TABLET ORAL ONCE
Refills: 0 | Status: COMPLETED | OUTPATIENT
Start: 2020-01-07 | End: 2020-01-07

## 2020-01-07 RX ORDER — HYDROMORPHONE HYDROCHLORIDE 2 MG/ML
16 INJECTION INTRAMUSCULAR; INTRAVENOUS; SUBCUTANEOUS ONCE
Refills: 0 | Status: DISCONTINUED | OUTPATIENT
Start: 2020-01-07 | End: 2020-01-07

## 2020-01-07 RX ADMIN — HYDROMORPHONE HYDROCHLORIDE 16 MILLIGRAM(S): 2 INJECTION INTRAMUSCULAR; INTRAVENOUS; SUBCUTANEOUS at 19:32

## 2020-01-07 RX ADMIN — Medication 800 MILLIGRAM(S): at 19:32

## 2020-01-07 NOTE — ED PROVIDER NOTE - OBJECTIVE STATEMENT
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 Similar to multiple prior episodes attributed to sickle crisis. Cannot think of precipitating factor. Denies any focal weakness/numbness, urinary complaints, incontinence, f/c, trauma, SOB/CP, abd pain, NVD, recent weight loss or night sweats. No hx IVDU. Cannot recall any specific precipitating trauma. Still smokes but cutting back. Denies recent etoh use. Took 8mg PO dilaudid ~noon w/ no relief.

## 2020-01-07 NOTE — ED ADULT NURSE NOTE - OBJECTIVE STATEMENT
Pt. c/o sickle cell pain in lower back and b/l hips since 10 am. Last took 8 mg dilaudid PO at 10 am w/ no pain relief. Tachycardic in triage,  at this time, pt. grimacing from pain.

## 2020-01-07 NOTE — ED PROVIDER NOTE - PHYSICAL EXAMINATION
no LE edema, normal equal distal pulses.  No spinal ttp, neck FROM. Strength 5/5. No bony ttp, FROM all extremities. Normal equal distal pulses. Steady gait w/ cane.

## 2020-01-07 NOTE — ED PROVIDER NOTE - CLINICAL SUMMARY MEDICAL DECISION MAKING FREE TEXT BOX
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 Similar to multiple prior episodes attributed to sickle crisis. Cannot think of precipitating factor. No other systemic symptoms. Tachycardic, other vitals wnl. Exam as above.  ddx: Likely sickle crisis. Pt often tachy on multiple prior presentations.  States 16 dialudid/motrin often helps. Will give meds, reassess.

## 2020-01-07 NOTE — ED PROVIDER NOTE - PATIENT PORTAL LINK FT
You can access the FollowMyHealth Patient Portal offered by [facility] by registering at the following website: [email or web address]. By joining [facility]’s FollowMyHealth portal, you will also be able to view your health information using other applications (apps) compatible with our system.

## 2020-01-07 NOTE — ED PROVIDER NOTE - NSFOLLOWUPINSTRUCTIONS_ED_ALL_ED_FT
Can take tylenol 650mg or motrin 600mg (May cause stomach irritation - take with food and avoid prolonged use) every 6hrs as needed for pain or fever.  Can also take your dilaudid as prescribed for more severe pain.  Stay well hydrated.  Return for fevers, persistent vomit, uncontrolled pain, worsening breathing, worsening lightheaded, focal weakness/numbness.  Follow up with primary doctor within 1-2 days.     Sickle Cell Crisis    WHAT YOU NEED TO KNOW:    A sickle cell crisis is a painful episode that occurs in people who have sickle cell anemia. It happens when sickle-shaped red blood cells (RBCs) block blood vessels. Blood and oxygen cannot get to tissues, causing pain. A sickle cell crisis can also damage your tissues and cause organ failure, such liver or kidney failure. A sickle cell crisis can become life-threatening.    DISCHARGE INSTRUCTIONS:    Call 911 for any of the following:   You have shortness of breath or chest pain.  You are a man and have an erection that is painful and does not go away.   You lose vision in one or both eyes.    Seek care immediately if:   You feel like you cannot cope with your pain, or you feel like hurting yourself.   You have behavior changes, a seizure, or faint.   You have a fever.  You have abdominal pain, nausea, or vomiting.  You have a different or worse headache.   You have new weakness or numbness in your arm, leg, or face.  You have new pain in any part of your body.  Your urine is dark and you are urinating less than usual or not at all.   You are dizzy, lightheaded, or faint.     Contact your healthcare provider if:   You have any new signs or symptoms.   You have blood in your urine.   You are constipated or you have diarrhea.   You have changes in your vision.   You have increased fatigue.   You plan to travel by airplane or to a high [facility].   You have questions or concerns about your condition or care.    Medicines: You may need any of the following:     Prescription pain medicine may be given. Ask how to take this medicine safely. Medicine may also be given to decrease sickling of your RBCs. You may also need medicine to treat or prevent a bacterial infection.     NSAIDs, such as ibuprofen, help decrease swelling, pain, and fever. This medicine is available with or without a doctor's order. NSAIDs can cause stomach bleeding or kidney problems in certain people. If you take blood thinner medicine, always ask your healthcare provider if NSAIDs are safe for you. Always read the medicine label and follow directions.    Acetaminophen decreases pain and fever. It is available without a doctor's order. Ask how much to take and how often to take it. Follow directions. Acetaminophen can cause liver damage if not taken correctly.    Take your medicine as directed. Contact your healthcare provider if you think your medicine is not helping or if you have side effects. Tell him or her if you are allergic to any medicine. Keep a list of the medicines, vitamins, and herbs you take. Include the amounts, and when and why you take them. Bring the list or the pill bottles to follow-up visits. Carry your medicine list with you in case of an emergency.    Medical alert identification: Wear medical alert jewelry or carry a card that says you have sickle cell anemia. Ask your healthcare provider where to get these items.    Prevent a sickle cell crisis:     Take vitamins and minerals as directed. Folic acid can help prevent blood vessel problems that can occur with sickle cell anemia. Zinc may decrease how often you have pain.     Drink liquids as directed. Dehydration can increase your risk for a sickle cell crisis. Ask how much liquid to drink each day and which liquids are best for you.    Balance rest and exercise. Rest during a sickle cell crisis. Over time, increase your activity to a moderate amount. Exercise regularly. Avoid exercise or activities that can cause injury, such as football. Ask about the best exercise plan for you.     Stay out of the cold. Do not go quickly from a warm place to a cold place. Do not go swimming in cold water. Stay warm in the winter.    Do not smoke cigarettes or drink alcohol. These increase your risk for a sickle cell crisis. Nicotine and other chemicals in cigarettes and cigars can cause lung damage. Ask your healthcare provider for information if you currently smoke and need help to quit. E-cigarettes or smokeless tobacco still contain nicotine. Talk to your healthcare provider before you use these products.     Ask about vaccinations you need. Vaccinations can help prevent a viral infection that may lead to a sickle cell crisis. Get a flu shot every year as directed. You may need a pneumonia vaccine every 5 years. Can take tylenol 650mg or motrin 600mg (May cause stomach irritation - take with food and avoid prolonged use) every 6hrs as needed for pain.  Can also take your dilaudid as prescribed for more severe pain.  Stay well hydrated.  Return for fevers, persistent vomit, uncontrolled pain, worsening breathing, worsening lightheaded, focal weakness/numbness.  Follow up with primary doctor within 1-2 days.     Sickle Cell Crisis    WHAT YOU NEED TO KNOW:    A sickle cell crisis is a painful episode that occurs in people who have sickle cell anemia. It happens when sickle-shaped red blood cells (RBCs) block blood vessels. Blood and oxygen cannot get to tissues, causing pain. A sickle cell crisis can also damage your tissues and cause organ failure, such liver or kidney failure. A sickle cell crisis can become life-threatening.    DISCHARGE INSTRUCTIONS:    Call 911 for any of the following:   You have shortness of breath or chest pain.  You are a man and have an erection that is painful and does not go away.   You lose vision in one or both eyes.    Seek care immediately if:   You feel like you cannot cope with your pain, or you feel like hurting yourself.   You have behavior changes, a seizure, or faint.   You have a fever.  You have abdominal pain, nausea, or vomiting.  You have a different or worse headache.   You have new weakness or numbness in your arm, leg, or face.  You have new pain in any part of your body.  Your urine is dark and you are urinating less than usual or not at all.   You are dizzy, lightheaded, or faint.     Contact your healthcare provider if:   You have any new signs or symptoms.   You have blood in your urine.   You are constipated or you have diarrhea.   You have changes in your vision.   You have increased fatigue.   You plan to travel by airplane or to a high elevation.   You have questions or concerns about your condition or care.    Medicines: You may need any of the following:     Prescription pain medicine may be given. Ask how to take this medicine safely. Medicine may also be given to decrease sickling of your RBCs. You may also need medicine to treat or prevent a bacterial infection.     NSAIDs, such as ibuprofen, help decrease swelling, pain, and fever. This medicine is available with or without a doctor's order. NSAIDs can cause stomach bleeding or kidney problems in certain people. If you take blood thinner medicine, always ask your healthcare provider if NSAIDs are safe for you. Always read the medicine label and follow directions.    Acetaminophen decreases pain and fever. It is available without a doctor's order. Ask how much to take and how often to take it. Follow directions. Acetaminophen can cause liver damage if not taken correctly.    Take your medicine as directed. Contact your healthcare provider if you think your medicine is not helping or if you have side effects. Tell him or her if you are allergic to any medicine. Keep a list of the medicines, vitamins, and herbs you take. Include the amounts, and when and why you take them. Bring the list or the pill bottles to follow-up visits. Carry your medicine list with you in case of an emergency.    Medical alert identification: Wear medical alert jewelry or carry a card that says you have sickle cell anemia. Ask your healthcare provider where to get these items.    Prevent a sickle cell crisis:     Take vitamins and minerals as directed. Folic acid can help prevent blood vessel problems that can occur with sickle cell anemia. Zinc may decrease how often you have pain.     Drink liquids as directed. Dehydration can increase your risk for a sickle cell crisis. Ask how much liquid to drink each day and which liquids are best for you.    Balance rest and exercise. Rest during a sickle cell crisis. Over time, increase your activity to a moderate amount. Exercise regularly. Avoid exercise or activities that can cause injury, such as football. Ask about the best exercise plan for you.     Stay out of the cold. Do not go quickly from a warm place to a cold place. Do not go swimming in cold water. Stay warm in the winter.    Do not smoke cigarettes or drink alcohol. These increase your risk for a sickle cell crisis. Nicotine and other chemicals in cigarettes and cigars can cause lung damage. Ask your healthcare provider for information if you currently smoke and need help to quit. E-cigarettes or smokeless tobacco still contain nicotine. Talk to your healthcare provider before you use these products.     Ask about vaccinations you need. Vaccinations can help prevent a viral infection that may lead to a sickle cell crisis. Get a flu shot every year as directed. You may need a pneumonia vaccine every 5 years.

## 2020-01-08 DIAGNOSIS — M25.551 PAIN IN RIGHT HIP: ICD-10-CM

## 2020-01-08 DIAGNOSIS — D57.00 HB-SS DISEASE WITH CRISIS, UNSPECIFIED: ICD-10-CM

## 2020-01-08 DIAGNOSIS — M54.5 LOW BACK PAIN: ICD-10-CM

## 2020-01-08 DIAGNOSIS — M54.9 DORSALGIA, UNSPECIFIED: ICD-10-CM

## 2020-01-08 DIAGNOSIS — M25.552 PAIN IN LEFT HIP: ICD-10-CM

## 2020-01-15 ENCOUNTER — EMERGENCY (EMERGENCY)
Facility: HOSPITAL | Age: 35
LOS: 1 days | Discharge: HOME | End: 2020-01-15
Attending: EMERGENCY MEDICINE | Admitting: EMERGENCY MEDICINE
Payer: MEDICAID

## 2020-01-15 VITALS
RESPIRATION RATE: 17 BRPM | OXYGEN SATURATION: 98 % | DIASTOLIC BLOOD PRESSURE: 77 MMHG | SYSTOLIC BLOOD PRESSURE: 140 MMHG | HEART RATE: 99 BPM

## 2020-01-15 VITALS
SYSTOLIC BLOOD PRESSURE: 132 MMHG | WEIGHT: 159.84 LBS | HEART RATE: 115 BPM | HEIGHT: 68 IN | TEMPERATURE: 98 F | OXYGEN SATURATION: 97 % | RESPIRATION RATE: 20 BRPM | DIASTOLIC BLOOD PRESSURE: 83 MMHG

## 2020-01-15 PROCEDURE — 99283 EMERGENCY DEPT VISIT LOW MDM: CPT

## 2020-01-15 RX ORDER — HYDROMORPHONE HYDROCHLORIDE 2 MG/ML
8 INJECTION INTRAMUSCULAR; INTRAVENOUS; SUBCUTANEOUS ONCE
Refills: 0 | Status: DISCONTINUED | OUTPATIENT
Start: 2020-01-15 | End: 2020-01-15

## 2020-01-15 RX ORDER — HYDROMORPHONE HYDROCHLORIDE 2 MG/ML
10 INJECTION INTRAMUSCULAR; INTRAVENOUS; SUBCUTANEOUS ONCE
Refills: 0 | Status: DISCONTINUED | OUTPATIENT
Start: 2020-01-15 | End: 2020-01-15

## 2020-01-15 RX ORDER — IBUPROFEN 200 MG
600 TABLET ORAL ONCE
Refills: 0 | Status: COMPLETED | OUTPATIENT
Start: 2020-01-15 | End: 2020-01-15

## 2020-01-15 RX ADMIN — HYDROMORPHONE HYDROCHLORIDE 8 MILLIGRAM(S): 2 INJECTION INTRAMUSCULAR; INTRAVENOUS; SUBCUTANEOUS at 14:45

## 2020-01-15 RX ADMIN — HYDROMORPHONE HYDROCHLORIDE 8 MILLIGRAM(S): 2 INJECTION INTRAMUSCULAR; INTRAVENOUS; SUBCUTANEOUS at 12:34

## 2020-01-15 RX ADMIN — Medication 600 MILLIGRAM(S): at 12:34

## 2020-01-15 NOTE — ED ADULT NURSE NOTE - NSSUHOSCREENINGYN_ED_ALL_ED
PT Re-Evaluation     Today's date: 2019  Patient name: Lou Silveira  : 1980  MRN: [id number]  Referring provider: LIA Fernández  Dx:   Encounter Diagnosis     ICD-10-CM    1  Left facial pain R51    2  TMJ dysfunction M26 609    3  Chronic pain of left ankle M25 572     G89 29    4  Arthralgia of left foot M25 572        Start Time: 1615  Stop Time: 1715  Total time in clinic (min): 60 minutes    Assessment  Assessment details: Since starting physical therapy for her L sided jaw pain, the patient has improved with pain levels in the jaw, joint mobility with opening, improved posture and increased strength in her shoulders  Patient is able to eat a cherry tomato and a burger with very minimal pain, however has pain with baby carrots  Patient was also evaluated for her foot 2* increased pain with jogging and had a signed POC by a her podiatrist  Patient has improved with pain levels and strength since in treatment  PT will continue to address the strength and endurance impairments in the jaw by performing shoulder and scapular strengthening, stretching, posture training, functional activities and manual techniques to allow the patient to return to her PLOF  PT recommended 1-2x/week for 2-3 weeks c a good prognosis 2* noted progress  Impairments: abnormal or restricted ROM, activity intolerance, impaired physical strength, lacks appropriate home exercise program, pain with function, poor posture  and poor body mechanics    Symptom irritability: lowUnderstanding of Dx/Px/POC: good   Prognosis: good    Goals  STG: In four weeks the patient will:    1  Be (I) with her HEP  (75% MET)  2  Increase shoulder and scapular strength to 4+/5 MMT score to assist c ADLs  (75% MET)  3  Increase jaw opening ROM by 5 degrees to assist c eating and flosssing  (in progress)  4  Performed proper posture for 75% of the session with 1-2 cues  (MET)      LTG: In six weeks, the patient will:    1  Increase FOTO score to 89 to demonstrate improvements in symptoms and function  (In progress)  2  Demonstrate full jaw opening without pain  (75% MET)  3  Perform 15 mins of chewing without pain  (MET)  4  Increase shoulder and scapular strength to 5/5 MMT score to assist c prolonged activities  (50% MET)  5  Performed proper posture for 100% of the session without cues  (90% MET)      Plan  Patient would benefit from: skilled physical therapy  Referral necessary: No  Planned modality interventions: cryotherapy and thermotherapy: hydrocollator packs  Planned therapy interventions: abdominal trunk stabilization, manual therapy, joint mobilization, massage, neuromuscular re-education, patient education, postural training, strengthening, stretching, therapeutic activities, therapeutic exercise, home exercise program, functional ROM exercises and body mechanics training  Frequency: 2x week  Duration in visits: 6  Duration in weeks: 3  Plan of Care beginning date: 12/18/2019  Plan of Care expiration date: 1/8/2020  Treatment plan discussed with: patient    Goals For Foot    STG: In four weeks the patient will:    1  Be (I) with her HEP  (75% MET)  2  Increase hip,knee and ankle strength to 4+/5 MMT score to assist c stairs and driving  (68% MET)  3  Increase L ankle ROM by 5 degrees to assist c jogging  (in progress)      LTG: In six weeks, the patient will:    1  Increase FOTO score to 80 to demonstrate improvements in symptoms and function  (MET)  2  Demonstrate full L ankle AROM without pain  (in progress)  3  Perform 2-3 miles of jogging without pain  (in progress)  4  Increase hip,knee and ankle strength to 5/5 MMT score to assist c prolonged jogging  (in progress)    Subjective Evaluation    History of Present Illness  Mechanism of injury: Patient noted that she is 75% back to her PLOF  Patient is now able to eat a burger and cherry tomato's comfortably  Patient noted her ear and L shoulder pain has improved since physical therapy  Patient continues to be limited in maximal opening and does get pain with baby carrots  Patient has improved with L foot ROM and pain levels since evaluation  Patient is able to amb in boots without pain which is an improvement  Patient noted at times she does have back pain due to gait deviations  Recurrent probem    Quality of life: good    Pain  Current pain ratin  At best pain ratin  At worst pain rating: 3  Location: L TMJ  Quality: sharp and dull ache  Relieving factors: medications  Aggravating factors: eating  Progression: improved    Social Support  Lives in: apartment  Lives with: young children (17 yo son)    Employment status: working ([employer])  Hand dominance: right  Exercise history: Exercise video, walking    Treatments  Treatments tried: Braces  Patient Goals  Patient goals for therapy: decreased pain, increased motion, increased strength, return to sport/leisure activities and independence with ADLs/IADLs  Patient goal: "to open without pain " (75% MET)        Objective     Postural Observations  Seated posture: fair  Standing posture: fair  Correction of posture: makes symptoms better    Additional Postural Observation Details  Improved posture decreases her jaw pain  PT educated the patient on a lumbar roll while seated  RE: Since IE the patient has improved with posture and requires less cues throughout the session       Neurological Testing     Sensation   Cervical/Thoracic   Left   Intact: light touch    Right   Intact: light touch    Active Range of Motion   Cervical/Thoracic Spine       Cervical    Flexion:  Restriction level: minimal  Extension:  Restriction level: minimal  Left lateral flexion:  Restriction level: minimal  Right lateral flexion:  Restriction level minimal  Left rotation:  Restriction level: minimal  Right rotation:  Restriction level: minimal    Strength/Myotome Testing     Left Shoulder     Planes of Motion   Flexion: 4+ Abduction: 4+   External rotation at 0°: 4+   Internal rotation at 0°: 4+     Right Shoulder     Planes of Motion   Flexion: 4+   Abduction: 4+   External rotation at 0°: 4+   Internal rotation at 0°: 4+     Left Elbow   Flexion: 5  Extension: 5    Right Elbow   Flexion: 5  Extension: 5    Left Wrist/Hand   Wrist extension: 5  Wrist flexion: 5    Right Wrist/Hand   Wrist extension: 5  Wrist flexion: 5    General Comments:      Shoulder Comments   WNL  TMJ   Jaw observations: facial symmetry within normal limits  good oral hygiene  Joint sounds left: popping and normal  Lateral bite test, right: pain on left and Less pain mendoza IE  ROM: limited and pain with movement  Opening (mm): 38   Lateral excursion, left (mm): 20  Lateral excursion, right (mm)t: 15     Foot Objective Measurments      Observations     Additional Observation Details  Patient amb the following gait deviations: toe heel pattern, high arch   Patient presents with a callus on the ball of her L foot under the 2nd Metatarsal head        Neurological Testing      Sensation      Ankle/Foot   Left Ankle/Foot   Intact: light touch     Right Ankle/Foot   Intact: light touch      Active Range of Motion   Left Ankle/Foot   Dorsiflexion (ke): 3 degrees   Plantar flexion: 50 degrees   Inversion: 23 degrees   Eversion: 10 degrees      Right Ankle/Foot   Dorsiflexion (ke): 6 degrees   Plantar flexion: 50 degrees   Inversion: 26 degrees   Eversion: 10 degrees      Strength/Myotome Testing      Left Hip   Planes of Motion   Flexion: 4  Abduction: 4+  Adduction: 4     Right Hip   Planes of Motion   Flexion: 4  Abduction: 4+  Adduction: 4     Left Ankle/Foot   Dorsiflexion: 4+  Plantar flexion: 4+  Inversion: 4+  Eversion: 4     Right Ankle/Foot   Dorsiflexion: 4+  Plantar flexion: 4+  Inversion: 4+  Eversion: 4+    Additional Strength Details  Knee flexion: 4+/5  Knee extension: 4+/5     Tests   Left Ankle/Foot   Negative for navicular drop, syndesmosis squeeze and Tinel's sign (tarsal tunnel)              Precautions: none      Ankle    Manual  12/2 12/4 12/9 12/11 12/16 12/18       L ankle distraction and talocural post mob nv            Metatarsal ant/post mob nv            Metatarsal sweeping mob nv            L great toe distraction nv            Re-eval MW     MW           Exercise Diary  12/2 12/4 12/9 12/11 12/16 12/18                    Calf and soleus stretch nv 3x30" ea 3x30" ea 3x30" ea 3x30" ea        Heel raises nv 2x10  3x10 3x10        Great toe stretch (extension) nv            4 way ankle nv RTB  2x10 ea RTB  2x10 ea  (B)          FTEO/FTEC nv   3x30" ea on foam  EC 1 HHA         SLS nv            Tandem stance on foam    3x30" ea                                                                                                                                                                         Modalities  12/2            HP/CP PRN np                                         TMJ    Manual  11/18 11/20 11/25 11/27 12/4 12/9 12/11 12/16 12/18    TMJ distraction and ant mob MW  grade II MW  Grade  II    MW  Grade  II MW  Grade  II MW  Grade  II Mw  Grade  II    TMJ STM (ptyergoids) MW MW MW  MW & masseter MW MW MW MW    Cervical upglide/downglide nv            Sub-occipital release nv MW  SK    MW     L shoulder post and inferior mobs   MW  Grade III SK Grade III         Re-eval         MW        Exercise Diary  11/18 11/20 11/25 11/27 12/4 12/9 12/11 12/16 12/18    UBE  nv 10' retro 10' retro 10' retro 10' retro 10'  retro 10' retro 10' retro 10' retro    pec stretch 5x15" 5x15"  5x15" 5x15"  5x15" On full foam roll  5x15"  5x15" 5x15"    Open books 5x15" ea 5x15" ea 5x15" ea 5x15" ea  5x15" ea        Scapular retraction nv 2x10  5" hold 2x10  5" hold 2x10  5" hold     2x10  5" hold    Chin tuck nv 2x10  5" hold 2x10  5" hold 2x10  5" hold 2x10  5" hold 2x10  5" hold 3x10  5" hold 3x10  5" hold 3x10  5" hold    Serratus punch nv nv 2x10 3x10 3x10 2x10  2# 2x10  2# 3x10  2# 3x10  2#    sidelying ER nv nv 2x10 ea 2x10 ea  2x10 ea  1# 2x10 ea  1# 2x10 ea  1# 2x10  Ea  1#    TA nv nv nv 10x 5" hold   2x10  5" hold x10  5" hold     TA+ hip add nv       x10  5" hold     TA + hip abd nv                                                                                                                                                  Modalities  11/18            HP/CP PRN np Yes - the patient is able to be screened

## 2020-01-15 NOTE — ED ADULT NURSE NOTE - OBJECTIVE STATEMENT
Pt is a 35 y/o male c/o lumbar back pain radiating to bilateral hips since 5 am r/t hx of sickle cell disease. Pt denies chest pain, injury. Pt reports taking 8 mg of Dilaudid as prescribed at 5 am, but pain persists. Pt ambulating with steady gait.

## 2020-01-15 NOTE — ED PROVIDER NOTE - PATIENT PORTAL LINK FT
You can access the FollowMyHealth Patient Portal offered by [facility] by registering at the following website: [email or web address]. By joining [facility]’s FollowMyHealth portal, you will also be able to view your health information using other applications (apps) compatible with our system.

## 2020-01-15 NOTE — ED PROVIDER NOTE - PHYSICAL EXAMINATION
PHYSICAL EXAM:  Constitutional: Young man mild distress 2/2 to pain   HEENT: NC/AT, anicteric sclera, no oropharyngeal erythema or exudates; MMM  Neck: supple, no palpable tenderness   Respiratory: CTA B/L; no W/R/R, no retractions  Cardiac: +S1/S2; tachycardic, no murmurs   Gastrointestinal: soft, NT/ND; no rebound or guarding; +BSx4  Back: spine midline, no bony tenderness or step-offs; b/l paraspinal tenderness   Extremities: WWP, no clubbing or cyanosis; no peripheral edema  Musculoskeletal: bilateral hip tenderness to palpation, FROM of lower extremities with pain   Vascular: 2+ radial, DP/PT pulses B/L  Dermatologic: skin warm, dry and intact; no rashes, wounds, or scars  Lymphatic: no submandibular or cervical LAD  Neurologic: AAOx3; CNII-XII grossly intact; no focal deficits  Psychiatric: affect and characteristics of appearance, verbalizations, behaviors are appropriate

## 2020-01-15 NOTE — ED ADULT TRIAGE NOTE - CHIEF COMPLAINT QUOTE
pt c/p lower back and bilateral hips pain since 5 am today. pmh sickle cell . took 8 mg of Dilaudid with minimal relief. denies falls.

## 2020-01-15 NOTE — ED PROVIDER NOTE - ATTENDING CONTRIBUTION TO CARE
Patient presents to ED with concern for sickle cell pain crisis.  He notes bilateral hip pain x several hours as well as back and shoulder pain - all typical of his sick cell pain flares.  He did take oral dilaudid at home, with minimal improvement in symptoms.  Denies chest pain, sob, fever or chills.  Ambulating without difficulty.  On my face to face ED eval, patient is non toxic in appearance.  HRRR, lungs clear.  Abd soft, NT/ND.  Will plan for oral pain medication, reassessment and will dispo accordingly.  He is offered IV hydration, labs however refuses.

## 2020-01-15 NOTE — ED PROVIDER NOTE - OBJECTIVE STATEMENT
Patient is a 34 year old man with sickle cell disease presenting with hip pain and left shoulder pain since 5am. Patient states the pain is sharp and 9/10 in severity.  Patient states movement makes pain worse. Patient took his Dilaudid 8mg oral at 5am with partial relief of symptoms.  Pain is typical of prior pain. Patient denies any radiation of pain down legs. Patient hematologist is Dr. Masters in [facility]. Patient also endorses paraspinal pain that is bilateral that also began at 5 am this morning. Review of systems only significant for clear rhinorrhea w/o fever, chills, or sick contacts. Patient had the flu shot this year. Last blood transfusion was April 2019.

## 2020-01-15 NOTE — ED PROVIDER NOTE - NS ED ROS FT
REVIEW OF SYSTEMS:  CONSTITUTIONAL: Patient denies weakness, fevers or chills  EYES/ENT: Patient denies visual changes;  denies vertigo or throat pain, +clear rhinorrhea  NECK: Patient denies pain or stiffness  RESPIRATORY: Patient denies cough, wheezing, hemoptysis; denies shortness of breath  CARDIOVASCULAR: Patient denies chest pain or palpitations  GASTROINTESTINAL: Patient denies abdominal or epigastric pain, nausea, vomiting, or hematemesis, diarrhea or constipation, melena or hematochezia.  GENITOURINARY: Patient denies dysuria, frequency or hematuria  MUSCULOSKELETAL: + bilateral hip pain, + bilateral paraspinal pain, + left shoulder pain   NEUROLOGICAL: Patient denies numbness or weakness  SKIN: Patient denies itching, burning, rashes, or lesions   All other review of systems is negative unless indicated above.

## 2020-01-15 NOTE — ED PROVIDER NOTE - PLAN OF CARE
Patient presented with sickle cell crisis w/ bilateral hip pain, paraspinal pain and left shoulder pain. Giving Dilaudid 8mg oral and ibuprofen 600mg.

## 2020-01-15 NOTE — ED PROVIDER NOTE - CLINICAL SUMMARY MEDICAL DECISION MAKING FREE TEXT BOX
Patient is a 34 year old man presenting with sickle cell crises with b/l hip pain, left shoulder pain, and paraspinal back pain. Patient w/o SOB or chest pain low suspicion for any acute chest. Will give 8mg of Dilaudid and 600mg of ibuprofen for pain control and reassess. Patient declining labs or IV placement. Encouraged oral hydration. Patient is a 34 year old man presenting with sickle cell crises with b/l hip pain, left shoulder pain, and paraspinal back pain. Patient w/o SOB or chest pain low suspicion for any acute chest. Will give 8mg of Dilaudid and 600mg of ibuprofen for pain control and reassess. Patient declining labs or IV placement. Encouraged oral hydration. Patient improved with dilaudid 8mg x 2 and 600mg of ibuprofen. Patient discharged. Instructed to follow up with his hematologist.

## 2020-01-15 NOTE — ED PROVIDER NOTE - NSFOLLOWUPINSTRUCTIONS_ED_ALL_ED_FT
You were seen at our ED due to pain from your sickle cell disease. You were given dilaudid and ibuprofen for your symptoms. Your symptoms improved. Please follow up with your hematologist for further care of your sickle cell. Continue to take your home medications as prescribed. If you develop chest pain, shortness of breath, fever or chills do not hesitate to re-present to the ED.

## 2020-01-15 NOTE — ED PROVIDER NOTE - CARE PLAN
Principal Discharge DX:	Sickle cell crisis  Assessment and plan of treatment:	Patient presented with sickle cell crisis w/ bilateral hip pain, paraspinal pain and left shoulder pain. Giving Dilaudid 8mg oral and ibuprofen 600mg.

## 2020-01-15 NOTE — ED PROVIDER NOTE - PMH
Acute Chest Syndrome  2003 and possibly 2008, with intubation, exchange transfusion  Avascular Necrosis of Bone  hips  DVT (deep venous thrombosis)    Personal History of PE (Pulmonary Embolism)  2008, post 6mo coumadin  Sickle Cell Anemia  [place]
no

## 2020-01-20 DIAGNOSIS — M54.9 DORSALGIA, UNSPECIFIED: ICD-10-CM

## 2020-01-20 DIAGNOSIS — D57.00 HB-SS DISEASE WITH CRISIS, UNSPECIFIED: ICD-10-CM

## 2020-01-23 ENCOUNTER — EMERGENCY (EMERGENCY)
Facility: HOSPITAL | Age: 35
LOS: 1 days | Discharge: HOME | End: 2020-01-23
Admitting: EMERGENCY MEDICINE
Payer: MEDICAID

## 2020-01-23 VITALS
OXYGEN SATURATION: 99 % | HEART RATE: 112 BPM | DIASTOLIC BLOOD PRESSURE: 65 MMHG | HEIGHT: 68 IN | RESPIRATION RATE: 18 BRPM | TEMPERATURE: 99 F | WEIGHT: 160.94 LBS | SYSTOLIC BLOOD PRESSURE: 114 MMHG

## 2020-01-23 VITALS
DIASTOLIC BLOOD PRESSURE: 73 MMHG | RESPIRATION RATE: 18 BRPM | HEART RATE: 90 BPM | TEMPERATURE: 98 F | OXYGEN SATURATION: 99 % | SYSTOLIC BLOOD PRESSURE: 115 MMHG

## 2020-01-23 PROCEDURE — 99283 EMERGENCY DEPT VISIT LOW MDM: CPT

## 2020-01-23 RX ORDER — IBUPROFEN 200 MG
800 TABLET ORAL ONCE
Refills: 0 | Status: COMPLETED | OUTPATIENT
Start: 2020-01-23 | End: 2020-01-23

## 2020-01-23 RX ORDER — HYDROMORPHONE HYDROCHLORIDE 2 MG/ML
16 INJECTION INTRAMUSCULAR; INTRAVENOUS; SUBCUTANEOUS ONCE
Refills: 0 | Status: DISCONTINUED | OUTPATIENT
Start: 2020-01-23 | End: 2020-01-23

## 2020-01-23 RX ADMIN — HYDROMORPHONE HYDROCHLORIDE 16 MILLIGRAM(S): 2 INJECTION INTRAMUSCULAR; INTRAVENOUS; SUBCUTANEOUS at 15:23

## 2020-01-23 RX ADMIN — Medication 800 MILLIGRAM(S): at 15:25

## 2020-01-23 NOTE — ED PROVIDER NOTE - OBJECTIVE STATEMENT
35 yo m with pmh of sickle cell disease, AVN in b/l hips scheduled for hip replacement next month c/o low back and b/l hip pain since this morning. Pt took 8mg of dialudid at 10am but pain persisted. Pain typical of his sickle cell pain. Denies fever, chills, cp, sob, dysuria, hematuria. Pt thinks he has not been drinking enough water. Pt sees his hematologist Dr. Parrish in [place] every 3 months, his next appt is in March. 33 yo m with pmh of sickle cell disease, AVN in b/l hips scheduled for hip replacement next month c/o low back and b/l hip pain since this morning. Pt took 8mg of dialudid at 10am but pain persisted. Pain typical of his sickle cell pain. Denies fever, chills, cp, sob, dysuria, hematuria, incontinence, numbness, tingling. Pt thinks he has not been drinking enough water. Pt sees his hematologist Dr. Parrish in [place] every 3 months, his next appt is in March.

## 2020-01-23 NOTE — ED ADULT NURSE NOTE - CHPI ED NUR SYMPTOMS NEG
no motor function loss/no neck tenderness/no tingling/no constipation/no fatigue/no difficulty bearing weight/no bladder dysfunction/no bowel dysfunction/no numbness/no anorexia

## 2020-01-23 NOTE — ED PROVIDER NOTE - CLINICAL SUMMARY MEDICAL DECISION MAKING FREE TEXT BOX
35 yo m with pmh of sickle cell disease, AVN in b/l hips scheduled for hip replacement next month c/o low back and b/l hip pain since this morning. Pt took 8mg of dialudid at 10am but pain persisted. Pain typical of his sickle cell pain. Denies fever, chills, cp, sob, dysuria, hematuria. Afebrile, tachycardic. Pt states his HR elevates when he is in pain. +tenderness to b/l hips and low back 33 yo m with pmh of sickle cell disease, AVN in b/l hips scheduled for hip replacement next month c/o low back and b/l hip pain since this morning. Pt took 8mg of dialudid at 10am but pain persisted. Pain typical of his sickle cell pain. Denies fever, chills, cp, sob, dysuria, hematuria. Afebrile, tachycardic. Pt states his HR elevates when he is in pain. +tenderness to b/l hips and low back. Pain improved with pain meds. Will f/u with hematologist

## 2020-01-23 NOTE — ED ADULT NURSE NOTE - OBJECTIVE STATEMENT
35 yo M pmhx sickle cell anemia presents to ED co back pain and hip pain. Per pt, "my sicke cell pain is so bad also can I have a ginger ale." Pt ambulates from front triage to chair. Upon arrival pt rating pain 10/10, denies 33 yo M pmhx sickle cell anemia presents to ED co back pain and hip pain. Per pt, "my sicke cell pain is so bad also can I have a ginger ale." Pt ambulates from front triage to chair. Upon arrival pt rating pain 10/10, denies any medication use for pain. Upon assessment pt is slouched in chair touching lower back, denies changes to the characteristics of pain form baseline, SOB, chest pain, lightheadedness, dizziness. VSS, will continue to assess.

## 2020-01-23 NOTE — ED ADULT NURSE NOTE - NSIMPLEMENTINTERV_GEN_ALL_ED
Implemented All Universal Safety Interventions:  [place] to call system. Call bell, personal items and telephone within reach. Instruct patient to call for assistance. Room bathroom lighting operational. Non-slip footwear when patient is off stretcher. Physically safe environment: no spills, clutter or unnecessary equipment. Stretcher in lowest position, wheels locked, appropriate side rails in place.

## 2020-01-23 NOTE — ED PROVIDER NOTE - NSFOLLOWUPINSTRUCTIONS_ED_ALL_ED_FT
Sickle Cell Crisis    WHAT YOU NEED TO KNOW:    A sickle cell crisis is a painful episode that occurs in people who have sickle cell anemia. It happens when sickle-shaped red blood cells (RBCs) block blood vessels. Blood and oxygen cannot get to tissues, causing pain. A sickle cell crisis can also damage your tissues and cause organ failure, such liver or kidney failure. A sickle cell crisis can become life-threatening.    DISCHARGE INSTRUCTIONS:    Call 911 for any of the following:     You have shortness of breath or chest pain.      You are a man and have an erection that is painful and does not go away.       You lose vision in one or both eyes.    Return to the emergency department if:     You have a fever.      You feel like you cannot cope with your pain, or you feel like hurting yourself.       You have behavior changes, a seizure, or faint.       You have abdominal pain, nausea, or vomiting.      You have a headache that is worse or different from those that you have had in the past.       You have new weakness or numbness in your arm, leg, or face.      You have new pain in any part of your body.      Your urine is dark and you are urinating less than usual or not at all.       You are dizzy, lightheaded, or faint.     Contact your healthcare provider if:     You have any new signs or symptoms.       You have blood in your urine.       You are constipated or you have diarrhea.       You have changes in your vision.       You have increased fatigue.       You plan to travel by airplane or to a high elevation.       You have questions or concerns about your condition or care.    Medicines: You may need any of the following:     Prescription pain medicine may be given. Ask how to take this medicine safely. Medicine may also be given to decrease sickling of your RBCs. You may also need medicine to treat or prevent a bacterial infection.       NSAIDs, such as ibuprofen, help decrease swelling, pain, and fever. This medicine is available with or without a doctor's order. NSAIDs can cause stomach bleeding or kidney problems in certain people. If you take blood thinner medicine, always ask your healthcare provider if NSAIDs are safe for you. Always read the medicine label and follow directions.      Acetaminophen decreases pain and fever. It is available without a doctor's order. Ask how much to take and how often to take it. Follow directions. Acetaminophen can cause liver damage if not taken correctly.      Take your medicine as directed. Contact your healthcare provider if you think your medicine is not helping or if you have side effects. Tell him or her if you are allergic to any medicine. Keep a list of the medicines, vitamins, and herbs you take. Include the amounts, and when and why you take them. Bring the list or the pill bottles to follow-up visits. Carry your medicine list with you in case of an emergency.    Medical alert identification: Wear medical alert jewelry or carry a card that says you have sickle cell anemia. Ask your healthcare provider where to get these items.     Prevent a sickle cell crisis:     Take vitamins and minerals as directed. Folic acid can help prevent blood vessel problems that can occur with sickle cell anemia. Zinc may decrease how often you have pain.       Drink liquids as directed. Dehydration can increase your risk for a sickle cell crisis. Ask how much liquid to drink each day and which liquids are best for you.      Balance rest and exercise. Rest during a sickle cell crisis. Over time, increase your activity to a moderate amount. Exercise regularly. Avoid exercise or activities that can cause injury, such as football. Ask about the best exercise plan for you.       Stay out of the cold. Do not go quickly from a warm place to a cold place. Do not go swimming in cold water. Stay warm in the winter.      Do not smoke cigarettes or drink alcohol. These increase your risk for a sickle cell crisis. Nicotine and other chemicals in cigarettes and cigars can cause lung damage. Ask your healthcare provider for information if you currently smoke and need help to quit. E-cigarettes or smokeless tobacco still contain nicotine. Talk to your healthcare provider before you use these products.       Ask about vaccinations you need. Vaccinations can help prevent a viral infection that may lead to a sickle cell crisis. Get a flu shot every year as directed. You may need a pneumonia vaccine every 5 years.     Follow up with your healthcare provider as directed: You may need ongoing screening for conditions that can develop because of sickle cell disease. Examples include kidney disease, hypertension (high blood pressure), retinopathy (eye problems), and problems with your lungs. Write down your questions so you remember to ask them during your visits.

## 2020-01-23 NOTE — ED PROVIDER NOTE - PHYSICAL EXAMINATION
CONSTITUTIONAL: Well-appearing; well-nourished; in no apparent distress.   HEAD: Normocephalic; atraumatic.   EYES: PERRL; EOM intact; conjunctiva and sclera clear  ENT: normal nose; no rhinorrhea; normal pharynx with no erythema or lesions.   NECK: Supple; non-tender; no LAD  CARDIOVASCULAR: Normal S1, S2; no murmurs, rubs, or gallops. Regular rate and rhythm.   RESPIRATORY: Breathing easily; breath sounds clear and equal bilaterally; no wheezes, rhonchi, or rales.  GI: Soft; non-distended; non-tender; no palpable organomegaly.   MSK: FROM at all extremities, +tenderness to b/l hips and low back   EXT: No cyanosis or edema; N/V intact  SKIN: Normal for age and race; warm; dry; good turgor; no apparent lesions or rash.   NEURO: A & O x 3; face symmetric; grossly unremarkable.   PSYCHOLOGICAL: The patient’s mood and manner are appropriate.

## 2020-01-23 NOTE — ED ADULT NURSE NOTE - CAS TRG GENERAL AIRWAY, MLM
"""Follow ERM w/o surgery. Call if vision decreases or distortion increases. Recommend regular Amsler checks.  """ Patent

## 2020-01-23 NOTE — ED ADULT TRIAGE NOTE - ARRIVAL INFO ADDITIONAL COMMENTS
c.o lower back and b.l hip pain since 10am today. states he took 8mg dilaudid with no relief. denies any chest pain, sob, fever/chills, bowel/bladder complaints. pt denies any palpitations, states hr is due to pain. refused ekg

## 2020-01-25 ENCOUNTER — EMERGENCY (EMERGENCY)
Facility: HOSPITAL | Age: 35
LOS: 1 days | Discharge: HOME | End: 2020-01-25
Admitting: EMERGENCY MEDICINE
Payer: MEDICAID

## 2020-01-25 VITALS
HEIGHT: 68 IN | WEIGHT: 160.94 LBS | DIASTOLIC BLOOD PRESSURE: 73 MMHG | OXYGEN SATURATION: 100 % | SYSTOLIC BLOOD PRESSURE: 120 MMHG | RESPIRATION RATE: 18 BRPM | HEART RATE: 90 BPM | TEMPERATURE: 97 F

## 2020-01-25 PROCEDURE — 99283 EMERGENCY DEPT VISIT LOW MDM: CPT

## 2020-01-25 RX ORDER — HYDROMORPHONE HYDROCHLORIDE 2 MG/ML
16 INJECTION INTRAMUSCULAR; INTRAVENOUS; SUBCUTANEOUS ONCE
Refills: 0 | Status: DISCONTINUED | OUTPATIENT
Start: 2020-01-25 | End: 2020-01-25

## 2020-01-25 RX ORDER — IBUPROFEN 200 MG
600 TABLET ORAL ONCE
Refills: 0 | Status: COMPLETED | OUTPATIENT
Start: 2020-01-25 | End: 2020-01-25

## 2020-01-25 RX ADMIN — HYDROMORPHONE HYDROCHLORIDE 16 MILLIGRAM(S): 2 INJECTION INTRAMUSCULAR; INTRAVENOUS; SUBCUTANEOUS at 10:58

## 2020-01-25 RX ADMIN — Medication 600 MILLIGRAM(S): at 10:58

## 2020-01-25 NOTE — ED PROVIDER NOTE - OBJECTIVE STATEMENT
33 y/o m hx sickle cell with frequent ED visits for sickle cell related pain presents c/o pain to b/l hips and low back.  Pt stating the change in weather (cold, wet) precipitated his symptoms, he took dilaudid at 12am today without improvement.  Denies fever, chills, numbness/tingling to ext, weakness, all other ROS negative.

## 2020-01-25 NOTE — ED PROVIDER NOTE - PATIENT PORTAL LINK FT
You can access the FollowMyHealth Patient Portal offered by [facility] by registering at the following website: [email or web address]. By joining [employer]’s FollowMyHealth portal, you will also be able to view your health information using other applications (apps) compatible with our system.

## 2020-01-25 NOTE — ED ADULT NURSE NOTE - CHIEF COMPLAINT QUOTE
33 y/o male came in c/o back and hip pain, hx of sickle cell anemia. Last Dilaudid 8 mg PO at midnight at home.

## 2020-01-25 NOTE — ED ADULT TRIAGE NOTE - CHIEF COMPLAINT QUOTE
35 y/o male came in c/o back and hip pain, hx of sickle cell anemia. Last Dilaudid 8 mg PO at midnight at home.

## 2020-01-25 NOTE — ED ADULT NURSE NOTE - OBJECTIVE STATEMENT
Pt presents to ED complaining of generalized back and bilateral hip pain. PMHx sickle cell anemia. Reports pain localized to the back and bilateral hips, no radiation, no numbness/tingling, ambulatory w steady gait, no loss of bladder bowel. Denies cp, sob, dizziness, n/v/d.

## 2020-01-25 NOTE — ED PROVIDER NOTE - CLINICAL SUMMARY MEDICAL DECISION MAKING FREE TEXT BOX
33 y/o m presents c/o low back pain, b/l hip pain consistent with his sickle cell pain; vss in ED, no neuro deficits, pt ambulatory, will give oral dilaudid and ibuprofen, observe for improvement.

## 2020-01-25 NOTE — ED ADULT NURSE NOTE - CHPI ED NUR SYMPTOMS NEG
no anorexia/no bowel dysfunction/no constipation/no bladder dysfunction/no fatigue/no neck tenderness/no tingling/no difficulty bearing weight/no motor function loss/no numbness

## 2020-01-30 ENCOUNTER — EMERGENCY (EMERGENCY)
Facility: HOSPITAL | Age: 35
LOS: 1 days | Discharge: HOME | End: 2020-01-30
Attending: EMERGENCY MEDICINE | Admitting: EMERGENCY MEDICINE
Payer: MEDICAID

## 2020-01-30 VITALS
HEART RATE: 93 BPM | HEIGHT: 68 IN | OXYGEN SATURATION: 100 % | TEMPERATURE: 98 F | SYSTOLIC BLOOD PRESSURE: 126 MMHG | WEIGHT: 160.94 LBS | RESPIRATION RATE: 18 BRPM | DIASTOLIC BLOOD PRESSURE: 67 MMHG

## 2020-01-30 VITALS
OXYGEN SATURATION: 99 % | RESPIRATION RATE: 17 BRPM | SYSTOLIC BLOOD PRESSURE: 128 MMHG | HEART RATE: 83 BPM | TEMPERATURE: 98 F | DIASTOLIC BLOOD PRESSURE: 80 MMHG

## 2020-01-30 DIAGNOSIS — M54.5 LOW BACK PAIN: ICD-10-CM

## 2020-01-30 DIAGNOSIS — Z79.899 OTHER LONG TERM (CURRENT) DRUG THERAPY: ICD-10-CM

## 2020-01-30 DIAGNOSIS — M25.552 PAIN IN LEFT HIP: ICD-10-CM

## 2020-01-30 DIAGNOSIS — M25.551 PAIN IN RIGHT HIP: ICD-10-CM

## 2020-01-30 DIAGNOSIS — Z79.2 LONG TERM (CURRENT) USE OF ANTIBIOTICS: ICD-10-CM

## 2020-01-30 DIAGNOSIS — D57.219 SICKLE-CELL/HB-C DISEASE WITH CRISIS, UNSPECIFIED: ICD-10-CM

## 2020-01-30 DIAGNOSIS — D57.00 HB-SS DISEASE WITH CRISIS, UNSPECIFIED: ICD-10-CM

## 2020-01-30 DIAGNOSIS — Z79.1 LONG TERM (CURRENT) USE OF NON-STEROIDAL ANTI-INFLAMMATORIES (NSAID): ICD-10-CM

## 2020-01-30 PROCEDURE — 99283 EMERGENCY DEPT VISIT LOW MDM: CPT

## 2020-01-30 PROCEDURE — 99284 EMERGENCY DEPT VISIT MOD MDM: CPT

## 2020-01-30 RX ORDER — HYDROMORPHONE HYDROCHLORIDE 2 MG/ML
16 INJECTION INTRAMUSCULAR; INTRAVENOUS; SUBCUTANEOUS ONCE
Refills: 0 | Status: DISCONTINUED | OUTPATIENT
Start: 2020-01-30 | End: 2020-01-30

## 2020-01-30 RX ORDER — IBUPROFEN 200 MG
600 TABLET ORAL ONCE
Refills: 0 | Status: COMPLETED | OUTPATIENT
Start: 2020-01-30 | End: 2020-01-30

## 2020-01-30 RX ADMIN — HYDROMORPHONE HYDROCHLORIDE 16 MILLIGRAM(S): 2 INJECTION INTRAMUSCULAR; INTRAVENOUS; SUBCUTANEOUS at 02:24

## 2020-01-30 RX ADMIN — Medication 600 MILLIGRAM(S): at 02:24

## 2020-01-30 RX ADMIN — Medication 600 MILLIGRAM(S): at 02:09

## 2020-01-30 RX ADMIN — HYDROMORPHONE HYDROCHLORIDE 16 MILLIGRAM(S): 2 INJECTION INTRAMUSCULAR; INTRAVENOUS; SUBCUTANEOUS at 02:09

## 2020-01-30 NOTE — ED PROVIDER NOTE - PATIENT PORTAL LINK FT
You can access the FollowMyHealth Patient Portal offered by [facility] by registering at the following website: [email or web address]. By joining [email or web address]’s FollowMyHealth portal, you will also be able to view your health information using other applications (apps) compatible with our system.

## 2020-01-30 NOTE — ED PROVIDER NOTE - CLINICAL SUMMARY MEDICAL DECISION MAKING FREE TEXT BOX
lower back pain, hip pain, typical of sickle cell crisis, no chest pain, afebrile, no evidence of cord compression  will give po pain medication protocol  dilaudid, motrin

## 2020-01-30 NOTE — ED PROVIDER NOTE - PROGRESS NOTE DETAILS
pt states he needs to go see his daughter who is currently hospitalized and would like to be discharged.  recommend f/u with hematologist  I have discussed the discharge plan with the patient. The patient agrees with the plan, as discussed.  The patient understands Emergency Department diagnosis is a preliminary diagnosis often based on limited information and that the patient must adhere to the follow-up plan as discussed.  The patient understands that if the symptoms worsen the patient may return to the Emergency Department at any time for further evaluation and treatment.

## 2020-01-30 NOTE — ED PROVIDER NOTE - OBJECTIVE STATEMENT
34M hx scd, avn, c/o lower back pain and hip pain. states ongoing for past few hours. states took 8mg dilaudid at home without relief.  states this feels typical of his sickle cell crisis pain.  no chest pain. no SOB. no fevers. no vomiting.  no numbness/weakness.  pain worse with ambulation. no incontinence. no LE swelling.

## 2020-02-27 ENCOUNTER — EMERGENCY (EMERGENCY)
Facility: HOSPITAL | Age: 35
LOS: 1 days | Discharge: HOME | End: 2020-02-27
Attending: EMERGENCY MEDICINE | Admitting: EMERGENCY MEDICINE
Payer: MEDICAID

## 2020-02-27 VITALS
SYSTOLIC BLOOD PRESSURE: 114 MMHG | TEMPERATURE: 98 F | RESPIRATION RATE: 18 BRPM | DIASTOLIC BLOOD PRESSURE: 77 MMHG | HEART RATE: 108 BPM | OXYGEN SATURATION: 97 % | HEIGHT: 68 IN | WEIGHT: 166.01 LBS

## 2020-02-27 VITALS
HEART RATE: 94 BPM | RESPIRATION RATE: 18 BRPM | SYSTOLIC BLOOD PRESSURE: 103 MMHG | DIASTOLIC BLOOD PRESSURE: 68 MMHG | TEMPERATURE: 98 F | OXYGEN SATURATION: 100 %

## 2020-02-27 DIAGNOSIS — Z79.01 LONG TERM (CURRENT) USE OF ANTICOAGULANTS: ICD-10-CM

## 2020-02-27 DIAGNOSIS — D57.00 HB-SS DISEASE WITH CRISIS, UNSPECIFIED: ICD-10-CM

## 2020-02-27 PROCEDURE — 99284 EMERGENCY DEPT VISIT MOD MDM: CPT

## 2020-02-27 PROCEDURE — 99283 EMERGENCY DEPT VISIT LOW MDM: CPT

## 2020-02-27 RX ORDER — IBUPROFEN 200 MG
800 TABLET ORAL ONCE
Refills: 0 | Status: COMPLETED | OUTPATIENT
Start: 2020-02-27 | End: 2020-02-27

## 2020-02-27 RX ORDER — HYDROMORPHONE HYDROCHLORIDE 2 MG/ML
16 INJECTION INTRAMUSCULAR; INTRAVENOUS; SUBCUTANEOUS ONCE
Refills: 0 | Status: DISCONTINUED | OUTPATIENT
Start: 2020-02-27 | End: 2020-02-27

## 2020-02-27 RX ORDER — ACETAMINOPHEN 500 MG
1000 TABLET ORAL ONCE
Refills: 0 | Status: COMPLETED | OUTPATIENT
Start: 2020-02-27 | End: 2020-02-27

## 2020-02-27 RX ADMIN — HYDROMORPHONE HYDROCHLORIDE 16 MILLIGRAM(S): 2 INJECTION INTRAMUSCULAR; INTRAVENOUS; SUBCUTANEOUS at 09:24

## 2020-02-27 RX ADMIN — Medication 1000 MILLIGRAM(S): at 09:36

## 2020-02-27 RX ADMIN — Medication 800 MILLIGRAM(S): at 09:33

## 2020-02-27 NOTE — ED PROVIDER NOTE - CLINICAL SUMMARY MEDICAL DECISION MAKING FREE TEXT BOX
here w/ typical flare. will give PO dilaudid as well as high dose ibuprofen/acetaminophen. otherwise well appearing, do not think pt needs labs/ivf/admission at this time. to f/u his hematologist in [place]. DC home in [facility] with strict return precautions given.

## 2020-02-27 NOTE — ED PROVIDER NOTE - OBJECTIVE STATEMENT
34M PMH DVT/PE (no longer on AC), sickle cell (hx acute chest yrs ago), b/l hip AVN p/w pain to b/l lower back and b/l hips since midnight, gradual onset. Similar to multiple prior episodes attributed to sickle crisis. Cannot think of precipitating factor. Denies any focal weakness/numbness, urinary complaints, incontinence, f/c, trauma, SOB/CP, abd pain, NVD, recent weight loss or night sweats. No hx IVDU. not on hydroxyurea 2/2 gi side effects. No recent admissions. Denies CP/SOB. no recent infectious symptoms.

## 2020-02-27 NOTE — ED ADULT TRIAGE NOTE - CHIEF COMPLAINT QUOTE
Patient complaining of lower back and bilateral hip pain.  Patient states, "I took my 8mg of Dilaudid at 12AM, but it hasn't helped."  Patient denies any other complaints at this time.

## 2020-02-27 NOTE — ED ADULT NURSE NOTE - OBJECTIVE STATEMENT
pt to ED for lower back, bilateral hip, legs pain since midnight. pt took Dilauded 8mg, didn't help at all. denies numbness, tingling, dizziness or any other symptoms but pain. Pt is alert and oriented.

## 2020-02-27 NOTE — ED PROVIDER NOTE - NSFOLLOWUPINSTRUCTIONS_ED_ALL_ED_FT
WHAT YOU NEED TO KNOW:    Sickle cell disease (SCD) causes your RBCs to be sickle (crescent) shaped. The sickle shape is caused by abnormal hemoglobin attached to the RBC. Hemoglobin carries oxygen to all tissues in your body. Sickle-shaped RBCs can get stuck to the walls of blood vessels. This can stop or slow blood flow, and prevent oxygen from getting to tissues. When this happens, it is called a sickle cell crisis. SCD may also cause low red blood cell (RBC) levels (anemia).   DISCHARGE INSTRUCTIONS:  Call 911 or have someone else call for any of the following:   You have any of the following signs of a stroke:   Numbness or drooping on one side of your face   Weakness in an arm or leg  Confusion or difficulty speaking  Dizziness, a severe headache, or vision loss  You have any of the following signs of a heart attack:   Squeezing, pressure, or pain in your chest  You may also have any of the following:   Discomfort or pain in your back, neck, jaw, stomach, or arm  Shortness of breath  Nausea or vomiting  Lightheadedness or a sudden cold sweat  You have a seizure.   You lose vision in one or both eyes.  You lose consciousness or cannot be woken.   Seek care immediately if:  You feel lightheaded, short of breath, and have chest pain.  You cough up blood.  You have a fever of 100.4°F (38°C) or higher.  You have a severe headache.   Your pain does not get better after you take pain medicine.  Your arm or leg is painful, red, and larger than usual.   You feel like you can no longer cope with your pain, or feel like harming yourself.   You have abdominal pain, nausea, and vomiting.  You are a man and have an erection that is painful and does not go away after 4 hours.   You cannot think clearly, or you feel like you are going to faint.  Your urine is dark, or you are urinating less than usual or not at all.   You see blood in your urine.   Your eyes or skin are yellow.   You have a cold or the flu.   You have a cough or are wheezing.   Contact your healthcare provider if:   You are more tired than usual during the day.  You are constipated or have diarrhea.  Your vision has changed in one or both eyes.   You feel anxious or depressed.   You have new or worse swelling in your joints.   You have an open sore on your skin that will not heal.   You are pregnant or think you are pregnant.   You have questions or concerns about your condition or care.  Medicines: You may need any of the following:   Antibiotics may be given to prevent a bacterial infection.   Hydroxyurea helps your body make red blood cells that are less likely to sickle. This may help decrease your pain and prevent sickle cell crisis.  Folic acid helps your body may healthy red blood cells.  Prescription pain medicine may be given. Ask your healthcare provider how to take this medicine safely. Some prescription pain medicines contain acetaminophen. Do not take other medicines that contain acetaminophen without talking to your healthcare provider. Too much acetaminophen may cause liver damage. Prescription pain medicine may cause constipation. Ask your healthcare provider how to prevent or treat constipation.   NSAIDs, such as ibuprofen, help decrease swelling, pain, and fever. This medicine is available with or without a doctor's order. NSAIDs can cause stomach bleeding or kidney problems in certain people. If you take blood thinner medicine, always ask your healthcare provider if NSAIDs are safe for you. Always read the medicine label and follow directions.  Acetaminophen decreases pain and fever. It is available without a doctor's order. Ask how much to take and how often to take it. Follow directions. Read the labels of all other medicines you are using to see if they also contain acetaminophen, or ask your doctor or pharmacist. Acetaminophen can cause liver damage if not taken correctly. Do not use more than 4 grams (4,000 milligrams) total of acetaminophen in one day.  Take your medicine as directed. Contact your healthcare provider if you think your medicine is not helping or if you have side effects. Tell him or her if you are allergic to any medicine. Keep a list of the medicines, vitamins, and herbs you take. Include the amounts, and when and why you take them. Bring the list or the pill bottles to follow-up visits. Carry your medicine list with you in case of an emergency  Self-care:   Apply heat to areas of pain. Use a heating pad or take a warm bath. Do this for 20 to 30 minutes every 2 hours for as many days as directed.   Gently massage areas where you feel pain. A professional massage may also help with pain.   Balance rest and exercise. Rest during a sickle cell crisis. Over time, increase your activity. Exercise may help decrease pain. Take breaks during exercise and drink plenty of water. Ask your healthcare provider which activities are safe for you.   Get acupuncture treatment. Acupuncture may help decrease pain and help you relax. Ask your healthcare provider for more information about acupuncture.  Eat healthy foods. Healthy foods will improve your overall health and make it easier to manage SCD. Examples of healthy foods include fruits, vegetables, whole-grain breads, low-fat dairy products, beans, lean meats, and fish. Ask if you need to be on a special diet.   Prevent a sickle cell crisis: A sickle cell crisis may be caused by illness, changes in temperature, stress, dehydration, or being at high altitudes. Do the following to help prevent a sickle cell crisis:   Drink liquids as directed. Dehydration can increase your risk for a sickle cell crisis. Ask how much liquid to drink each day and which liquids are best for you.  Avoid quick changes in temperature. Do not go quickly from a warm place to a cold place. Get in a pool slowly instead of jumping in. Dress in light clothing in the summer and warm clothing in the winter.   Ask about vaccinations. Vaccinations can help prevent a viral infection. Get a flu shot every year as directed. You may also need a pneumonia vaccine.  Wash your hands frequently. Handwashing can help prevent illness. Wash your hands before you prepare or eat food, and after you use the bathroom.   Do not smoke. Nicotine and other chemicals in cigarettes and cigars can cause lung damage and sickle cell crisis. Ask your healthcare provider for information if you currently smoke and need help to quit. E-cigarettes or smokeless tobacco still contain nicotine. Talk to your healthcare provider before you use these products.  Limit or do not drink alcohol. Alcohol can cause dehydration and increase your risk for sickle cell crisis. If you drink alcohol, also drink plenty of water.   Manage your stress. Your healthcare provider may recommend relaxation techniques and deep breathing exercises to help decrease your stress. Your healthcare provider may recommend you talk to someone about your stress or anxiety, such as a counselor or a trusted friend.   Do not travel in an unpressurized plane or travel to high altitudes. These environments are low in oxygen and may cause a sickle cell crisis.   Wear medical alert identification: Wear medical alert jewelry or carry a card that says you have sickle cell anemia. Ask your healthcare provider where to get these items. Medical Alert Jewelry  What you need to know about family planning and pregnancy:   If you do not want to become pregnant, your healthcare provider may recommend birth control pills that contain only progestin. The pills will prevent pregnancy and make your periods lighter. Lighter periods may help treat low RBC levels.   Talk to your healthcare provider before you get pregnant. SCD increases a woman's risk for problems, such as a miscarriage and having a baby that weighs less than normal. You will need close monitoring during pregnancy. You may need to take certain vitamins and have 1 or more blood transfusions during pregnancy.   You will pass a gene for sickle cell disease to your child. Your partner should be tested for the sickle cell gene. This information can help predict your child's risk for sickle cell disease.   Follow up with your healthcare provider as directed: You may need ongoing screening for conditions that can develop because of sickle cell disease. Examples include kidney disease, hypertension (high blood pressure), retinopathy (eye problems), and problems with your lungs. Write down your questions so you remember to ask them during your visits Please call your hematologist today to let them know that you had a bad enough flare that you ended up in the ED to discuss options to reduce your frequency of flares.       WHAT YOU NEED TO KNOW:    Sickle cell disease (SCD) causes your RBCs to be sickle (crescent) shaped. The sickle shape is caused by abnormal hemoglobin attached to the RBC. Hemoglobin carries oxygen to all tissues in your body. Sickle-shaped RBCs can get stuck to the walls of blood vessels. This can stop or slow blood flow, and prevent oxygen from getting to tissues. When this happens, it is called a sickle cell crisis. SCD may also cause low red blood cell (RBC) levels (anemia).   DISCHARGE INSTRUCTIONS:  Call 911 or have someone else call for any of the following:   You have any of the following signs of a stroke:   Numbness or drooping on one side of your face   Weakness in an arm or leg  Confusion or difficulty speaking  Dizziness, a severe headache, or vision loss  You have any of the following signs of a heart attack:   Squeezing, pressure, or pain in your chest  You may also have any of the following:   Discomfort or pain in your back, neck, jaw, stomach, or arm  Shortness of breath  Nausea or vomiting  Lightheadedness or a sudden cold sweat  You have a seizure.   You lose vision in one or both eyes.  You lose consciousness or cannot be woken.   Seek care immediately if:  You feel lightheaded, short of breath, and have chest pain.  You cough up blood.  You have a fever of 100.4°F (38°C) or higher.  You have a severe headache.   Your pain does not get better after you take pain medicine.  Your arm or leg is painful, red, and larger than usual.   You feel like you can no longer cope with your pain, or feel like harming yourself.   You have abdominal pain, nausea, and vomiting.  You are a man and have an erection that is painful and does not go away after 4 hours.   You cannot think clearly, or you feel like you are going to faint.  Your urine is dark, or you are urinating less than usual or not at all.   You see blood in your urine.   Your eyes or skin are yellow.   You have a cold or the flu.   You have a cough or are wheezing.   Contact your healthcare provider if:   You are more tired than usual during the day.  You are constipated or have diarrhea.  Your vision has changed in one or both eyes.   You feel anxious or depressed.   You have new or worse swelling in your joints.   You have an open sore on your skin that will not heal.   You are pregnant or think you are pregnant.   You have questions or concerns about your condition or care.  Medicines: You may need any of the following:   Antibiotics may be given to prevent a bacterial infection.   Hydroxyurea helps your body make red blood cells that are less likely to sickle. This may help decrease your pain and prevent sickle cell crisis.  Folic acid helps your body may healthy red blood cells.  Prescription pain medicine may be given. Ask your healthcare provider how to take this medicine safely. Some prescription pain medicines contain acetaminophen. Do not take other medicines that contain acetaminophen without talking to your healthcare provider. Too much acetaminophen may cause liver damage. Prescription pain medicine may cause constipation. Ask your healthcare provider how to prevent or treat constipation.   NSAIDs, such as ibuprofen, help decrease swelling, pain, and fever. This medicine is available with or without a doctor's order. NSAIDs can cause stomach bleeding or kidney problems in certain people. If you take blood thinner medicine, always ask your healthcare provider if NSAIDs are safe for you. Always read the medicine label and follow directions.  Acetaminophen decreases pain and fever. It is available without a doctor's order. Ask how much to take and how often to take it. Follow directions. Read the labels of all other medicines you are using to see if they also contain acetaminophen, or ask your doctor or pharmacist. Acetaminophen can cause liver damage if not taken correctly. Do not use more than 4 grams (4,000 milligrams) total of acetaminophen in one day.  Take your medicine as directed. Contact your healthcare provider if you think your medicine is not helping or if you have side effects. Tell him or her if you are allergic to any medicine. Keep a list of the medicines, vitamins, and herbs you take. Include the amounts, and when and why you take them. Bring the list or the pill bottles to follow-up visits. Carry your medicine list with you in case of an emergency  Self-care:   Apply heat to areas of pain. Use a heating pad or take a warm bath. Do this for 20 to 30 minutes every 2 hours for as many days as directed.   Gently massage areas where you feel pain. A professional massage may also help with pain.   Balance rest and exercise. Rest during a sickle cell crisis. Over time, increase your activity. Exercise may help decrease pain. Take breaks during exercise and drink plenty of water. Ask your healthcare provider which activities are safe for you.   Get acupuncture treatment. Acupuncture may help decrease pain and help you relax. Ask your healthcare provider for more information about acupuncture.  Eat healthy foods. Healthy foods will improve your overall health and make it easier to manage SCD. Examples of healthy foods include fruits, vegetables, whole-grain breads, low-fat dairy products, beans, lean meats, and fish. Ask if you need to be on a special diet.   Prevent a sickle cell crisis: A sickle cell crisis may be caused by illness, changes in temperature, stress, dehydration, or being at high altitudes. Do the following to help prevent a sickle cell crisis:   Drink liquids as directed. Dehydration can increase your risk for a sickle cell crisis. Ask how much liquid to drink each day and which liquids are best for you.  Avoid quick changes in temperature. Do not go quickly from a warm place to a cold place. Get in a pool slowly instead of jumping in. Dress in light clothing in the summer and warm clothing in the winter.   Ask about vaccinations. Vaccinations can help prevent a viral infection. Get a flu shot every year as directed. You may also need a pneumonia vaccine.  Wash your hands frequently. Handwashing can help prevent illness. Wash your hands before you prepare or eat food, and after you use the bathroom.   Do not smoke. Nicotine and other chemicals in cigarettes and cigars can cause lung damage and sickle cell crisis. Ask your healthcare provider for information if you currently smoke and need help to quit. E-cigarettes or smokeless tobacco still contain nicotine. Talk to your healthcare provider before you use these products.  Limit or do not drink alcohol. Alcohol can cause dehydration and increase your risk for sickle cell crisis. If you drink alcohol, also drink plenty of water.   Manage your stress. Your healthcare provider may recommend relaxation techniques and deep breathing exercises to help decrease your stress. Your healthcare provider may recommend you talk to someone about your stress or anxiety, such as a counselor or a trusted friend.   Do not travel in an unpressurized plane or travel to high altitudes. These environments are low in oxygen and may cause a sickle cell crisis.   Wear medical alert identification: Wear medical alert jewelry or carry a card that says you have sickle cell anemia. Ask your healthcare provider where to get these items. Medical Alert [place]  What you need to know about family planning and pregnancy:   If you do not want to become pregnant, your healthcare provider may recommend birth control pills that contain only progestin. The pills will prevent pregnancy and make your periods lighter. Lighter periods may help treat low RBC levels.   Talk to your healthcare provider before you get pregnant. SCD increases a woman's risk for problems, such as a miscarriage and having a baby that weighs less than normal. You will need close monitoring during pregnancy. You may need to take certain vitamins and have 1 or more blood transfusions during pregnancy.   You will pass a gene for sickle cell disease to your child. Your partner should be tested for the sickle cell gene. This information can help predict your child's risk for sickle cell disease.   Follow up with your healthcare provider as directed: You may need ongoing screening for conditions that can develop because of sickle cell disease. Examples include kidney disease, hypertension (high blood pressure), retinopathy (eye problems), and problems with your lungs. Write down your questions so you remember to ask them during your visits

## 2020-02-27 NOTE — ED ADULT NURSE NOTE - NSIMPLEMENTINTERV_GEN_ALL_ED
Implemented All Universal Safety Interventions:  [place] to call system. Call bell, personal items and telephone within reach. Instruct patient to call for assistance. Room bathroom lighting operational. Non-slip footwear when patient is off stretcher. Physically safe environment: no spills, clutter or unnecessary equipment. Stretcher in lowest position, wheels locked, appropriate side rails in place.

## 2020-02-27 NOTE — ED PROVIDER NOTE - PATIENT PORTAL LINK FT
You can access the FollowMyHealth Patient Portal offered by [facility] by registering at the following website: [email or web address]. By joining [employer]’s FollowMyHealth portal, you will also be able to view your health information using other applications (apps) compatible with our system.

## 2020-03-05 ENCOUNTER — EMERGENCY (EMERGENCY)
Facility: HOSPITAL | Age: 35
LOS: 1 days | Discharge: HOME | End: 2020-03-05
Admitting: EMERGENCY MEDICINE
Payer: MEDICAID

## 2020-03-05 VITALS
SYSTOLIC BLOOD PRESSURE: 116 MMHG | WEIGHT: 166.01 LBS | TEMPERATURE: 99 F | HEIGHT: 68 IN | DIASTOLIC BLOOD PRESSURE: 80 MMHG | HEART RATE: 98 BPM | OXYGEN SATURATION: 100 % | RESPIRATION RATE: 17 BRPM

## 2020-03-05 PROCEDURE — 99284 EMERGENCY DEPT VISIT MOD MDM: CPT

## 2020-03-05 PROCEDURE — 99283 EMERGENCY DEPT VISIT LOW MDM: CPT

## 2020-03-05 RX ORDER — IBUPROFEN 200 MG
800 TABLET ORAL ONCE
Refills: 0 | Status: COMPLETED | OUTPATIENT
Start: 2020-03-05 | End: 2020-03-05

## 2020-03-05 RX ORDER — HYDROMORPHONE HYDROCHLORIDE 2 MG/ML
16 INJECTION INTRAMUSCULAR; INTRAVENOUS; SUBCUTANEOUS ONCE
Refills: 0 | Status: DISCONTINUED | OUTPATIENT
Start: 2020-03-05 | End: 2020-03-05

## 2020-03-05 RX ADMIN — Medication 800 MILLIGRAM(S): at 21:55

## 2020-03-05 RX ADMIN — Medication 800 MILLIGRAM(S): at 21:06

## 2020-03-05 RX ADMIN — HYDROMORPHONE HYDROCHLORIDE 16 MILLIGRAM(S): 2 INJECTION INTRAMUSCULAR; INTRAVENOUS; SUBCUTANEOUS at 21:55

## 2020-03-05 RX ADMIN — HYDROMORPHONE HYDROCHLORIDE 16 MILLIGRAM(S): 2 INJECTION INTRAMUSCULAR; INTRAVENOUS; SUBCUTANEOUS at 21:07

## 2020-03-05 NOTE — ED PROVIDER NOTE - CLINICAL SUMMARY MEDICAL DECISION MAKING FREE TEXT BOX
33 yo male with  h/o  sickle cell disease, avn, c/o lower back pain and hip pain. states ongoing for past few hours. states took 8mg dilaudid at home without relief.    Pt states this feels typical of his sickle cell crisis pain.  no chest pain. no SOB. no fevers. no vomiting.  no numbness/weakness, no incontinence. no LE swelling. reports   pain worse with ambulation. VSS, 1 st dose of PO Dilaudid given. Pt hydrated, tolerate PO well. will re-evaluate.

## 2020-03-05 NOTE — ED ADULT TRIAGE NOTE - ARRIVAL INFO ADDITIONAL COMMENTS
Patient reports that his sickle cell pain has been exacerbating all day. Patient reports taking prescribed Dilaudid 8mg at 1200 today with no relief.

## 2020-03-05 NOTE — ED ADULT NURSE NOTE - NSIMPLEMENTINTERV_GEN_ALL_ED
Spine appears normal, movement of extremities grossly intact.
Specific interventions were implemented:

## 2020-03-05 NOTE — ED PROVIDER NOTE - OBJECTIVE STATEMENT
35 yo male with  h/o scd, avn, c/o lower back pain and hip pain. states ongoing for past few hours. states took 8mg dilaudid at home without relief.    Pt states this feels typical of his sickle cell crisis pain.  no chest pain. no SOB. no fevers. no vomiting.  no numbness/weakness, no incontinence. no LE swelling. reports   pain worse with ambulation.

## 2020-03-05 NOTE — ED PROVIDER NOTE - PATIENT PORTAL LINK FT
You can access the FollowMyHealth Patient Portal offered by [facility] by registering at the following website: [email or web address]. By joining [employer]’s FollowMyHealth portal, you will also be able to view your health information using other applications (apps) compatible with our system.

## 2020-03-05 NOTE — ED PROVIDER NOTE - MUSCULOSKELETAL, MLM
Spine appears normal, range of motion is not limited,  generalized tenderness to b/l hip joints, left shoulder joint, b/l LE, no localized joint effusion or deformity

## 2020-03-05 NOTE — ED ADULT NURSE NOTE - OBJECTIVE STATEMENT
Pt. c/o sickle cell pain of b/l hips and lower back since noon. Last took 8 mg dilaudid at home w/ no pain relief. Ambulatory w/ cane in ED.

## 2020-03-06 ENCOUNTER — EMERGENCY (EMERGENCY)
Facility: HOSPITAL | Age: 35
LOS: 1 days | Discharge: HOME | End: 2020-03-06
Attending: EMERGENCY MEDICINE | Admitting: EMERGENCY MEDICINE
Payer: MEDICAID

## 2020-03-06 VITALS
RESPIRATION RATE: 18 BRPM | TEMPERATURE: 98 F | OXYGEN SATURATION: 100 % | DIASTOLIC BLOOD PRESSURE: 73 MMHG | SYSTOLIC BLOOD PRESSURE: 115 MMHG | HEART RATE: 75 BPM

## 2020-03-06 VITALS
OXYGEN SATURATION: 99 % | TEMPERATURE: 98 F | HEIGHT: 68 IN | SYSTOLIC BLOOD PRESSURE: 117 MMHG | RESPIRATION RATE: 16 BRPM | WEIGHT: 166.01 LBS | DIASTOLIC BLOOD PRESSURE: 69 MMHG | HEART RATE: 95 BPM

## 2020-03-06 DIAGNOSIS — Z79.899 OTHER LONG TERM (CURRENT) DRUG THERAPY: ICD-10-CM

## 2020-03-06 DIAGNOSIS — D57.1 SICKLE-CELL DISEASE WITHOUT CRISIS: ICD-10-CM

## 2020-03-06 DIAGNOSIS — M54.5 LOW BACK PAIN: ICD-10-CM

## 2020-03-06 PROCEDURE — 99284 EMERGENCY DEPT VISIT MOD MDM: CPT

## 2020-03-06 PROCEDURE — 99283 EMERGENCY DEPT VISIT LOW MDM: CPT

## 2020-03-06 RX ORDER — HYDROMORPHONE HYDROCHLORIDE 2 MG/ML
16 INJECTION INTRAMUSCULAR; INTRAVENOUS; SUBCUTANEOUS ONCE
Refills: 0 | Status: DISCONTINUED | OUTPATIENT
Start: 2020-03-06 | End: 2020-03-06

## 2020-03-06 RX ORDER — HYDROMORPHONE HYDROCHLORIDE 2 MG/ML
8 INJECTION INTRAMUSCULAR; INTRAVENOUS; SUBCUTANEOUS ONCE
Refills: 0 | Status: DISCONTINUED | OUTPATIENT
Start: 2020-03-06 | End: 2020-03-06

## 2020-03-06 RX ORDER — IBUPROFEN 200 MG
600 TABLET ORAL ONCE
Refills: 0 | Status: COMPLETED | OUTPATIENT
Start: 2020-03-06 | End: 2020-03-06

## 2020-03-06 RX ADMIN — HYDROMORPHONE HYDROCHLORIDE 16 MILLIGRAM(S): 2 INJECTION INTRAMUSCULAR; INTRAVENOUS; SUBCUTANEOUS at 11:50

## 2020-03-06 RX ADMIN — HYDROMORPHONE HYDROCHLORIDE 8 MILLIGRAM(S): 2 INJECTION INTRAMUSCULAR; INTRAVENOUS; SUBCUTANEOUS at 13:27

## 2020-03-06 RX ADMIN — Medication 600 MILLIGRAM(S): at 11:50

## 2020-03-06 NOTE — ED PROVIDER NOTE - OBJECTIVE STATEMENT
35 y/o M with Hx of Sickle cell disease returns to ED complaining of continuing lower back and hip pain. Pt was seen yesterday in ED with the same symptoms. Last night pt took one dose of Dilaudid for pain relief, however with no relief and pt. Denies fever and CP. Pt follows with hematologist, Dr. Francois Laguna.

## 2020-03-06 NOTE — ED PROVIDER NOTE - CLINICAL SUMMARY MEDICAL DECISION MAKING FREE TEXT BOX
33 y/o M with sickle cell disease complaining of lower back and hip pain consistent with sickle cell pain. Pt seen yesterday for the same symptoms, reports some symptom improvement. Pt taken 1 dose of Dilaudid overnight for pain with no improvement and came to ED. Pt afebrile and vitals are stable in ED. Pt given oral pain medications as per protocol.

## 2020-03-06 NOTE — ED PROVIDER NOTE - ATTENDING CONTRIBUTION TO CARE
35 y/o M with sickle cell disease complaining of lower back and hip pain consistent with sickle cell pain. no cp, sob, f/c.  Pt taken 1 dose of Dilaudid overnight for pain with no improvement and came to ED. Well appearing, nad, nc/at, lung cta, heart reg, abd soft, nt, ext no gross deformity, no gross neuro deficits, will treat pain and reassess.

## 2020-03-06 NOTE — ED ADULT TRIAGE NOTE - CHIEF COMPLAINT QUOTE
Pt CO Sickle Cell Pain to Back and Hips.  Pt denies falls, trauma, dizziness, N/V/D, SOB, Fevers, CP

## 2020-03-06 NOTE — ED PROVIDER NOTE - PATIENT PORTAL LINK FT
You can access the FollowMyHealth Patient Portal offered by [facility] by registering at the following website: [email or web address]. By joining [employer]’s FollowMyHealth portal, you will also be able to view your health information using other applications (apps) compatible with our system.

## 2020-03-09 NOTE — ED PROVIDER NOTE - NEUROLOGICAL, MLM
Left message for patient regarding how he is doing since injection   Alert and oriented, no focal deficits, no motor or sensory deficits.

## 2020-03-11 DIAGNOSIS — M54.5 LOW BACK PAIN: ICD-10-CM

## 2020-03-11 DIAGNOSIS — D57.00 HB-SS DISEASE WITH CRISIS, UNSPECIFIED: ICD-10-CM

## 2020-03-14 ENCOUNTER — EMERGENCY (EMERGENCY)
Facility: HOSPITAL | Age: 35
LOS: 1 days | Discharge: HOME | End: 2020-03-14
Admitting: EMERGENCY MEDICINE
Payer: MEDICAID

## 2020-03-14 VITALS
WEIGHT: 160.94 LBS | SYSTOLIC BLOOD PRESSURE: 108 MMHG | HEIGHT: 68 IN | HEART RATE: 97 BPM | RESPIRATION RATE: 22 BRPM | TEMPERATURE: 98 F | OXYGEN SATURATION: 95 % | DIASTOLIC BLOOD PRESSURE: 64 MMHG

## 2020-03-14 DIAGNOSIS — M54.5 LOW BACK PAIN: ICD-10-CM

## 2020-03-14 DIAGNOSIS — M25.551 PAIN IN RIGHT HIP: ICD-10-CM

## 2020-03-14 DIAGNOSIS — D57.1 SICKLE-CELL DISEASE WITHOUT CRISIS: ICD-10-CM

## 2020-03-14 DIAGNOSIS — M25.552 PAIN IN LEFT HIP: ICD-10-CM

## 2020-03-14 PROCEDURE — 99284 EMERGENCY DEPT VISIT MOD MDM: CPT

## 2020-03-14 PROCEDURE — 99283 EMERGENCY DEPT VISIT LOW MDM: CPT

## 2020-03-14 RX ORDER — HYDROMORPHONE HYDROCHLORIDE 2 MG/ML
16 INJECTION INTRAMUSCULAR; INTRAVENOUS; SUBCUTANEOUS ONCE
Refills: 0 | Status: DISCONTINUED | OUTPATIENT
Start: 2020-03-14 | End: 2020-03-14

## 2020-03-14 RX ORDER — HYDROMORPHONE HYDROCHLORIDE 2 MG/ML
8 INJECTION INTRAMUSCULAR; INTRAVENOUS; SUBCUTANEOUS ONCE
Refills: 0 | Status: DISCONTINUED | OUTPATIENT
Start: 2020-03-14 | End: 2020-03-14

## 2020-03-14 RX ORDER — IBUPROFEN 200 MG
800 TABLET ORAL ONCE
Refills: 0 | Status: COMPLETED | OUTPATIENT
Start: 2020-03-14 | End: 2020-03-14

## 2020-03-14 RX ADMIN — Medication 800 MILLIGRAM(S): at 04:39

## 2020-03-14 RX ADMIN — Medication 800 MILLIGRAM(S): at 02:33

## 2020-03-14 RX ADMIN — HYDROMORPHONE HYDROCHLORIDE 16 MILLIGRAM(S): 2 INJECTION INTRAMUSCULAR; INTRAVENOUS; SUBCUTANEOUS at 02:33

## 2020-03-14 RX ADMIN — HYDROMORPHONE HYDROCHLORIDE 16 MILLIGRAM(S): 2 INJECTION INTRAMUSCULAR; INTRAVENOUS; SUBCUTANEOUS at 04:39

## 2020-03-14 RX ADMIN — HYDROMORPHONE HYDROCHLORIDE 8 MILLIGRAM(S): 2 INJECTION INTRAMUSCULAR; INTRAVENOUS; SUBCUTANEOUS at 04:38

## 2020-03-14 NOTE — ED PROVIDER NOTE - CLINICAL SUMMARY MEDICAL DECISION MAKING FREE TEXT BOX
33 y/o M with sickle cell disease complaining of lower back and hip pain consistent with sickle cell pain. Pt seen last week  for the same symptoms, reports some symptom improvement. pt mentioned his f/u appointment with hematologist on 1 week.  Pt taken 1 dose of Dilaudid oat home today  for pain with no improvement and came to ED. Pt afebrile and vitals are stable in ED. Pt given oral pain medications as per protocol.. improved after 2nd dose and seeking discharge home.

## 2020-03-14 NOTE — ED PROVIDER NOTE - PATIENT PORTAL LINK FT
You can access the FollowMyHealth Patient Portal offered by [facility] by registering at the following website: [email or web address]. By joining [employer]’s FollowMyHealth portal, you will also be able to view your health information using other applications (apps) compatible with our system.

## 2020-03-14 NOTE — ED PROVIDER NOTE - MUSCULOSKELETAL, MLM
Spine appears normal,  diffuse tenderness to montano spine and b/l paraspinal muscles, diffuse tenderness to b/l hip joints, range of motion is not limited,

## 2020-03-14 NOTE — ED PROVIDER NOTE - NSFOLLOWUPINSTRUCTIONS_ED_ALL_ED_FT
I have discussed the discharge plan with the patient. The patient agrees with the plan, as discussed.  The patient understands Emergency Department diagnosis is a preliminary diagnosis often based on limited information and that the patient must adhere to the follow-up plan as discussed.  The patient understands that if the symptoms worsen or if prescribed medications do not have the desired/planned effect that the patient may return to the Emergency Department at any time for further evaluation and treatment.            SICKLE CELL CRISIS - AfterCare(R) Instructions(ER/ED)     Sickle Cell Crisis    WHAT YOU NEED TO KNOW:    A sickle cell crisis is a painful episode that occurs in people who have sickle cell anemia. It happens when sickle-shaped red blood cells (RBCs) block blood vessels. Blood and oxygen cannot get to tissues, causing pain. A sickle cell crisis can also damage your tissues and cause organ failure, such liver or kidney failure. A sickle cell crisis can become life-threatening.    DISCHARGE INSTRUCTIONS:    Call 911 for any of the following:     You have shortness of breath or chest pain.      You are a man and have an erection that is painful and does not go away.       You lose vision in one or both eyes.    Return to the emergency department if:     You have a fever.      You feel like you cannot cope with your pain, or you feel like hurting yourself.       You have behavior changes, a seizure, or faint.       You have abdominal pain, nausea, or vomiting.      You have a headache that is worse or different from those that you have had in the past.       You have new weakness or numbness in your arm, leg, or face.      You have new pain in any part of your body.      Your urine is dark and you are urinating less than usual or not at all.       You are dizzy, lightheaded, or faint.     Contact your healthcare provider if:     You have any new signs or symptoms.       You have blood in your urine.       You are constipated or you have diarrhea.       You have changes in your vision.       You have increased fatigue.       You plan to travel by airplane or to a high [facility].       You have questions or concerns about your condition or care.    Medicines: You may need any of the following:     Prescription pain medicine may be given. Ask how to take this medicine safely. Medicine may also be given to decrease sickling of your RBCs. You may also need medicine to treat or prevent a bacterial infection.       NSAIDs, such as ibuprofen, help decrease swelling, pain, and fever. This medicine is available with or without a doctor's order. NSAIDs can cause stomach bleeding or kidney problems in certain people. If you take blood thinner medicine, always ask your healthcare provider if NSAIDs are safe for you. Always read the medicine label and follow directions.      Acetaminophen decreases pain and fever. It is available without a doctor's order. Ask how much to take and how often to take it. Follow directions. Acetaminophen can cause liver damage if not taken correctly.      Take your medicine as directed. Contact your healthcare provider if you think your medicine is not helping or if you have side effects. Tell him or her if you are allergic to any medicine. Keep a list of the medicines, vitamins, and herbs you take. Include the amounts, and when and why you take them. Bring the list or the pill bottles to follow-up visits. Carry your medicine list with you in case of an emergency.    Medical alert identification: Wear medical alert jewelry or carry a card that says you have sickle cell anemia. Ask your healthcare provider where to get these items. Medical Alert Jewelry         Prevent a sickle cell crisis:     Take vitamins and minerals as directed. Folic acid can help prevent blood vessel problems that can occur with sickle cell anemia. Zinc may decrease how often you have pain.       Drink liquids as directed. Dehydration can increase your risk for a sickle cell crisis. Ask how much liquid to drink each day and which liquids are best for you.      Balance rest and exercise. Rest during a sickle cell crisis. Over time, increase your activity to a moderate amount. Exercise regularly. Avoid exercise or activities that can cause injury, such as football. Ask about the best exercise plan for you.       Stay out of the cold. Do not go quickly from a warm place to a cold place. Do not go swimming in cold water. Stay warm in the winter.      Do not smoke cigarettes or drink alcohol. These increase your risk for a sickle cell crisis. Nicotine and other chemicals in cigarettes and cigars can cause lung damage. Ask your healthcare provider for information if you currently smoke and need help to quit. E-cigarettes or smokeless tobacco still contain nicotine. Talk to your healthcare provider before you use these products.       Ask about vaccinations you need. Vaccinations can help prevent a viral infection that may lead to a sickle cell crisis. Get a flu shot every year as directed. You may need a pneumonia vaccine every 5 years.     Follow up with your healthcare provider as directed: You may need ongoing screening for conditions that can develop because of sickle cell disease. Examples include kidney disease, hypertension (high blood pressure), retinopathy (eye problems), and problems with your lungs. Write down your questions so you remember to ask them during your visits.

## 2020-03-14 NOTE — ED ADULT TRIAGE NOTE - OTHER COMPLAINTS
CC of sickle cell pain x 4PM yesterday, took dilaudid at the same time but the pain continues CC of sickle cell pain  back and bilateral hip x 4PM yesterday, took dilaudid at the same time but the pain continues

## 2020-03-14 NOTE — ED ADULT NURSE NOTE - OTHER COMPLAINTS
CC of sickle cell pain  back and bilateral hip x 4PM yesterday, took dilaudid at the same time but the pain continues

## 2020-03-14 NOTE — ED ADULT NURSE NOTE - OBJECTIVE STATEMENT
Received a 34 year old male with a chief complaint of re-exacerbation of sickle cell pain. Patient reports that he took his prescribed dilaudid 8mg at 1600 on 03/13/20 with no relief.

## 2020-03-14 NOTE — ED PROVIDER NOTE - OBJECTIVE STATEMENT
35 y/o M with Hx of Sickle cell disease returns to ED complaining of continuing lower back and hip pain. Pt was seen  in the ER last week  with the same symptoms.  pt states he took one dose of Dilaudid  earlier today for pain relief, however with no relief . Denies fever and CP. Pt follows with hematologist, Dr. Francois Laguna.

## 2020-03-24 ENCOUNTER — EMERGENCY (EMERGENCY)
Facility: HOSPITAL | Age: 35
LOS: 1 days | Discharge: HOME | End: 2020-03-24
Admitting: EMERGENCY MEDICINE
Payer: MEDICAID

## 2020-03-24 VITALS
WEIGHT: 160.94 LBS | HEART RATE: 89 BPM | HEIGHT: 68 IN | RESPIRATION RATE: 18 BRPM | DIASTOLIC BLOOD PRESSURE: 69 MMHG | TEMPERATURE: 99 F | SYSTOLIC BLOOD PRESSURE: 107 MMHG | OXYGEN SATURATION: 97 %

## 2020-03-24 DIAGNOSIS — D57.00 HB-SS DISEASE WITH CRISIS, UNSPECIFIED: ICD-10-CM

## 2020-03-24 DIAGNOSIS — M54.5 LOW BACK PAIN: ICD-10-CM

## 2020-03-24 PROCEDURE — 99284 EMERGENCY DEPT VISIT MOD MDM: CPT

## 2020-03-24 PROCEDURE — 99283 EMERGENCY DEPT VISIT LOW MDM: CPT

## 2020-03-24 RX ORDER — HYDROMORPHONE HYDROCHLORIDE 2 MG/ML
16 INJECTION INTRAMUSCULAR; INTRAVENOUS; SUBCUTANEOUS ONCE
Refills: 0 | Status: DISCONTINUED | OUTPATIENT
Start: 2020-03-24 | End: 2020-03-24

## 2020-03-24 RX ORDER — IBUPROFEN 200 MG
600 TABLET ORAL ONCE
Refills: 0 | Status: COMPLETED | OUTPATIENT
Start: 2020-03-24 | End: 2020-03-24

## 2020-03-24 RX ADMIN — HYDROMORPHONE HYDROCHLORIDE 16 MILLIGRAM(S): 2 INJECTION INTRAMUSCULAR; INTRAVENOUS; SUBCUTANEOUS at 19:19

## 2020-03-24 RX ADMIN — Medication 600 MILLIGRAM(S): at 19:19

## 2020-03-24 NOTE — ED ADULT NURSE NOTE - OBJECTIVE STATEMENT
Patient w/ sickle cell dse, c/o of bilateral hip pain radiates to back since this morning, no chest pain, fevers or SOB.

## 2020-03-24 NOTE — ED PROVIDER NOTE - CLINICAL SUMMARY MEDICAL DECISION MAKING FREE TEXT BOX
pt c/o lbp and hip pain - consistent w/crisis pain, took own po dilaudid w/o relief, requesting dilaudid and ibuprofen, no other c/o, hemodynamically stable, pain improved, to f/u w/pmd, pt understands and agrees w/plan

## 2020-03-24 NOTE — ED PROVIDER NOTE - NSFOLLOWUPINSTRUCTIONS_ED_ALL_ED_FT
Sickle Cell Crisis  WHAT YOU NEED TO KNOW:  A sickle cell crisis is a painful episode that occurs in people who have sickle cell anemia. It happens when sickle-shaped red blood cells (RBCs) block blood vessels. Blood and oxygen cannot get to tissues, causing pain. A sickle cell crisis can also damage your tissues and cause organ failure, such liver or kidney failure. A sickle cell crisis can become life-threatening.  DISCHARGE INSTRUCTIONS:  Call 911 for any of the following:   You have shortness of breath or chest pain.  You are a man and have an erection that is painful and does not go away.   You lose vision in one or both eyes  Return to the emergency department if:   You have a fever.  You feel like you cannot cope with your pain, or you feel like hurting yourself.   You have behavior changes, a seizure, or faint.   You have abdominal pain, nausea, or vomiting.  You have a headache that is worse or different from those that you have had in the past.   You have new weakness or numbness in your arm, leg, or face.  You have new pain in any part of your body.  Your urine is dark and you are urinating less than usual or not at all.   You are dizzy, lightheaded, or faint.   Contact your healthcare provider if:   You have any new signs or symptoms.   You have blood in your urine.   You are constipated or you have diarrhea.   You have changes in your vision.   You have increased fatigue.   You plan to travel by airplane or to a high elevation.   You have questions or concerns about your condition or care.  Medicines: You may need any of the following:   Prescription pain medicine may be given. Ask how to take this medicine safely. Medicine may also be given to decrease sickling of your RBCs. You may also need medicine to treat or prevent a bacterial infection.   NSAIDs, such as ibuprofen, help decrease swelling, pain, and fever. This medicine is available with or without a doctor's order. NSAIDs can cause stomach bleeding or kidney problems in certain people. If you take blood thinner medicine, always ask your healthcare provider if NSAIDs are safe for you. Always read the medicine label and follow directions.  Acetaminophen decreases pain and fever. It is available without a doctor's order. Ask how much to take and how often to take it. Follow directions. Acetaminophen can cause liver damage if not taken correctly.  Take your medicine as directed. Contact your healthcare provider if you think your medicine is not helping or if you have side effects. Tell him or her if you are allergic to any medicine. Keep a list of the medicines, vitamins, and herbs you take. Include the amounts, and when and why you take them. Bring the list or the pill bottles to follow-up visits. Carry your medicine list with you in case of an emergency.  Medical alert identification: Wear medical alert jewelry or carry a card that says you have sickle cell anemia. Ask your healthcare provider where to get these items. Medical Alert Jewelry  Prevent a sickle cell crisis:   Take vitamins and minerals as directed. Folic acid can help prevent blood vessel problems that can occur with sickle cell anemia. Zinc may decrease how often you have pain.   Drink liquids as directed. Dehydration can increase your risk for a sickle cell crisis. Ask how much liquid to drink each day and which liquids are best for you.  Balance rest and exercise. Rest during a sickle cell crisis. Over time, increase your activity to a moderate amount. Exercise regularly. Avoid exercise or activities that can cause injury, such as football. Ask about the best exercise plan for you.   Stay out of the cold. Do not go quickly from a warm place to a cold place. Do not go swimming in cold water. Stay warm in the winter.  Do not smoke cigarettes or drink alcohol. These increase your risk for a sickle cell crisis. Nicotine and other chemicals in cigarettes and cigars can cause lung damage. Ask your healthcare provider for information if you currently smoke and need help to quit. E-cigarettes or smokeless tobacco still contain nicotine. Talk to your healthcare provider before you use these products.   Ask about vaccinations you need. Vaccinations can help prevent a viral infection that may lead to a sickle cell crisis. Get a flu shot every year as directed. You may need a pneumonia vaccine every 5 years.

## 2020-03-24 NOTE — ED PROVIDER NOTE - PATIENT PORTAL LINK FT
You can access the FollowMyHealth Patient Portal offered by [facility] by registering at the following website: [email or web address]. By joining [facility]’s FollowMyHealth portal, you will also be able to view your health information using other applications (apps) compatible with our system.

## 2020-03-24 NOTE — ED ADULT NURSE NOTE - NSFALLRSKOUTCOME_ED_ALL_ED
Universal Safety Interventions

## 2020-03-24 NOTE — ED PROVIDER NOTE - OBJECTIVE STATEMENT
The pt is a 33 y/o M, who presents to ED c/o sickle cell pain to lower back and hip pain, states that took own po dilaudid w/o relief.  This is his typical crisis pain. Denies fevers, chills, cp, sob, n/v/d, abd pain

## 2020-03-25 ENCOUNTER — EMERGENCY (EMERGENCY)
Facility: HOSPITAL | Age: 35
LOS: 1 days | Discharge: HOME | End: 2020-03-25
Admitting: EMERGENCY MEDICINE
Payer: MEDICAID

## 2020-03-25 VITALS
SYSTOLIC BLOOD PRESSURE: 136 MMHG | DIASTOLIC BLOOD PRESSURE: 77 MMHG | HEIGHT: 68 IN | HEART RATE: 100 BPM | OXYGEN SATURATION: 100 % | RESPIRATION RATE: 18 BRPM | TEMPERATURE: 98 F | WEIGHT: 160.94 LBS

## 2020-03-25 DIAGNOSIS — M54.5 LOW BACK PAIN: ICD-10-CM

## 2020-03-25 DIAGNOSIS — D57.00 HB-SS DISEASE WITH CRISIS, UNSPECIFIED: ICD-10-CM

## 2020-03-25 PROCEDURE — 99284 EMERGENCY DEPT VISIT MOD MDM: CPT

## 2020-03-25 PROCEDURE — 99283 EMERGENCY DEPT VISIT LOW MDM: CPT

## 2020-03-25 RX ORDER — HYDROMORPHONE HYDROCHLORIDE 2 MG/ML
16 INJECTION INTRAMUSCULAR; INTRAVENOUS; SUBCUTANEOUS ONCE
Refills: 0 | Status: DISCONTINUED | OUTPATIENT
Start: 2020-03-25 | End: 2020-03-25

## 2020-03-25 RX ORDER — HYDROMORPHONE HYDROCHLORIDE 2 MG/ML
8 INJECTION INTRAMUSCULAR; INTRAVENOUS; SUBCUTANEOUS ONCE
Refills: 0 | Status: DISCONTINUED | OUTPATIENT
Start: 2020-03-25 | End: 2020-03-25

## 2020-03-25 RX ORDER — IBUPROFEN 200 MG
600 TABLET ORAL ONCE
Refills: 0 | Status: COMPLETED | OUTPATIENT
Start: 2020-03-25 | End: 2020-03-25

## 2020-03-25 RX ADMIN — Medication 600 MILLIGRAM(S): at 09:26

## 2020-03-25 RX ADMIN — HYDROMORPHONE HYDROCHLORIDE 16 MILLIGRAM(S): 2 INJECTION INTRAMUSCULAR; INTRAVENOUS; SUBCUTANEOUS at 09:26

## 2020-03-25 NOTE — ED ADULT NURSE NOTE - OBJECTIVE STATEMENT
35 y/o male w/ hx of sickle cell anemia c/o sickle cell pain xmultiple days. Seen multiple times in ED this week, to follow up with PMD.

## 2020-03-25 NOTE — ED PROVIDER NOTE - NSFOLLOWUPINSTRUCTIONS_ED_ALL_ED_FT
Sickle Cell Crisis  WHAT YOU NEED TO KNOW:  A sickle cell crisis is a painful episode that occurs in people who have sickle cell anemia. It happens when sickle-shaped red blood cells (RBCs) block blood vessels. Blood and oxygen cannot get to tissues, causing pain. A sickle cell crisis can also damage your tissues and cause organ failure, such liver or kidney failure. A sickle cell crisis can become life-threatening.  DISCHARGE INSTRUCTIONS:  Call 911 for any of the following:   You have shortness of breath or chest pain.  You are a man and have an erection that is painful and does not go away.   You lose vision in one or both eyes.  Return to the emergency department if:   You have a fever  You feel like you cannot cope with your pain, or you feel like hurting yourself.   You have behavior changes, a seizure, or faint.   You have abdominal pain, nausea, or vomiting.  You have a headache that is worse or different from those that you have had in the past.   You have new weakness or numbness in your arm, leg, or face.  You have new pain in any part of your body.  Your urine is dark and you are urinating less than usual or not at all.   You are dizzy, lightheaded, or faint.   Contact your healthcare provider if:   You have any new signs or symptoms.   You have blood in your urine.   You are constipated or you have diarrhea.   You have changes in your vision.   You have increased fatigue.   You plan to travel by airplane or to a high elevation.   You have questions or concerns about your condition or care  Medicines: You may need any of the following:   Prescription pain medicine may be given. Ask how to take this medicine safely. Medicine may also be given to decrease sickling of your RBCs. You may also need medicine to treat or prevent a bacterial infection.   NSAIDs, such as ibuprofen, help decrease swelling, pain, and fever. This medicine is available with or without a doctor's order. NSAIDs can cause stomach bleeding or kidney problems in certain people. If you take blood thinner medicine, always ask your healthcare provider if NSAIDs are safe for you. Always read the medicine label and follow directions.  Acetaminophen decreases pain and fever. It is available without a doctor's order. Ask how much to take and how often to take it. Follow directions. Acetaminophen can cause liver damage if not taken correctly.  Take your medicine as directed. Contact your healthcare provider if you think your medicine is not helping or if you have side effects. Tell him or her if you are allergic to any medicine. Keep a list of the medicines, vitamins, and herbs you take. Include the amounts, and when and why you take them. Bring the list or the pill bottles to follow-up visits. Carry your medicine list with you in case of an emergency.  Medical alert identification: Wear medical alert jewelry or carry a card that says you have sickle cell anemia. Ask your healthcare provider where to get these items. Medical Alert Jewelry  Prevent a sickle cell crisis:   Take vitamins and minerals as directed. Folic acid can help prevent blood vessel problems that can occur with sickle cell anemia. Zinc may decrease how often you have pain.   Drink liquids as directed. Dehydration can increase your risk for a sickle cell crisis. Ask how much liquid to drink each day and which liquids are best for you.  Balance rest and exercise. Rest during a sickle cell crisis. Over time, increase your activity to a moderate amount. Exercise regularly. Avoid exercise or activities that can cause injury, such as football. Ask about the best exercise plan for you.   Stay out of the cold. Do not go quickly from a warm place to a cold place. Do not go swimming in cold water. Stay warm in the winter.  Do not smoke cigarettes or drink alcohol. These increase your risk for a sickle cell crisis. Nicotine and other chemicals in cigarettes and cigars can cause lung damage. Ask your healthcare provider for information if you currently smoke and need help to quit. E-cigarettes or smokeless tobacco still contain nicotine. Talk to your healthcare provider before you use these products.   Ask about vaccinations you need. Vaccinations can help prevent a viral infection that may lead to a sickle cell crisis. Get a flu shot every year as directed. You may need a pneumonia vaccine every 5 years.

## 2020-03-25 NOTE — ED PROVIDER NOTE - MUSCULOSKELETAL, MLM
Spine appears normal, range of motion is not limited, no muscle or joint tenderness; LE w/FROM b/l, normal gait

## 2020-03-25 NOTE — ED ADULT NURSE NOTE - CHPI ED NUR SYMPTOMS NEG
no decreased eating/drinking/no chills/no vomiting/no dizziness/no tingling/no nausea/no weakness/no fever

## 2020-03-25 NOTE — ED ADULT TRIAGE NOTE - CHIEF COMPLAINT QUOTE
pt c/o sickle cell pain in lower back and hips since midnight. unrelieved by his 8mg PO dilaudid last night.

## 2020-03-25 NOTE — ED PROVIDER NOTE - OBJECTIVE STATEMENT
The pt is a 34 The pt is a 34 M, who presents to ED requesting pain meds for sickle cell pain - c/o lbp and b/l hip pain, states that took own po dilaudid w/good relief but now symptoms returned. Seen yest for same and was medicated - states that just needs another dose of pain meds. Denies cp, sob, n/v/d, abd pain, fevers, chills, fall, injury, numbness or tingling to toes

## 2020-03-25 NOTE — ED PROVIDER NOTE - PATIENT PORTAL LINK FT
You can access the FollowMyHealth Patient Portal offered by [facility] by registering at the following website: [email or web address]. By joining [facility]’s FollowMyHealth portal, you will also be able to view your health information using other applications (apps) compatible with our system.

## 2020-03-25 NOTE — ED PROVIDER NOTE - CLINICAL SUMMARY MEDICAL DECISION MAKING FREE TEXT BOX
pt c/o acute exacerbation of sickle cell pain, no trauma, no fevers or chills, FROM and ambulatory w/o dif, hemodynamically stable, well appearing, medicated and will dc, explained that must f/u w/own heme or pmd for any further pain meds, pt understands and agrees w/plan

## 2020-03-25 NOTE — ED ADULT TRIAGE NOTE - IDEAL BODY WEIGHT(KG)
Recovery After Procedural Sedation (Adult)  You have been given medicine by vein to make you sleep during your procedure. This may have included both a pain medicine and sleeping medicine. Most of the effects have worn off. But you may still have some drowsiness for the next 6 to 8 hours.  Home care  Follow these guidelines when you get home:  · For the next 8 hours, you should be watched by a responsible adult. This person should make sure your condition is not getting worse.  · Don't drink any alcohol for the next 24 hours.  · Don't drive, operate dangerous machinery, make important business or personal decisions, or sign legal documents during the next 24 hours.  Note: Your healthcare provider may tell you not to take any medicine by mouth for pain or sleep in the next 4 hours. These medicines may react with the medicines you were given in the hospital. This could cause a much stronger response than usual.  Follow-up care  Follow up with your healthcare provider if you are not alert and back to your usual level of activity within 12 hours.  When to seek medical advice  Call your healthcare provider right away if any of these occur:  · Drowsiness gets worse  · Weakness or dizziness gets worse  · Repeated vomiting  · You can't be awakened   Date Last Reviewed: 10/18/2016  © 3126-7784 [employer]. [address] [place] [address], [place] [address]. All rights reserved. This information is not intended as a substitute for professional medical care. Always follow your healthcare professional's instructions.      COLONOSCOPY      DIET:  · Resume your usual diet.    ACTIVITY:  · Rest quietly at home for the remainder of the day. You may resume normal activities tomorrow.  · Do not do anything that requires coordination the day of the procedure, such as climbing ladders, using a sharp knife or operating machinery.       WHAT TO EXPECT:  · Mild abdominal discomfort, bloating and gas pains.  · A scant amount of  rectal bleeding following a biopsy, polypectomy or if you have hemorrhoids is normal.  · Return of your usual bowel movements in 1-2 days.  · A tired feeling after the procedure due to the medications given to help you relax.       PROBLEMS TO WATCH FOR - NOTIFY YOUR SURGEON IF YOU HAVE ANY OF THESE SYMPTOMS:  · Severe abdominal pain or bloating.   · Chills or temperature over 101 degrees.  · Large amounts of bright red rectal bleeding, blood clots or black colored stool.  · Vomiting blood or black colored liquid.    FOLLOW -UP:  · If a specimen was taken, please allow at least 7 business days for pathology results.  · Someone will either call or send a letter with your pathology results.  · If you have not received a letter or phone call, please call [phone].     68

## 2020-04-06 ENCOUNTER — EMERGENCY (EMERGENCY)
Facility: HOSPITAL | Age: 35
LOS: 1 days | Discharge: HOME | End: 2020-04-06
Attending: EMERGENCY MEDICINE | Admitting: EMERGENCY MEDICINE
Payer: SELF-PAY

## 2020-04-06 VITALS
RESPIRATION RATE: 18 BRPM | HEIGHT: 68 IN | TEMPERATURE: 98 F | HEART RATE: 129 BPM | WEIGHT: 160.06 LBS | OXYGEN SATURATION: 98 % | SYSTOLIC BLOOD PRESSURE: 131 MMHG | DIASTOLIC BLOOD PRESSURE: 86 MMHG

## 2020-04-06 VITALS
SYSTOLIC BLOOD PRESSURE: 133 MMHG | TEMPERATURE: 99 F | RESPIRATION RATE: 16 BRPM | OXYGEN SATURATION: 99 % | DIASTOLIC BLOOD PRESSURE: 75 MMHG | HEART RATE: 115 BPM

## 2020-04-06 PROCEDURE — 99284 EMERGENCY DEPT VISIT MOD MDM: CPT

## 2020-04-06 PROCEDURE — 99283 EMERGENCY DEPT VISIT LOW MDM: CPT

## 2020-04-06 RX ORDER — IBUPROFEN 200 MG
600 TABLET ORAL ONCE
Refills: 0 | Status: COMPLETED | OUTPATIENT
Start: 2020-04-06 | End: 2020-04-06

## 2020-04-06 RX ORDER — HYDROMORPHONE HYDROCHLORIDE 2 MG/ML
16 INJECTION INTRAMUSCULAR; INTRAVENOUS; SUBCUTANEOUS ONCE
Refills: 0 | Status: DISCONTINUED | OUTPATIENT
Start: 2020-04-06 | End: 2020-04-06

## 2020-04-06 RX ADMIN — HYDROMORPHONE HYDROCHLORIDE 16 MILLIGRAM(S): 2 INJECTION INTRAMUSCULAR; INTRAVENOUS; SUBCUTANEOUS at 14:37

## 2020-04-06 RX ADMIN — Medication 600 MILLIGRAM(S): at 14:37

## 2020-04-06 NOTE — ED ADULT NURSE NOTE - CAS TRG GEN SKIN CONDITION
[facility]  Pulmonary, Critical Care, and Sleep Medicine    Name: Sloane Leon MRN: [id number]   : 1927 Hospital: [address] [place] [address]   Date: 2017        Critical Care Initial Patient Consult  Requesting MD:  Hospitalist MD                                                            Reason for CC Consult: Bradycardia  IMPRESSION:   Patient Active Problem List   Diagnosis Code    Wound dehiscence, traumatic injury repair T81.33XA    Traumatic open wound of right lower leg with infection S81.801A, L08.9    Cellulitis of left lower extremity L03. 116    Pneumonia J18.9    Sepsis ([facility]) A41.9    Cellulitis of leg, left L03. 116    Cellulitis of right leg L03.115    Acute renal failure superimposed on stage 3 chronic kidney disease (HCC) N17.9, N18.3    Chronic atrial fibrillation (HCC) I48.2    Gout M10.9    Chronic venous stasis dermatitis I87.2    Diarrhea R19.7    Acute on chronic renal failure (HCC) N17.9, N18.9    UTI (urinary tract infection) N39.0    Pancytopenia ([facility]) D61.818 ·   A Fib: Seen by Cardiology already  · Bradycardia: seen by Cardiology already  · SEJAL on CKD III: seen by Nephrology already  · Arterial Hypotension: Volume depletion (poor intake, Diuretic, ARB. Diarrhea)  · Poor response to Dopamine 20 ug/kg/min + Dobutamine 30 ug/kg/min  · Pancytopenia: On Multiple ATBx  · Advanced age  · Code status NOT addressed      RECOMMENDATIONS:   · DNR and DNI given to me by her POA/NOK: Daughter Jesus Manuel Corona  · If further Pressors needed her daughter Jesus Manuel Corona would allow it: next Joel  · Palliative care Consult  · Continue present tratment     Subjective/History: This patient has been seen and evaluated at the request of ICU RN and Hospitalist MD for Bradycardia. Patient is a 78 y.o. female unknown to me with extensive PMH as noted. Brought to this hospital dehydrated and in SEJAL on CKD III after poor intake, chronic diuretic, ARB and Diarrhea.   Place in observation, seen by Nephrology, place in observation initially then admitted to acute care later on 11/28/17. Dr Ahmet Ortiz was preparing her for SNF and she deteriorated with Bradycardia and hypotension. Cardiology saw her, started on DOPAMINE and DOBUTAMINE with poor response, transferred to ICU. Code status NOT addressed per her daughter and my conversations with her. I spoke with daytime ICU RN, night shift ICU RN, Hospitalist, patient son in law, her daughetr and son and after my conversations with them, Brandon Stahl, her daughter, Reina Maza gave me a DNR and DNI, Brandon Stahl is ok with further pressors , if need and conservative measures and NO invasive diagnostic or therapeutic interventions. Has H/O DIAMOND and has her own CPAP and SPO2 is now in the 90% range. Past Medical History:   Diagnosis Date    Atrial fibrillation ([address]     Cellulitis     Gout     Sleep apnea       Past Surgical History:   Procedure Laterality Date    HX HEART CATHETERIZATION        Prior to Admission medications    Medication Sig Start Date End Date Taking? Authorizing Provider   Potassium Citrate 15 mEq TbER Take  by mouth. Yes Rudolph Gamnig MD   febuxostat (ULORIC) 40 mg tab tablet Take 40 mg by mouth daily. Yes Rudolph Gaming MD   traMADol (ULTRAM) 50 mg tablet Take 1 Tab by mouth every six (6) hours as needed. Max Daily Amount: 200 mg. 11/30/16  Yes Jose Botello MD   dilTIAZem XR (DILACOR XR) 120 mg XR capsule Take  by mouth daily. Yes Historical Provider   levothyroxine (SYNTHROID) 88 mcg tablet Take 88 mcg by mouth Daily (before breakfast). Yes Rudolph Gaming MD   simvastatin (ZOCOR) 20 mg tablet Take 20 mg by mouth nightly. Yes Rudolph Gaming MD   telmisartan (MICARDIS) 80 mg tablet Take 80 mg by mouth daily. Yes Rudolph Gaming MD   rivaroxaban (XARELTO) 15 mg tab tablet Take 15 mg by mouth daily (with breakfast). Yes Rudolph Gaming MD   furosemide (LASIX) 40 mg tablet Take 40 mg by mouth daily.    Yes Rudolph Gaming MD   ergocalciferol (ERGOCALCIFEROL) 50,000 unit capsule Take 400 Units by mouth daily. Yes Rudolph Gaming MD   multivitamin, tx-iron-ca-min (THERA-M W/ IRON) 9 mg iron-400 mcg tab tablet Take 1 Tab by mouth daily. Yes Rudolph Gaming MD   Flaxseed Oil oil by Does Not Apply route. Yes Rudolph Gaming MD   metoprolol tartrate (LOPRESSOR) 50 mg tablet Take 50 mg by mouth two (2) times a day. Yes Rudolph Gaming MD   digoxin (LANOXIN) 0.125 mg tablet Take 0.0625 mg by mouth daily. Yes Rudolph Gaming MD   colchicine 0.6 mg tablet Take 0.3 mg by mouth daily.     Rudolph Gaming MD     Current Facility-Administered Medications   Medication Dose Route Frequency    melatonin tablet 1.5 mg  1.5 mg Oral QHS    DOPamine (INTROPIN) 1,600 mcg/mL infusion (STANDARD)  0-20 mcg/kg/min IntraVENous TITRATE    DOBUTamine (DOBUTREX) 500 mg/250 mL (2,000 mcg/mL) infusion  0-10 mcg/kg/min IntraVENous TITRATE    PHENYLephrine (MEAGHAN-SYNEPHRINE) 90 mg in 0.9% sodium chloride 250 mL infusion (premix or compounded)  0-300 mcg/min IntraVENous TITRATE    sodium bicarbonate (8.4%) 75 mEq in dextrose 5 % - 0.45% NaCl 1,000 mL infusion   IntraVENous CONTINUOUS    cefTRIAXone (ROCEPHIN) 1 g in sterile water (preservative free) 10 mL IV syringe  1 g IntraVENous Q24H    levothyroxine (SYNTHROID) tablet 88 mcg  88 mcg Oral ACB    multivitamin, tx-iron-ca-min (THERA-M w/ IRON) tablet 1 Tab  1 Tab Oral DAILY    simvastatin (ZOCOR) tablet 20 mg  20 mg Oral QHS     Allergies   Allergen Reactions    Augmentin [Amoxicillin-Pot Clavulanate] Unknown (comments)    Bacitracin Rash    Biaxin [Clarithromycin] Unknown (comments)    Clindamycin Rash    Codeine Unknown (comments)    Doxycycline Diarrhea    Levaquin [Levofloxacin] Rash and Itching    Procardia [Nifedipine] Unknown (comments)    Sulfa (Sulfonamide Antibiotics) Diarrhea and Rash    Trimox [Amoxicillin] Unknown (comments)      Social History   Substance Use Topics    Smoking status: Never Smoker    Smokeless tobacco: Never Used    Alcohol use No      Family History   Problem Relation Age of Onset    Breast Cancer Child 39    Breast Cancer Maternal Grandmother         Review of Systems:  Unable to obtain. Objective:   Vital Signs:    Visit Vitals    BP (!) 70/32    Pulse 64    Temp (!) 94.1 °F (34.5 °C)    Resp 13    Ht 5' (1.524 m)    Wt 67.1 kg (148 lb)    SpO2 97%    BMI 28.9 kg/m2       O2 Device: CPAP nasal   O2 Flow Rate (L/min): 15 l/min   Temp (24hrs), Av.9 °F (35.5 °C), Min:94.1 °F (34.5 °C), Max:97.6 °F (36.4 °C)       Intake/Output:   Last shift:       1901 -  0700  In: 422.6 [I.V.:422.6]  Out: -   Last 3 shifts:  0701 -  1900  In: 3436.1 [P.O.:900; I.V.:2536.1]  Out: 400 [Urine:400]    Intake/Output Summary (Last 24 hours) at 17 2253  Last data filed at 17 2200   Gross per 24 hour   Intake          3084.89 ml   Output              100 ml   Net          2984.89 ml     Hemodynamics:   . @MAP     . @CVP       Ventilator Settings:  Mode Rate Tidal Volume Pressure FiO2 PEEP                    Peak airway pressure:      Minute ventilation:        ARDS network Guidelines: Lung protective strategy and Pl pressure goals____________    Physical Exam:    General:  CPAP Mask no distress, appears stated age. Head:  Normocephalic, without obvious abnormality. Eyes:  Conjunctivae/corneas clear. PERRL. Nose: Nares normal. Septum midline. Mucosa normal. No drainage or sinus tenderness. Throat: Lips, mucosa, and tongue dry mucosa. Neck: Supple, symmetrical, trachea midline, no adenopathy, thyroid: no enlargment/tenderness/nodules, no carotid bruit and no JVD. Back:   Not examined   Lungs:   Clear to auscultation bilaterally. Chest wall:  No tenderness or deformity. Heart:  Irregular rate and rhythm, S1, S2 normal, no murmur, click, rub or gallop. Abdomen:   Soft, non-tender. Bowel sounds normal. No masses,  No organomegaly.    Extremities: Extremities normal, atraumatic, no cyanosis or edema. Pulses: 2+ and symmetric all extremities. Skin: Decreased tturgor normal. No rashes. Lymph nodes: Cervical, supraclavicular, and axillary nodes normal.   Neurologic: Grossly non focal       Data:     Recent Results (from the past 24 hour(s))   RENAL FUNCTION PANEL    Collection Time: 12/01/17  4:10 AM   Result Value Ref Range    Sodium 137 136 - 145 mmol/L    Potassium 4.6 3.5 - 5.5 mmol/L    Chloride 104 100 - 108 mmol/L    CO2 20 (L) 21 - 32 mmol/L    Anion gap 13 3.0 - 18 mmol/L    Glucose 106 (H) 74 - 99 mg/dL     (H) 7.0 - 18 MG/DL    Creatinine 4.11 (H) 0.6 - 1.3 MG/DL    BUN/Creatinine ratio 30 (H) 12 - 20      GFR est AA 12 (L) >60 ml/min/1.73m2    GFR est non-AA 10 (L) >60 ml/min/1.73m2    Calcium 8.6 8.5 - 10.1 MG/DL    Phosphorus 8.4 (H) 2.5 - 4.9 MG/DL    Albumin 3.1 (L) 3.4 - 5.0 g/dL   CBC WITH AUTOMATED DIFF    Collection Time: 12/01/17  4:10 AM   Result Value Ref Range    WBC 2.9 (L) 4.6 - 13.2 K/uL    RBC 3.27 (L) 4.20 - 5.30 M/uL    HGB 9.9 (L) 12.0 - 16.0 g/dL    HCT 30.0 (L) 35.0 - 45.0 %    MCV 91.7 74.0 - 97.0 FL    MCH 30.3 24.0 - 34.0 PG    MCHC 33.0 31.0 - 37.0 g/dL    RDW 20.5 (H) 11.6 - 14.5 %    PLATELET 72 (L) 656 - 420 K/uL    NEUTROPHILS 68 40 - 73 %    LYMPHOCYTES 12 (L) 21 - 52 %    MONOCYTES 7 3 - 10 %    EOSINOPHILS 13 (H) 0 - 5 %    BASOPHILS 0 0 - 2 %    ABS. NEUTROPHILS 1.9 1.8 - 8.0 K/UL    ABS. LYMPHOCYTES 0.4 (L) 0.9 - 3.6 K/UL    ABS. MONOCYTES 0.2 0.05 - 1.2 K/UL    ABS. EOSINOPHILS 0.4 0.0 - 0.4 K/UL    ABS.  BASOPHILS 0.0 0.0 - 0.06 K/UL    DF AUTOMATED     T4, FREE    Collection Time: 12/01/17  4:10 AM   Result Value Ref Range    T4, Free 1.0 0.7 - 1.5 NG/DL   HEPATIC FUNCTION PANEL    Collection Time: 12/01/17  4:10 AM   Result Value Ref Range    Protein, total 5.9 (L) 6.4 - 8.2 g/dL    Albumin 3.2 (L) 3.4 - 5.0 g/dL    Globulin 2.7 2.0 - 4.0 g/dL    A-G Ratio 1.2 0.8 - 1.7      Bilirubin, total 0.3 0.2 - 1.0 MG/DL Bilirubin, direct 0.2 0.0 - 0.2 MG/DL    Alk. phosphatase 172 (H) 45 - 117 U/L    AST (SGOT) 36 15 - 37 U/L    ALT (SGPT) 29 13 - 56 U/L   EKG, 12 LEAD, SUBSEQUENT    Collection Time: 12/01/17 10:51 AM   Result Value Ref Range    Ventricular Rate 36 BPM    Atrial Rate 15 BPM    QRS Duration 124 ms    Q-T Interval 642 ms    QTC Calculation (Bezet) 496 ms    Calculated R Axis -20 degrees    Calculated T Axis -33 degrees    Diagnosis       Most likely a.fib with slow ventricular response  RSR' or QR pattern in V1 suggests right ventricular conduction delay  Nonspecific T wave abnormality  Abnormal ECG  When compared with ECG of 28-NOV-2017 16:25,  Idioventricular rhythm has replaced Atrial fibrillation  Confirmed by Lora Mullen ([id number]) on 12/1/2017 7:42:26 PM               Telemetry: SEE ABOVE Cardiologist read EKG report    Imaging:  I have personally reviewed the patients radiographs and have reviewed the reports:  ALL notes read, d/w: daytime and night time shifts ICU RNs, hospitalist and patient's son in law, daughter and son. Best practice: Code status addressed with her faily members and her daughter gave me a DNR/DNI.        Total critical care time exclusive of procedures:45 minutes  Raheem Sullivan MD [facility]

## 2020-04-06 NOTE — ED ADULT TRIAGE NOTE - CHIEF COMPLAINT QUOTE
c/o lower back and b/l hip pain since 8am, hx sickle cell. denies cp/sob. took 8mg dilaudid without relief.

## 2020-04-06 NOTE — ED PROVIDER NOTE - OBJECTIVE STATEMENT
33 y/o M PMHx sickle cell, avascular necrosis of bilateral hips, acute chest syndrome and PE presents to the ED with c/o bilateral hip and back pain. States this is typical of her sickle cell pain. Pt has palpitations because of the pain. His last dose of Dilaudid was at 8am this morning but the pain persisted so he came to the ED. Denies fevers, chills, CP, cough, SOB, and pain anywhere else on the body. 33 y/o M PMHx sickle cell, avascular necrosis of bilateral hips, acute chest syndrome and PE presents to the ED with c/o bilateral hip and back pain. States this is typical of her sickle cell pain. Pt has palpitations because of the pain. His last dose of Dilaudid was at 8am this morning but the pain persisted so he came to the ED. Denies fevers, chills, CP, cough, SOB, and pain anywhere else on the body. states that this has happened ot him many times in the past.

## 2020-04-06 NOTE — ED ADULT NURSE NOTE - OBJECTIVE STATEMENT
34y M, A&ox3, presents to ed for back pain, leg pain, per pt. Pt states "its my sickle cell" pt reports taking dilaudid 8mg without any relief. No cp, no sob, no n/v/d. no fevers nor chills.

## 2020-04-06 NOTE — ED PROVIDER NOTE - PHYSICAL EXAMINATION
General: Patient is well developed and well nourised. Patient is alert and oriented to person, place and date. Patient is laying comfortably in stretcher and appears in no acute distress.  HEENT: Head is normocephalic and atraumatic. Pupils are equal, round and reactive. Extraocular movements intact. No evidence of nystagmus, conjunctival injection, or scleral icterus. External ears symmetric without evidence of discharge.  Nose is symmetric, non-tender, patent without evidence of discharge. Teeth in good repair. Uvula midline.   Neck: Supple with no evidence of lymphadenopathy.  Full range of motion.  Heart: Regular rate and rhythm. No murmurs, rubs or gallops.   Lungs: Clear to auscultation bilaterally with equal chest expansion. No note of wheezes, rhonchi, rales. Equal chest expansion. No note of retractions.  Abdomen: Bowel sounds present in all four quadrants. Soft, non-tender, non-distended without signs of masses, rebound or guarding. No note of hepatosplenomegaly. No CVA tenderness bilaterally. Negative Diallo sign. No pain present over McBurney's point.  Musculoskeletal: No edema, erythema, ecchymosis, atrophy or deformity. Full range of motion in all four extremities.  No clubbing or cyanosis. No point tenderness to palpation.   Neuro:  GCS 15. Moving all extremities without discomfort.  gait steady   Skin: Warm, dry and intact without evidence of rashes, bruising, pallor, jaundice or cyanosis.   Psych: Mood and affect appropriate.

## 2020-04-06 NOTE — ED PROVIDER NOTE - CLINICAL SUMMARY MEDICAL DECISION MAKING FREE TEXT BOX
35 y/o M PMHx sickle cell anemia presents for sickle cell pain in the typical location of his lower back and bilateral hips. On exam, pt is well and nontoxic appearing w/ lungs clear to auscultation and heart RRR. Plan is for meds and reeval. 33 y/o M PMHx sickle cell anemia presents for sickle cell pain in the typical location of his lower back and bilateral hips. On exam, pt is well and nontoxic appearing w/ lungs clear to auscultation and heart RRR. Plan is for meds and reeval. re-evaluation patient states improvement of symptoms. hr 115, patient states that this is normal for him and would like to be discharged home. ED evaluation and management discussed with the patient and family (if available) in detail.  Close PMD follow up encouraged.  Strict ED return instructions discussed in detail and patient given the opportunity to ask any questions about their discharge diagnosis and instructions. Patient verbalized understanding. Patient is agreeable to plan.

## 2020-04-06 NOTE — ED PROVIDER NOTE - ATTENDING CONTRIBUTION TO CARE
34 M SCA p/w 'typical sickle pain' to bilateral hips and lower back x days.  Hx AVN supposed to have hip replacements.  Is on home dilauded, last dose this AM did not improve symptoms.  Pt well, walking, FROM hips, nontender bony regions.  - pt reports typical.  No fever.  Denies drug use.  Plan dilauded and ressess.

## 2020-04-06 NOTE — ED PROVIDER NOTE - NSFOLLOWUPINSTRUCTIONS_ED_ALL_ED_FT
Sickle Cell Anemia, Adult  Sickle cell anemia is a condition where your red blood cells are shaped like katya. Red blood cells carry oxygen through the body. Sickle-shaped cells do not live as long as normal red blood cells. They also clump together and block blood from flowing through the blood vessels. This prevents the body from getting enough oxygen. Sickle cell anemia causes organ damage and pain. It also increases the risk of infection.  Follow these instructions at home:  Medicines     Take over-the-counter and prescription medicines only as told by your doctor.If you were prescribed an antibiotic medicine, take it as told by your doctor. Do not stop taking the antibiotic even if you start to feel better.If you develop a fever, do not take medicines to lower the fever right away. Tell your doctor about the fever.Managing pain, stiffness, and swelling     Try these methods to help with pain:  Use a heating pad.Take a warm bath.Distract yourself, such as by watching TV.Eating and drinking     Drink enough fluid to keep your pee (urine) clear or pale yellow. Drink more in hot weather and during exercise.Limit or avoid alcohol.Eat a healthy diet. Eat plenty of fruits, vegetables, whole grains, and lean protein.Take vitamins and supplements as told by your doctor.Traveling     When traveling, keep these with you:  Your medical information.The names of your doctors.Your medicines.If you need to take an airplane, talk to your doctor first.Activity     Rest often.Avoid exercises that make your heart beat much faster, such as jogging.General instructions     Do not use products that have nicotine or tobacco, such as cigarettes and e-cigarettes. If you need help quitting, ask your doctor.Consider wearing a medical alert bracelet.Avoid being in high places (high altitudes), such as mountains.Avoid very hot or cold temperatures.Avoid places where the temperature changes a lot.Keep all follow-up visits as told by your doctor. This is important.Contact a doctor if:  A joint hurts.Your feet or hands hurt or swell.You feel tired (fatigued).Get help right away if:  You have symptoms of infection. These include:  Fever.Chills.Being very tired.Irritability.Poor eating.Throwing up (vomiting).You feel dizzy or faint.You have new stomach pain, especially on the left side.You have a an erection (priapism) that lasts more than 4 hours.You have numbness in your arms or legs.You have a hard time moving your arms or legs.You have trouble talking.You have pain that does not go away when you take medicine.You are short of breath.You are breathing fast.You have a long-term cough.You have pain in your chest.You have a bad headache.You have a stiff neck.Your stomach looks bloated even though you did not eat much.Your skin is pale.You suddenly cannot see well.Summary  Sickle cell anemia is a condition where your red blood cells are shaped like katya.Follow your doctor's advice on ways to manage pain, food to eat, activities to do, and steps to take for safe travel.Get medical help right away if you have any signs of infection, such as a fever.This information is not intended to replace advice given to you by your health care provider. Make sure you discuss any questions you have with your health care provider.    Document Released: 10/08/2014 Document Revised: 01/23/2018 Document Reviewed: 01/23/2018  [employer] Interactive Patient Education © 2020 [employer]

## 2020-04-06 NOTE — ED PROVIDER NOTE - PATIENT PORTAL LINK FT
You can access the FollowMyHealth Patient Portal offered by [facility] by registering at the following website: [email or web address]. By joining [employer]’s FollowMyHealth portal, you will also be able to view your health information using other applications (apps) compatible with our system.

## 2020-04-07 ENCOUNTER — EMERGENCY (EMERGENCY)
Facility: HOSPITAL | Age: 35
LOS: 1 days | Discharge: HOME | End: 2020-04-07
Attending: EMERGENCY MEDICINE | Admitting: EMERGENCY MEDICINE
Payer: MEDICAID

## 2020-04-07 VITALS
WEIGHT: 160.94 LBS | TEMPERATURE: 99 F | HEART RATE: 108 BPM | DIASTOLIC BLOOD PRESSURE: 78 MMHG | HEIGHT: 68 IN | OXYGEN SATURATION: 99 % | SYSTOLIC BLOOD PRESSURE: 127 MMHG | RESPIRATION RATE: 16 BRPM

## 2020-04-07 PROCEDURE — 99284 EMERGENCY DEPT VISIT MOD MDM: CPT

## 2020-04-07 RX ORDER — HYDROMORPHONE HYDROCHLORIDE 2 MG/ML
2 INJECTION INTRAMUSCULAR; INTRAVENOUS; SUBCUTANEOUS ONCE
Refills: 0 | Status: DISCONTINUED | OUTPATIENT
Start: 2020-04-07 | End: 2020-04-07

## 2020-04-07 RX ORDER — IBUPROFEN 200 MG
600 TABLET ORAL ONCE
Refills: 0 | Status: COMPLETED | OUTPATIENT
Start: 2020-04-07 | End: 2020-04-07

## 2020-04-07 RX ADMIN — Medication 600 MILLIGRAM(S): at 03:31

## 2020-04-07 RX ADMIN — HYDROMORPHONE HYDROCHLORIDE 2 MILLIGRAM(S): 2 INJECTION INTRAMUSCULAR; INTRAVENOUS; SUBCUTANEOUS at 03:32

## 2020-04-07 NOTE — ED PROVIDER NOTE - CLINICAL SUMMARY MEDICAL DECISION MAKING FREE TEXT BOX
Will give one dose of pain meds. Will DC. Encouraged to F/U w primary hematologist as he has many visits to [facility] for similar. No distress.

## 2020-04-07 NOTE — ED ADULT NURSE NOTE - CHIEF COMPLAINT QUOTE
BIBA from [facility] requesting Dilaudid 8mg PO for sickle cell pain. Pt was dc from ED less than 12 hours ago for same, states he was out visiting friends and the pain returned. " I have medications in [place] but that is too far away"

## 2020-04-07 NOTE — ED PROVIDER NOTE - OBJECTIVE STATEMENT
33 y/o M PMHx sickle cell, avascular necrosis of bilateral hips, acute chest syndrome and PE presents to the ED with c/o bilateral hip and back pain. Similar to prior episodes of sickle cell pain. Evaluated for same at [facility] this afternoon. no fever, no chills, no chest pain or sob. Has a complex care note about repeat visits for same. F/U at [facility]. hx of B/L avascular hip necrosis pending replacements.

## 2020-04-07 NOTE — ED PROVIDER NOTE - PATIENT PORTAL LINK FT
You can access the FollowMyHealth Patient Portal offered by [facility] by registering at the following website: [email or web address]. By joining [employer]’s FollowMyHealth portal, you will also be able to view your health information using other applications (apps) compatible with our system.

## 2020-04-07 NOTE — ED ADULT TRIAGE NOTE - CHIEF COMPLAINT QUOTE
BIBA from [facility] requesting Dilaudid 8mg PO for sickle cell pain. Pt was dc from ED less than 12 hours ago for same, states he was out visiting friends and the pain returned. " I have medications in [place] but that is too far away"

## 2020-04-07 NOTE — ED ADULT NURSE NOTE - PAIN RATING/NUMBER SCALE (0-10): REST
5
FAMILY HISTORY:  Father  Still living? No  Family history of bladder cancer, Age at diagnosis: Age Unknown  Family history of diabetes mellitus, Age at diagnosis: Age Unknown    Mother  Still living? No  Family history of brain tumor, Age at diagnosis: Age Unknown  Family history of dementia, Age at diagnosis: Age Unknown

## 2020-04-10 DIAGNOSIS — D57.1 SICKLE-CELL DISEASE WITHOUT CRISIS: ICD-10-CM

## 2020-04-10 DIAGNOSIS — M25.551 PAIN IN RIGHT HIP: ICD-10-CM

## 2020-04-11 DIAGNOSIS — D57.00 HB-SS DISEASE WITH CRISIS, UNSPECIFIED: ICD-10-CM

## 2020-04-15 ENCOUNTER — EMERGENCY (EMERGENCY)
Facility: HOSPITAL | Age: 35
LOS: 1 days | Discharge: HOME | End: 2020-04-15
Attending: EMERGENCY MEDICINE
Payer: SELF-PAY

## 2020-04-15 ENCOUNTER — EMERGENCY (EMERGENCY)
Facility: HOSPITAL | Age: 35
LOS: 1 days | Discharge: HOME | End: 2020-04-15
Attending: EMERGENCY MEDICINE | Admitting: EMERGENCY MEDICINE
Payer: SELF-PAY

## 2020-04-15 VITALS
TEMPERATURE: 98 F | HEIGHT: 68 IN | WEIGHT: 160.94 LBS | SYSTOLIC BLOOD PRESSURE: 124 MMHG | OXYGEN SATURATION: 98 % | HEART RATE: 126 BPM | DIASTOLIC BLOOD PRESSURE: 77 MMHG | RESPIRATION RATE: 20 BRPM

## 2020-04-15 VITALS
TEMPERATURE: 98 F | OXYGEN SATURATION: 99 % | HEART RATE: 71 BPM | SYSTOLIC BLOOD PRESSURE: 109 MMHG | RESPIRATION RATE: 20 BRPM | DIASTOLIC BLOOD PRESSURE: 68 MMHG

## 2020-04-15 VITALS
HEIGHT: 68 IN | TEMPERATURE: 99 F | RESPIRATION RATE: 20 BRPM | DIASTOLIC BLOOD PRESSURE: 74 MMHG | HEART RATE: 97 BPM | OXYGEN SATURATION: 98 % | SYSTOLIC BLOOD PRESSURE: 116 MMHG

## 2020-04-15 VITALS — OXYGEN SATURATION: 99 % | HEART RATE: 90 BPM | RESPIRATION RATE: 20 BRPM

## 2020-04-15 PROCEDURE — 99283 EMERGENCY DEPT VISIT LOW MDM: CPT

## 2020-04-15 PROCEDURE — 96372 THER/PROPH/DIAG INJ SC/IM: CPT

## 2020-04-15 PROCEDURE — 99284 EMERGENCY DEPT VISIT MOD MDM: CPT

## 2020-04-15 PROCEDURE — 99284 EMERGENCY DEPT VISIT MOD MDM: CPT | Mod: 25

## 2020-04-15 RX ORDER — HYDROMORPHONE HYDROCHLORIDE 2 MG/ML
8 INJECTION INTRAMUSCULAR; INTRAVENOUS; SUBCUTANEOUS ONCE
Refills: 0 | Status: DISCONTINUED | OUTPATIENT
Start: 2020-04-15 | End: 2020-04-15

## 2020-04-15 RX ORDER — HYDROMORPHONE HYDROCHLORIDE 2 MG/ML
16 INJECTION INTRAMUSCULAR; INTRAVENOUS; SUBCUTANEOUS ONCE
Refills: 0 | Status: DISCONTINUED | OUTPATIENT
Start: 2020-04-15 | End: 2020-04-15

## 2020-04-15 RX ORDER — DIPHENHYDRAMINE HCL 50 MG
50 CAPSULE ORAL ONCE
Refills: 0 | Status: COMPLETED | OUTPATIENT
Start: 2020-04-15 | End: 2020-04-15

## 2020-04-15 RX ORDER — IBUPROFEN 200 MG
600 TABLET ORAL ONCE
Refills: 0 | Status: COMPLETED | OUTPATIENT
Start: 2020-04-15 | End: 2020-04-15

## 2020-04-15 RX ORDER — HYDROMORPHONE HYDROCHLORIDE 2 MG/ML
4 INJECTION INTRAMUSCULAR; INTRAVENOUS; SUBCUTANEOUS ONCE
Refills: 0 | Status: DISCONTINUED | OUTPATIENT
Start: 2020-04-15 | End: 2020-04-15

## 2020-04-15 RX ADMIN — Medication 50 MILLIGRAM(S): at 11:05

## 2020-04-15 RX ADMIN — HYDROMORPHONE HYDROCHLORIDE 16 MILLIGRAM(S): 2 INJECTION INTRAMUSCULAR; INTRAVENOUS; SUBCUTANEOUS at 21:34

## 2020-04-15 RX ADMIN — Medication 600 MILLIGRAM(S): at 21:35

## 2020-04-15 RX ADMIN — HYDROMORPHONE HYDROCHLORIDE 8 MILLIGRAM(S): 2 INJECTION INTRAMUSCULAR; INTRAVENOUS; SUBCUTANEOUS at 13:31

## 2020-04-15 RX ADMIN — HYDROMORPHONE HYDROCHLORIDE 4 MILLIGRAM(S): 2 INJECTION INTRAMUSCULAR; INTRAVENOUS; SUBCUTANEOUS at 11:09

## 2020-04-15 NOTE — ED ADULT TRIAGE NOTE - CHIEF COMPLAINT QUOTE
PT BIBA co pain to his back and bilateral hips that started at 0100 pt ran out of his pain medication, pt takes Dilaudid and ran out this morning. pt denies any sick contacts, fevers, chills, cough

## 2020-04-15 NOTE — ED PROVIDER NOTE - NS ED ROS FT
Constitutional: No fever or chills.   Eyes: No pain, blurry vision, or discharge.  ENMT: No hearing changes, pain, discharge or infections. No neck pain or stiffness.  Cardiac: No chest pain, SOB or edema. No chest pain with exertion.  Respiratory: No cough or respiratory distress. No hemoptysis. No history of asthma or RAD.  GI: No nausea, vomiting, diarrhea or abdominal pain.  : No dysuria, frequency or burning.  MS: No myalgia, muscle weakness, + hip and back pain.  Neuro: No headache or weakness. No LOC.  Skin: No skin rash.

## 2020-04-15 NOTE — ED PROVIDER NOTE - CLINICAL SUMMARY MEDICAL DECISION MAKING FREE TEXT BOX
Patient in ED w concern for hip and back pain, consistent with what he has experienced with sickle cell pain crisis in the past.  Patient notes he took 8 mg PO dilaudid at home today without any pain relief.  Patient states he typically receives 16 mg PO dilaudid in ED to treat his pain.  Patient is given this dose of pain medication and is noted to be feeling improved in ED.  Will plan for discharge home with instruction for prompt PCP follow up.  Patient is advised to follow up with their PCP in 1-2 days without fail.  Patient instructed to return to ED immediately should their symptoms worsen or if there is any concern prior to the recommended PCP follow up.  Patient is aware of plan and verbalizes their understanding.  Will discharge home at this time.

## 2020-04-15 NOTE — ED PROVIDER NOTE - NSFOLLOWUPINSTRUCTIONS_ED_ALL_ED_FT
Please follow up with your primary physician in 1-2 days for re evaluation.  Please return to ER immediately should your symptoms worsen or if you have any concern prior to this recommended follow up.    Sickle Cell Crisis    WHAT YOU NEED TO KNOW:    A sickle cell crisis is a painful episode that occurs in people who have sickle cell anemia. It happens when sickle-shaped red blood cells (RBCs) block blood vessels. Blood and oxygen cannot get to tissues, causing pain. A sickle cell crisis can also damage your tissues and cause organ failure, such liver or kidney failure. A sickle cell crisis can become life-threatening.    DISCHARGE INSTRUCTIONS:    Call 911 for any of the following:     You have shortness of breath or chest pain.      You are a man and have an erection that is painful and does not go away.       You lose vision in one or both eyes.    Return to the emergency department if:     You have a fever.      You feel like you cannot cope with your pain, or you feel like hurting yourself.       You have behavior changes, a seizure, or faint.       You have abdominal pain, nausea, or vomiting.      You have a headache that is worse or different from those that you have had in the past.       You have new weakness or numbness in your arm, leg, or face.      You have new pain in any part of your body.      Your urine is dark and you are urinating less than usual or not at all.       You are dizzy, lightheaded, or faint.     Contact your healthcare provider if:     You have any new signs or symptoms.       You have blood in your urine.       You are constipated or you have diarrhea.       You have changes in your vision.       You have increased fatigue.       You plan to travel by airplane or to a high [facility].       You have questions or concerns about your condition or care.    Medicines: You may need any of the following:     Prescription pain medicine may be given. Ask how to take this medicine safely. Medicine may also be given to decrease sickling of your RBCs. You may also need medicine to treat or prevent a bacterial infection.       NSAIDs, such as ibuprofen, help decrease swelling, pain, and fever. This medicine is available with or without a doctor's order. NSAIDs can cause stomach bleeding or kidney problems in certain people. If you take blood thinner medicine, always ask your healthcare provider if NSAIDs are safe for you. Always read the medicine label and follow directions.      Acetaminophen decreases pain and fever. It is available without a doctor's order. Ask how much to take and how often to take it. Follow directions. Acetaminophen can cause liver damage if not taken correctly.      Take your medicine as directed. Contact your healthcare provider if you think your medicine is not helping or if you have side effects. Tell him or her if you are allergic to any medicine. Keep a list of the medicines, vitamins, and herbs you take. Include the amounts, and when and why you take them. Bring the list or the pill bottles to follow-up visits. Carry your medicine list with you in case of an emergency.    Medical alert identification: Wear medical alert jewelry or carry a card that says you have sickle cell anemia. Ask your healthcare provider where to get these items. Medical Alert Jewelry         Prevent a sickle cell crisis:     Take vitamins and minerals as directed. Folic acid can help prevent blood vessel problems that can occur with sickle cell anemia. Zinc may decrease how often you have pain.       Drink liquids as directed. Dehydration can increase your risk for a sickle cell crisis. Ask how much liquid to drink each day and which liquids are best for you.      Balance rest and exercise. Rest during a sickle cell crisis. Over time, increase your activity to a moderate amount. Exercise regularly. Avoid exercise or activities that can cause injury, such as football. Ask about the best exercise plan for you.       Stay out of the cold. Do not go quickly from a warm place to a cold place. Do not go swimming in cold water. Stay warm in the winter.      Do not smoke cigarettes or drink alcohol. These increase your risk for a sickle cell crisis. Nicotine and other chemicals in cigarettes and cigars can cause lung damage. Ask your healthcare provider for information if you currently smoke and need help to quit. E-cigarettes or smokeless tobacco still contain nicotine. Talk to your healthcare provider before you use these products.       Ask about vaccinations you need. Vaccinations can help prevent a viral infection that may lead to a sickle cell crisis. Get a flu shot every year as directed. You may need a pneumonia vaccine every 5 years.     Follow up with your healthcare provider as directed: You may need ongoing screening for conditions that can develop because of sickle cell disease. Examples include kidney disease, hypertension (high blood pressure), retinopathy (eye problems), and problems with your lungs. Write down your questions so you remember to ask them during your visits.       © Copyright [employer] 2020       back to top                      © Copyright [employer] 2020

## 2020-04-15 NOTE — ED PROVIDER NOTE - CLINICAL SUMMARY MEDICAL DECISION MAKING FREE TEXT BOX
plan to control pain pt refused iv right now an wants to try meds, if first round cardenas snot help, he will allow for blood draw

## 2020-04-15 NOTE — ED PROVIDER NOTE - PATIENT PORTAL LINK FT
You can access the FollowMyHealth Patient Portal offered by [facility] by registering at the following website: [email or web address]. By joining [employer]’s FollowMyHealth portal, you will also be able to view your health information using other applications (apps) compatible with our system.

## 2020-04-15 NOTE — ED ADULT NURSE NOTE - CHPI ED NUR SYMPTOMS NEG
no fever/no tingling/no weakness/no decreased eating/drinking/no vomiting/no dizziness/no chills/no nausea

## 2020-04-15 NOTE — ED PROVIDER NOTE - PHYSICAL EXAMINATION
VITAL SIGNS: I have reviewed nursing notes and confirm.  CONSTITUTIONAL: Well-developed; well-nourished; in no acute distress.   SKIN:  warm and dry, no acute rash.   HEAD:  normocephalic, atraumatic.  EYES: PERRL.  EOM intact; conjunctiva and sclera clear.  ENT: No nasal discharge; airway clear.   NECK: Supple; non tender.  CARD: S1, S2 normal; no murmurs, gallops, or rubs. Regular rate and rhythm.   RESP:  Clear to auscultation b/l, no wheezes, rales or rhonchi.  ABD: Normal bowel sounds; soft; non-distended; non-tender; no guarding/rebound.  MSK: Pelvis stable to compression, normal gait.    EXT: No reproducible pain on palpation to hips/extremities.  Normal ROM. No clubbing, cyanosis or edema. 2+ pulses to b/l ue/le.  NEURO: Alert, oriented, grossly unremarkable.  5/5 strength x 4 extremities against gravity and external force.  No drift x 4 extremities.  Sensation intact and symmetric x 4 extremities.  No facial asymmetry.    PSYCH: Cooperative, mood and affect appropriate.

## 2020-04-15 NOTE — ED PROVIDER NOTE - OBJECTIVE STATEMENT
34 year old male with history of sickle cell disease presents to ED with concern for bilateral hip and back pain, consistent with what he has experienced with pain crisis in the past.  Patient denies associated fever, chills, chest pain, shortness of breath, abdominal pain, nausea, emesis, changes to bowel movements, inability to ambulate or any additional acute complaints or concerns at this time.  Patient states he took 8mg of PO Dilaudid earlier today without any significant improvement in his symptoms.  He is requesting his typical 16 mg PO Dilaudid and Motrin today in ED.

## 2020-04-15 NOTE — ED PROVIDER NOTE - PATIENT PORTAL LINK FT
You can access the FollowMyHealth Patient Portal offered by [facility] by registering at the following website: [email or web address]. By joining [employer]’s FollowMyHealth portal, you will also be able to view your health information using other applications (apps) compatible with our system.

## 2020-04-15 NOTE — ED ADULT NURSE NOTE - OBJECTIVE STATEMENT
34 year old M patient with c/o of back pain.  States "my sickle cell is acting up".  Denies trauma/falls.  Denies urinary/bowel incontinence.  A+OX3, ambulatory w steady gait.

## 2020-04-19 DIAGNOSIS — D57.00 HB-SS DISEASE WITH CRISIS, UNSPECIFIED: ICD-10-CM

## 2020-04-19 DIAGNOSIS — Z79.899 OTHER LONG TERM (CURRENT) DRUG THERAPY: ICD-10-CM

## 2020-04-19 DIAGNOSIS — M25.551 PAIN IN RIGHT HIP: ICD-10-CM

## 2020-04-20 ENCOUNTER — EMERGENCY (EMERGENCY)
Facility: HOSPITAL | Age: 35
LOS: 1 days | Discharge: HOME | End: 2020-04-20
Attending: EMERGENCY MEDICINE | Admitting: EMERGENCY MEDICINE
Payer: SELF-PAY

## 2020-04-20 VITALS
HEART RATE: 125 BPM | SYSTOLIC BLOOD PRESSURE: 148 MMHG | DIASTOLIC BLOOD PRESSURE: 88 MMHG | RESPIRATION RATE: 18 BRPM | HEIGHT: 68 IN | OXYGEN SATURATION: 99 % | WEIGHT: 160.94 LBS | TEMPERATURE: 98 F

## 2020-04-20 VITALS
DIASTOLIC BLOOD PRESSURE: 80 MMHG | RESPIRATION RATE: 18 BRPM | HEART RATE: 106 BPM | TEMPERATURE: 99 F | OXYGEN SATURATION: 99 % | SYSTOLIC BLOOD PRESSURE: 128 MMHG

## 2020-04-20 PROCEDURE — 99284 EMERGENCY DEPT VISIT MOD MDM: CPT

## 2020-04-20 PROCEDURE — 99283 EMERGENCY DEPT VISIT LOW MDM: CPT

## 2020-04-20 RX ORDER — IBUPROFEN 200 MG
600 TABLET ORAL ONCE
Refills: 0 | Status: COMPLETED | OUTPATIENT
Start: 2020-04-20 | End: 2020-04-20

## 2020-04-20 RX ORDER — HYDROMORPHONE HYDROCHLORIDE 2 MG/ML
16 INJECTION INTRAMUSCULAR; INTRAVENOUS; SUBCUTANEOUS ONCE
Refills: 0 | Status: DISCONTINUED | OUTPATIENT
Start: 2020-04-20 | End: 2020-04-20

## 2020-04-20 RX ORDER — DIPHENHYDRAMINE HCL 50 MG
25 CAPSULE ORAL ONCE
Refills: 0 | Status: COMPLETED | OUTPATIENT
Start: 2020-04-20 | End: 2020-04-20

## 2020-04-20 RX ORDER — HYDROMORPHONE HYDROCHLORIDE 2 MG/ML
8 INJECTION INTRAMUSCULAR; INTRAVENOUS; SUBCUTANEOUS ONCE
Refills: 0 | Status: DISCONTINUED | OUTPATIENT
Start: 2020-04-20 | End: 2020-04-20

## 2020-04-20 RX ADMIN — Medication 25 MILLIGRAM(S): at 17:30

## 2020-04-20 RX ADMIN — HYDROMORPHONE HYDROCHLORIDE 16 MILLIGRAM(S): 2 INJECTION INTRAMUSCULAR; INTRAVENOUS; SUBCUTANEOUS at 17:30

## 2020-04-20 RX ADMIN — Medication 600 MILLIGRAM(S): at 17:30

## 2020-04-20 RX ADMIN — HYDROMORPHONE HYDROCHLORIDE 8 MILLIGRAM(S): 2 INJECTION INTRAMUSCULAR; INTRAVENOUS; SUBCUTANEOUS at 18:30

## 2020-04-20 NOTE — ED ADULT NURSE NOTE - OBJECTIVE STATEMENT
PMH sickle cell with pain to left leg and b/l hip. States "pain meds at home not working". Home meds consist of dilaudid 8mg, folic acid and multivitamin. Has had acute chest in past.

## 2020-04-20 NOTE — ED PROVIDER NOTE - PROGRESS NOTE DETAILS
reports improvement in pain but still not to baseline.  requesting second dose of po dilaudid then thinks he will be ready for dc home

## 2020-04-20 NOTE — ED PROVIDER NOTE - CLINICAL SUMMARY MEDICAL DECISION MAKING FREE TEXT BOX
recurrent hip/back pain with sickle cell, similar to prior.  requesting po meds as prior.  tachycardic, no fever.  says his heartrate is usually high with pain, better with meds.  given dose of meds, hr improved, feels better.  comfortable with dc.  return precautions discussed

## 2020-04-20 NOTE — ED ADULT TRIAGE NOTE - CHIEF COMPLAINT QUOTE
pt c/o lower back pain and bilateral hip pain. pt states " I have a sickle cell crisis " took 8 mg PO Dilaudid in the morning without relief.

## 2020-04-20 NOTE — ED PROVIDER NOTE - PATIENT PORTAL LINK FT
You can access the FollowMyHealth Patient Portal offered by [facility] by registering at the following website: [email or web address]. By joining [employer]’s FollowMyHealth portal, you will also be able to view your health information using other applications (apps) compatible with our system.

## 2020-04-20 NOTE — ED PROVIDER NOTE - OBJECTIVE STATEMENT
34 year old male with history of sickle cell disease presents to ED with concern for bilateral hip and back pain, similar to prior sickle cell pain crisis.  Started this am again.  Took dilaudid 8mg po without relief.  Patient denies associated fever, chills, chest pain, shortness of breath, abdominal pain, nausea, emesis, changes to bowel movements, inability to ambulate or any additional acute complaints or concerns at this time.  He is requesting his typical 16 mg PO Dilaudid and Motrin along with benadryl in ED.

## 2020-04-20 NOTE — ED ADULT NURSE NOTE - ISOLATION TYPE:
None [Father] : father [Normal] : Normal [No] : No cigarette smoke exposure [Tummy time] : Tummy time [Water heater temperature set at <120 degrees F] : Water heater temperature set at <120 degrees F [Carbon Monoxide Detectors] : Carbon monoxide detectors [Rear facing car seat in  back seat] : Rear facing car seat in  back seat [Smoke Detectors] : Smoke detectors [Up to date] : Up to date [Gun in Home] : No gun in home [FreeTextEntry1] : when holding child uprigth- infant hold his arms back and away from body- when prone, lays on belly with arms back and perpendicular to the ground - if parent places hands in front, will keep but tries to move them back- head does go up 90 degrees-  [FreeTextEntry7] : 4 mth ck [de-identified] : 6-7 ounce feedings-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

## 2020-04-20 NOTE — ED PROVIDER NOTE - NSFOLLOWUPINSTRUCTIONS_ED_ALL_ED_FT
Please see your primary care provider in 2-3 days.  Call for appointment.  If you have any problems with followup, please call the ED Referral Coordinator at [phone].  Return to the ER if symptoms worsen or other concerns.    Sickle Cell Crisis    WHAT YOU NEED TO KNOW:    A sickle cell crisis is a painful episode that occurs in people who have sickle cell anemia. It happens when sickle-shaped red blood cells (RBCs) block blood vessels. Blood and oxygen cannot get to tissues, causing pain. A sickle cell crisis can also damage your tissues and cause organ failure, such liver or kidney failure. A sickle cell crisis can become life-threatening.    DISCHARGE INSTRUCTIONS:    Call 911 for any of the following:     You have shortness of breath or chest pain.      You are a man and have an erection that is painful and does not go away.       You lose vision in one or both eyes.    Return to the emergency department if:     You have a fever.      You feel like you cannot cope with your pain, or you feel like hurting yourself.       You have behavior changes, a seizure, or faint.       You have abdominal pain, nausea, or vomiting.      You have a headache that is worse or different from those that you have had in the past.       You have new weakness or numbness in your arm, leg, or face.      You have new pain in any part of your body.      Your urine is dark and you are urinating less than usual or not at all.       You are dizzy, lightheaded, or faint.     Contact your healthcare provider if:     You have any new signs or symptoms.       You have blood in your urine.       You are constipated or you have diarrhea.       You have changes in your vision.       You have increased fatigue.       You plan to travel by airplane or to a high [facility].       You have questions or concerns about your condition or care.    Medicines: You may need any of the following:     Prescription pain medicine may be given. Ask how to take this medicine safely. Medicine may also be given to decrease sickling of your RBCs. You may also need medicine to treat or prevent a bacterial infection.       NSAIDs, such as ibuprofen, help decrease swelling, pain, and fever. This medicine is available with or without a doctor's order. NSAIDs can cause stomach bleeding or kidney problems in certain people. If you take blood thinner medicine, always ask your healthcare provider if NSAIDs are safe for you. Always read the medicine label and follow directions.      Acetaminophen decreases pain and fever. It is available without a doctor's order. Ask how much to take and how often to take it. Follow directions. Acetaminophen can cause liver damage if not taken correctly.      Take your medicine as directed. Contact your healthcare provider if you think your medicine is not helping or if you have side effects. Tell him or her if you are allergic to any medicine. Keep a list of the medicines, vitamins, and herbs you take. Include the amounts, and when and why you take them. Bring the list or the pill bottles to follow-up visits. Carry your medicine list with you in case of an emergency.    Medical alert identification: Wear medical alert jewelry or carry a card that says you have sickle cell anemia. Ask your healthcare provider where to get these items. Medical Alert Jewelry         Prevent a sickle cell crisis:     Take vitamins and minerals as directed. Folic acid can help prevent blood vessel problems that can occur with sickle cell anemia. Zinc may decrease how often you have pain.       Drink liquids as directed. Dehydration can increase your risk for a sickle cell crisis. Ask how much liquid to drink each day and which liquids are best for you.      Balance rest and exercise. Rest during a sickle cell crisis. Over time, increase your activity to a moderate amount. Exercise regularly. Avoid exercise or activities that can cause injury, such as football. Ask about the best exercise plan for you.       Stay out of the cold. Do not go quickly from a warm place to a cold place. Do not go swimming in cold water. Stay warm in the winter.      Do not smoke cigarettes or drink alcohol. These increase your risk for a sickle cell crisis. Nicotine and other chemicals in cigarettes and cigars can cause lung damage. Ask your healthcare provider for information if you currently smoke and need help to quit. E-cigarettes or smokeless tobacco still contain nicotine. Talk to your healthcare provider before you use these products.       Ask about vaccinations you need. Vaccinations can help prevent a viral infection that may lead to a sickle cell crisis. Get a flu shot every year as directed. You may need a pneumonia vaccine every 5 years.     Follow up with your healthcare provider as directed: You may need ongoing screening for conditions that can develop because of sickle cell disease. Examples include kidney disease, hypertension (high blood pressure), retinopathy (eye problems), and problems with your lungs. Write down your questions so you remember to ask them during your visits.

## 2020-04-21 ENCOUNTER — EMERGENCY (EMERGENCY)
Facility: HOSPITAL | Age: 35
LOS: 1 days | Discharge: HOME | End: 2020-04-21
Attending: EMERGENCY MEDICINE | Admitting: EMERGENCY MEDICINE
Payer: SELF-PAY

## 2020-04-21 VITALS
RESPIRATION RATE: 18 BRPM | HEIGHT: 68 IN | TEMPERATURE: 98 F | SYSTOLIC BLOOD PRESSURE: 126 MMHG | HEART RATE: 118 BPM | DIASTOLIC BLOOD PRESSURE: 83 MMHG | WEIGHT: 160.94 LBS | OXYGEN SATURATION: 99 %

## 2020-04-21 VITALS — HEART RATE: 91 BPM

## 2020-04-21 PROCEDURE — 99283 EMERGENCY DEPT VISIT LOW MDM: CPT

## 2020-04-21 RX ORDER — DIPHENHYDRAMINE HCL 50 MG
25 CAPSULE ORAL ONCE
Refills: 0 | Status: COMPLETED | OUTPATIENT
Start: 2020-04-21 | End: 2020-04-21

## 2020-04-21 RX ORDER — HYDROMORPHONE HYDROCHLORIDE 2 MG/ML
16 INJECTION INTRAMUSCULAR; INTRAVENOUS; SUBCUTANEOUS ONCE
Refills: 0 | Status: DISCONTINUED | OUTPATIENT
Start: 2020-04-21 | End: 2020-04-21

## 2020-04-21 RX ORDER — IBUPROFEN 200 MG
600 TABLET ORAL ONCE
Refills: 0 | Status: COMPLETED | OUTPATIENT
Start: 2020-04-21 | End: 2020-04-21

## 2020-04-21 RX ADMIN — HYDROMORPHONE HYDROCHLORIDE 16 MILLIGRAM(S): 2 INJECTION INTRAMUSCULAR; INTRAVENOUS; SUBCUTANEOUS at 16:55

## 2020-04-21 RX ADMIN — Medication 25 MILLIGRAM(S): at 16:55

## 2020-04-21 RX ADMIN — Medication 600 MILLIGRAM(S): at 16:55

## 2020-04-21 NOTE — ED PROVIDER NOTE - PATIENT PORTAL LINK FT
You can access the FollowMyHealth Patient Portal offered by [facility] by registering at the following website: [email or web address]. By joining [employer]’s FollowMyHealth portal, you will also be able to view your health information using other applications (apps) compatible with our system.

## 2020-04-21 NOTE — ED ADULT NURSE NOTE - SCORE
Occupational Therapy   Evaluation    Name: Jayla Reagan  MRN: [id number]  Admitting Diagnosis:  <principal problem not specified> Day of Surgery    Recommendations:     Discharge Recommendations: home health PT, home health OT  Discharge Equipment Recommendations:  walker, rolling, shower chair  Barriers to discharge:  None    Assessment:     Jayla Reagan is a 68 y.o. female with a medical diagnosis of <principal problem not specified>.  She presents with s/p spinal surgery . Performance deficits affecting function: weakness, impaired self care skills, impaired balance, impaired functional mobilty, impaired endurance, gait instability, decreased safety awareness, decreased ROM, impaired skin, orthopedic precautions.      Pt would benefit from cont OT services in order to maximize functional independence. Recommending RW and shower chair at d/c     Rehab Prognosis: Good; patient would benefit from acute skilled OT services to address these deficits and reach maximum level of function.       Plan:     Patient to be seen 5 x/week to address the above listed problems via self-care/home management, therapeutic activities, therapeutic exercises  · Plan of Care Expires: 12/29/19  · Plan of Care Reviewed with: patient, daughter    Subjective     Chief Complaint: pt reports she feels very medicated and dizzy  Patient/Family Comments/goals: return to PLOF    Occupational Profile:  Living Environment: alone, SSH, threshold to enter, t/s combo  Previous level of function: independent; drives; retired   Equipment Used at Home:  none  Assistance upon Discharge: from [facility]     Pain/Comfort:  · Pain Rating 1: 0/10    Patients cultural, spiritual, Confucianist conflicts given the current situation:      Objective:     Communicated with: nsbi prior to session.      General Precautions: Standard, fall   Orthopedic Precautions:spinal precautions   Braces: N/A     Occupational Performance:    Bed Mobility:    · Patient completed  Scooting/Bridging with contact guard assistance  · Patient completed Supine to Sit with minimum assistance  · Patient completed Sit to Supine with minimum assistance    Functional Mobility/Transfers:  · Patient completed Sit <> Stand Transfer with contact guard assistance and minimum assistance  with  rolling walker   · Functional Mobility: Min A with RW     Activities of Daily Living:  · Lower Body Dressing: total assistance      Cognitive/Visual Perceptual:  WFL }    Physical Exam:  Balance:    -       limited 2/2 drowsiness   Postural examination/scapula alignment:    -       Rounded shoulders  Skin integrity: Wound surgical   Upper Extremity Range of Motion:   WFL based on observed function  Upper Extremity Strength:  WFL based on observed function    Strength:  Good     AMPAC 6 Click ADL:  AMPAC Total Score: 17    Treatment & Education:  Educated on spinal precautions and log rolling for bed mobility prior to OOB   Pt requires assist to recall precautions   Min/mod A bed mobility 2/2 pain   Stood x 1 trial 2/2 dizziness  Lateral steps to HOB CGA/Min A with RW   Increase in dizziness with movement- returned to supine   Education:    Patient left supine with all lines intact, call button in reach, bed alarm on, nsg notified and dghtr present    GOALS:   Multidisciplinary Problems     Occupational Therapy Goals        Problem: Occupational Therapy Goal    Goal Priority Disciplines Outcome Interventions   Occupational Therapy Goal     OT, PT/OT Ongoing, Progressing    Description:  Goals to be met by: 12/29/19     Patient will increase functional independence with ADLs by performing:    LE Dressing with Stand-by Assistance.  Grooming while standing with Stand-by Assistance.  Toileting from toilet with Stand-by Assistance for hygiene and clothing management.   Supine to sit with Stand-by Assistance.  Step transfer with Stand-by Assistance  Toilet transfer to toilet with Stand-by Assistance.                       History:     Past Medical History:   Diagnosis Date    Atrial fibrillation     Cataract     Hypertension     PAD (peripheral artery disease)     Peripheral artery disease        Past Surgical History:   Procedure Laterality Date    BREAST SURGERY      CHOLECYSTECTOMY      ECTOPIC PREGNANCY SURGERY      SALPINGECTOMY         Time Tracking:     OT Date of Treatment: 12/27/19  OT Start Time: 1503  OT Stop Time: 1519  OT Total Time (min): 16 min    Billable Minutes:Evaluation 16    Nina Cantor OT  12/27/2019     1

## 2020-04-21 NOTE — ED PROVIDER NOTE - CHPI ED SYMPTOMS NEG
no fever, no chills, no chest pain, no SOB, no abdominal pain, no nausea, no emesis, no changes to BM, no inability to ambulate

## 2020-04-21 NOTE — ED PROVIDER NOTE - CLINICAL SUMMARY MEDICAL DECISION MAKING FREE TEXT BOX
34 year old male with history of sickle cell disease returns to ED with concern for bilateral hip and back pain, similar to prior sickle cell pain crisis. Requesting usual Dilaudid 16 mg, Motrin 600 and benadryl 25 mg regimen.   Patient advised that he cannot come to ED on a daily basis for pain medication bc his insurance is inactive. Spoke with  who recommended he go to [facility] tomorrow and request for medicaid to be reinstated. patient understands and states he will comply with this plan.    I have discussed the discharge plan with the patient. The patient agrees with the plan, as discussed.  The patient understands Emergency Department diagnosis is a preliminary diagnosis often based on limited information and that the patient must adhere to the follow-up plan as discussed.  The patient understands that if the symptoms worsen or if prescribed medications do not have the desired/planned effect that the patient must/may return to the closest Emergency Department at any time for further evaluation and treatment.

## 2020-04-21 NOTE — ED PROVIDER NOTE - ATTENDING CONTRIBUTION TO CARE
recurrent hip/back pain related to sickle cell.  ran out of his meds.  says he can't refil until next month.  no fever.  dose given with improvement.  vitals stable

## 2020-04-21 NOTE — ED PROVIDER NOTE - NSFOLLOWUPINSTRUCTIONS_ED_ALL_ED_FT
PLEASE GO TO [facility] ED LOCATED AT [address] [place] [address] and ask to have your Medicaid reinstated.     Please follow up with your primary care physician Dr. Francois Laguna for medication refill.

## 2020-04-21 NOTE — ED PROVIDER NOTE - OBJECTIVE STATEMENT
34 year old male with history of sickle cell disease presents to ED with concern for bilateral hip and back pain, similar to prior sickle cell pain crisis.  Started this am again.  Took dilaudid 8mg po without relief.  Patient denies associated fever, chills, chest pain, shortness of breath, abdominal pain, nausea, emesis, changes to bowel movements, inability to ambulate or any additional acute complaints or concerns at this time.  He is requesting his typical 16 mg PO Dilaudid and Motrin along with benadryl in ED. 34 year old male with history of sickle cell disease returns to ED with concern for bilateral hip and back pain, similar to prior sickle cell pain crisis.  Started this am again.  Took Dilaudid 8mg po without relief.  Patient denies associated fever, chills, chest pain, shortness of breath, abdominal pain, nausea, emesis, changes to bowel movements, inability to ambulate or any additional acute complaints or concerns at this time.  He is requesting his typical 16 mg PO Dilaudid and Motrin along with benadryl in ED. 34 year old male with history of sickle cell disease returns to ED with concern for bilateral hip and back pain, similar to prior sickle cell pain crisis.  Started this am again.  Took Dilaudid 8mg po without relief.      Patient denies associated fever, chills, chest pain, shortness of breath, abdominal pain, nausea, emesis, changes to bowel movements, inability to ambulate or any additional acute complaints or concerns at this time.      States his medicaid is cut and he ran out of dilaudid RX and cannot afford refill.

## 2020-04-21 NOTE — ED ADULT NURSE NOTE - OBJECTIVE STATEMENT
c/o bilateral hip pain reported as "sickle cell pain." Pt states he had taken his last dose of Dilaudid 8mg PO today and quotes "my insurance is running out and I can't get anymore." Pt denies chest pain, sob or dizziness. Pt is requesting for Dilaudid 16mg, Benadryl and Ibuprofen.

## 2020-04-21 NOTE — ED PROVIDER NOTE - SKIN NEGATIVE STATEMENT, MLM
Per review this has been picked up. Closing encounter.    no abrasions, no jaundice, no lesions, no pruritis, and no rashes.

## 2020-04-21 NOTE — ED ADULT TRIAGE NOTE - CHIEF COMPLAINT QUOTE
c/o lower back and b/l hip pain typical of his sickle-cell pain. states he took 8mg dilaudid which provided no relief. denies cp/sob.

## 2020-04-24 DIAGNOSIS — D57.00 HB-SS DISEASE WITH CRISIS, UNSPECIFIED: ICD-10-CM

## 2020-04-24 DIAGNOSIS — Z79.899 OTHER LONG TERM (CURRENT) DRUG THERAPY: ICD-10-CM

## 2020-04-24 DIAGNOSIS — Z79.891 LONG TERM (CURRENT) USE OF OPIATE ANALGESIC: ICD-10-CM

## 2020-04-25 DIAGNOSIS — Z79.1 LONG TERM (CURRENT) USE OF NON-STEROIDAL ANTI-INFLAMMATORIES (NSAID): ICD-10-CM

## 2020-04-25 DIAGNOSIS — M25.551 PAIN IN RIGHT HIP: ICD-10-CM

## 2020-04-25 DIAGNOSIS — D57.00 HB-SS DISEASE WITH CRISIS, UNSPECIFIED: ICD-10-CM

## 2020-04-29 ENCOUNTER — EMERGENCY (EMERGENCY)
Facility: HOSPITAL | Age: 35
LOS: 1 days | Discharge: HOME | End: 2020-04-29
Attending: EMERGENCY MEDICINE | Admitting: EMERGENCY MEDICINE
Payer: SELF-PAY

## 2020-04-29 VITALS
HEART RATE: 108 BPM | TEMPERATURE: 98 F | OXYGEN SATURATION: 100 % | HEIGHT: 68 IN | RESPIRATION RATE: 19 BRPM | SYSTOLIC BLOOD PRESSURE: 110 MMHG | DIASTOLIC BLOOD PRESSURE: 74 MMHG | WEIGHT: 160.94 LBS

## 2020-04-29 VITALS
TEMPERATURE: 99 F | SYSTOLIC BLOOD PRESSURE: 114 MMHG | DIASTOLIC BLOOD PRESSURE: 72 MMHG | OXYGEN SATURATION: 100 % | HEART RATE: 98 BPM | RESPIRATION RATE: 19 BRPM

## 2020-04-29 PROCEDURE — 99283 EMERGENCY DEPT VISIT LOW MDM: CPT

## 2020-04-29 RX ORDER — HYDROMORPHONE HYDROCHLORIDE 2 MG/ML
16 INJECTION INTRAMUSCULAR; INTRAVENOUS; SUBCUTANEOUS ONCE
Refills: 0 | Status: DISCONTINUED | OUTPATIENT
Start: 2020-04-29 | End: 2020-04-29

## 2020-04-29 RX ORDER — DIPHENHYDRAMINE HCL 50 MG
25 CAPSULE ORAL ONCE
Refills: 0 | Status: COMPLETED | OUTPATIENT
Start: 2020-04-29 | End: 2020-04-29

## 2020-04-29 RX ORDER — IBUPROFEN 200 MG
600 TABLET ORAL ONCE
Refills: 0 | Status: COMPLETED | OUTPATIENT
Start: 2020-04-29 | End: 2020-04-29

## 2020-04-29 RX ADMIN — Medication 600 MILLIGRAM(S): at 07:58

## 2020-04-29 RX ADMIN — HYDROMORPHONE HYDROCHLORIDE 16 MILLIGRAM(S): 2 INJECTION INTRAMUSCULAR; INTRAVENOUS; SUBCUTANEOUS at 07:58

## 2020-04-29 RX ADMIN — Medication 25 MILLIGRAM(S): at 07:58

## 2020-04-29 NOTE — ED PROVIDER NOTE - PHYSICAL EXAMINATION
Vitals reviewed  Gen: appears uncomfortable, speaking in full sentences   Skin: wwp, no rash/lesions  HEENT: ncat, eomi, mmm  Back: b/l lumbar ttp, no midline ttp, no step off/deformity  CV: tachycardia, regular rhythm, no audible m/r/g  Resp: symmetrical expansion, ctab, no w/r/r  Abd: nondistended, soft/nt  Ext: FROM throughout, 5/5 strength in all ext, distal sensation intact, no peripheral edema/calf ttp   Neuro: alert/oriented, no focal deficits, steady gait

## 2020-04-29 NOTE — ED PROVIDER NOTE - PATIENT PORTAL LINK FT
You can access the FollowMyHealth Patient Portal offered by [facility] by registering at the following website: [email or web address]. By joining [facility]’s FollowMyHealth portal, you will also be able to view your health information using other applications (apps) compatible with our system.

## 2020-04-29 NOTE — ED PROVIDER NOTE - ATTENDING CONTRIBUTION TO CARE
34 M pmh SSD, acute chest, h/o PE, AVN b/l hips p/w back and b/l hip pain typical of sickle cell crisis pain.  Was supposed to have hip replacement but delayed due to covid as per pt.  Pt reports he typically takes dilaudid 8mg PO Q4 hrs prn but his insurance is inactive and hasn't had his meds.  Last visit was instructed to go to [facility] to reinstate his insurance but never went "because I don't know where that hospital is."  Pt requesting his usual 16mg dilaudid PO w/ motrin/benadryl.  Denies fever, chills, headache, dizziness, vision changes, chest pain, sob, cough, uri sxs, abd pain, nvd, bowel/bladder dysfunction, numbness, weakness, difficulty walking, trauma/fall, travel    Agree with HPI and PE as documented by PA  Pt well known to ED with sickle cell pain  No other acute issues in ED  Afebrile, no acute chest pain or dyspnea  Feels better after meds in ED

## 2020-04-29 NOTE — ED ADULT NURSE NOTE - OBJECTIVE STATEMENT
Pt presents to ER c/o sudden onset of intermittent "9/10 pressure" bilateral hip and lower back pain since ~0000 tonight. Denies any other completes at this time.

## 2020-04-29 NOTE — ED PROVIDER NOTE - OBJECTIVE STATEMENT
34 M pmh SSD, acute chest, h/o PE, AVN b/l hips p/w back and b/l hip pain typical of sickle cell crisis pain.  Was supposed to have hip replacement but delayed due to covid as per pt.  Pt reports he typically takes dilaudid 8mg PO Q4 hrs prn but his insurance is inactive and hasn't had his meds.  Last visit was instructed to go to [facility] to reinstate his insurance but never went "because I don't know where that hospital is."  Pt requesting his usual 16mg dilaudid PO w/ motrin/benadryl.  Denies fever, chills, headache, dizziness, vision changes, chest pain, sob, cough, uri sxs, abd pain, nvd, bowel/bladder dysfunction, numbness, weakness, difficulty walking, trauma/fall, travel

## 2020-04-29 NOTE — ED PROVIDER NOTE - NSFOLLOWUPINSTRUCTIONS_ED_ALL_ED_FT
Please go to [facility] today in order to get your insurance and prescriptions.  Hydrate with plenty of water and schedule appointment with primary doctor within one week.  Return to ED for fever, chest pain, difficulty breathing, vomiting, numbness/weakness in extremities, or other concerning symptoms     [facility] [id number] [phone]    Sickle Cell Anemia, Adult     Sickle cell anemia is a condition in which red blood cells have an abnormal “sickle” shape. Red blood cells carry oxygen through the body. Sickle-shaped red blood cells do not live as long as normal red blood cells. They also clump together and block blood from flowing through the blood vessels. This condition prevents the body from getting enough oxygen. Sickle cell anemia causes organ damage and pain. It also increases the risk of infection.  What are the causes?  This condition is caused by a gene that is passed from parent to child (inherited). Receiving two copies of the gene causes the disease. Receiving one copy causes the "trait," which means that symptoms are milder or not present.  What increases the risk?  This condition is more likely to develop if your ancestors were from [place], the [place], [place] or [place], the [place], [place], or the [place].  What are the signs or symptoms?  Symptoms of this condition include:  Episodes of pain (crises), especially in the hands and feet, joints, back, chest, or abdomen. The pain can be triggered by:  An illness, especially if there is dehydration.Doing an activity with great effort (overexertion).Exposure to extreme temperature changes.High altitude.Fatigue.Shortness of breath or difficulty breathing.Dizziness.Pale skin or yellowed skin (jaundice).Frequent bacterial infections.Pain and swelling in the hands and feet (hand-food syndrome).Prolonged, painful erection of the penis (priapism). Acute chest syndrome. Symptoms of this include:  Chest pain.Fever.Cough.Fast breathing.Stroke.Decreased activity.Loss of appetite.Change in behavior.Headaches.Seizures.Vision changes.Skin ulcers.Heart disease.High blood pressure.Gallstones.Liver and kidney problems.How is this diagnosed?  This condition is diagnosed with blood tests that check for the gene that causes this condition.  How is this treated?  There is no cure for most cases of this condition. Treatment focuses on managing your symptoms and preventing complications of the disease. Your health care provider will work with you to identify the best treatment options for you based on an assessment of your condition. Treatment may include:  Medicines, including:  Pain medicines.Antibiotic medicines for infection.Medicines to increase the production of a protein in red blood cells that helps carry oxygen in the body (hemoglobin).Fluids to treat pain and swelling.Oxygen to treat acute chest syndrome.Blood transfusions to treat symptoms such as fatigue, stroke, and acute chest syndrome.Massage and physical therapy for pain.Regular tests to monitor your condition, such as blood tests, X-rays, CT scans, MRI scans, ultrasounds, and lung function tests. These should be done every 3–12 months, depending on your age.Hematopoietic stem cell transplant. This is a procedure to replace abnormal stem cells with healthy stem cells from a donor's bone marrow. Stem cells are cells that can develop into blood cells, and bone marrow is the spongy tissue inside the bones.Follow these instructions at home:  Medicines     Take over-the-counter and prescription medicines only as told by your health care provider.If you were prescribed an antibiotic medicine, take it as told by your health care provider. Do not stop taking the antibiotic even if you start to feel better.If you develop a fever, do not take medicines to reduce the fever right away. This could cover up another problem. Notify your health care provider.Managing pain, stiffness, and swelling     Try these methods to help ease your pain:  Using a heating pad.Taking a warm bath.Distracting yourself, such as by watching TV.Eating and drinking     Drink enough fluid to keep your urine clear or pale yellow. Drink more in hot weather and during exercise.Limit or avoid drinking alcohol.Eat a balanced and nutritious diet. Eat plenty of fruits, vegetables, whole grains, and lean protein.Take vitamins and supplements as directed by your health care provider.Traveling     When traveling, keep these with you:  Your medical information.The names of your health care providers.Your medicines.If you have to travel by air, ask about precautions you should take.Activity     Get plenty of rest.Avoid activities that will lower your oxygen levels, such as exercising vigorously.General instructions     Do not use any products that contain nicotine or tobacco, such as cigarettes and e-cigarettes. They lower blood oxygen levels. If you need help quitting, ask your health care provider.Consider wearing a medical alert bracelet.Avoid high altitudes.Avoid extreme temperatures and extreme temperature changes.Keep all follow-up visits as told by your health care provider. This is important.Contact a health care provider if:  You develop joint pain.Your feet or hands swell or have pain.You have fatigue.Get help right away if:  You have symptoms of infection. These include:  Fever.Chills.Extreme tiredness.Irritability.Poor eating.Vomiting.You feel dizzy or faint.You have new abdominal pain, especially on the left side near the stomach area.You develop priapism.You have numbness in your arms or legs or have trouble moving them.You have trouble talking.You develop pain that cannot be controlled with medicine.You become short of breath.You have rapid breathing.You have a persistent cough.You have pain in your chest.You develop a severe headache or stiff neck.You feel bloated without eating or after eating a small amount of food.Your skin is pale.You suddenly lose vision.Summary  Sickle cell anemia is a condition in which red blood cells have an abnormal “sickle” shape. This disease can cause organ damage and chronic pain, and it can raise your risk of infection.Sickle cell anemia is a genetic disorder.Treatment focuses on managing your symptoms and preventing complications of the disease.Get medical help right away if you have any signs of infection, such as a fever.This information is not intended to replace advice given to you by your health care provider. Make sure you discuss any questions you have with your health care provider.

## 2020-04-29 NOTE — ED PROVIDER NOTE - CLINICAL SUMMARY MEDICAL DECISION MAKING FREE TEXT BOX
34 M pmh SSD, acute chest, h/o PE, AVN b/l hips p/w back and b/l hip pain typical of sickle cell crisis pain, ran out of meds due to insurance issues- was instructed to go to [facility] last week to reinstate insurance but pt never went- "didn't know where it was."  pt tachycardic 2/2 pain, no fever, denies chest pain/sob, uri sxs.  pt improved after meds.  given address/phone number for [facility] and instructed to go today in order to get insurance.  discussed strict return parameters.

## 2020-04-29 NOTE — ED ADULT NURSE NOTE - CHPI ED NUR SYMPTOMS NEG
no tingling/no vomiting/no weakness/no fever/no nausea/no dizziness/no chills/no decreased eating/drinking

## 2020-05-03 DIAGNOSIS — D57.00 HB-SS DISEASE WITH CRISIS, UNSPECIFIED: ICD-10-CM

## 2020-05-03 DIAGNOSIS — Z79.01 LONG TERM (CURRENT) USE OF ANTICOAGULANTS: ICD-10-CM

## 2020-05-03 DIAGNOSIS — M54.9 DORSALGIA, UNSPECIFIED: ICD-10-CM

## 2020-05-10 ENCOUNTER — EMERGENCY (EMERGENCY)
Facility: HOSPITAL | Age: 35
LOS: 1 days | Discharge: HOME | End: 2020-05-10
Attending: EMERGENCY MEDICINE | Admitting: EMERGENCY MEDICINE
Payer: SELF-PAY

## 2020-05-10 VITALS
DIASTOLIC BLOOD PRESSURE: 80 MMHG | HEART RATE: 112 BPM | RESPIRATION RATE: 18 BRPM | WEIGHT: 160.94 LBS | SYSTOLIC BLOOD PRESSURE: 144 MMHG | TEMPERATURE: 98 F | OXYGEN SATURATION: 98 % | HEIGHT: 68 IN

## 2020-05-10 VITALS
SYSTOLIC BLOOD PRESSURE: 125 MMHG | OXYGEN SATURATION: 97 % | HEART RATE: 85 BPM | DIASTOLIC BLOOD PRESSURE: 73 MMHG | TEMPERATURE: 98 F | RESPIRATION RATE: 18 BRPM

## 2020-05-10 PROCEDURE — 99283 EMERGENCY DEPT VISIT LOW MDM: CPT

## 2020-05-10 RX ORDER — HYDROMORPHONE HYDROCHLORIDE 2 MG/ML
16 INJECTION INTRAMUSCULAR; INTRAVENOUS; SUBCUTANEOUS ONCE
Refills: 0 | Status: DISCONTINUED | OUTPATIENT
Start: 2020-05-10 | End: 2020-05-10

## 2020-05-10 RX ORDER — IBUPROFEN 200 MG
600 TABLET ORAL ONCE
Refills: 0 | Status: COMPLETED | OUTPATIENT
Start: 2020-05-10 | End: 2020-05-10

## 2020-05-10 RX ORDER — DIPHENHYDRAMINE HCL 50 MG
25 CAPSULE ORAL ONCE
Refills: 0 | Status: COMPLETED | OUTPATIENT
Start: 2020-05-10 | End: 2020-05-10

## 2020-05-10 RX ADMIN — Medication 25 MILLIGRAM(S): at 13:52

## 2020-05-10 RX ADMIN — HYDROMORPHONE HYDROCHLORIDE 16 MILLIGRAM(S): 2 INJECTION INTRAMUSCULAR; INTRAVENOUS; SUBCUTANEOUS at 13:52

## 2020-05-10 RX ADMIN — Medication 600 MILLIGRAM(S): at 13:53

## 2020-05-10 NOTE — ED ADULT NURSE NOTE - OBJECTIVE STATEMENT
Patient presents to the ED complaining of back pain and hip pain since 5AM this morning. Patient reports that the cold sometimes makes it worst. Denies any injury. Reports that he needs hip surgery but due to COVID was delayed. Took motrin at 5 AM with no relief.

## 2020-05-10 NOTE — ED PROVIDER NOTE - PROGRESS NOTE DETAILS
[place]: Pain improved, vitals improved. Clinically no indication for further emergent ED workup or hospitalization at this time. Comfortable for dc, outpt f/u.

## 2020-05-10 NOTE — ED PROVIDER NOTE - PHYSICAL EXAMINATION
no LE edema, normal equal distal pulses  No spinal ttp, neck FROM. Strength 5/5. No bony ttp, FROM all extremities. Normal equal distal pulses. Steady unassisted gait.

## 2020-05-10 NOTE — ED PROVIDER NOTE - CLINICAL SUMMARY MEDICAL DECISION MAKING FREE TEXT BOX
34M PMH DVT/PE (no longer on AC), sickle cell (hx acute chest yrs ago), b/l hip AVN p/w b/l back and b/l hip pain. Pt has chronic intermittent similar pain attributed to sickle crisis/AVN, worse since ~0500. Attributes it to colder weather over last few days. Taking home dilaudid (and motrin at 0500) w/ minimal relief so came to ED. No other systemic symptoms. Mild tachycardia, other vitals wnl. Exam as above.  ddx: Likely sickle crisis.  PO meds, reassess.

## 2020-05-10 NOTE — ED PROVIDER NOTE - PATIENT PORTAL LINK FT
You can access the FollowMyHealth Patient Portal offered by [facility] by registering at the following website: [email or web address]. By joining [facility]’s FollowMyHealth portal, you will also be able to view your health information using other applications (apps) compatible with our system.

## 2020-05-10 NOTE — ED PROVIDER NOTE - OBJECTIVE STATEMENT
34M PMH DVT/PE (no longer on AC), sickle cell (hx acute chest yrs ago), b/l hip AVN p/w  Pt often tachy on prior ED visits. Dispensed 20 tabs of 8mg dilaudid 2d ago. 34M PMH DVT/PE (no longer on AC), sickle cell (hx acute chest yrs ago), b/l hip AVN p/w b/l back and b/l hip pain. Pt has chronic intermittent similar pain attributed to sickle crisis/AVN, worse since ~0500. Attributes it to colder weather over last few days. Taking home dilaudid (and motrin at 0500) w/ minimal relief so came to ED. Denies any focal weakness/numbness, urinary complaints, incontinence, f/c, trauma, SOB/CP, abd pain, NVD, URI symptoms, recent weight loss or night sweats. No hx IVDU. Cannot recall any specific precipitating trauma.   Pt often tachy on prior ED visits. Dispensed 20 tabs of 8mg dilaudid 2d ago. Usually gets monthly meds, but due to insurance issues (which is being fixed) he was unable to get last months supply.

## 2020-05-10 NOTE — ED ADULT TRIAGE NOTE - CHIEF COMPLAINT QUOTE
"sickle cell pain," c/o back and hip pain since 5:00 this AM. reported taking Ibuprofen with minimal relief.

## 2020-05-10 NOTE — ED PROVIDER NOTE - NSFOLLOWUPINSTRUCTIONS_ED_ALL_ED_FT
Can take tylenol 650mg or motrin 600mg (May cause stomach irritation - take with food and avoid prolonged use) every 6hrs as needed for pain or fever.  Can also take your dilaudid as prescribed for more severe pain.  Stay well hydrated.  Return for fevers, persistent vomit, uncontrolled pain, worsening breathing, worsening lightheaded, focal weakness/numbness.  Follow up with primary doctor within 1-2 days.     Sickle Cell Crisis    WHAT YOU NEED TO KNOW:    A sickle cell crisis is a painful episode that occurs in people who have sickle cell anemia. It happens when sickle-shaped red blood cells (RBCs) block blood vessels. Blood and oxygen cannot get to tissues, causing pain. A sickle cell crisis can also damage your tissues and cause organ failure, such liver or kidney failure. A sickle cell crisis can become life-threatening.    DISCHARGE INSTRUCTIONS:    Call 911 for any of the following:   You have shortness of breath or chest pain.  You are a man and have an erection that is painful and does not go away.   You lose vision in one or both eyes.    Seek care immediately if:   You feel like you cannot cope with your pain, or you feel like hurting yourself.   You have behavior changes, a seizure, or faint.   You have a fever.  You have abdominal pain, nausea, or vomiting.  You have a different or worse headache.   You have new weakness or numbness in your arm, leg, or face.  You have new pain in any part of your body.  Your urine is dark and you are urinating less than usual or not at all.   You are dizzy, lightheaded, or faint.     Contact your healthcare provider if:   You have any new signs or symptoms.   You have blood in your urine.   You are constipated or you have diarrhea.   You have changes in your vision.   You have increased fatigue.   You plan to travel by airplane or to a high [facility].   You have questions or concerns about your condition or care.    Medicines: You may need any of the following:     Prescription pain medicine may be given. Ask how to take this medicine safely. Medicine may also be given to decrease sickling of your RBCs. You may also need medicine to treat or prevent a bacterial infection.     NSAIDs, such as ibuprofen, help decrease swelling, pain, and fever. This medicine is available with or without a doctor's order. NSAIDs can cause stomach bleeding or kidney problems in certain people. If you take blood thinner medicine, always ask your healthcare provider if NSAIDs are safe for you. Always read the medicine label and follow directions.    Acetaminophen decreases pain and fever. It is available without a doctor's order. Ask how much to take and how often to take it. Follow directions. Acetaminophen can cause liver damage if not taken correctly.    Take your medicine as directed. Contact your healthcare provider if you think your medicine is not helping or if you have side effects. Tell him or her if you are allergic to any medicine. Keep a list of the medicines, vitamins, and herbs you take. Include the amounts, and when and why you take them. Bring the list or the pill bottles to follow-up visits. Carry your medicine list with you in case of an emergency.    Medical alert identification: Wear medical alert jewelry or carry a card that says you have sickle cell anemia. Ask your healthcare provider where to get these items.    Prevent a sickle cell crisis:     Take vitamins and minerals as directed. Folic acid can help prevent blood vessel problems that can occur with sickle cell anemia. Zinc may decrease how often you have pain.     Drink liquids as directed. Dehydration can increase your risk for a sickle cell crisis. Ask how much liquid to drink each day and which liquids are best for you.    Balance rest and exercise. Rest during a sickle cell crisis. Over time, increase your activity to a moderate amount. Exercise regularly. Avoid exercise or activities that can cause injury, such as football. Ask about the best exercise plan for you.     Stay out of the cold. Do not go quickly from a warm place to a cold place. Do not go swimming in cold water. Stay warm in the winter.    Do not smoke cigarettes or drink alcohol. These increase your risk for a sickle cell crisis. Nicotine and other chemicals in cigarettes and cigars can cause lung damage. Ask your healthcare provider for information if you currently smoke and need help to quit. E-cigarettes or smokeless tobacco still contain nicotine. Talk to your healthcare provider before you use these products.     Ask about vaccinations you need. Vaccinations can help prevent a viral infection that may lead to a sickle cell crisis. Get a flu shot every year as directed. You may need a pneumonia vaccine every 5 years.

## 2020-05-14 ENCOUNTER — EMERGENCY (EMERGENCY)
Facility: HOSPITAL | Age: 35
LOS: 1 days | Discharge: HOME | End: 2020-05-14
Attending: EMERGENCY MEDICINE
Payer: SELF-PAY

## 2020-05-14 VITALS
RESPIRATION RATE: 20 BRPM | WEIGHT: 179.9 LBS | HEIGHT: 68 IN | OXYGEN SATURATION: 99 % | TEMPERATURE: 98 F | SYSTOLIC BLOOD PRESSURE: 115 MMHG | DIASTOLIC BLOOD PRESSURE: 72 MMHG | HEART RATE: 79 BPM

## 2020-05-14 DIAGNOSIS — M54.9 DORSALGIA, UNSPECIFIED: ICD-10-CM

## 2020-05-14 DIAGNOSIS — Z79.899 OTHER LONG TERM (CURRENT) DRUG THERAPY: ICD-10-CM

## 2020-05-14 DIAGNOSIS — D57.00 HB-SS DISEASE WITH CRISIS, UNSPECIFIED: ICD-10-CM

## 2020-05-14 LAB
ALBUMIN SERPL ELPH-MCNC: 3.6 G/DL — SIGNIFICANT CHANGE UP (ref 3.3–5.2)
ALP SERPL-CCNC: 93 U/L — SIGNIFICANT CHANGE UP (ref 40–120)
ALT FLD-CCNC: 7 U/L — SIGNIFICANT CHANGE UP
ANION GAP SERPL CALC-SCNC: 13 MMOL/L — SIGNIFICANT CHANGE UP (ref 5–17)
AST SERPL-CCNC: 9 U/L — SIGNIFICANT CHANGE UP
BASOPHILS # BLD AUTO: 0.05 K/UL — SIGNIFICANT CHANGE UP (ref 0–0.2)
BASOPHILS NFR BLD AUTO: 0.4 % — SIGNIFICANT CHANGE UP (ref 0–2)
BILIRUB SERPL-MCNC: 0.4 MG/DL — SIGNIFICANT CHANGE UP (ref 0.4–2)
BUN SERPL-MCNC: 11 MG/DL — SIGNIFICANT CHANGE UP (ref 8–20)
CALCIUM SERPL-MCNC: 9 MG/DL — SIGNIFICANT CHANGE UP (ref 8.6–10.2)
CHLORIDE SERPL-SCNC: 108 MMOL/L — HIGH (ref 98–107)
CO2 SERPL-SCNC: 20 MMOL/L — LOW (ref 22–29)
CREAT SERPL-MCNC: 0.61 MG/DL — SIGNIFICANT CHANGE UP (ref 0.5–1.3)
EOSINOPHIL # BLD AUTO: 0.05 K/UL — SIGNIFICANT CHANGE UP (ref 0–0.5)
EOSINOPHIL NFR BLD AUTO: 0.4 % — SIGNIFICANT CHANGE UP (ref 0–6)
GLUCOSE SERPL-MCNC: 121 MG/DL — HIGH (ref 70–99)
HCT VFR BLD CALC: 25 % — LOW (ref 39–50)
HGB BLD-MCNC: 8.3 G/DL — LOW (ref 13–17)
IMM GRANULOCYTES NFR BLD AUTO: 0.4 % — SIGNIFICANT CHANGE UP (ref 0–1.5)
LYMPHOCYTES # BLD AUTO: 1.8 K/UL — SIGNIFICANT CHANGE UP (ref 1–3.3)
LYMPHOCYTES # BLD AUTO: 15.2 % — SIGNIFICANT CHANGE UP (ref 13–44)
MCHC RBC-ENTMCNC: 20.1 PG — LOW (ref 27–34)
MCHC RBC-ENTMCNC: 33.2 GM/DL — SIGNIFICANT CHANGE UP (ref 32–36)
MCV RBC AUTO: 60.7 FL — LOW (ref 80–100)
MONOCYTES # BLD AUTO: 0.58 K/UL — SIGNIFICANT CHANGE UP (ref 0–0.9)
MONOCYTES NFR BLD AUTO: 4.9 % — SIGNIFICANT CHANGE UP (ref 2–14)
NEUTROPHILS # BLD AUTO: 9.29 K/UL — HIGH (ref 1.8–7.4)
NEUTROPHILS NFR BLD AUTO: 78.7 % — HIGH (ref 43–77)
PLATELET # BLD AUTO: 504 K/UL — HIGH (ref 150–400)
POTASSIUM SERPL-MCNC: 3.2 MMOL/L — LOW (ref 3.5–5.3)
POTASSIUM SERPL-SCNC: 3.2 MMOL/L — LOW (ref 3.5–5.3)
PROT SERPL-MCNC: 8.2 G/DL — SIGNIFICANT CHANGE UP (ref 6.6–8.7)
RBC # BLD: 4.12 M/UL — LOW (ref 4.2–5.8)
RBC # BLD: 4.12 M/UL — LOW (ref 4.2–5.8)
RBC # FLD: 19.9 % — HIGH (ref 10.3–14.5)
RETICS #: 78.1 K/UL — SIGNIFICANT CHANGE UP (ref 25–125)
RETICS/RBC NFR: 1.9 % — SIGNIFICANT CHANGE UP (ref 0.5–2.5)
SODIUM SERPL-SCNC: 140 MMOL/L — SIGNIFICANT CHANGE UP (ref 135–145)
WBC # BLD: 11.82 K/UL — HIGH (ref 3.8–10.5)
WBC # FLD AUTO: 11.82 K/UL — HIGH (ref 3.8–10.5)

## 2020-05-14 PROCEDURE — 80053 COMPREHEN METABOLIC PANEL: CPT

## 2020-05-14 PROCEDURE — 36415 COLL VENOUS BLD VENIPUNCTURE: CPT

## 2020-05-14 PROCEDURE — 85027 COMPLETE CBC AUTOMATED: CPT

## 2020-05-14 PROCEDURE — 85045 AUTOMATED RETICULOCYTE COUNT: CPT

## 2020-05-14 PROCEDURE — 99283 EMERGENCY DEPT VISIT LOW MDM: CPT

## 2020-05-14 PROCEDURE — 99284 EMERGENCY DEPT VISIT MOD MDM: CPT

## 2020-05-14 RX ORDER — HYDROMORPHONE HYDROCHLORIDE 2 MG/ML
1 INJECTION INTRAMUSCULAR; INTRAVENOUS; SUBCUTANEOUS
Qty: 20 | Refills: 0
Start: 2020-05-14 | End: 2020-05-18

## 2020-05-14 RX ORDER — ACETAMINOPHEN 500 MG
650 TABLET ORAL ONCE
Refills: 0 | Status: COMPLETED | OUTPATIENT
Start: 2020-05-14 | End: 2020-05-14

## 2020-05-14 RX ORDER — HYDROMORPHONE HYDROCHLORIDE 2 MG/ML
8 INJECTION INTRAMUSCULAR; INTRAVENOUS; SUBCUTANEOUS ONCE
Refills: 0 | Status: DISCONTINUED | OUTPATIENT
Start: 2020-05-14 | End: 2020-05-14

## 2020-05-14 RX ADMIN — Medication 650 MILLIGRAM(S): at 10:37

## 2020-05-14 RX ADMIN — HYDROMORPHONE HYDROCHLORIDE 8 MILLIGRAM(S): 2 INJECTION INTRAMUSCULAR; INTRAVENOUS; SUBCUTANEOUS at 10:38

## 2020-05-14 NOTE — ED PROVIDER NOTE - PHYSICAL EXAMINATION
General: Uncomfortable appearing male in no acute distress  HEENT: Normocephalic, atraumatic. Moist mucous membranes.   Eyes: No scleral icterus. EOMI. IRENA.  Neck:. Soft and supple. Full ROM without pain. No midline tenderness  Cardiac: Regular rate and regular rhythm. No murmurs, rubs, gallops. Peripheral pulses 2+ and symmetric. No LE edema.  Resp: Lungs CTAB. Speaking in full sentences. No wheezes, rales or rhonchi.  Abd: Soft, non-tender, non-distended. No guarding or rebound. No scars, masses, or lesions.  MSK: Tenderness to palpation of hips bilaterally. TTP L posterior flank. No midline spinal tenderness.   Skin: No rashes, abrasions, or lacerations.  Neuro: AO x 3. Moves all extremities symmetrically. Motor strength and sensation grossly intact

## 2020-05-14 NOTE — ED PROVIDER NOTE - ATTENDING CONTRIBUTION TO CARE
seen with resident: well appearing adult male hx of sickle cell disease with complications; has had mulitple visits to [facility] for pain crisis; states has no insurance, so unable to follow up withPMD or fill rx's for meds; today, patient c/o severe aching pain to lower back and b/kl hips, c/w prior episodes of sickle pain, with no pain meds left at home; denies other illness, no fever/chills; no cough/sob; no abd pain/n/v/d; on exam, NAD, no icterus, clear mucosa, normal heart and lung sounds, abd soft nt nd; no pedal edema, no rash; ok for d/c with short course of pain meds, SW/CM to assist with meds [facility]

## 2020-05-14 NOTE — ED ADULT TRIAGE NOTE - CHIEF COMPLAINT QUOTE
pt c/o lower back pain and b/l leg pain. pt states he has h/o sickle cell. pt states he ran out of Dilaudid last night and took his last dose at midnight.

## 2020-05-14 NOTE — ED PROVIDER NOTE - OBJECTIVE STATEMENT
Pt is a 33yo M with PMH of SSD presenting with back and hip pain. He states that last night around midnight he started to have pain, reports that it feels like his normal sickle cell pain and is in the usual location for it. He states he took his last Dilaudid at that time which helped and then when he woke up this morning he felt the pain continue. He states that he has no more Dilaudid and came to the ED for pain control. He states that he takes 8mg Dilaudid 4 times per day usually. He reports following with Dr. Parrish at [facility] [place] as his PMD. He denies any fever, chills, chest pain, SOB, abdominal pain, n/v/d, urinary complaints.

## 2020-05-14 NOTE — ED PROVIDER NOTE - PATIENT PORTAL LINK FT
You can access the FollowMyHealth Patient Portal offered by [facility] by registering at the following website: [email or web address]. By joining [facility]’s FollowMyHealth portal, you will also be able to view your health information using other applications (apps) compatible with our system.

## 2020-05-14 NOTE — ED PROVIDER NOTE - NS ED ROS FT
General: Denies fever, chills  HEENT: Denies sensory changes, sore throat  Neck: Denies neck pain, neck stiffness  Resp: Denies coughing, SOB  Cardiovascular: Denies CP, palpitations, LE edema  GI: Denies nausea, vomiting, abdominal pain, diarrhea, constipation, blood in stool  : Denies dysuria, hematuria, frequency, incontinence  MSK: Endorses hip and lower back pain  Neuro: Denies HA, dizziness, numbness, weakness  Skin: Denies rashes

## 2020-05-14 NOTE — CHART NOTE - NSCHARTNOTEFT_GEN_A_CORE
SOCIAL WORK NOTE: MD APPROACHED CASE MANAGEMENT TEAM FOR DIRECTION WITH ASSISTING PATIENT OBTAIN MEDICATIONS. PATIENT PRESENTLY IN SICKLE CELL CRISIS AND NEEDS PAIN MEDS.  ADVISED MD TO SEND THE RX FOR DILAUDID 4 MG X 20 TABLETS TO [facility].  THIS WORKER REVIEWED CHART. PATIENT APPEARS TO HAVE ACTIVE MEDICAID NUMBER.  MEDICAID NUMBER PROVIDED TO THE  TO ADVISE [facility] TO RUN INSURANCE.  MEDICAID S ACTIVE FOR PATIENT [facility] HAS $9.50 COPAY FOR DILAUDID.  MD MADE AWARE OF ABOVE.

## 2020-05-26 ENCOUNTER — EMERGENCY (EMERGENCY)
Facility: HOSPITAL | Age: 35
LOS: 1 days | Discharge: HOME | End: 2020-05-26
Attending: EMERGENCY MEDICINE | Admitting: EMERGENCY MEDICINE
Payer: SELF-PAY

## 2020-05-26 VITALS
HEART RATE: 113 BPM | SYSTOLIC BLOOD PRESSURE: 145 MMHG | DIASTOLIC BLOOD PRESSURE: 76 MMHG | HEIGHT: 68 IN | TEMPERATURE: 98 F | WEIGHT: 160.94 LBS | RESPIRATION RATE: 18 BRPM | OXYGEN SATURATION: 98 %

## 2020-05-26 PROCEDURE — 99284 EMERGENCY DEPT VISIT MOD MDM: CPT

## 2020-05-26 PROCEDURE — 99283 EMERGENCY DEPT VISIT LOW MDM: CPT

## 2020-05-26 RX ORDER — HYDROMORPHONE HYDROCHLORIDE 2 MG/ML
16 INJECTION INTRAMUSCULAR; INTRAVENOUS; SUBCUTANEOUS ONCE
Refills: 0 | Status: DISCONTINUED | OUTPATIENT
Start: 2020-05-26 | End: 2020-05-26

## 2020-05-26 RX ORDER — IBUPROFEN 200 MG
600 TABLET ORAL ONCE
Refills: 0 | Status: COMPLETED | OUTPATIENT
Start: 2020-05-26 | End: 2020-05-26

## 2020-05-26 RX ADMIN — HYDROMORPHONE HYDROCHLORIDE 16 MILLIGRAM(S): 2 INJECTION INTRAMUSCULAR; INTRAVENOUS; SUBCUTANEOUS at 18:07

## 2020-05-26 RX ADMIN — Medication 600 MILLIGRAM(S): at 18:07

## 2020-05-26 NOTE — ED PROVIDER NOTE - NSFOLLOWUPINSTRUCTIONS_ED_ALL_ED_FT
Please increase your fluid hydration and follow up with your doctor. Return to the Emergency Department if you have any new or worsening symptoms, or if you have any concerns.    Sickle Cell Crisis    WHAT YOU NEED TO KNOW:    A sickle cell crisis is a painful episode that occurs in people who have sickle cell anemia. It happens when sickle-shaped red blood cells (RBCs) block blood vessels. Blood and oxygen cannot get to tissues, causing pain. A sickle cell crisis can also damage your tissues and cause organ failure, such liver or kidney failure. A sickle cell crisis can become life-threatening.    DISCHARGE INSTRUCTIONS:    Call 911 for any of the following:     You have shortness of breath or chest pain.      You are a man and have an erection that is painful and does not go away.       You lose vision in one or both eyes.    Return to the emergency department if:     You have a fever.      You feel like you cannot cope with your pain, or you feel like hurting yourself.       You have behavior changes, a seizure, or faint.       You have abdominal pain, nausea, or vomiting.      You have a headache that is worse or different from those that you have had in the past.       You have new weakness or numbness in your arm, leg, or face.      You have new pain in any part of your body.      Your urine is dark and you are urinating less than usual or not at all.       You are dizzy, lightheaded, or faint.     Contact your healthcare provider if:     You have any new signs or symptoms.       You have blood in your urine.       You are constipated or you have diarrhea.       You have changes in your vision.       You have increased fatigue.       You plan to travel by airplane or to a high [facility].       You have questions or concerns about your condition or care.    Medicines: You may need any of the following:     Prescription pain medicine may be given. Ask how to take this medicine safely. Medicine may also be given to decrease sickling of your RBCs. You may also need medicine to treat or prevent a bacterial infection.       NSAIDs, such as ibuprofen, help decrease swelling, pain, and fever. This medicine is available with or without a doctor's order. NSAIDs can cause stomach bleeding or kidney problems in certain people. If you take blood thinner medicine, always ask your healthcare provider if NSAIDs are safe for you. Always read the medicine label and follow directions.      Acetaminophen decreases pain and fever. It is available without a doctor's order. Ask how much to take and how often to take it. Follow directions. Acetaminophen can cause liver damage if not taken correctly.      Take your medicine as directed. Contact your healthcare provider if you think your medicine is not helping or if you have side effects. Tell him or her if you are allergic to any medicine. Keep a list of the medicines, vitamins, and herbs you take. Include the amounts, and when and why you take them. Bring the list or the pill bottles to follow-up visits. Carry your medicine list with you in case of an emergency.    Medical alert identification: Wear medical alert jewelry or carry a card that says you have sickle cell anemia. Ask your healthcare provider where to get these items. Medical Alert Jewelry         Prevent a sickle cell crisis:     Take vitamins and minerals as directed. Folic acid can help prevent blood vessel problems that can occur with sickle cell anemia. Zinc may decrease how often you have pain.       Drink liquids as directed. Dehydration can increase your risk for a sickle cell crisis. Ask how much liquid to drink each day and which liquids are best for you.      Balance rest and exercise. Rest during a sickle cell crisis. Over time, increase your activity to a moderate amount. Exercise regularly. Avoid exercise or activities that can cause injury, such as football. Ask about the best exercise plan for you.       Stay out of the cold. Do not go quickly from a warm place to a cold place. Do not go swimming in cold water. Stay warm in the winter.      Do not smoke cigarettes or drink alcohol. These increase your risk for a sickle cell crisis. Nicotine and other chemicals in cigarettes and cigars can cause lung damage. Ask your healthcare provider for information if you currently smoke and need help to quit. E-cigarettes or smokeless tobacco still contain nicotine. Talk to your healthcare provider before you use these products.       Ask about vaccinations you need. Vaccinations can help prevent a viral infection that may lead to a sickle cell crisis. Get a flu shot every year as directed. You may need a pneumonia vaccine every 5 years.     Follow up with your healthcare provider as directed: You may need ongoing screening for conditions that can develop because of sickle cell disease. Examples include kidney disease, hypertension (high blood pressure), retinopathy (eye problems), and problems with your lungs. Write down your questions so you remember to ask them during your visits.       © Copyright [employer] 2020       back to top                      © Copyright [employer] 2020

## 2020-05-26 NOTE — ED PROVIDER NOTE - CLINICAL SUMMARY MEDICAL DECISION MAKING FREE TEXT BOX
33 y/o male here in the ED c/o exacerbation of his chronic hip pain due to avn. Consider chronic pain management. no acute injury or sxs that would indicated infection. pt ambulatory and neuro intact with not signs of cauda equina. pain treated and control. has fu with heme this week - plan for hip replacement with ortho as per pt. I have discussed the discharge plan with the patient. The patient agrees with the plan, as discussed.  The patient understands Emergency Department diagnosis is a preliminary diagnosis often based on limited information and that the patient must adhere to the follow-up plan as discussed.  The patient understands that if the symptoms worsen or if prescribed medications do not have the desired/planned effect that the patient may return to the Emergency Department at any time for further evaluation and treatment.

## 2020-05-26 NOTE — ED ADULT NURSE NOTE - CHPI ED NUR SYMPTOMS NEG
no abrasion/no deformity/no bruising/no numbness/no back pain/no tingling/no stiffness/no weakness/no fever

## 2020-05-26 NOTE — ED PROVIDER NOTE - PATIENT PORTAL LINK FT
You can access the FollowMyHealth Patient Portal offered by [facility] by registering at the following website: [email or web address]. By joining [employer]’s FollowMyHealth portal, you will also be able to view your health information using other applications (apps) compatible with our system.

## 2020-05-26 NOTE — ED ADULT NURSE NOTE - CAS EDN DISCHARGE ASSESSMENT
[FreeTextEntry1] : 65 year old woman with OA/RA, Sjogrens on plaquenil, hypothyroidism, HLD, migraines, Smiley's syndrome, depression, asthma, recently diagnosed Stage IA, pT1bN0 well-differentiated invasive ductal carcinoma of the right breast s/p lumpectomy, reexcision and sentinel lymph node biopsy with negative margins and negative sentinel lymph nodes (course c/b persistent seroma and cellulitis). Oncotype Dx score 10; she completed adjuvant radiation and started AI 5/2019, doing well.\par \par -continue arimidex therapy daily, tolerating well\par -DEXA 3/2019 OK\par -Continue calcium (1200mg PO daily) and continue vitamin D supplementation (1000mg PO daily) for bone strength\par -She will continue follow up with her surgeon Dr. Rea for ongoing surveillance imaging and exams - next summer 2020 scheduled, but advised she call and follow up now regarding her breast pains/concerns\par -follow up with Dr. Isabel radiation oncology as recommended\par -She was also given contact information for genetic counseling given her significant family history\par -follow up with me 4 months, sooner if issues arise\par -RHM all addressed, up to date\par -continue rheumatology follow up
Alert and oriented to person, place and time

## 2020-05-26 NOTE — ED PROVIDER NOTE - NS ED ROS FT
General: no fever, chills, confusion  Cardiac: no chest pain, chest tightness, palpitations  Lungs: no sob, difficulty breathing  Abdomen: no abdominal pain, nausea, vomiting, diarrhea, constipation, nml BM  : no dysuria, urinary frequency/urgency  MSK: b/l hip pain    All other systems negative except as per HPI

## 2020-05-26 NOTE — ED ADULT NURSE NOTE - NSIMPLEMENTINTERV_GEN_ALL_ED
Implemented All Fall Risk Interventions:  [place] to call system. Call bell, personal items and telephone within reach. Instruct patient to call for assistance. Room bathroom lighting operational. Non-slip footwear when patient is off stretcher. Physically safe environment: no spills, clutter or unnecessary equipment. Stretcher in lowest position, wheels locked, appropriate side rails in place. Provide visual cue, wrist band, yellow gown, etc. Monitor gait and stability. Monitor for mental status changes and reorient to person, place, and time. Review medications for side effects contributing to fall risk. Reinforce activity limits and safety measures with patient and family.

## 2020-05-26 NOTE — ED PROVIDER NOTE - ATTENDING CONTRIBUTION TO CARE
34M PMH DVT/PE (no longer on AC), sickle cell (hx acute chest yrs ago), b/l hip AVN p/w pain to b/l lower back and b/l hips, gradual onset. Similar to multiple prior episodes attributed to sickle crisis. Cannot think of precipitating factor. Denies any focal weakness/numbness, urinary complaints, incontinence, f/c, trauma, SOB/CP, abd pain, NVD, recent weight loss or night sweats. No hx IVDU. Cannot recall any specific precipitating trauma. Still smokes but cutting back. Denies recent etoh use. Ran out of home dilaudid.   PT w/ hx of multiple ED visits for similar. I stop w/ dilaudid scripts from 5/22, 5/14, 5/8. Mild tachycardia (often tachycardic), other vitals wnl. Exam as above. Very well appearing.  ddx: Likely sickle crisis.  d/w pt importance of outpt pmd/pain management f/u.   will give PO meds x1 in ED.   Clinically no indication for further emergent ED workup or hospitalization at this time. Comfortable for dc, outpt f/u.

## 2020-05-26 NOTE — ED PROVIDER NOTE - OBJECTIVE STATEMENT
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 Similar to multiple prior episodes attributed to sickle crisis. Denies any focal weakness/numbness, urinary complaints, incontinence, f/c, trauma, SOB/CP, abd pain, NVD, recent weight loss or night sweats. No hx IVDU. Cannot recall any specific precipitating trauma. Still smokes but cutting back. Denies recent etoh use. Took 8mg PO dilaudid ~noon w/ no relief.

## 2020-05-26 NOTE — ED ADULT TRIAGE NOTE - CHIEF COMPLAINT QUOTE
Pt CO Sickle Cell pain to Back and Bilat Hips since 0800.  Pt states "I always feel the pain here."  Pt denies falls, trauma, dizziness, N/V/D, SOB, Fevers and CP.  Masked.

## 2020-05-30 DIAGNOSIS — D57.00 HB-SS DISEASE WITH CRISIS, UNSPECIFIED: ICD-10-CM

## 2020-05-30 DIAGNOSIS — Z79.899 OTHER LONG TERM (CURRENT) DRUG THERAPY: ICD-10-CM

## 2020-05-30 DIAGNOSIS — M54.5 LOW BACK PAIN: ICD-10-CM

## 2020-06-17 ENCOUNTER — EMERGENCY (EMERGENCY)
Facility: HOSPITAL | Age: 35
LOS: 1 days | Discharge: HOME | End: 2020-06-17
Attending: EMERGENCY MEDICINE
Payer: SELF-PAY

## 2020-06-17 VITALS
HEART RATE: 96 BPM | WEIGHT: 164.91 LBS | SYSTOLIC BLOOD PRESSURE: 102 MMHG | DIASTOLIC BLOOD PRESSURE: 68 MMHG | TEMPERATURE: 99 F | OXYGEN SATURATION: 98 % | HEIGHT: 68 IN | RESPIRATION RATE: 18 BRPM

## 2020-06-17 VITALS
RESPIRATION RATE: 16 BRPM | DIASTOLIC BLOOD PRESSURE: 71 MMHG | HEART RATE: 97 BPM | OXYGEN SATURATION: 99 % | SYSTOLIC BLOOD PRESSURE: 115 MMHG

## 2020-06-17 PROCEDURE — 99283 EMERGENCY DEPT VISIT LOW MDM: CPT

## 2020-06-17 PROCEDURE — 71045 X-RAY EXAM CHEST 1 VIEW: CPT | Mod: 26

## 2020-06-17 PROCEDURE — 71045 X-RAY EXAM CHEST 1 VIEW: CPT

## 2020-06-17 PROCEDURE — 99284 EMERGENCY DEPT VISIT MOD MDM: CPT

## 2020-06-17 RX ORDER — HYDROMORPHONE HYDROCHLORIDE 2 MG/ML
1 INJECTION INTRAMUSCULAR; INTRAVENOUS; SUBCUTANEOUS
Qty: 42 | Refills: 0
Start: 2020-06-17 | End: 2020-06-23

## 2020-06-17 RX ORDER — IBUPROFEN 200 MG
1 TABLET ORAL
Qty: 56 | Refills: 0
Start: 2020-06-17 | End: 2020-06-30

## 2020-06-17 RX ORDER — ACETAMINOPHEN 500 MG
975 TABLET ORAL ONCE
Refills: 0 | Status: COMPLETED | OUTPATIENT
Start: 2020-06-17 | End: 2020-06-17

## 2020-06-17 RX ORDER — SODIUM CHLORIDE 9 MG/ML
1000 INJECTION, SOLUTION INTRAVENOUS
Refills: 0 | Status: DISCONTINUED | OUTPATIENT
Start: 2020-06-17 | End: 2020-06-17

## 2020-06-17 RX ORDER — HYDROMORPHONE HYDROCHLORIDE 2 MG/ML
4 INJECTION INTRAMUSCULAR; INTRAVENOUS; SUBCUTANEOUS ONCE
Refills: 0 | Status: DISCONTINUED | OUTPATIENT
Start: 2020-06-17 | End: 2020-06-17

## 2020-06-17 RX ORDER — IBUPROFEN 200 MG
600 TABLET ORAL ONCE
Refills: 0 | Status: COMPLETED | OUTPATIENT
Start: 2020-06-17 | End: 2020-06-17

## 2020-06-17 RX ORDER — HYDROMORPHONE HYDROCHLORIDE 2 MG/ML
8 INJECTION INTRAMUSCULAR; INTRAVENOUS; SUBCUTANEOUS ONCE
Refills: 0 | Status: DISCONTINUED | OUTPATIENT
Start: 2020-06-17 | End: 2020-06-17

## 2020-06-17 RX ADMIN — HYDROMORPHONE HYDROCHLORIDE 4 MILLIGRAM(S): 2 INJECTION INTRAMUSCULAR; INTRAVENOUS; SUBCUTANEOUS at 14:28

## 2020-06-17 RX ADMIN — Medication 975 MILLIGRAM(S): at 13:04

## 2020-06-17 RX ADMIN — HYDROMORPHONE HYDROCHLORIDE 8 MILLIGRAM(S): 2 INJECTION INTRAMUSCULAR; INTRAVENOUS; SUBCUTANEOUS at 13:04

## 2020-06-17 RX ADMIN — Medication 600 MILLIGRAM(S): at 13:03

## 2020-06-17 RX ADMIN — HYDROMORPHONE HYDROCHLORIDE 8 MILLIGRAM(S): 2 INJECTION INTRAMUSCULAR; INTRAVENOUS; SUBCUTANEOUS at 16:34

## 2020-06-17 NOTE — ED PROVIDER NOTE - PATIENT PORTAL LINK FT
You can access the FollowMyHealth Patient Portal offered by [facility] by registering at the following website: [email or web address]. By joining [employer]’s FollowMyHealth portal, you will also be able to view your health information using other applications (apps) compatible with our system.

## 2020-06-17 NOTE — ED ADULT NURSE NOTE - INTERVENTIONS DEFINITIONS
Stretcher in lowest position, wheels locked, appropriate side rails in place/Monitor gait and stability/[place] to call system/Non-slip footwear when patient is off stretcher/Call bell, personal items and telephone within reach/Instruct patient to call for assistance/Physically safe environment: no spills, clutter or unnecessary equipment

## 2020-06-17 NOTE — ED ADULT NURSE NOTE - DOES PATIENT HAVE ADVANCE DIRECTIVE
Message from [employer]:  Original authorizing provider: MD Oliver Bedoya would like a refill of the following medications:  doxycycline monohydrate 50 MG capsule [Melisa Rose MD]    Preferred pharmacy: [id number] - [place], [place] - [address] AT [facility] [address]    Comment:      Medication renewals requested in this message routed to other providers:  albuterol (2.5 MG/3ML) 0.083% nebulizer solution [BROOK Rosa CNP]  blood glucose monitoring (ANTONIO MICROLET) lancets [BROOK Rosa CNP]   unknown

## 2020-06-17 NOTE — PROVIDER CONTACT NOTE (OTHER) - SITUATION
[facility] is contacted by the admitting team that this pt. is waiting for renewal of his Medicaid and is requesting us to fill his medications.

## 2020-06-17 NOTE — ED PROVIDER NOTE - OBJECTIVE STATEMENT
35yom w/ sickle cell anemia p/w 1 day of lower back pain and bilateral hip pain consistent with his usual crisis pain. Onset of pain after running out of his prescribed dilaudid. He states he has been having difficulty getting long term prescriptions because he lost insurance coverage. He denies any fevers, chills, cough, chest pain.

## 2020-06-17 NOTE — PROVIDER CONTACT NOTE (OTHER) - RECOMMENDATIONS
Pt. is informed that his pain medication will be covered as a courtesy for this visit only  ,he needs to f/u with DSS to have an adequate coverage due to the nature of his illness.

## 2020-06-17 NOTE — PROVIDER CONTACT NOTE (OTHER) - BACKGROUND
I spoke to pt. in ER waiting area. Pt. stated, " I don't have any money , I just got out of shelter" .He informed me that he is in contact with DSS to get his Medicaid reinstated for outpatient visits.

## 2020-06-17 NOTE — ED ADULT NURSE NOTE - OBJECTIVE STATEMENT
Pt received A&Ox3 c/o bilateral hip pain radiating to lower back 7/10. VSS. No acute distress noted at this time. Pt states pain began early this am and took Dilaudid PO 8mg @ 5am. Pt states that he ran out of medication. MD made aware of pain. Pt denies any chest pain, SOB, HA, blurred vision, tingling/numbness, NVD, or any  symptoms. Pt able to move all extremities with purpose. Pt made aware of plan of care. Pt safety maintained.

## 2020-06-17 NOTE — ED PROVIDER NOTE - CLINICAL SUMMARY MEDICAL DECISION MAKING FREE TEXT BOX
Acute on chronic sickle cell pain in the context of running out of home meds. Able to control pain with PO meds, has no signs or symptoms of acute chest. Per [facility] prescriber records, pt is filling prescriptions appropriately, though has required multiple ED visits for such, which he is candid about. Will provide pt with 1 week supply of his usual 8mg dilaudid q4h.

## 2020-06-30 NOTE — ED ADULT NURSE NOTE - NS ED NURSE ELOPE DATE TIME
Calcipotriene Pregnancy And Lactation Text: This medication has not been proven safe during pregnancy. It is unknown if this medication is excreted in breast milk. 09-Oct-2019 21:57

## 2020-07-03 ENCOUNTER — EMERGENCY (EMERGENCY)
Facility: HOSPITAL | Age: 35
LOS: 1 days | Discharge: HOME | End: 2020-07-03
Attending: EMERGENCY MEDICINE | Admitting: EMERGENCY MEDICINE
Payer: SELF-PAY

## 2020-07-03 VITALS
RESPIRATION RATE: 16 BRPM | DIASTOLIC BLOOD PRESSURE: 64 MMHG | SYSTOLIC BLOOD PRESSURE: 101 MMHG | HEART RATE: 92 BPM | OXYGEN SATURATION: 99 % | TEMPERATURE: 98 F

## 2020-07-03 VITALS
RESPIRATION RATE: 19 BRPM | HEART RATE: 129 BPM | DIASTOLIC BLOOD PRESSURE: 87 MMHG | SYSTOLIC BLOOD PRESSURE: 146 MMHG | WEIGHT: 160.94 LBS | HEIGHT: 68 IN | TEMPERATURE: 98 F | OXYGEN SATURATION: 98 %

## 2020-07-03 PROCEDURE — 99284 EMERGENCY DEPT VISIT MOD MDM: CPT

## 2020-07-03 PROCEDURE — 99283 EMERGENCY DEPT VISIT LOW MDM: CPT

## 2020-07-03 RX ORDER — HYDROMORPHONE HYDROCHLORIDE 2 MG/ML
16 INJECTION INTRAMUSCULAR; INTRAVENOUS; SUBCUTANEOUS ONCE
Refills: 0 | Status: DISCONTINUED | OUTPATIENT
Start: 2020-07-03 | End: 2020-07-03

## 2020-07-03 RX ORDER — IBUPROFEN 200 MG
600 TABLET ORAL ONCE
Refills: 0 | Status: COMPLETED | OUTPATIENT
Start: 2020-07-03 | End: 2020-07-03

## 2020-07-03 RX ADMIN — Medication 600 MILLIGRAM(S): at 13:11

## 2020-07-03 RX ADMIN — HYDROMORPHONE HYDROCHLORIDE 16 MILLIGRAM(S): 2 INJECTION INTRAMUSCULAR; INTRAVENOUS; SUBCUTANEOUS at 13:11

## 2020-07-03 NOTE — ED ADULT NURSE NOTE - INTERVENTIONS DEFINITIONS
Stretcher in lowest position, wheels locked, appropriate side rails in place/Physically safe environment: no spills, clutter or unnecessary equipment/Monitor gait and stability

## 2020-07-03 NOTE — ED ADULT TRIAGE NOTE - CHIEF COMPLAINT QUOTE
"I have sickle cell pain to my back and to my legs". Pt states he was discharge from [facility] recently.

## 2020-07-03 NOTE — ED PROVIDER NOTE - CLINICAL SUMMARY MEDICAL DECISION MAKING FREE TEXT BOX
35M with acute on chronic sickle cell pain despite taking his home dose of meds today. Plan for pain control in the D with dilaudid 16mg and motrin 600mg. No indication for labs or imaging at this time. Do not suspect acute chest. Pt is tolerating PO, if able to control pain with PO meds, plan for DC. Pt informed he will need to f/u with pmd or heme for future Rx (he does not need a refill at this time and states he has meds at home).

## 2020-07-03 NOTE — ED PROVIDER NOTE - PATIENT PORTAL LINK FT
You can access the FollowMyHealth Patient Portal offered by [facility] by registering at the following website: [email or web address]. By joining [employer]’s FollowMyHealth portal, you will also be able to view your health information using other applications (apps) compatible with our system.

## 2020-07-03 NOTE — ED ADULT NURSE NOTE - OBJECTIVE STATEMENT
Pt. w/ Hx of sickle cell c/o constant b/l hip and lower back pain since 8 am. Last took 8 mg PO dilaudid at 8 am w/ no pain relief. Ambulatory independently with steady gait. Pt. w/ Hx of sickle cell c/o constant b/l hip and lower back pain since 8 am. Last took 8 mg PO dilaudid at 8 am w/ no pain relief. Ambulatory with cane in ED.

## 2020-07-03 NOTE — ED ADULT NURSE NOTE - CHIEF COMPLAINT QUOTE
"I have sickle cell pain to my back and to my legs". Pt states he was discharge from [facility] recently.

## 2020-07-03 NOTE — ED PROVIDER NOTE - CROS ED CARDIOVAS ALL NEG
Subjective:       Patient ID: Tracy Gabriel is a 46 y.o. male.    Chief Complaint: Hypertension    HPI     The patient presents today for first visit with me.  He was found to have an elevated BP during a random screening.  Given that reading, he presented to an Urgent care center, where his blood pressure wasWhen 83/122. He was prescribed lisinopril.  He has not seen a physician in ~ 30 years.    There is no problem list on file for this patient.      History reviewed. No pertinent surgical history.      Current Outpatient Prescriptions:     lisinopril (PRINIVIL,ZESTRIL) 20 MG tablet, Take 1 tablet (20 mg total) by mouth once daily., Disp: 30 tablet, Rfl: 0  H ehas been on ACE inhibitor since 10/19.      Review of patient's allergies indicates:  No Known Allergies    Family History   Problem Relation Age of Onset    Hypertension Mother     Heart disease Father     No Known Problems Sister     Diabetes Maternal Grandmother        Social History     Social History    Marital status:      Spouse name: N/A    Number of children: 0    Years of education: N/A     Occupational History    sales      Social History Main Topics    Smoking status: Former Smoker     Packs/day: 1.00     Years: 14.00     Types: Cigarettes     Start date: 2/14/1987     Quit date: 2/14/2001    Smokeless tobacco: Never Used    Alcohol use 12.0 oz/week     20 Standard drinks or equivalent per week      Comment: quit 10/2017    Drug use: Unknown    Sexual activity: Not on file     Other Topics Concern    Not on file     Social History Narrative    The patient does exercise regularly (bikes 4 days/wk).      He is not satisfied with weight.    Rates diet as good (vegetarian).    He does drink at least 1/2 gallon water daily.    He drinks 2 coffee/tea/caffeine-containing soft drinks daily.    Total sleep time at night is 6 hours.    He works 60 hours per week.    He does wear seat belts.    Hobbies include biking, baseball.  "          Patient Care Team:  Primary Doctor No as PCP - General    Review of Systems   Constitutional: Negative for fatigue and unexpected weight change.   HENT: Negative for ear discharge, ear pain, hearing loss, tinnitus and voice change.    Eyes: Negative for pain.   Respiratory: Negative for cough and shortness of breath.    Cardiovascular: Negative for chest pain, palpitations and leg swelling.   Gastrointestinal: Negative for abdominal pain, blood in stool, constipation, diarrhea, nausea and vomiting.   Genitourinary: Negative for decreased urine volume, difficulty urinating, dysuria, enuresis, frequency, hematuria and urgency.   Musculoskeletal: Negative for arthralgias, back pain and myalgias.   Skin: Negative for rash.   Neurological: Negative for dizziness, weakness, light-headedness and headaches.   Hematological: Does not bruise/bleed easily.   Psychiatric/Behavioral: Positive for sleep disturbance (snores; witnessed apneic spells). Negative for dysphoric mood. The patient is not nervous/anxious.          [place] Sleepiness Scale Questionnaire:    0 = would never doze or sleep.  1 = slight chance of dozing or sleeping  2 = moderate chance of dozing or sleeping  3 = high chance of dozing or sleeping     Sitting and reading 1  Watching TV 3  Sitting inactive in a public place 1  Being a passenger in a motor vehicle for an hour or more 1  Lying down in the afternoon 3  Sitting and talking to someone 1  Sitting quietly after lunch (no alcohol) 2  Stopped for a few minutes in traffic while driving 1    Total score:  13/24      Objective:       BP (!) 180/110 (BP Location: Right arm, Patient Position: Sitting, BP Method: Large (Manual))   Pulse 88   Resp 16   Ht 5' 7" (1.702 m)   Wt 97.8 kg (215 lb 9.6 oz)   SpO2 97%   BMI 33.77 kg/m²     Physical Exam   Constitutional: He is oriented to person, place, and time. He appears well-developed and well-nourished. He is cooperative.   HENT:   Head: " Normocephalic and atraumatic.   Right Ear: External ear normal.   Left Ear: External ear normal.   Nose: Nose normal.   Mouth/Throat: Oropharynx is clear and moist and mucous membranes are normal. No oropharyngeal exudate.   Eyes: Conjunctivae are normal. No scleral icterus.   Neck: Neck supple. No JVD present. Carotid bruit is not present. No thyromegaly present.   Cardiovascular: Normal rate, regular rhythm, normal heart sounds and normal pulses.  Exam reveals no gallop and no friction rub.    No murmur heard.  Pulmonary/Chest: Effort normal and breath sounds normal. He has no wheezes. He has no rhonchi. He has no rales.   Abdominal: Soft. Bowel sounds are normal. He exhibits no distension and no mass. There is no splenomegaly or hepatomegaly. There is no tenderness.   Musculoskeletal: Normal range of motion. He exhibits no edema or tenderness.   Lymphadenopathy:     He has no cervical adenopathy.     He has no axillary adenopathy.   Neurological: He is alert and oriented to person, place, and time. He has normal strength and normal reflexes. No cranial nerve deficit or sensory deficit. Coordination normal.   Skin: Skin is warm and dry.   Psychiatric: He has a normal mood and affect.   Vitals reviewed.        Assessment:       1. Essential hypertension    2. Snoring    3. Witnessed apneic spells    4. Daytime somnolence    5. Laboratory exam ordered as part of routine general medical examination    6. Prostate cancer screening          Plan:       Laboratory investigation, including diabetes and thyroid screening, serum chemistries, and lipid profile.  Discussed routine examinations and screenings.   Flu/Tdap today.  Discussed with patient the importance of lifestyle modifications, including well-balanced diet and moderate exercise regimen, in reducing risk for cardiovascular/cerebrovascular disease and diabetes.  Consider Sleep Medicine referral.  We will call the patient with results & make further  "recommendations at that time.      "This note will not be shared with the patient."  " negative...

## 2020-07-03 NOTE — ED PROVIDER NOTE - NSFOLLOWUPINSTRUCTIONS_ED_ALL_ED_FT
Please follow up with your primary care physician. If you have any problem getting an appointment this week, please call the ED Referral Coordinator at [phone].  Return to the Emergency Department if you have any new o    Sickle Cell Anemia, Adult     Sickle cell anemia is a condition where your red blood cells are shaped like katya. Red blood cells carry oxygen through the body. Sickle-shaped cells do not live as long as normal red blood cells. They also clump together and block blood from flowing through the blood vessels. This prevents the body from getting enough oxygen. Sickle cell anemia causes organ damage and pain. It also increases the risk of infection.  Follow these instructions at home:  Medicines     Take over-the-counter and prescription medicines only as told by your doctor.If you were prescribed an antibiotic medicine, take it as told by your doctor. Do not stop taking the antibiotic even if you start to feel better.If you develop a fever, do not take medicines to lower the fever right away. Tell your doctor about the fever.Managing pain, stiffness, and swelling     Try these methods to help with pain:  Use a heating pad.Take a warm bath.Distract yourself, such as by watching TV.Eating and drinking     Drink enough fluid to keep your pee (urine) clear or pale yellow. Drink more in hot weather and during exercise.Limit or avoid alcohol.Eat a healthy diet. Eat plenty of fruits, vegetables, whole grains, and lean protein.Take vitamins and supplements as told by your doctor.Traveling     When traveling, keep these with you:  Your medical information.The names of your doctors.Your medicines.If you need to take an airplane, talk to your doctor first.Activity     Rest often.Avoid exercises that make your heart beat much faster, such as jogging.General instructions     Do not use products that have nicotine or tobacco, such as cigarettes and e-cigarettes. If you need help quitting, ask your doctor.Consider wearing a medical alert bracelet.Avoid being in high places (high altitudes), such as mountains.Avoid very hot or cold temperatures.Avoid places where the temperature changes a lot.Keep all follow-up visits as told by your doctor. This is important.Contact a doctor if:  A joint hurts.Your feet or hands hurt or swell.You feel tired (fatigued).Get help right away if:  You have symptoms of infection. These include:  Fever.Chills.Being very tired.Irritability.Poor eating.Throwing up (vomiting).You feel dizzy or faint.You have new stomach pain, especially on the left side.You have a an erection (priapism) that lasts more than 4 hours.You have numbness in your arms or legs.You have a hard time moving your arms or legs.You have trouble talking.You have pain that does not go away when you take medicine.You are short of breath.You are breathing fast.You have a long-term cough.You have pain in your chest.You have a bad headache.You have a stiff neck.Your stomach looks bloated even though you did not eat much.Your skin is pale.You suddenly cannot see well.Summary  Sickle cell anemia is a condition where your red blood cells are shaped like katya.Follow your doctor's advice on ways to manage pain, food to eat, activities to do, and steps to take for safe travel.Get medical help right away if you have any signs of infection, such as a fever.This information is not intended to replace advice given to you by your health care provider. Make sure you discuss any questions you have with your health care provider.      Chronic Pain    Chronic pain is a complex condition and the Emergency Department is not the ideal place to manage this condition. Prescription painkillers must be written by your primary care provider or a pain management physician. Avoid activities or triggers that exacerbate your pain.    SEEK IMMEDIATE MEDICAL CARE IF YOU HAVE ANY OF THE FOLLOWING SYMPTOMS: severe chest pain, fainting, vomiting blood, dark or bloody stools, or pain different from your chronic pain.

## 2020-07-03 NOTE — ED ADULT NURSE NOTE - NSIMPLEMENTINTERV_GEN_ALL_ED
Implemented All Universal Safety Interventions:  [place] to call system. Call bell, personal items and telephone within reach. Instruct patient to call for assistance. Room bathroom lighting operational. Non-slip footwear when patient is off stretcher. Physically safe environment: no spills, clutter or unnecessary equipment. Stretcher in lowest position, wheels locked, appropriate side rails in place. Specific interventions were implemented:

## 2020-07-03 NOTE — ED PROVIDER NOTE - OBJECTIVE STATEMENT
35M w/ sickle cell anemia who p/w 1 day of lower back pain and bilateral hip pain consistent with his usual crisis pain. He states he took his usual dose of dilaudid 4mg at 8am today but it did not help the pain. He reports recent issues with his insurance coverage so has been getting his long term Rx from his PMD. He denies any fevers, chills, cough, chest pain, nausea, vomits, abdominal pain, dizziness or syncope. He is requesting dialudid 16mg and motrin 600mg for pain control.

## 2020-07-03 NOTE — ED PROVIDER NOTE - PHYSICAL EXAMINATION
GEN: Well appearing, well nourished, awake, alert, oriented to person, place, time/situation and appears uncomfortable 2/2 pain.  ENT: Airway patent, Nasal mucosa clear. Mouth with normal mucosa.  EYES: Clear bilaterally. PERRL, EOMI  RESPIRATORY: Breathing comfortably with normal RR. No W/C/R, no hypoxia or resp distress.  CARDIAC: Regular rate and rhythm, no M/R/G  ABDOMEN: Soft, nontender, +bowel sounds, no rebound, rigidity, or guarding.  MSK: Range of motion is not limited, no deformities noted.  NEURO: Alert and oriented, no focal deficits.  SKIN: Skin normal color for race, warm, dry and intact. No evidence of rash.  PSYCH: Alert and oriented to person, place, time/situation. normal mood and affect. no apparent risk to self or others.

## 2020-07-07 DIAGNOSIS — M54.5 LOW BACK PAIN: ICD-10-CM

## 2020-07-07 DIAGNOSIS — D57.00 HB-SS DISEASE WITH CRISIS, UNSPECIFIED: ICD-10-CM

## 2020-07-08 ENCOUNTER — EMERGENCY (EMERGENCY)
Facility: HOSPITAL | Age: 35
LOS: 1 days | Discharge: HOME | End: 2020-07-08
Attending: EMERGENCY MEDICINE | Admitting: EMERGENCY MEDICINE
Payer: SELF-PAY

## 2020-07-08 VITALS
WEIGHT: 160.94 LBS | OXYGEN SATURATION: 99 % | DIASTOLIC BLOOD PRESSURE: 80 MMHG | HEIGHT: 68 IN | HEART RATE: 120 BPM | SYSTOLIC BLOOD PRESSURE: 142 MMHG | RESPIRATION RATE: 18 BRPM | TEMPERATURE: 98 F

## 2020-07-08 VITALS
DIASTOLIC BLOOD PRESSURE: 70 MMHG | HEART RATE: 81 BPM | OXYGEN SATURATION: 99 % | SYSTOLIC BLOOD PRESSURE: 110 MMHG | TEMPERATURE: 98 F | RESPIRATION RATE: 18 BRPM

## 2020-07-08 PROCEDURE — 99283 EMERGENCY DEPT VISIT LOW MDM: CPT

## 2020-07-08 PROCEDURE — 99284 EMERGENCY DEPT VISIT MOD MDM: CPT

## 2020-07-08 RX ORDER — DIPHENHYDRAMINE HCL 50 MG
50 CAPSULE ORAL ONCE
Refills: 0 | Status: COMPLETED | OUTPATIENT
Start: 2020-07-08 | End: 2020-07-08

## 2020-07-08 RX ORDER — IBUPROFEN 200 MG
600 TABLET ORAL ONCE
Refills: 0 | Status: COMPLETED | OUTPATIENT
Start: 2020-07-08 | End: 2020-07-08

## 2020-07-08 RX ORDER — HYDROMORPHONE HYDROCHLORIDE 2 MG/ML
16 INJECTION INTRAMUSCULAR; INTRAVENOUS; SUBCUTANEOUS ONCE
Refills: 0 | Status: DISCONTINUED | OUTPATIENT
Start: 2020-07-08 | End: 2020-07-08

## 2020-07-08 RX ADMIN — Medication 50 MILLIGRAM(S): at 19:57

## 2020-07-08 RX ADMIN — HYDROMORPHONE HYDROCHLORIDE 16 MILLIGRAM(S): 2 INJECTION INTRAMUSCULAR; INTRAVENOUS; SUBCUTANEOUS at 19:57

## 2020-07-08 RX ADMIN — Medication 600 MILLIGRAM(S): at 19:57

## 2020-07-08 NOTE — ED ADULT NURSE NOTE - CHPI ED NUR SYMPTOMS NEG
no difficulty bearing weight/no stiffness/no bruising/no deformity/no numbness/no abrasion/no weakness/no tingling/no fever

## 2020-07-08 NOTE — ED ADULT NURSE NOTE - OBJECTIVE STATEMENT
pt is a 34 y/o M arriving to ED for c/c "I have sickle cell pain to both of my hips and my shoulder". 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 per pt, worsening pain today to lower back, bilateral hips and Lt shoulder. pt states he took Dilaudid 8mg PO at home with minimal relief. Per pt, scheduled for bilateral hip replacements "after the summer". Ambulates with cane at baseline. Pt denies n/v/d, fever, chills, CP or SOB.
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
## 2020-07-08 NOTE — ED PROVIDER NOTE - CONSTITUTIONAL, MLM
normal... unkempt appearing, awake, alert, oriented to person, place, time/situation and in no apparent distress.

## 2020-07-08 NOTE — ED PROVIDER NOTE - NSFOLLOWUPINSTRUCTIONS_ED_ALL_ED_FT
Please see your primary care provider in 2-3 days.  Call for appointment.  If you have any problems with followup, please call the ED Referral Coordinator at [phone].  Return to the ER if symptoms worsen or other concerns.    Sickle Cell Crisis    WHAT YOU NEED TO KNOW:    A sickle cell crisis is a painful episode that occurs in people who have sickle cell anemia. It happens when sickle-shaped red blood cells (RBCs) block blood vessels. Blood and oxygen cannot get to tissues, causing pain. A sickle cell crisis can also damage your tissues and cause organ failure, such liver or kidney failure. A sickle cell crisis can become life-threatening.    DISCHARGE INSTRUCTIONS:    Call 911 for any of the following:     You have shortness of breath or chest pain.      You are a man and have an erection that is painful and does not go away.       You lose vision in one or both eyes.    Return to the emergency department if:     You have a fever.      You feel like you cannot cope with your pain, or you feel like hurting yourself.       You have behavior changes, a seizure, or faint.       You have abdominal pain, nausea, or vomiting.      You have a headache that is worse or different from those that you have had in the past.       You have new weakness or numbness in your arm, leg, or face.      You have new pain in any part of your body.      Your urine is dark and you are urinating less than usual or not at all.       You are dizzy, lightheaded, or faint.     Contact your healthcare provider if:     You have any new signs or symptoms.       You have blood in your urine.       You are constipated or you have diarrhea.       You have changes in your vision.       You have increased fatigue.       You plan to travel by airplane or to a high [facility].       You have questions or concerns about your condition or care.    Medicines: You may need any of the following:     Prescription pain medicine may be given. Ask how to take this medicine safely. Medicine may also be given to decrease sickling of your RBCs. You may also need medicine to treat or prevent a bacterial infection.       NSAIDs, such as ibuprofen, help decrease swelling, pain, and fever. This medicine is available with or without a doctor's order. NSAIDs can cause stomach bleeding or kidney problems in certain people. If you take blood thinner medicine, always ask your healthcare provider if NSAIDs are safe for you. Always read the medicine label and follow directions.      Acetaminophen decreases pain and fever. It is available without a doctor's order. Ask how much to take and how often to take it. Follow directions. Acetaminophen can cause liver damage if not taken correctly.      Take your medicine as directed. Contact your healthcare provider if you think your medicine is not helping or if you have side effects. Tell him or her if you are allergic to any medicine. Keep a list of the medicines, vitamins, and herbs you take. Include the amounts, and when and why you take them. Bring the list or the pill bottles to follow-up visits. Carry your medicine list with you in case of an emergency.    Medical alert identification: Wear medical alert jewelry or carry a card that says you have sickle cell anemia. Ask your healthcare provider where to get these items. Medical Alert Jewelry         Prevent a sickle cell crisis:     Take vitamins and minerals as directed. Folic acid can help prevent blood vessel problems that can occur with sickle cell anemia. Zinc may decrease how often you have pain.       Drink liquids as directed. Dehydration can increase your risk for a sickle cell crisis. Ask how much liquid to drink each day and which liquids are best for you.      Balance rest and exercise. Rest during a sickle cell crisis. Over time, increase your activity to a moderate amount. Exercise regularly. Avoid exercise or activities that can cause injury, such as football. Ask about the best exercise plan for you.       Stay out of the cold. Do not go quickly from a warm place to a cold place. Do not go swimming in cold water. Stay warm in the winter.      Do not smoke cigarettes or drink alcohol. These increase your risk for a sickle cell crisis. Nicotine and other chemicals in cigarettes and cigars can cause lung damage. Ask your healthcare provider for information if you currently smoke and need help to quit. E-cigarettes or smokeless tobacco still contain nicotine. Talk to your healthcare provider before you use these products.       Ask about vaccinations you need. Vaccinations can help prevent a viral infection that may lead to a sickle cell crisis. Get a flu shot every year as directed. You may need a pneumonia vaccine every 5 years.     Follow up with your healthcare provider as directed: You may need ongoing screening for conditions that can develop because of sickle cell disease. Examples include kidney disease, hypertension (high blood pressure), retinopathy (eye problems), and problems with your lungs. Write down your questions so you remember to ask them during your visits.

## 2020-07-08 NOTE — ED PROVIDER NOTE - PATIENT PORTAL LINK FT
You can access the FollowMyHealth Patient Portal offered by [facility] by registering at the following website: [email or web address]. By joining [employer]’s FollowMyHealth portal, you will also be able to view your health information using other applications (apps) compatible with our system.

## 2020-07-08 NOTE — ED PROVIDER NOTE - CLINICAL SUMMARY MEDICAL DECISION MAKING FREE TEXT BOX
exacerbation of typical sickle cell hip/low back pain.  tachycardic on arrival similar to prior visits.  no fever, signs of infection.  no trauma.  given dilaudid/ibuprofen/benaryl with improvement in pain/ hr. to continue outpatient management. return precautions discussed

## 2020-07-08 NOTE — ED PROVIDER NOTE - OBJECTIVE STATEMENT
history of sickle cell, here with bilateral hip and low back pain x 1 day.  Says he took his normal dilaudid 8mg po at noon without relief.  Denies trauma, fever, chills, sob, chest pain.  Feels similar to prior ss pain.  Reports he is supposed to have hip replacement at [facility] at the end of the summer.  Requesting dilaudid 16mg, ibuprofen 600mg, and benadryl 50mg po.

## 2020-07-12 DIAGNOSIS — D57.00 HB-SS DISEASE WITH CRISIS, UNSPECIFIED: ICD-10-CM

## 2020-07-12 DIAGNOSIS — M25.551 PAIN IN RIGHT HIP: ICD-10-CM

## 2020-07-12 DIAGNOSIS — M25.552 PAIN IN LEFT HIP: ICD-10-CM

## 2020-07-12 DIAGNOSIS — M54.5 LOW BACK PAIN: ICD-10-CM

## 2020-07-18 ENCOUNTER — EMERGENCY (EMERGENCY)
Facility: HOSPITAL | Age: 35
LOS: 1 days | Discharge: HOME | End: 2020-07-18
Attending: EMERGENCY MEDICINE | Admitting: EMERGENCY MEDICINE
Payer: SELF-PAY

## 2020-07-18 VITALS
DIASTOLIC BLOOD PRESSURE: 72 MMHG | TEMPERATURE: 98 F | RESPIRATION RATE: 18 BRPM | SYSTOLIC BLOOD PRESSURE: 115 MMHG | OXYGEN SATURATION: 99 % | HEART RATE: 87 BPM

## 2020-07-18 VITALS
HEIGHT: 68 IN | DIASTOLIC BLOOD PRESSURE: 75 MMHG | WEIGHT: 160.94 LBS | TEMPERATURE: 99 F | RESPIRATION RATE: 18 BRPM | OXYGEN SATURATION: 96 % | SYSTOLIC BLOOD PRESSURE: 129 MMHG | HEART RATE: 98 BPM

## 2020-07-18 PROCEDURE — 99284 EMERGENCY DEPT VISIT MOD MDM: CPT

## 2020-07-18 PROCEDURE — 99283 EMERGENCY DEPT VISIT LOW MDM: CPT

## 2020-07-18 RX ORDER — HYDROMORPHONE HYDROCHLORIDE 2 MG/ML
16 INJECTION INTRAMUSCULAR; INTRAVENOUS; SUBCUTANEOUS ONCE
Refills: 0 | Status: DISCONTINUED | OUTPATIENT
Start: 2020-07-18 | End: 2020-07-18

## 2020-07-18 RX ORDER — IBUPROFEN 200 MG
600 TABLET ORAL ONCE
Refills: 0 | Status: COMPLETED | OUTPATIENT
Start: 2020-07-18 | End: 2020-07-18

## 2020-07-18 RX ADMIN — HYDROMORPHONE HYDROCHLORIDE 16 MILLIGRAM(S): 2 INJECTION INTRAMUSCULAR; INTRAVENOUS; SUBCUTANEOUS at 18:00

## 2020-07-18 RX ADMIN — HYDROMORPHONE HYDROCHLORIDE 16 MILLIGRAM(S): 2 INJECTION INTRAMUSCULAR; INTRAVENOUS; SUBCUTANEOUS at 18:51

## 2020-07-18 RX ADMIN — Medication 600 MILLIGRAM(S): at 18:00

## 2020-07-18 NOTE — ED PROVIDER NOTE - PATIENT PORTAL LINK FT
You can access the FollowMyHealth Patient Portal offered by [facility] by registering at the following website: [email or web address]. By joining [employer]’s FollowMyHealth portal, you will also be able to view your health information using other applications (apps) compatible with our system.

## 2020-07-18 NOTE — ED PROVIDER NOTE - CLINICAL SUMMARY MEDICAL DECISION MAKING FREE TEXT BOX
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 On exam feels well, nontoxic, HRRR, lungs clear, abdomen soft. FROM hips spine. 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 On exam feels well, nontoxic, HRRR, lungs clear, abdomen soft. FROM hips spine. Give meds. re-evaluation pt states improvement of symptoms. states he will follow up with Dr. Teran this week. ED evaluation and management discussed with the patient and family (if available) in detail.  Close PMD follow up encouraged.  Strict ED return instructions discussed in detail and patient given the opportunity to ask any questions about their discharge diagnosis and instructions. Patient verbalized understanding. Patient is agreeable to plan.

## 2020-07-18 NOTE — ED ADULT NURSE NOTE - OBJECTIVE STATEMENT
pt reports onset of sickle cell pain this morning.  pt took his 8mg dose of PO Dilaudid at 1100 without relief.  pt states pain is at lower back and b/l hips.  pt denies sob, fevers.

## 2020-07-18 NOTE — ED PROVIDER NOTE - OBJECTIVE STATEMENT
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 No fever, chills, chest pain, palpitations, nausea, vomiting, diarrhea. Typical lower back and hip pain. Scheduled for a hip replacement, delayed due to COVID but scheduled for the end of summer.

## 2020-07-18 NOTE — ED ADULT TRIAGE NOTE - ACCOMPANIED BY
Pt. Walked into office at 12:25pm for chest pressure, constant since late Sun. Afternoon. This pain is new for pt. And he is worried. He doesn't   Pt. Advised to go to ER. Offered pt. An appointment with Filomena but he decided to go to    Self

## 2020-07-18 NOTE — ED PROVIDER NOTE - PHYSICAL EXAMINATION
General: Patient is well developed and well nourised. Patient is alert and oriented to person, place and date. Patient is laying comfortably in stretcher and appears in no acute distress.  HEENT: Head is normocephalic and atraumatic. Pupils are equal, round and reactive to light and accommodation. Extraocular movements intact. No evidence of nystagmus, conjunctival injection, or scleral icterus. External ears symmetric and non-tender without evidence of discharge. Ear canals are clear without evidence of edema or erythema. Cone of light evident on tympanic membrade without evidence of erythema, retraction or bulge. No hemotympanum present bilaterally.  Nose is symmetric, non-tender, patent without evidence of discharge. Teeth in good repair. Uvula midline. Pharynx without erythema, edema, tonsillar enlargement or exudates.   Neck: Supple and nontender, with no evidence of lymphadenopathy. No cervical spinal tenderness. Full range of motion.  Heart: Regular rate and rhythm. No murmurs, rubs or gallops.   Lungs: Clear to auscultation bilaterally with equal chest expansion. No note of wheezes, rhonchi, rales. Equal chest expansion. No note of retractions.  Abdomen: Bowel sounds present in all four quadrants. Soft, non-tender, non-distended without signs of masses, rebound or guarding. No note of hepatosplenomegaly. No CVA tenderness bilaterally. Negative Diallo sign. No pain present over McBurney's point.  Musculoskeletal: No edema, erythema, ecchymosis, atrophy or deformity. Full range of motion in all four extremities.  No clubbing or cyanosis. No point tenderness to palpation.   Neuro: Cranial nerves intact. GCS 15. Moving all extremities without discomfort. Sensation intact. Gait steady  Skin: Warm, dry and intact without evidence of rashes, bruising, pallor, jaundice or cyanosis.   Psych: Mood and affect appropriate.

## 2020-07-18 NOTE — ED ADULT NURSE NOTE - NSIMPLEMENTINTERV_GEN_ALL_ED
Implemented All Fall Risk Interventions:  [place] to call system. Call bell, personal items and telephone within reach. Instruct patient to call for assistance. Room bathroom lighting operational. Non-slip footwear when patient is off stretcher. Physically safe environment: no spills, clutter or unnecessary equipment. Stretcher in lowest position, wheels locked, appropriate side rails in place. Provide visual cue, wrist band, yellow gown, etc. Monitor gait and stability. Monitor for mental status changes and reorient to person, place, and time. Review medications for side effects contributing to fall risk. Reinforce activity limits and safety measures with patient and family.

## 2020-07-18 NOTE — ED ADULT TRIAGE NOTE - CHIEF COMPLAINT QUOTE
c.o back and bilateral hip pain. Pt quotes "I have sickle cell." c/o back and bilateral hip pain. Pt quotes "I have sickle cell."

## 2020-07-18 NOTE — ED PROVIDER NOTE - NSFOLLOWUPINSTRUCTIONS_ED_ALL_ED_FT
please follow up with your hematologist/sickle cell doctor this week    SICKLE CELL CRISIS - [facility]) Instructions(ER/ED)     Sickle Cell Crisis    WHAT YOU NEED TO KNOW:    A sickle cell crisis is a painful episode that occurs in people who have sickle cell anemia. It happens when sickle-shaped red blood cells (RBCs) block blood vessels. Blood and oxygen cannot get to tissues, causing pain. A sickle cell crisis can also damage your tissues and cause organ failure, such liver or kidney failure. A sickle cell crisis can become life-threatening.    DISCHARGE INSTRUCTIONS:    Call 911 for any of the following:     You have shortness of breath or chest pain.      You are a man and have an erection that is painful and does not go away.       You lose vision in one or both eyes.    Return to the emergency department if:     You have a fever.      You feel like you cannot cope with your pain, or you feel like hurting yourself.       You have behavior changes, a seizure, or faint.       You have abdominal pain, nausea, or vomiting.      You have a headache that is worse or different from those that you have had in the past.       You have new weakness or numbness in your arm, leg, or face.      You have new pain in any part of your body.      Your urine is dark and you are urinating less than usual or not at all.       You are dizzy, lightheaded, or faint.     Contact your healthcare provider if:     You have any new signs or symptoms.       You have blood in your urine.       You are constipated or you have diarrhea.       You have changes in your vision.       You have increased fatigue.       You plan to travel by airplane or to a high elevation.       You have questions or concerns about your condition or care.    Medicines: You may need any of the following:     Prescription pain medicine may be given. Ask how to take this medicine safely. Medicine may also be given to decrease sickling of your RBCs. You may also need medicine to treat or prevent a bacterial infection.       NSAIDs, such as ibuprofen, help decrease swelling, pain, and fever. This medicine is available with or without a doctor's order. NSAIDs can cause stomach bleeding or kidney problems in certain people. If you take blood thinner medicine, always ask your healthcare provider if NSAIDs are safe for you. Always read the medicine label and follow directions.      Acetaminophen decreases pain and fever. It is available without a doctor's order. Ask how much to take and how often to take it. Follow directions. Acetaminophen can cause liver damage if not taken correctly.      Take your medicine as directed. Contact your healthcare provider if you think your medicine is not helping or if you have side effects. Tell him or her if you are allergic to any medicine. Keep a list of the medicines, vitamins, and herbs you take. Include the amounts, and when and why you take them. Bring the list or the pill bottles to follow-up visits. Carry your medicine list with you in case of an emergency.    Medical alert identification: Wear medical alert jewelry or carry a card that says you have sickle cell anemia. Ask your healthcare provider where to get these items. Medical Alert Jewelry         Prevent a sickle cell crisis:     Take vitamins and minerals as directed. Folic acid can help prevent blood vessel problems that can occur with sickle cell anemia. Zinc may decrease how often you have pain.       Drink liquids as directed. Dehydration can increase your risk for a sickle cell crisis. Ask how much liquid to drink each day and which liquids are best for you.      Balance rest and exercise. Rest during a sickle cell crisis. Over time, increase your activity to a moderate amount. Exercise regularly. Avoid exercise or activities that can cause injury, such as football. Ask about the best exercise plan for you.       Stay out of the cold. Do not go quickly from a warm place to a cold place. Do not go swimming in cold water. Stay warm in the winter.      Do not smoke cigarettes or drink alcohol. These increase your risk for a sickle cell crisis. Nicotine and other chemicals in cigarettes and cigars can cause lung damage. Ask your healthcare provider for information if you currently smoke and need help to quit. E-cigarettes or smokeless tobacco still contain nicotine. Talk to your healthcare provider before you use these products.       Ask about vaccinations you need. Vaccinations can help prevent a viral infection that may lead to a sickle cell crisis. Get a flu shot every year as directed. You may need a pneumonia vaccine every 5 years.     Follow up with your healthcare provider as directed: You may need ongoing screening for conditions that can develop because of sickle cell disease. Examples include kidney disease, hypertension (high blood pressure), retinopathy (eye problems), and problems with your lungs. Write down your questions so you remember to ask them during your visits.       © Copyright [employer] 2020       back to top                      © Copyright [employer] 2020

## 2020-07-22 DIAGNOSIS — D57.00 HB-SS DISEASE WITH CRISIS, UNSPECIFIED: ICD-10-CM

## 2020-08-01 ENCOUNTER — EMERGENCY (EMERGENCY)
Facility: HOSPITAL | Age: 35
LOS: 1 days | Discharge: HOME | End: 2020-08-01
Attending: EMERGENCY MEDICINE
Payer: SELF-PAY

## 2020-08-01 ENCOUNTER — EMERGENCY (EMERGENCY)
Facility: HOSPITAL | Age: 35
LOS: 1 days | Discharge: HOME | End: 2020-08-01
Attending: EMERGENCY MEDICINE | Admitting: EMERGENCY MEDICINE
Payer: SELF-PAY

## 2020-08-01 VITALS
DIASTOLIC BLOOD PRESSURE: 74 MMHG | RESPIRATION RATE: 16 BRPM | OXYGEN SATURATION: 100 % | TEMPERATURE: 98 F | WEIGHT: 160.94 LBS | HEART RATE: 87 BPM | SYSTOLIC BLOOD PRESSURE: 117 MMHG | HEIGHT: 68 IN

## 2020-08-01 VITALS
HEART RATE: 97 BPM | TEMPERATURE: 98 F | RESPIRATION RATE: 19 BRPM | SYSTOLIC BLOOD PRESSURE: 127 MMHG | DIASTOLIC BLOOD PRESSURE: 79 MMHG | WEIGHT: 160.94 LBS | OXYGEN SATURATION: 97 % | HEIGHT: 68 IN

## 2020-08-01 DIAGNOSIS — M54.5 LOW BACK PAIN: ICD-10-CM

## 2020-08-01 DIAGNOSIS — D57.1 SICKLE-CELL DISEASE WITHOUT CRISIS: ICD-10-CM

## 2020-08-01 PROCEDURE — 99282 EMERGENCY DEPT VISIT SF MDM: CPT

## 2020-08-01 PROCEDURE — 99284 EMERGENCY DEPT VISIT MOD MDM: CPT

## 2020-08-01 PROCEDURE — 99283 EMERGENCY DEPT VISIT LOW MDM: CPT

## 2020-08-01 RX ORDER — IBUPROFEN 200 MG
600 TABLET ORAL ONCE
Refills: 0 | Status: DISCONTINUED | OUTPATIENT
Start: 2020-08-01 | End: 2020-08-06

## 2020-08-01 RX ORDER — HYDROMORPHONE HYDROCHLORIDE 2 MG/ML
8 INJECTION INTRAMUSCULAR; INTRAVENOUS; SUBCUTANEOUS ONCE
Refills: 0 | Status: COMPLETED | OUTPATIENT
Start: 2020-08-01 | End: 2020-08-01

## 2020-08-01 NOTE — ED PROVIDER NOTE - NSFOLLOWUPINSTRUCTIONS_ED_ALL_ED_FT
Follow up with your hematologist in 2 days        Sickle Cell Disease    WHAT YOU NEED TO KNOW:    What is sickle cell disease (SCD)? SCD causes your RBCs to be sickle (crescent) shaped. The sickle shape is caused by abnormal hemoglobin in the RBC. Hemoglobin carries oxygen to all tissues in your body. Sickle-shaped RBCs can get stuck inside blood vessels. This can stop or slow blood flow, and prevent oxygen from getting to tissues. When this happens, it is called a sickle cell crisis. SCD also causes RBCs break apart and die faster than healthy RBCs. This causes low red blood cell levels (anemia).     What causes SCD? SCD is passed from parents to a child. A child is given 2 abnormal genes for hemoglobin, 1 from each parent.     What are the signs and symptoms of SCD? The following symptoms may come and go, or may happen during a sickle cell crisis:     Low energy      Pain anywhere in your body      Pale skin      Headaches      Shortness of breath    How is SCD diagnosed and managed? A blood test will check the shape and number of your RBCs. You may need any of the following treatments:     Medicines may be given to decrease pain or sickling of your RBCs. You may also need medicine to treat or prevent a bacterial infection.       A blood transfusion increases the number of healthy RBCs in your blood.       Surgery may be done to remove your spleen or gallbladder. Surgery may be needed if RBCs often get stuck in your spleen, or you have gallstones.       A stem cell transplant, also called a bone marrow transplant, helps your body make new, healthy blood cells.     What can I do to manage pain and care for myself?     Apply heat to areas of pain. Use a heating pad or take a warm bath. Do this for 20 to 30 minutes every 2 hours for as many days as directed.       Gently massage areas where you feel pain. A professional massage may also help with pain.       Balance rest and exercise. Rest during a sickle cell crisis. Over time, increase your activity. Exercise may help decrease pain. Take breaks during exercise and drink plenty of water. Ask your healthcare provider which activities are safe for you.       Get acupuncture treatment. Acupuncture may help decrease pain and help you relax. Ask your healthcare provider for more information about acupuncture.       Eat healthy foods. Healthy foods will improve your overall health and make it easier to manage SCD. Examples of healthy foods include fruits, vegetables, whole-grain breads, low-fat dairy products, beans, lean meats, and fish. Ask if you need to be on a special diet.     What can I do to prevent a sickle cell crisis? A sickle cell crisis may be caused by illness, changes in temperature, stress, dehydration, or being at high altitudes. Do the following to help prevent a sickle cell crisis:     Drink liquids as directed. Dehydration can increase your risk for a sickle cell crisis. Ask how much liquid to drink each day and which liquids are best for you.      Avoid quick changes in temperature. Do not go quickly from a warm place to a cold place. Get in a pool slowly instead of jumping in. Dress in light clothing in the summer and warm clothing in the winter.       Ask about vaccinations. Vaccinations can help prevent a viral infection. Get a flu shot every year as directed. You may also need a pneumonia vaccine.      Wash your hands frequently. Handwashing can help prevent illness. Wash your hands before you prepare or eat food, and after you use the bathroom.       Do not smoke. Nicotine and other chemicals in cigarettes and cigars can cause lung damage and sickle cell crisis. Ask your healthcare provider for information if you currently smoke and need help to quit. E-cigarettes or smokeless tobacco still contain nicotine. Talk to your healthcare provider before you use these products.      Limit or do not drink alcohol. Alcohol can cause dehydration and increase your risk for sickle cell crisis. If you drink alcohol, also drink plenty of water.       Manage your stress. Your healthcare provider may recommend relaxation techniques and deep breathing exercises to help decrease your stress. Your healthcare provider may recommend you talk to someone about your stress or anxiety, such as a counselor or a trusted friend.       Do not travel in an unpressurized plane or travel to high altitudes. These environments are low in oxygen and may cause a sickle cell crisis.     What are the risks of SCD? SCD increases your risk for the following:     Infection      Breathing problems      Damage to organs such as the heart, liver, kidney, bone marrow, or spleen      Stroke, heart attack, or blood clots in your lungs or limbs      Eye problems      Problems with your joints      Open sores on your legs      Depression    What do I need to know about family planning and pregnancy?     If you do not want to become pregnant, your healthcare provider may recommend birth control pills that contain only progestin. The pills will prevent pregnancy and make your periods lighter. Lighter periods may help treat low RBC levels.       Talk to your healthcare provider before you get pregnant. SCD increases a woman's risk for problems, such as a miscarriage and having a baby that weighs less than normal. You will need close monitoring during pregnancy. You may need to take certain vitamins and have 1 or more blood transfusions during pregnancy.       You will pass a gene for sickle cell disease to your child. Your partner should be tested for the sickle cell gene. This information can help predict your child's risk for sickle cell disease.     Call 911 or have someone else call for any of the following:     You have any of the following signs of a stroke:   Numbness or drooping on one side of your face       Weakness in an arm or leg      Confusion or difficulty speaking      Dizziness, a severe headache, or vision loss      You have any of the following signs of a heart attack:   Squeezing, pressure, or pain in your chest      You may also have any of the following:   Discomfort or pain in your back, neck, jaw, stomach, or arm      Shortness of breath      Nausea or vomiting      Lightheadedness or a sudden cold sweat      You have a seizure.       You lose vision in one or both eyes.      You lose consciousness or cannot be woken.     When should I seek immediate care?     You feel lightheaded, short of breath, and have chest pain.      You cough up blood.      You have a fever of 100.4°F (38°C) or higher.      You have a severe headache.       Your pain does not get better after you take pain medicine.      Your arm or leg is painful, red, and larger than usual.       You feel like you can no longer cope with your pain, or feel like harming yourself.       You have abdominal pain, nausea, and vomiting.      You are a man and have an erection that is painful and does not go away after 4 hours.       You cannot think clearly, or you feel like you are going to faint.      Your urine is dark, or you are urinating less than usual or not at all.       You see blood in your urine.       Your eyes or skin are yellow.       You have a cold or the flu.       You have a cough or are wheezing.     When should I contact my healthcare provider?     You are more tired than usual during the day.      You are constipated or have diarrhea.      Your vision has changed in one or both eyes.       You feel anxious or depressed.       You have new or worse swelling in your joints.       You have an open sore on your skin that will not heal.       You are pregnant or think you are pregnant.       You have questions or concerns about your condition or care.    CARE AGREEMENT:    You have the right to help plan your care. Learn about your health condition and how it may be treated. Discuss treatment options with your healthcare providers to decide what care you want to receive. You always have the right to refuse treatment.        © Copyright [employer] 2020       back to top                      © Copyright [employer] 2020

## 2020-08-01 NOTE — ED PROVIDER NOTE - PATIENT PORTAL LINK FT
You can access the FollowMyHealth Patient Portal offered by [facility] by registering at the following website: [email or web address]. By joining [employer]’s FollowMyHealth portal, you will also be able to view your health information using other applications (apps) compatible with our system.

## 2020-08-01 NOTE — ED PROVIDER NOTE - PATIENT PORTAL LINK FT
You can access the FollowMyHealth Patient Portal offered by [facility] by registering at the following website: [email or web address]. By joining [employer]’s FollowMyHealth portal, you will also be able to view your health information using other applications (apps) compatible with our system.

## 2020-08-01 NOTE — ED PROVIDER NOTE - OBJECTIVE STATEMENT
Pt is a 34 y/o M w/PMHx sickle cell (hx acute chest yrs ago), b/l hip AVN p/w sickle cell crisis since 1100hrs this morning.  Pt in process of re-establing insurance, so can not afford full prescription, and ran out this am.  States has follow up with PMD on Monday. No fever, chills, chest pain, palpitations, nausea, vomiting, diarrhea. Typical lower back and hip pain for SS. Scheduled for a hip replacement at [facility], delayed due to COVID but scheduled for the end of summer.  Pt states that he usually finds relief with 8mg PO Dilaudid and 600 Ibuprofen.

## 2020-08-01 NOTE — ED PROVIDER NOTE - ATTENDING CONTRIBUTION TO CARE
Dr. Weir : I have personally seen and examined this patient at the bedside. I have fully participated in the care of this patient. I have reviewed all pertinent clinical information, including history, physical exam, plan and the Resident's note and agree except as noted.     34 y/o M w/PMHx sickle cell (hx acute chest yrs ago), b/l hip AVN p/w bl hip and lower back pain since this morning. states that took his 8mg po dilaudid with minimal relief. states that feels the same as his sickle cell crisis. has had chest syndrome in the past does not feel anything like it.        Denies f/c/n/v/cp/sob/palpitations/cough/abd.pain/d/c/dysuria/hematuria. nosick contacts/recent travel.    PE:  head; atraumatic normocephalic  eyes: perrla  Heart: rrr s1s2  lungs: ctab  abd: soft, nt nd + bs no rebound/guarding no cva ttp  le: no swelling no calf ttp  back: no midline cervical/thoracic/lumbar ttp      -->sickle cell crisis; gave 8mg po dilaudid planned to do labs; as pt received meds said that he needs to go bc his daughter is at [facility] and that he needs to leave notes that feels better doesn't want any labs; drug seeking?--will dc VS stable

## 2020-08-01 NOTE — ED PROVIDER NOTE - ATTENDING CONTRIBUTION TO CARE
here malingering for pain medications. when confronted, admitted it, and left quietly. VSS, non toxic appearing, ambulatory with steady gait. DC home in NAD. No narcotics given.

## 2020-08-01 NOTE — ED PROVIDER NOTE - CLINICAL SUMMARY MEDICAL DECISION MAKING FREE TEXT BOX
sickle cell pain. ? pain seeking as pt reported last pain meds at 11am despite receiving pain meds 3 hrs ago at [facility] for same sx's. Pt also denied seeking medical care today on initial interview.  no evidence of infection on exam. neurovascular intact. d/w pt will need to see his hematologist for further pain medication.

## 2020-08-01 NOTE — ED PROVIDER NOTE - PROGRESS NOTE DETAILS
States that his daughter is in the hospital, Pt reassessed, pt feeling better at this time, vss, pt able to walk, talk and vocalized plan of action. Discussed in depth and explained to pt in depth the next steps that need to be taking including proper follow up with PCP or specialists. All incidental findings were discussed with pt as well. Pt verbalized their concerns and all questions were answered. Pt understands dispo and wants discharge. Given good instructions when to return to ED and importance of f/u.

## 2020-08-01 NOTE — ED PROVIDER NOTE - NS ED ROS FT
CONSTITUTIONAL: No fevers, no chills  Eyes: No vision changes  Cardiovascular: No Chest pain  Respiratory: No SOB  Gastrointestinal: No n/v/c/d, no abd pain  Genitourinary: no dysuria, no hematuria  SKIN: no rashes.  MSK: +lower back pain, no weakness, no myalgias, no arthralgias  NEURO: no headache, no weakness, no numbness  PSYCHIATRIC: no known mental health issues.

## 2020-08-01 NOTE — ED ADULT NURSE NOTE - OBJECTIVE STATEMENT
Pt is a 34 y/o male A&Ox4 in NAD ambulatory with steady gait c/o low back pain radiating to bilateral hips starting today. Pt states "my sickle cell is acting up." Pt denies injury/trauma. Pt reports taking 8mg PO Diluted at 11 am as ordered for pain today with no relief.

## 2020-08-01 NOTE — ED PROVIDER NOTE - OBJECTIVE STATEMENT
36 y/o male with hx of sickle cell disease, avn b/l hips, PE c/o low back pain and b/l hip pain since 11 am today. pt states sharp pain to lower back and hips similar to prior episodes of sickle cell pain. no trauma. no cp or sob. no fever or chills. no calf pain or leg swelling. no abd pain, n/v. Pt reports took dilaudid at 11am today and no further pain meds today. Pt denied seeking medical visit other than current visit. d/w pt note in chart from [facility] 2.5 hrs ago.

## 2020-08-01 NOTE — ED PROVIDER NOTE - MUSCULOSKELETAL, MLM
Spine appears normal, range of motion is not limited, no muscle or joint tenderness. b/l hip non tender. no deformity. FROM. no edema. no bruising. no redness. distal NVI. remiander of b/l LE non tender.

## 2020-08-01 NOTE — ED ADULT NURSE NOTE - OBJECTIVE STATEMENT
36 y/o male presents to ED c/o sickle cell crisis, pain in bilateral hips radiating to mid-back, symptoms started at 11am, took 8mg of dilaudid PO with no relief.

## 2020-08-10 ENCOUNTER — EMERGENCY (EMERGENCY)
Facility: HOSPITAL | Age: 35
LOS: 1 days | Discharge: HOME | End: 2020-08-10
Attending: EMERGENCY MEDICINE | Admitting: EMERGENCY MEDICINE
Payer: SELF-PAY

## 2020-08-10 VITALS
SYSTOLIC BLOOD PRESSURE: 125 MMHG | RESPIRATION RATE: 16 BRPM | HEART RATE: 89 BPM | DIASTOLIC BLOOD PRESSURE: 78 MMHG | TEMPERATURE: 99 F | OXYGEN SATURATION: 99 %

## 2020-08-10 VITALS
OXYGEN SATURATION: 100 % | HEIGHT: 68 IN | SYSTOLIC BLOOD PRESSURE: 128 MMHG | RESPIRATION RATE: 18 BRPM | WEIGHT: 160.94 LBS | TEMPERATURE: 98 F | HEART RATE: 120 BPM | DIASTOLIC BLOOD PRESSURE: 75 MMHG

## 2020-08-10 PROCEDURE — 93010 ELECTROCARDIOGRAM REPORT: CPT

## 2020-08-10 PROCEDURE — 93005 ELECTROCARDIOGRAM TRACING: CPT

## 2020-08-10 PROCEDURE — 99284 EMERGENCY DEPT VISIT MOD MDM: CPT

## 2020-08-10 RX ORDER — HYDROMORPHONE HYDROCHLORIDE 2 MG/ML
16 INJECTION INTRAMUSCULAR; INTRAVENOUS; SUBCUTANEOUS ONCE
Refills: 0 | Status: DISCONTINUED | OUTPATIENT
Start: 2020-08-10 | End: 2020-08-10

## 2020-08-10 RX ORDER — IBUPROFEN 200 MG
600 TABLET ORAL ONCE
Refills: 0 | Status: COMPLETED | OUTPATIENT
Start: 2020-08-10 | End: 2020-08-10

## 2020-08-10 RX ADMIN — HYDROMORPHONE HYDROCHLORIDE 16 MILLIGRAM(S): 2 INJECTION INTRAMUSCULAR; INTRAVENOUS; SUBCUTANEOUS at 18:15

## 2020-08-10 RX ADMIN — Medication 600 MILLIGRAM(S): at 18:15

## 2020-08-10 NOTE — ED PROVIDER NOTE - NSFOLLOWUPINSTRUCTIONS_ED_ALL_ED_FT
Please see your primary care provider in 2-3 days.  Call for appointment.  If you have any problems with followup, please call the ED Referral Coordinator at [phone].  Return to the ER if symptoms worsen or other concerns.    Sickle Cell Crisis    WHAT YOU NEED TO KNOW:    A sickle cell crisis is a painful episode that occurs in people who have sickle cell anemia. It happens when sickle-shaped red blood cells (RBCs) block blood vessels. Blood and oxygen cannot get to tissues, causing pain. A sickle cell crisis can also damage your tissues and cause organ failure, such liver or kidney failure. A sickle cell crisis can become life-threatening.    DISCHARGE INSTRUCTIONS:    Call 911 for any of the following:     You have shortness of breath or chest pain.      You are a man and have an erection that is painful and does not go away.       You lose vision in one or both eyes.    Return to the emergency department if:     You have a fever.      You feel like you cannot cope with your pain, or you feel like hurting yourself.       You have behavior changes, a seizure, or faint.       You have abdominal pain, nausea, or vomiting.      You have a headache that is worse or different from those that you have had in the past.       You have new weakness or numbness in your arm, leg, or face.      You have new pain in any part of your body.      Your urine is dark and you are urinating less than usual or not at all.       You are dizzy, lightheaded, or faint.     Contact your healthcare provider if:     You have any new signs or symptoms.       You have blood in your urine.       You are constipated or you have diarrhea.       You have changes in your vision.       You have increased fatigue.       You plan to travel by airplane or to a high [facility].       You have questions or concerns about your condition or care.    Medicines: You may need any of the following:     Prescription pain medicine may be given. Ask how to take this medicine safely. Medicine may also be given to decrease sickling of your RBCs. You may also need medicine to treat or prevent a bacterial infection.       NSAIDs, such as ibuprofen, help decrease swelling, pain, and fever. This medicine is available with or without a doctor's order. NSAIDs can cause stomach bleeding or kidney problems in certain people. If you take blood thinner medicine, always ask your healthcare provider if NSAIDs are safe for you. Always read the medicine label and follow directions.      Acetaminophen decreases pain and fever. It is available without a doctor's order. Ask how much to take and how often to take it. Follow directions. Acetaminophen can cause liver damage if not taken correctly.      Take your medicine as directed. Contact your healthcare provider if you think your medicine is not helping or if you have side effects. Tell him or her if you are allergic to any medicine. Keep a list of the medicines, vitamins, and herbs you take. Include the amounts, and when and why you take them. Bring the list or the pill bottles to follow-up visits. Carry your medicine list with you in case of an emergency.    Medical alert identification: Wear medical alert jewelry or carry a card that says you have sickle cell anemia. Ask your healthcare provider where to get these items. Medical Alert Jewelry         Prevent a sickle cell crisis:     Take vitamins and minerals as directed. Folic acid can help prevent blood vessel problems that can occur with sickle cell anemia. Zinc may decrease how often you have pain.       Drink liquids as directed. Dehydration can increase your risk for a sickle cell crisis. Ask how much liquid to drink each day and which liquids are best for you.      Balance rest and exercise. Rest during a sickle cell crisis. Over time, increase your activity to a moderate amount. Exercise regularly. Avoid exercise or activities that can cause injury, such as football. Ask about the best exercise plan for you.       Stay out of the cold. Do not go quickly from a warm place to a cold place. Do not go swimming in cold water. Stay warm in the winter.      Do not smoke cigarettes or drink alcohol. These increase your risk for a sickle cell crisis. Nicotine and other chemicals in cigarettes and cigars can cause lung damage. Ask your healthcare provider for information if you currently smoke and need help to quit. E-cigarettes or smokeless tobacco still contain nicotine. Talk to your healthcare provider before you use these products.       Ask about vaccinations you need. Vaccinations can help prevent a viral infection that may lead to a sickle cell crisis. Get a flu shot every year as directed. You may need a pneumonia vaccine every 5 years.     Follow up with your healthcare provider as directed: You may need ongoing screening for conditions that can develop because of sickle cell disease. Examples include kidney disease, hypertension (high blood pressure), retinopathy (eye problems), and problems with your lungs. Write down your questions so you remember to ask them during your visits.

## 2020-08-10 NOTE — ED PROVIDER NOTE - CLINICAL SUMMARY MEDICAL DECISION MAKING FREE TEXT BOX
sickle cell pain in hips/ back similar to prior. no signs of infection, denies trauma, chest pain/ sob. tachycardic on arrival similar to prior visits.  given po dilaudid 16mg and ibuprofen 600mg with improvement in pain and tachycardia.  to continue outpatient meds.  return precautions discussed

## 2020-08-10 NOTE — ED ADULT TRIAGE NOTE - PAIN RATING/NUMBER SCALE (0-10): ACTIVITY
Continue current meds. Watch diet for fats and carbs. Stay active. Repeat blood work as recommended by Dr. Francine Jimenez in 2 weeks. Follow-up with GI for further evaluation. Schedule an appointment in April for medication check.
9

## 2020-08-10 NOTE — ED ADULT NURSE NOTE - OBJECTIVE STATEMENT
pt received into spot 18 A&Ox3 ambulatory appears uncomfortable arrives via walk in triage for eval of b/l hip and lower back pain typical of sickle cell pain. States he has a history of avascular necrosis of b/l hips has been told he needs replacements in March but could not get done because of covid. Reportedly now scheduled for October. Denies CP but is tachycardic in triage pt reports "its from the pain" reports hx of acute chest but not today, last in 2012. denies SOB fever chills cough runny nose sore throat  abd pain N/V/D constipation. took 8mg Dilaudid at 11AM with no change. Denies head ache blurry vision numbness/ tingling facial droop slurred speech pt received into spot 18 A&Ox3 ambulatory appears uncomfortable arrives via walk in triage for eval of b/l hip and lower back pain typical of sickle cell pain. States he has a history of avascular necrosis of b/l hips has been told he needs replacements in March but could not get done because of covid. Reportedly now scheduled for October. Denies CP but is tachycardic in triage pt reports "its from the pain" reports hx of acute chest but not today, last in 2012. denies SOB fever chills cough runny nose sore throat  abd pain N/V/D constipation. took 8mg Dilaudid at 11AM with no change. Denies head ache blurry vision numbness/ tingling facial droop slurred speech. respirations appear even and unlabored sating at 100% on room air. 12 lead ekg in progress awaiting MD mishra in nad

## 2020-08-10 NOTE — ED PROVIDER NOTE - OBJECTIVE STATEMENT
here with low back pain and hip pain bilaterally since this am.  Similar to prior sickle cell pain.  Denies fever/chills, trauma, sob, chest pain.  Says he took po dilaudid without relief at home pta.  Requesting dilaudid/ ibuprofen, says he doesn't need benadryl today.

## 2020-08-10 NOTE — ED PROVIDER NOTE - PATIENT PORTAL LINK FT
You can access the FollowMyHealth Patient Portal offered by [facility] by registering at the following website: [email or web address]. By joining [email or web address]’s FollowMyHealth portal, you will also be able to view your health information using other applications (apps) compatible with our system.

## 2020-08-14 DIAGNOSIS — M54.5 LOW BACK PAIN: ICD-10-CM

## 2020-08-14 DIAGNOSIS — D57.00 HB-SS DISEASE WITH CRISIS, UNSPECIFIED: ICD-10-CM

## 2020-08-26 ENCOUNTER — EMERGENCY (EMERGENCY)
Facility: HOSPITAL | Age: 35
LOS: 1 days | Discharge: HOME | End: 2020-08-26
Attending: EMERGENCY MEDICINE | Admitting: EMERGENCY MEDICINE
Payer: SELF-PAY

## 2020-08-26 VITALS
DIASTOLIC BLOOD PRESSURE: 75 MMHG | TEMPERATURE: 99 F | HEART RATE: 97 BPM | RESPIRATION RATE: 18 BRPM | OXYGEN SATURATION: 98 % | SYSTOLIC BLOOD PRESSURE: 109 MMHG

## 2020-08-26 VITALS
SYSTOLIC BLOOD PRESSURE: 123 MMHG | RESPIRATION RATE: 18 BRPM | TEMPERATURE: 99 F | DIASTOLIC BLOOD PRESSURE: 80 MMHG | HEART RATE: 116 BPM | HEIGHT: 68 IN | OXYGEN SATURATION: 100 % | WEIGHT: 160.94 LBS

## 2020-08-26 DIAGNOSIS — D57.00 HB-SS DISEASE WITH CRISIS, UNSPECIFIED: ICD-10-CM

## 2020-08-26 DIAGNOSIS — M54.2 CERVICALGIA: ICD-10-CM

## 2020-08-26 PROCEDURE — 99284 EMERGENCY DEPT VISIT MOD MDM: CPT

## 2020-08-26 PROCEDURE — 99283 EMERGENCY DEPT VISIT LOW MDM: CPT

## 2020-08-26 RX ORDER — IBUPROFEN 200 MG
600 TABLET ORAL ONCE
Refills: 0 | Status: COMPLETED | OUTPATIENT
Start: 2020-08-26 | End: 2020-08-26

## 2020-08-26 RX ORDER — HYDROMORPHONE HYDROCHLORIDE 2 MG/ML
16 INJECTION INTRAMUSCULAR; INTRAVENOUS; SUBCUTANEOUS ONCE
Refills: 0 | Status: DISCONTINUED | OUTPATIENT
Start: 2020-08-26 | End: 2020-08-26

## 2020-08-26 RX ADMIN — HYDROMORPHONE HYDROCHLORIDE 16 MILLIGRAM(S): 2 INJECTION INTRAMUSCULAR; INTRAVENOUS; SUBCUTANEOUS at 18:05

## 2020-08-26 RX ADMIN — Medication 600 MILLIGRAM(S): at 18:05

## 2020-08-26 NOTE — ED PROVIDER NOTE - OBJECTIVE STATEMENT
36 y/o M with PMHx of sickle cell disease, DVT, avascular necrosis comes in with complaints of lower back and R hip pain. Reports classic symptoms when he gets sickle cell flareups. Reports he took 8mg of Dilaudid this morning at 8am, usually takes every 6 hours but has not had any relief. Denies recent fevers, falls, injuries, chest pain, SOB. Denies going to any other hospital for meds. Denies urinary symptoms, skin infections.

## 2020-08-26 NOTE — ED PROVIDER NOTE - CLINICAL SUMMARY MEDICAL DECISION MAKING FREE TEXT BOX
Patient with sickle cell  pain. Pain controlled was given according to research of previous visits and pain management for sickle cell protocol. Patient ambulating well.

## 2020-08-26 NOTE — ED PROVIDER NOTE - SKIN, MLM
Skin normal color for race, warm, dry and intact. No evidence of rash. No acute signs of skin infections.

## 2020-08-26 NOTE — ED ADULT TRIAGE NOTE - CHIEF COMPLAINT QUOTE
Patient presents complaining of lower back pain took 8 mg of dilaudid this morning with no relief. Ambulates with a cane.  Deneis chest pain or SOB.

## 2020-08-26 NOTE — ED PROVIDER NOTE - PATIENT PORTAL LINK FT
You can access the FollowMyHealth Patient Portal offered by [facility] by registering at the following website: [email or web address]. By joining [employer]’s FollowMyHealth portal, you will also be able to view your health information using other applications (apps) compatible with our system.

## 2020-08-26 NOTE — ED ADULT NURSE NOTE - OBJECTIVE STATEMENT
c/o lower back pain radiating to LLE x few days.  Seen for same at [facility].  Denies falls/trauma, incontinence, motor function loss, sob, chest discomfort, fever, chills.  Hx sickle cell.  Patient ambulated w/ cane from triage in no acute distress.  VSS.  Comfort care given.  Continue to monitor.

## 2020-08-26 NOTE — ED PROVIDER NOTE - MUSCULOSKELETAL, MLM
FROM R hip however with pain. Spine appears normal, range of motion is not limited, no muscle or joint tenderness

## 2020-08-26 NOTE — ED PROVIDER NOTE - NSFOLLOWUPINSTRUCTIONS_ED_ALL_ED_FT
SICKLE CELL DISEASE - Ambulatory Care     Sickle Cell Disease    AMBULATORY CARE:    Sickle cell disease (SCD) causes your RBCs to be sickle (crescent) shaped. The sickle shape is caused by abnormal hemoglobin attached to the RBC. Hemoglobin carries oxygen to all tissues in your body. Sickle-shaped RBCs can get stuck to the walls of blood vessels. This can stop or slow blood flow, and prevent oxygen from getting to tissues. When this happens, it is called a sickle cell crisis. SCD may also cause low red blood cell (RBC) levels (anemia).     Common symptoms include the following: The following symptoms may come and go, or may happen during a sickle cell crisis:     Low energy      Pain anywhere in your body      Pale skin      Headaches      Shortness of breath    Call 911 or have someone else call for any of the following:     You have any of the following signs of a stroke:   Numbness or drooping on one side of your face       Weakness in an arm or leg      Confusion or difficulty speaking      Dizziness, a severe headache, or vision loss      You have any of the following signs of a heart attack:   Squeezing, pressure, or pain in your chest      You may also have any of the following:   Discomfort or pain in your back, neck, jaw, stomach, or arm      Shortness of breath      Nausea or vomiting      Lightheadedness or a sudden cold sweat      You have a seizure.       You lose vision in one or both eyes.      You lose consciousness or cannot be woken.     Seek care immediately if:     You feel lightheaded, short of breath, and have chest pain.      You cough up blood.      You have a fever of 100.4°F (38°C) or higher.      You have a severe headache.       Your pain does not get better after you take pain medicine.      Your arm or leg is painful, red, and larger than usual.       You feel like you can no longer cope with your pain, or feel like harming yourself.       You have abdominal pain, nausea, and vomiting.      You are a man and have an erection that is painful and does not go away after 4 hours.       You cannot think clearly, or you feel like you are going to faint.      Your urine is dark, or you are urinating less than usual or not at all.       You see blood in your urine.       Your eyes or skin are yellow.       You have a cold or the flu.       You have a cough or are wheezing.     Contact your healthcare provider if:     You are more tired than usual during the day.      You are constipated or have diarrhea.      Your vision has changed in one or both eyes.       You feel anxious or depressed.       You have new or worse swelling in your joints.       You have an open sore on your skin that will not heal.       You are pregnant or think you are pregnant.       You have questions or concerns about your condition or care.    Risks of SCD: SCD increases your risk for the following:     Infection      Breathing problems      Damage to organs such as the heart, liver, kidney, bone marrow, or spleen      Stroke, heart attack, or blood clots in your lungs or limbs      Eye problems      Problems with your joints      Open sores on your legs      Depression    Treatment for sickle cell anemia may include any of the following:     Medicines may be given to decrease pain or sickling of your RBCs. You may also need medicine to treat or prevent a bacterial infection.       A blood transfusion increases the number of healthy RBCs in your blood.       Surgery may be done to remove your spleen or gallbladder. Surgery may be needed if RBCs often get stuck in your spleen, or you have gallstones.       A stem cell transplant, also called a bone marrow transplant, helps your body make new, healthy blood cells.     Self-care:     Apply heat to areas of pain. Use a heating pad or take a warm bath. Do this for 20 to 30 minutes every 2 hours for as many days as directed.       Gently massage areas where you feel pain. A professional massage may also help with pain.       Balance rest and exercise. Rest during a sickle cell crisis. Over time, increase your activity. Exercise may help decrease pain. Take breaks during exercise and drink plenty of water. Ask your healthcare provider which activities are safe for you.       Get acupuncture treatment. Acupuncture may help decrease pain and help you relax. Ask your healthcare provider for more information about acupuncture.       Eat healthy foods. Healthy foods will improve your overall health and make it easier to manage SCD. Examples of healthy foods include fruits, vegetables, whole-grain breads, low-fat dairy products, beans, lean meats, and fish. Ask if you need to be on a special diet.     Prevent a sickle cell crisis: A sickle cell crisis may be caused by illness, changes in temperature, stress, dehydration, or being at high altitudes. Do the following to help prevent a sickle cell crisis:     Drink liquids as directed. Dehydration can increase your risk for a sickle cell crisis. Ask how much liquid to drink each day and which liquids are best for you.      Avoid quick changes in temperature. Do not go quickly from a warm place to a cold place. Get in a pool slowly instead of jumping in. Dress in light clothing in the summer and warm clothing in the winter.       Ask about vaccinations. Vaccinations can help prevent a viral infection. Get a flu shot every year as directed. You may also need a pneumonia vaccine.      Wash your hands frequently. Handwashing can help prevent illness. Wash your hands before you prepare or eat food, and after you use the bathroom.       Do not smoke. Nicotine and other chemicals in cigarettes and cigars can cause lung damage and sickle cell crisis. Ask your healthcare provider for information if you currently smoke and need help to quit. E-cigarettes or smokeless tobacco still contain nicotine. Talk to your healthcare provider before you use these products.      Limit or do not drink alcohol. Alcohol can cause dehydration and increase your risk for sickle cell crisis. If you drink alcohol, also drink plenty of water.       Manage your stress. Your healthcare provider may recommend relaxation techniques and deep breathing exercises to help decrease your stress. Your healthcare provider may recommend you talk to someone about your stress or anxiety, such as a counselor or a trusted friend.       Do not travel in an unpressurized plane or travel to high altitudes. These environments are low in oxygen and may cause a sickle cell crisis.     Wear medical alert identification: Wear medical alert jewelry or carry a card that says you have sickle cell anemia. Ask your healthcare provider where to get these items. Medical Alert Jewelry         What you need to know about family planning and pregnancy:     If you do not want to become pregnant, your healthcare provider may recommend birth control pills that contain only progestin. The pills will prevent pregnancy and make your periods lighter. Lighter periods may help treat low RBC levels.       Talk to your healthcare provider before you get pregnant. SCD increases a woman's risk for problems, such as a miscarriage and having a baby that weighs less than normal. You will need close monitoring during pregnancy. You may need to take certain vitamins and have 1 or more blood transfusions during pregnancy.       You will pass a gene for sickle cell disease to your child. Your partner should be tested for the sickle cell gene. This information can help predict your child's risk for sickle cell disease.     Follow up with your healthcare provider as directed: You may need ongoing screening for conditions that can develop because of sickle cell disease. Examples include kidney disease, hypertension (high blood pressure), retinopathy (eye problems), and problems with your lungs. Write down your questions so you remember to ask them during your visits.       © Copyright [employer] 2020       back to top                      © Copyright [employer] 2020

## 2020-08-26 NOTE — ED ADULT NURSE NOTE - CHPI ED NUR SYMPTOMS NEG
no bladder dysfunction/no bowel dysfunction/no numbness/no difficulty bearing weight/no tingling/no motor function loss/no neck tenderness/no anorexia/no constipation/no fatigue

## 2020-09-01 ENCOUNTER — EMERGENCY (EMERGENCY)
Facility: HOSPITAL | Age: 35
LOS: 1 days | Discharge: HOME | End: 2020-09-01
Attending: EMERGENCY MEDICINE | Admitting: EMERGENCY MEDICINE
Payer: SELF-PAY

## 2020-09-01 VITALS
OXYGEN SATURATION: 98 % | SYSTOLIC BLOOD PRESSURE: 124 MMHG | TEMPERATURE: 98 F | WEIGHT: 160.94 LBS | HEART RATE: 125 BPM | DIASTOLIC BLOOD PRESSURE: 82 MMHG | HEIGHT: 68 IN | RESPIRATION RATE: 18 BRPM

## 2020-09-01 VITALS — HEART RATE: 112 BPM

## 2020-09-01 PROCEDURE — 99283 EMERGENCY DEPT VISIT LOW MDM: CPT

## 2020-09-01 PROCEDURE — 99284 EMERGENCY DEPT VISIT MOD MDM: CPT

## 2020-09-01 RX ORDER — HYDROMORPHONE HYDROCHLORIDE 2 MG/ML
16 INJECTION INTRAMUSCULAR; INTRAVENOUS; SUBCUTANEOUS ONCE
Refills: 0 | Status: DISCONTINUED | OUTPATIENT
Start: 2020-09-01 | End: 2020-09-01

## 2020-09-01 RX ORDER — IBUPROFEN 200 MG
600 TABLET ORAL ONCE
Refills: 0 | Status: COMPLETED | OUTPATIENT
Start: 2020-09-01 | End: 2020-09-01

## 2020-09-01 RX ADMIN — HYDROMORPHONE HYDROCHLORIDE 16 MILLIGRAM(S): 2 INJECTION INTRAMUSCULAR; INTRAVENOUS; SUBCUTANEOUS at 17:31

## 2020-09-01 RX ADMIN — Medication 600 MILLIGRAM(S): at 18:20

## 2020-09-01 NOTE — ED PROVIDER NOTE - CLINICAL SUMMARY MEDICAL DECISION MAKING FREE TEXT BOX
34 y/o M with PMHx of sickle cell disease, necrosis of the hips, DVT/PE p/w b/l hip pain and lower back pain consistent with his sickle cell. Pt afebrile. No chest sxs ro SOB. Tachycardiac with a HR of 125 at triage. Reporting pain which causes HR to be elevated. Will give oral pain meds and oral hydration. Will reassess for improvement and repeat vitals.

## 2020-09-01 NOTE — ED PROVIDER NOTE - OBJECTIVE STATEMENT
36 y/o M with PMHx of sickle cell disease, necrosis of the hips, DVT/PE p/w lower back pain and b/l hip pain consistent with his sickle cell. Took 8mg of Dilaudid with no improvement. Hip pain more frequent. Pt is scheduled for hip surgery next month. No fever, chills, CP, SOB, N/V,

## 2020-09-01 NOTE — ED PROVIDER NOTE - PROGRESS NOTE DETAILS
pt feeling better, wants d/c, HR is 112, discussed with pt, wanting him to stay and continue oral hydration and monitor HR for improvement, pt declined and wants to go, to f/u with his hematologist

## 2020-09-01 NOTE — ED ADULT NURSE NOTE - OBJECTIVE STATEMENT
34 y/o male w/ hx of sickle cell disease, DVT/PE, and necrosis of the hips presents to the ED c/o lower back pain associated w/ bilateral hip pain that feels typical to his sickle cell pain. Pt took Dilaudid 8 mg @ home but no relief of sx. Pt reports he is having hip surgery next month. Denies CP, SOB, headache, NVD, abdominal pain, and any other associated sx.

## 2020-09-01 NOTE — ED PROVIDER NOTE - PATIENT PORTAL LINK FT
You can access the FollowMyHealth Patient Portal offered by [facility] by registering at the following website: [email or web address]. By joining [employer]’s FollowMyHealth portal, you will also be able to view your health information using other applications (apps) compatible with our system.

## 2020-09-01 NOTE — ED PROVIDER NOTE - ATTENDING CONTRIBUTION TO CARE
36 y/o M with PMHx of sickle cell disease, necrosis of the hips, DVT/PE p/w b/l hip pain and lower back pain consistent with his sickle cell pain. no sob, chest pain, fever. states improvement w oral meds. not wanting to stay to monitor HR, states will fu w oncology.

## 2020-09-04 DIAGNOSIS — D57.1 SICKLE-CELL DISEASE WITHOUT CRISIS: ICD-10-CM

## 2020-09-04 DIAGNOSIS — M54.5 LOW BACK PAIN: ICD-10-CM

## 2020-09-08 NOTE — ED ADULT NURSE NOTE - NSSEPSISSUSPECTED_ED_A_ED
Wash with warm water and soap twice daily, and apply mupirocin on top (please send to pharmacy) instead of triple antibiotic. Keep covered with a bandage.   No

## 2020-09-17 ENCOUNTER — EMERGENCY (EMERGENCY)
Facility: HOSPITAL | Age: 35
LOS: 1 days | Discharge: HOME | End: 2020-09-17
Attending: EMERGENCY MEDICINE | Admitting: EMERGENCY MEDICINE
Payer: MEDICAID

## 2020-09-17 VITALS
HEIGHT: 68 IN | TEMPERATURE: 98 F | SYSTOLIC BLOOD PRESSURE: 130 MMHG | DIASTOLIC BLOOD PRESSURE: 77 MMHG | RESPIRATION RATE: 18 BRPM | WEIGHT: 160.94 LBS | HEART RATE: 117 BPM | OXYGEN SATURATION: 98 %

## 2020-09-17 VITALS
TEMPERATURE: 98 F | DIASTOLIC BLOOD PRESSURE: 82 MMHG | OXYGEN SATURATION: 98 % | HEART RATE: 99 BPM | RESPIRATION RATE: 18 BRPM | SYSTOLIC BLOOD PRESSURE: 123 MMHG

## 2020-09-17 PROCEDURE — 99283 EMERGENCY DEPT VISIT LOW MDM: CPT

## 2020-09-17 PROCEDURE — 99284 EMERGENCY DEPT VISIT MOD MDM: CPT

## 2020-09-17 RX ORDER — IBUPROFEN 200 MG
600 TABLET ORAL ONCE
Refills: 0 | Status: COMPLETED | OUTPATIENT
Start: 2020-09-17 | End: 2020-09-17

## 2020-09-17 RX ORDER — HYDROMORPHONE HYDROCHLORIDE 2 MG/ML
16 INJECTION INTRAMUSCULAR; INTRAVENOUS; SUBCUTANEOUS ONCE
Refills: 0 | Status: DISCONTINUED | OUTPATIENT
Start: 2020-09-17 | End: 2020-09-17

## 2020-09-17 RX ADMIN — Medication 600 MILLIGRAM(S): at 22:09

## 2020-09-17 RX ADMIN — HYDROMORPHONE HYDROCHLORIDE 16 MILLIGRAM(S): 2 INJECTION INTRAMUSCULAR; INTRAVENOUS; SUBCUTANEOUS at 22:10

## 2020-09-17 NOTE — ED PROVIDER NOTE - PHYSICAL EXAMINATION
CONSTITUTIONAL: WD,WN. Appears moderately uncomfortable.  SKIN: Normal color and turgor. No rash.    HEAD: NC/AT.  EYES: Conjunctiva clear. EOMI. PERRL.    ENT: Airway patent, OP without erythema, tonsillar swelling or exudate; uvula midline without swelling. Nasal mucosa clear, no rhinorrhea.   RESPIRATORY:  Breathing non-labored. No retractions or accessory muscle use.  Lungs CTA bilat.  CARDIOVASCULAR:  RRR, S1S2. No M/R/G.      GI:  Abdomen soft, nontender.    MSK: Neck supple with painless ROM.  No lower extremity edema or calf tenderness.  No joint swelling or ROM limitation.  NEURO: Alert and oriented; Speech clear. HOFFMAN 5/5 strength in all extremities.  SILT throughout. Good balance. Steady gait.

## 2020-09-17 NOTE — ED PROVIDER NOTE - NSFOLLOWUPINSTRUCTIONS_ED_ALL_ED_FT
SICKLE CELL CRISIS - AfterCare(R) Instructions(ER/ED)           Sickle Cell Crisis    WHAT YOU NEED TO KNOW:    A sickle cell crisis is a painful episode that occurs in people who have sickle cell anemia. It happens when sickle-shaped red blood cells (RBCs) block blood vessels. Blood and oxygen cannot get to tissues, causing pain. A sickle cell crisis can also damage your tissues and cause organ failure, such liver or kidney failure. A sickle cell crisis can become life-threatening.    DISCHARGE INSTRUCTIONS:    Call 911 for any of the following:   •You have shortness of breath or chest pain.      •You are a man and have an erection that is painful and does not go away.       •You lose vision in one or both eyes.      Return to the emergency department if:   •You have a fever.      •You feel like you cannot cope with your pain, or you feel like hurting yourself.       •You have behavior changes, a seizure, or faint.       •You have abdominal pain, nausea, or vomiting.      •You have a headache that is worse or different from those that you have had in the past.       •You have new weakness or numbness in your arm, leg, or face.      •You have new pain in any part of your body.      •Your urine is dark and you are urinating less than usual or not at all.       •You are dizzy, lightheaded, or faint.       Contact your healthcare provider if:   •You have any new signs or symptoms.       •You have blood in your urine.       •You are constipated or you have diarrhea.       •You have changes in your vision.       •You have increased fatigue.       •You plan to travel by airplane or to a high [facility].       •You have questions or concerns about your condition or care.      Medicines: You may need any of the following:   •Prescription pain medicine may be given. Ask how to take this medicine safely. Medicine may also be given to decrease sickling of your RBCs. You may also need medicine to treat or prevent a bacterial infection.       •NSAIDs, such as ibuprofen, help decrease swelling, pain, and fever. This medicine is available with or without a doctor's order. NSAIDs can cause stomach bleeding or kidney problems in certain people. If you take blood thinner medicine, always ask your healthcare provider if NSAIDs are safe for you. Always read the medicine label and follow directions.      •Acetaminophen decreases pain and fever. It is available without a doctor's order. Ask how much to take and how often to take it. Follow directions. Acetaminophen can cause liver damage if not taken correctly.      •Take your medicine as directed. Contact your healthcare provider if you think your medicine is not helping or if you have side effects. Tell him or her if you are allergic to any medicine. Keep a list of the medicines, vitamins, and herbs you take. Include the amounts, and when and why you take them. Bring the list or the pill bottles to follow-up visits. Carry your medicine list with you in case of an emergency.      Medical alert identification: Wear medical alert jewelry or carry a card that says you have sickle cell anemia. Ask your healthcare provider where to get these items.     Medical Alert Jewelry         Prevent a sickle cell crisis:   •Take vitamins and minerals as directed. Folic acid can help prevent blood vessel problems that can occur with sickle cell anemia. Zinc may decrease how often you have pain.       •Drink liquids as directed. Dehydration can increase your risk for a sickle cell crisis. Ask how much liquid to drink each day and which liquids are best for you.      •Balance rest and exercise. Rest during a sickle cell crisis. Over time, increase your activity to a moderate amount. Exercise regularly. Avoid exercise or activities that can cause injury, such as football. Ask about the best exercise plan for you.       •Stay out of the cold. Do not go quickly from a warm place to a cold place. Do not go swimming in cold water. Stay warm in the winter.      •Do not smoke cigarettes or drink alcohol. These increase your risk for a sickle cell crisis. Nicotine and other chemicals in cigarettes and cigars can cause lung damage. Ask your healthcare provider for information if you currently smoke and need help to quit. E-cigarettes or smokeless tobacco still contain nicotine. Talk to your healthcare provider before you use these products.       •Ask about vaccinations you need. Vaccinations can help prevent a viral infection that may lead to a sickle cell crisis. Get a flu shot every year as directed. You may need a pneumonia vaccine every 5 years.       Follow up with your healthcare provider as directed: You may need ongoing screening for conditions that can develop because of sickle cell disease. Examples include kidney disease, hypertension (high blood pressure), retinopathy (eye problems), and problems with your lungs. Write down your questions so you remember to ask them during your visits. SICKLE CELL CRISIS - AfterCare(R) Instructions(ER/ED)     WHAT YOU NEED TO KNOW:    A sickle cell crisis is a painful episode that occurs in people who have sickle cell anemia. It happens when sickle-shaped red blood cells (RBCs) block blood vessels. Blood and oxygen cannot get to tissues, causing pain. A sickle cell crisis can also damage your tissues and cause organ failure, such liver or kidney failure. A sickle cell crisis can become life-threatening.    DISCHARGE INSTRUCTIONS:    Call 911 for any of the following:   •You have shortness of breath or chest pain.      •You are a man and have an erection that is painful and does not go away.       •You lose vision in one or both eyes.      Return to the emergency department if:   •You have a fever.      •You feel like you cannot cope with your pain, or you feel like hurting yourself.       •You have behavior changes, a seizure, or faint.       •You have abdominal pain, nausea, or vomiting.      •You have a headache that is worse or different from those that you have had in the past.       •You have new weakness or numbness in your arm, leg, or face.      •You have new pain in any part of your body.      •Your urine is dark and you are urinating less than usual or not at all.       •You are dizzy, lightheaded, or faint.       Contact your healthcare provider if:   •You have any new signs or symptoms.       •You have blood in your urine.       •You are constipated or you have diarrhea.       •You have changes in your vision.       •You have increased fatigue.       •You plan to travel by airplane or to a high elevation.       •You have questions or concerns about your condition or care.      Medicines: You may need any of the following:   •Prescription pain medicine may be given. Ask how to take this medicine safely. Medicine may also be given to decrease sickling of your RBCs. You may also need medicine to treat or prevent a bacterial infection.       •NSAIDs, such as ibuprofen, help decrease swelling, pain, and fever. This medicine is available with or without a doctor's order. NSAIDs can cause stomach bleeding or kidney problems in certain people. If you take blood thinner medicine, always ask your healthcare provider if NSAIDs are safe for you. Always read the medicine label and follow directions.      •Acetaminophen decreases pain and fever. It is available without a doctor's order. Ask how much to take and how often to take it. Follow directions. Acetaminophen can cause liver damage if not taken correctly.      •Take your medicine as directed. Contact your healthcare provider if you think your medicine is not helping or if you have side effects. Tell him or her if you are allergic to any medicine. Keep a list of the medicines, vitamins, and herbs you take. Include the amounts, and when and why you take them. Bring the list or the pill bottles to follow-up visits. Carry your medicine list with you in case of an emergency.      Medical alert identification: Wear medical alert jewelry or carry a card that says you have sickle cell anemia. Ask your healthcare provider where to get these items.     Medical Alert Jewelry         Prevent a sickle cell crisis:   •Take vitamins and minerals as directed. Folic acid can help prevent blood vessel problems that can occur with sickle cell anemia. Zinc may decrease how often you have pain.       •Drink liquids as directed. Dehydration can increase your risk for a sickle cell crisis. Ask how much liquid to drink each day and which liquids are best for you.      •Balance rest and exercise. Rest during a sickle cell crisis. Over time, increase your activity to a moderate amount. Exercise regularly. Avoid exercise or activities that can cause injury, such as football. Ask about the best exercise plan for you.       •Stay out of the cold. Do not go quickly from a warm place to a cold place. Do not go swimming in cold water. Stay warm in the winter.      •Do not smoke cigarettes or drink alcohol. These increase your risk for a sickle cell crisis. Nicotine and other chemicals in cigarettes and cigars can cause lung damage. Ask your healthcare provider for information if you currently smoke and need help to quit. E-cigarettes or smokeless tobacco still contain nicotine. Talk to your healthcare provider before you use these products.       •Ask about vaccinations you need. Vaccinations can help prevent a viral infection that may lead to a sickle cell crisis. Get a flu shot every year as directed. You may need a pneumonia vaccine every 5 years.       Follow up with your healthcare provider as directed: You may need ongoing screening for conditions that can develop because of sickle cell disease. Examples include kidney disease, hypertension (high blood pressure), retinopathy (eye problems), and problems with your lungs. Write down your questions so you remember to ask them during your visits.

## 2020-09-17 NOTE — ED ADULT NURSE NOTE - OBJECTIVE STATEMENT
Pt states, "I have sickle cell and have pain to my lower back and legs. I took my 8mg dose of dilaudid at 4pm but I still have pain." Denies any fevers, chest pain or sob.

## 2020-09-17 NOTE — ED PROVIDER NOTE - PROGRESS NOTE DETAILS
Patient feels better, he is requesting to be discharged. Appears comfortable and alert. Says he will call his hematologist in the morning to arrange follow up.

## 2020-09-17 NOTE — ED PROVIDER NOTE - OBJECTIVE STATEMENT
36 yo m PMHx sickle cell anemia, acute chest synd, DVT/PE on AC, AVN of hips  c/o "my usual sickle cell crisis" with pain in low back and hips.  took a dose of dilaudid 8 mg at 4 pm today without relief, so came to ED.  says a dose of dilaudid 16 mg with ibuprofen works for him in the ED.  Denies any fever/chills, weakness/numbness in lower extremities, bladder or bowel dysfunction, hematuria/dysuria, chest pain, dyspnea, cough/hemoptysis, abdominal pain, nausea or vomiting.  Scheduled for hip surgery at [facility] next month.

## 2020-09-17 NOTE — ED PROVIDER NOTE - PATIENT PORTAL LINK FT
You can access the FollowMyHealth Patient Portal offered by [facility] by registering at the following website: [email or web address]. By joining [employer]’s FollowMyHealth portal, you will also be able to view your health information using other applications (apps) compatible with our system.

## 2020-09-17 NOTE — ED PROVIDER NOTE - CLINICAL SUMMARY MEDICAL DECISION MAKING FREE TEXT BOX
Sickle cell pain crisis.  No red flags for back pain.  No symptoms of acute chest synd.  Afeb, VS WNL, appears nontoxic.  Will treat pain.

## 2020-09-21 DIAGNOSIS — M25.551 PAIN IN RIGHT HIP: ICD-10-CM

## 2020-09-21 DIAGNOSIS — D57.00 HB-SS DISEASE WITH CRISIS, UNSPECIFIED: ICD-10-CM

## 2020-09-24 ENCOUNTER — EMERGENCY (EMERGENCY)
Facility: HOSPITAL | Age: 35
LOS: 1 days | Discharge: HOME | End: 2020-09-24
Attending: EMERGENCY MEDICINE | Admitting: EMERGENCY MEDICINE
Payer: SELF-PAY

## 2020-09-24 VITALS
RESPIRATION RATE: 17 BRPM | OXYGEN SATURATION: 99 % | HEART RATE: 132 BPM | WEIGHT: 160.94 LBS | SYSTOLIC BLOOD PRESSURE: 115 MMHG | DIASTOLIC BLOOD PRESSURE: 78 MMHG | HEIGHT: 68 IN | TEMPERATURE: 98 F

## 2020-09-24 VITALS
RESPIRATION RATE: 18 BRPM | TEMPERATURE: 99 F | HEART RATE: 116 BPM | SYSTOLIC BLOOD PRESSURE: 101 MMHG | OXYGEN SATURATION: 99 % | DIASTOLIC BLOOD PRESSURE: 68 MMHG

## 2020-09-24 DIAGNOSIS — D57.00 HB-SS DISEASE WITH CRISIS, UNSPECIFIED: ICD-10-CM

## 2020-09-24 PROCEDURE — 99284 EMERGENCY DEPT VISIT MOD MDM: CPT

## 2020-09-24 PROCEDURE — 99283 EMERGENCY DEPT VISIT LOW MDM: CPT

## 2020-09-24 RX ORDER — HYDROMORPHONE HYDROCHLORIDE 2 MG/ML
16 INJECTION INTRAMUSCULAR; INTRAVENOUS; SUBCUTANEOUS ONCE
Refills: 0 | Status: DISCONTINUED | OUTPATIENT
Start: 2020-09-24 | End: 2020-09-24

## 2020-09-24 RX ORDER — IBUPROFEN 200 MG
600 TABLET ORAL ONCE
Refills: 0 | Status: COMPLETED | OUTPATIENT
Start: 2020-09-24 | End: 2020-09-24

## 2020-09-24 RX ADMIN — HYDROMORPHONE HYDROCHLORIDE 16 MILLIGRAM(S): 2 INJECTION INTRAMUSCULAR; INTRAVENOUS; SUBCUTANEOUS at 13:47

## 2020-09-24 RX ADMIN — Medication 600 MILLIGRAM(S): at 13:47

## 2020-09-24 NOTE — ED ADULT TRIAGE NOTE - CHIEF COMPLAINT QUOTE
Patient states, "I took 8mg Dilaudid and 600mg of Motrin around 7AM, but it isn't helping."  Patient denies any CP, SOB, dizziness, N/V, palpitations or any other complaints at this time.

## 2020-09-24 NOTE — ED PROVIDER NOTE - OBJECTIVE STATEMENT
35M PMH DVT/PE (no longer on AC), sickle cell (hx acute chest yrs ago), b/l hip AVN (?hip replacement surgery scheduled for Oct 2020) p/w pain to b/l lower back and b/l hips since ~0700, gradual onset. Similar to multiple prior episodes attributed to sickle crisis. Hx of multiple prior ED visits for similar. Thinks possibly precipitated by sleeping w/ AC. Denies any focal weakness/numbness, urinary complaints, incontinence, f/c, trauma, SOB/CP, abd pain, NVD, recent weight loss or night sweats. No hx IVDU. Cannot recall any specific precipitating trauma. Still smokes but cutting back. Denies recent etoh use. Took 8mg PO dilaudid ~0730 w/ no relief.

## 2020-09-24 NOTE — ED PROVIDER NOTE - CLINICAL SUMMARY MEDICAL DECISION MAKING FREE TEXT BOX
35M PMH DVT/PE (no longer on AC), sickle cell (hx acute chest yrs ago), b/l hip AVN (?hip replacement surgery scheduled for Oct 2020) p/w pain to b/l lower back and b/l hips since ~0700, gradual onset. Similar to multiple prior episodes attributed to sickle crisis. Thinks possibly precipitated by sleeping w/ AC. No other systemic symptoms. Tachycardic, other vitals wnl. Exam as above.   ddx: Likely sickle crisis.  Pt frequently tachycardic on initial presentation.  Symptom control, reassess.

## 2020-09-24 NOTE — ED ADULT NURSE NOTE - OBJECTIVE STATEMENT
35 yr old male with hx of sickle cell anemia, c.o pain to lower back and b.l hip pain. denies any injuries, numbness/tingling, cough, sob, fever/chills, palpitations or any other medical complaints. states he took 8 mg Dilaudid at 7am with no relief.

## 2020-09-24 NOTE — ED PROVIDER NOTE - NSFOLLOWUPINSTRUCTIONS_ED_ALL_ED_FT
Can take tylenol 650mg or motrin 600mg (May cause stomach irritation - take with food and avoid prolonged use) every 6hrs as needed for pain or fever.  Can also take your dilaudid as prescribed for more severe pain.  Stay well hydrated.  Return for fevers, persistent vomit, uncontrolled pain, worsening breathing, worsening lightheaded, focal weakness/numbness.  Follow up with primary doctor within 1-2 days.     Sickle Cell Crisis    WHAT YOU NEED TO KNOW:    A sickle cell crisis is a painful episode that occurs in people who have sickle cell anemia. It happens when sickle-shaped red blood cells (RBCs) block blood vessels. Blood and oxygen cannot get to tissues, causing pain. A sickle cell crisis can also damage your tissues and cause organ failure, such liver or kidney failure. A sickle cell crisis can become life-threatening.    DISCHARGE INSTRUCTIONS:    Call 911 for any of the following:   You have shortness of breath or chest pain.  You are a man and have an erection that is painful and does not go away.   You lose vision in one or both eyes.    Seek care immediately if:   You feel like you cannot cope with your pain, or you feel like hurting yourself.   You have behavior changes, a seizure, or faint.   You have a fever.  You have abdominal pain, nausea, or vomiting.  You have a different or worse headache.   You have new weakness or numbness in your arm, leg, or face.  You have new pain in any part of your body.  Your urine is dark and you are urinating less than usual or not at all.   You are dizzy, lightheaded, or faint.     Contact your healthcare provider if:   You have any new signs or symptoms.   You have blood in your urine.   You are constipated or you have diarrhea.   You have changes in your vision.   You have increased fatigue.   You plan to travel by airplane or to a high [facility] [address].   You have questions or concerns about your condition or care.    Medicines: You may need any of the following:     Prescription pain medicine may be given. Ask how to take this medicine safely. Medicine may also be given to decrease sickling of your RBCs. You may also need medicine to treat or prevent a bacterial infection.     NSAIDs, such as ibuprofen, help decrease swelling, pain, and fever. This medicine is available with or without a doctor's order. NSAIDs can cause stomach bleeding or kidney problems in certain people. If you take blood thinner medicine, always ask your healthcare provider if NSAIDs are safe for you. Always read the medicine label and follow directions.    Acetaminophen decreases pain and fever. It is available without a doctor's order. Ask how much to take and how often to take it. Follow directions. Acetaminophen can cause liver damage if not taken correctly.    Take your medicine as directed. Contact your healthcare provider if you think your medicine is not helping or if you have side effects. Tell him or her if you are allergic to any medicine. Keep a list of the medicines, vitamins, and herbs you take. Include the amounts, and when and why you take them. Bring the list or the pill bottles to follow-up visits. Carry your medicine list with you in case of an emergency.    Medical alert identification: Wear medical alert jewelry or carry a card that says you have sickle cell anemia. Ask your healthcare provider where to get these items.    Prevent a sickle cell crisis:     Take vitamins and minerals as directed. Folic acid can help prevent blood vessel problems that can occur with sickle cell anemia. Zinc may decrease how often you have pain.     Drink liquids as directed. Dehydration can increase your risk for a sickle cell crisis. Ask how much liquid to drink each day and which liquids are best for you.    Balance rest and exercise. Rest during a sickle cell crisis. Over time, increase your activity to a moderate amount. Exercise regularly. Avoid exercise or activities that can cause injury, such as football. Ask about the best exercise plan for you.     Stay out of the cold. Do not go quickly from a warm place to a cold place. Do not go swimming in cold water. Stay warm in the winter.    Do not smoke cigarettes or drink alcohol. These increase your risk for a sickle cell crisis. Nicotine and other chemicals in cigarettes and cigars can cause lung damage. Ask your healthcare provider for information if you currently smoke and need help to quit. E-cigarettes or smokeless tobacco still contain nicotine. Talk to your healthcare provider before you use these products.     Ask about vaccinations you need. Vaccinations can help prevent a viral infection that may lead to a sickle cell crisis. Get a flu shot every year as directed. You may need a pneumonia vaccine every 5 years.

## 2020-09-24 NOTE — ED PROVIDER NOTE - PHYSICAL EXAMINATION
slightly decreased ROM b/l hips 2/2 pain. no joint warmth/erythema  No spinal ttp, neck FROM. Strength 5/5. No bony ttp, FROM all other extremities. Normal equal distal pulses.

## 2020-09-24 NOTE — ED PROVIDER NOTE - PROGRESS NOTE DETAILS
[place]: HR improving (pt often tachy). Feeling much better, wants to go home. Clinically no indication for further emergent ED workup or hospitalization at this time. Comfortable for dc, outpt f/u.

## 2020-09-24 NOTE — ED PROVIDER NOTE - PATIENT PORTAL LINK FT
You can access the FollowMyHealth Patient Portal offered by [facility] by registering at the following website: [email or web address]. By joining [employer]’s FollowMyHealth portal, you will also be able to view your health information using other applications (apps) compatible with our system.

## 2020-09-28 NOTE — ED ADULT NURSE NOTE - NSFALLRSKUNASSIST_ED_ALL_ED
Airway    Date/Time: 9/28/2020 3:24 PM  Performed by: Mari Phan M.D.  Authorized by: Mari Phan M.D.     Location:  OR  Urgency:  Elective  Indications for Airway Management:  Anesthesia      Spontaneous Ventilation: absent    Sedation Level:  Deep  Preoxygenated: Yes    Mask Difficulty Assessment:  0 - not attempted  Final Airway Type:  Supraglottic airway  Final Supraglottic Airway:  Standard LMA    SGA Size:  5  Airway Seal Pressure (cm H2O):  22  Number of Attempts at Approach:  1          
no

## 2020-10-03 NOTE — ED PROVIDER NOTE - SKIN, MLM
Skin normal color for race, warm, dry and intact. Skin intact no signs of infection to the lower back. No cellulitis.

## 2020-10-03 NOTE — ED PROVIDER NOTE - CLINICAL SUMMARY MEDICAL DECISION MAKING FREE TEXT BOX
Patient is a frequent patient to ED for  SC flare ups. 1st visit to ED for this month. Recommend f/u and continue pain management.

## 2020-10-03 NOTE — ED ADULT TRIAGE NOTE - WEIGHT IN LBS
Problem: Adult Mental Health  Intervention: Implement and maintain protective environment  15 minute checks to ensure safety     Goal: Denies having a suicidal plan  Outcome: Outcome Met, Continue evaluating goal progress toward completion     01/05/20 1156   C-SSRS (Frequent Screen)   5. Have you started to work out or worked out the details of how to kill yourself? Do you intend to carry out this plan?  No       Comments: Nursing Suicide Assessment Note - Inpatient    Current assessment:    Current C-SSRS score: (P) Negative screen= no ideation, behaviors or history      Protective Factors / Reason for Living: Responsibility to children    Interventions:   ·  Maintain current plan of care.     Pt said she had weird dreams last night but says they were not nightmares. Pt says she didn't feel well this morning, pt has cold like symptoms. Reports SI and says \"I have plenty of things I can use in my garage but I am safe here\". Received Ativan related to high anxiety due to the amount of people present in group. Is polite and cooperative. Makes needs known appropriately. Writer will continue to monitor.     160.9

## 2020-10-03 NOTE — ED PROVIDER NOTE - MUSCULOSKELETAL, MLM
lower back pain. Pain to ROM of b/l hips. Able to range knee and ankles. Spine appears normal, range of motion is not limited, no muscle or joint tenderness +lower back pain. Pain with ROM of b/l hips. Able to range knee and ankles. Spine appears normal, range of motion is not limited, no muscle or joint tenderness

## 2020-10-03 NOTE — ED PROVIDER NOTE - PATIENT PORTAL LINK FT
You can access the FollowMyHealth Patient Portal offered by [facility] by registering at the following website: [email or web address]. By joining [employer]’s FollowMyHealth portal, you will also be able to view your health information using other applications (apps) compatible with our system.

## 2020-10-03 NOTE — ED ADULT TRIAGE NOTE - CHIEF COMPLAINT QUOTE
hx SCD c/o lower back and b/l hip pain, took 8mg dilaudid at 8am with no relief. pt states, "my heart rate is usually elevated when I am in pain." ekg in progress.

## 2020-10-03 NOTE — ED PROVIDER NOTE - OBJECTIVE STATEMENT
34 y/o M with Hx of sickle cell disease p/w sickle cell pain. Reports typical sites when he has this crisis. Reports taking 8mg of Dilaudid with no improvement of pain. No CP, SOB, recent acute illnesses, fever, N/V, or trauma. 34 y/o M with Hx of sickle cell disease p/w sickle cell pain. Reports typical sites when he has this crisis. Admit of  taking 8mg of Dilaudid with no improvement of pain. No CP, SOB, recent acute illnesses, fever, N/V, or trauma.

## 2020-10-03 NOTE — ED PROVIDER NOTE - ATTENDING CONTRIBUTION TO CARE
34 m w hx of Sickle Cell dz- w pain- co typical flare back for him  back pain/hip pain  no cp no sob  vs- sinus tach  s1s2 lungs cta bl  abd soft nt nd +bs  IMP- SS pain  dose PO meds in ED  for breakthrough pain  then fu w his md

## 2020-10-13 NOTE — ED PROVIDER NOTE - PHYSICAL EXAMINATION
no LE edema, normal equal distal pulses, steady gait w cane.  Slightly decreased ROM b/l hips 2/2 pain. no erythema/warmth.   No spinal ttp, neck FROM. Strength 5/5. No bony ttp, FROM all other extremities.

## 2020-10-13 NOTE — ED ADULT NURSE NOTE - NSIMPLEMENTINTERV_GEN_ALL_ED
Implemented All Fall Risk Interventions:  Stephen to call system. Call bell, personal items and telephone within reach. Instruct patient to call for assistance. Room bathroom lighting operational. Non-slip footwear when patient is off stretcher. Physically safe environment: no spills, clutter or unnecessary equipment. Stretcher in lowest position, wheels locked, appropriate side rails in place. Provide visual cue, wrist band, yellow gown, etc. Monitor gait and stability. Monitor for mental status changes and reorient to person, place, and time. Review medications for side effects contributing to fall risk. Reinforce activity limits and safety measures with patient and family.

## 2020-10-13 NOTE — ED PROVIDER NOTE - OBJECTIVE STATEMENT
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 Gradual onset. Similar to multiple prior episodes attributed to sickle crisis. Has chronic pain to those regions but ran out of his home dilaudid. I stop w/ last script filled 9/5. States he is scheduled for refill this week. Denies any focal weakness/numbness, urinary complaints, incontinence, f/c, trauma, SOB/CP, abd pain, NVD, recent weight loss or night sweats. No hx IVDU. Cannot recall any specific precipitating trauma. Still smokes but cutting back. Denies recent etoh use.

## 2020-10-13 NOTE — ED PROVIDER NOTE - CLINICAL SUMMARY MEDICAL DECISION MAKING FREE TEXT BOX
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 Gradual onset. Similar to multiple prior episodes attributed to sickle crisis. Has chronic pain to those regions but ran out of his home dilaudid. I stop w/ last script filled 9/5. States he is scheduled for refill this week. No other systemic symptoms. Tachycardic, other vitals wnl. Exam as above.   EKG performed prior to my eval w/ new STD v5-6. Pt has no cardiac/respiratory complaints. Can f/u outpt cards.   ddx: Likely sickle crisis.  Pt frequently tachycardic on initial presentation.  Symptom control, reassess.

## 2020-10-13 NOTE — ED PROVIDER NOTE - PATIENT PORTAL LINK FT
You can access the FollowMyHealth Patient Portal offered by [facility] by registering at the following website: [email or web address]. By joining [employer]’s FollowMyHealth portal, you will also be able to view your health information using other applications (apps) compatible with our system.

## 2020-10-13 NOTE — ED ADULT NURSE NOTE - OBJECTIVE STATEMENT
Patient A&Ox3, ambulatory with cane, respirations even and unlabored. Patient arrives with complaints of right hip pain, "I have sickle cell pain". Denies chest pain or SOB. Patient evaluated by Dr. Bryant. Patient received medication ordered by provider. Patient eloped prior to provider and RN reassessment. Charge RN made aware. Hx sickle cell disease. Patient A&Ox3, ambulatory with cane, respirations even and unlabored. Patient arrives with complaints of right hip pain, "I have sickle cell pain". Denies chest pain or SOB. Patient evaluated by Dr. Bryant. Patient received medication ordered by provider. No IV in place at time. Will continue to monitor patient.

## 2020-10-13 NOTE — ED PROVIDER NOTE - PROGRESS NOTE DETAILS
Klepfish: pt eloped after receiving meds and prior to reassessment. [place]: pt states he was just using the bathroom. Pain improving, HR improving, wants to go home. Clinically no indication for further emergent ED workup or hospitalization at this time. Comfortable for dc, outpt f/u.

## 2020-10-13 NOTE — ED ADULT TRIAGE NOTE - CHIEF COMPLAINT QUOTE
Patient complaining of lower back  and bilateral hip pain.  Patient states, "my heart is fast when I am in pain."

## 2020-10-16 NOTE — ED ADULT TRIAGE NOTE - RESPIRATORY RATE (BREATHS/MIN)
Well Visit, Ages 25 to 48: Care Instructions Your Care Instructions Physical exams can help you stay healthy. Your doctor has checked your overall health and may have suggested ways to take good care of yourself. He or she also may have recommended tests. At home, you can help prevent illness with healthy eating, regular exercise, and other steps. Follow-up care is a key part of your treatment and safety. Be sure to make and go to all appointments, and call your doctor if you are having problems. It's also a good idea to know your test results and keep a list of the medicines you take. How can you care for yourself at home? · Reach and stay at a healthy weight. This will lower your risk for many problems, such as obesity, diabetes, heart disease, and high blood pressure. · Get at least 30 minutes of physical activity on most days of the week. Walking is a good choice. You also may want to do other activities, such as running, swimming, cycling, or playing tennis or team sports. Discuss any changes in your exercise program with your doctor. · Do not smoke or allow others to smoke around you. If you need help quitting, talk to your doctor about stop-smoking programs and medicines. These can increase your chances of quitting for good. · Talk to your doctor about whether you have any risk factors for sexually transmitted infections (STIs). Having one sex partner (who does not have STIs and does not have sex with anyone else) is a good way to avoid these infections. · Use birth control if you do not want to have children at this time. Talk with your doctor about the choices available and what might be best for you. · Protect your skin from too much sun. When you're outdoors from 10 a.m. to 4 p.m., stay in the shade or cover up with clothing and a hat with a wide brim. Wear sunglasses that block UV rays. Even when it's cloudy, put broad-spectrum sunscreen (SPF 30 or higher) on any exposed skin. · See a dentist one or two times a year for checkups and to have your teeth cleaned. · Wear a seat belt in the car. Follow your doctor's advice about when to have certain tests. These tests can spot problems early. For everyone · Cholesterol. Have the fat (cholesterol) in your blood tested after age 21. Your doctor will tell you how often to have this done based on your age, family history, or other things that can increase your risk for heart disease. · Blood pressure. Have your blood pressure checked during a routine doctor visit. Your doctor will tell you how often to check your blood pressure based on your age, your blood pressure results, and other factors. · Vision. Talk with your doctor about how often to have a glaucoma test. 
· Diabetes. Ask your doctor whether you should have tests for diabetes. · Colon cancer. Your risk for colorectal cancer gets higher as you get older. Some experts say that adults should start regular screening at age 48 and stop at age 76. Others say to start before age 48 or continue after age 76. Talk with your doctor about your risk and when to start and stop screening. For women · Breast exam and mammogram. Talk to your doctor about when you should have a clinical breast exam and a mammogram. Medical experts differ on whether and how often women under 50 should have these tests. Your doctor can help you decide what is right for you. · Cervical cancer screening test and pelvic exam. Begin with a Pap test at age 24. The test often is part of a pelvic exam. Starting at age 27, you may choose to have a Pap test, an HPV test, or both tests at the same time (called co-testing). Talk with your doctor about how often to have testing. · Tests for sexually transmitted infections (STIs). Ask whether you should have tests for STIs. You may be at risk if you have sex with more than one person, especially if your partners do not wear condoms. For men · Tests for sexually transmitted infections (STIs). Ask whether you should have tests for STIs. You may be at risk if you have sex with more than one person, especially if you do not wear a condom. · Testicular cancer exam. Ask your doctor whether you should check your testicles regularly. · Prostate exam. Talk to your doctor about whether you should have a blood test (called a PSA test) for prostate cancer. Experts differ on whether and when men should have this test. Some experts suggest it if you are older than 39 and are -American or have a father or brother who got prostate cancer when he was younger than 72. When should you call for help? Watch closely for changes in your health, and be sure to contact your doctor if you have any problems or symptoms that concern you. Where can you learn more? Go to http://www.li.com/ Enter P072 in the search box to learn more about \"Well Visit, Ages [address] [facility] [address] to [address]: 20

## 2020-10-21 NOTE — ED ADULT NURSE NOTE - CHPI ED NUR SYMPTOMS NEG
no nausea/no fever/no decreased eating/drinking/no vomiting/no chills/no weakness/no tingling/no dizziness

## 2020-10-21 NOTE — ED PROVIDER NOTE - PHYSICAL EXAMINATION
General: Patient is well developed and well nourished. Patient is alert and oriented to person, place and date. Patient is sitting stretcher and appears in no acute distress.  HEENT: Head is normocephalic and atraumatic. Pupils are equal, round and reactive. Extraocular movements intact. No evidence of nystagmus, conjunctival injection, or scleral icterus. External ears symmetric without evidence of discharge.  Nose is symmetric, non-tender, patent without evidence of discharge. Teeth in good repair. Uvula midline.   Neck: Supple with no evidence of lymphadenopathy.  Full range of motion.  Heart: Regular rate and rhythm. No murmurs, rubs or gallops.   Lungs: Clear to auscultation bilaterally with equal chest expansion. No note of wheezes, rhonchi, rales. Equal chest expansion. No note of retractions.  Abdomen: Bowel sounds present in all four quadrants. Soft, non-tender, non-distended without signs of masses, rebound or guarding. No note of hepatosplenomegaly. No CVA tenderness bilaterally. Negative Diallo sign or McBurney's.  Musculoskeletal: No edema, erythema, ecchymosis, atrophy or deformity. F No clubbing or cyanosis. No point tenderness to palpation.   Neuro: GCS 15. Moving all extremities. Strength is 5/5 arms and legs bilaterally. Sensation intact in extremities. gait steady   Skin: Warm, dry and intact without evidence of rashes, bruising, pallor, jaundice or cyanosis.   Psych: Mood and affect appropriate.

## 2020-10-21 NOTE — ED PROVIDER NOTE - CLINICAL SUMMARY MEDICAL DECISION MAKING FREE TEXT BOX
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 Upon exam pt well appearing, non toxic. +Discomfort to palpation of lumbar paraspinal region. Plan for meds and reeval. 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 Upon exam pt well appearing, non toxic. +Discomfort to palpation of lumbar paraspinal region. Plan for meds and reeval. re-eval pt states improvement of symptoms. recommend follow up with hematologist/specialist. ED evaluation and management discussed with the patient and family (if available) in detail.  Close PMD follow up encouraged.  Strict ED return instructions discussed in detail and patient given the opportunity to ask any questions about their discharge diagnosis and instructions. Patient verbalized understanding. Patient is agreeable to plan.

## 2020-10-21 NOTE — ED PROVIDER NOTE - OBJECTIVE STATEMENT
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 Took 8 mg Dilaudid at home w/o improvement to pain. Denies CP, SOB, cough, palpitations, bowel or bladder incontinence or any other acute sx.

## 2020-10-21 NOTE — ED PROVIDER NOTE - NSFOLLOWUPINSTRUCTIONS_ED_ALL_ED_FT
SICKLE CELL CRISIS - Ambulatory Care           Sickle Cell Crisis    AMBULATORY CARE:    A sickle cell crisis is a painful episode that occurs in people who have sickle cell anemia. It happens when sickle-shaped red blood cells (RBCs) block blood vessels. Blood and oxygen cannot get to your tissues, causing pain. A sickle cell crisis can also damage your tissues and cause organ failure, such liver or kidney failure. A sickle cell crisis can become life-threatening.    Common symptoms include the following:   •Fever      •Pain      •Weakness or fatigue      •Abdominal pain and swelling      •Headaches      •A painful, erect penis (priapism)      •Fast heartbeats      •Shortness of breath      Call your local emergency number (911 in the [place]) if:   •You have shortness of breath or chest pain.      •You are a man and have an erection that is painful and does not go away.       •You lose vision in one or both eyes.      Seek care immediately if:   •You feel like you cannot cope with your pain, or you feel like hurting yourself.       •You have behavior changes, a seizure, or faint.       •You have a fever.      •You have abdominal pain, nausea, or vomiting.      •You have a different or worse headache.       •You have new weakness or numbness in your arm, leg, or face.      •You have new pain in any part of your body.      •Your urine is dark and you are urinating less than usual or not at all.       •You are dizzy, lightheaded, or faint.       Call your doctor if:   •You have any new signs or symptoms.       •You have blood in your urine.       •You are constipated or you have diarrhea.       •You have changes in your vision.       •You have increased fatigue.       •You plan to travel by airplane or to a high [facility].       •You have questions or concerns about your condition or care.      Medical alert identification: Wear medical alert jewelry or carry a card that says you have sickle cell anemia. Ask your healthcare provider where to get these items.     Medical Alert Jewelry         Treatment for a sickle cell crisis may include any of the following:   •IV fluids treat dehydration and help reduce sickling of RBCs.       •Oxygen helps increase oxygen levels in your blood and make it easier for you to breathe.      •A blood transfusion replaces blood with RBCs that are not sickle shaped.      •Surgery may be done to remove part of your spleen.       Self-care:   •Medicines may be given to decrease pain or to decrease sickling of your RBCs. You may also need medicine to prevent a bacterial infection or help you breathe more easily.       •NSAIDs, such as ibuprofen, help decrease swelling, pain, and fever. This medicine is available with or without a doctor's order. NSAIDs can cause stomach bleeding or kidney problems in certain people. If you take blood thinner medicine, always ask your healthcare provider if NSAIDs are safe for you. Always read the medicine label and follow directions.      •Acetaminophen decreases pain and fever. It is available without a doctor's order. Ask how much to take and how often to take it. Follow directions. Acetaminophen can cause liver damage if not taken correctly.      Prevent a sickle cell crisis:   •Take vitamins and minerals as directed. Folic acid may help prevent blood vessel problems that can come with sickle cell anemia. Zinc may decrease how often you have pain.       •Drink liquids as directed. Dehydration can increase your risk for a sick cell crisis. Ask how much liquid to drink each day and which liquids are best for you.      •Balance rest and exercise. Rest during a sickle cell crisis. Over time, increase your activity to a moderate amount. Exercise regularly. Avoid exercise or activities that can cause injury, such as football. Ask about the best exercise plan for you.       •Wash your hands frequently. Handwashing can help prevent illness. Wash your hands before you prepare or eat food, and after you use the bathroom.   Handwashing           •Avoid quick changes in temperature. Do not go quickly from a warm place to a cold place. Get in a pool slowly instead of jumping in. Avoid getting too hot or too cold. Dress in light clothing in the summer and warm clothing in the winter.       •Do not smoke cigarettes or drink alcohol. These increase your risk for a sickle cell crisis. Nicotine and other chemicals in cigarettes and cigars can cause lung damage. Ask your healthcare provider for information if you currently smoke and need help to quit. E-cigarettes or smokeless tobacco still contain nicotine. Talk to your healthcare provider before you use these products.       •Ask about vaccinations you need. Vaccinations can help prevent a viral infection that may lead to a sickle cell crisis. Get a flu shot every year as directed. You may need pneumonia vaccines every 5 years.       Follow up with your doctor as directed: You may need ongoing screening for conditions that can develop because of sickle cell disease. Examples include kidney disease, hypertension (high blood pressure), retinopathy (eye problems), and problems with your lungs. Write down your questions so you remember to ask them during your visits.       © Copyright [employer] 2020           back to top                          © Copyright [employer] 2020

## 2020-10-21 NOTE — ED ADULT NURSE NOTE - OBJECTIVE STATEMENT
Pt AOx4. Pt c/o lower back pain and bilateral hip pain. Pt states "the pain started last night at midnight". Pt denies recent injury or trauma. No obvious deformity noted. Pt ambulatory with steady gait. Pt speaking in full complete sentences. Pt answering questions and following verbal commands appropriately. Pt denies fever, chills, chest pain, n/v.

## 2020-10-21 NOTE — ED PROVIDER NOTE - ATTENDING CONTRIBUTION TO CARE
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 Took 8 mg Dilaudid at home w/o improvement to pain. Denies CP, SOB, cough, palpitations, bowel or bladder incontinence or any other acute sx. Upon exam, pt well appearing, non toxic. +Discomfort to palpation of lumbar paraspinal region. Plan for po Dilaudid 16 mg and reeval. On re-eval, pt states improvement of symptoms. Recommend follow up with hematologist/specialist. Close PMD follow up encouraged. 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 Took 8 mg Dilaudid at home w/o improvement to pain. Denies CP, SOB, cough, palpitations, bowel or bladder incontinence or any other acute sx. Upon exam, pt well appearing, non toxic. +Discomfort to palpation of lumbar paraspinal region. Plan for po Dilaudid 16 mg and Ibuprofen 600 mg and reeval. On re-eval, pt states improvement of symptoms. Recommend follow up with hematologist/specialist. Close PMD follow up encouraged.

## 2020-10-21 NOTE — ED ADULT TRIAGE NOTE - CHIEF COMPLAINT QUOTE
Sickle cell pain, lower back, bilateral hip pain. Took 8mg of Dilaudid at midnight with little relief.

## 2020-10-21 NOTE — ED PROVIDER NOTE - PATIENT PORTAL LINK FT
You can access the FollowMyHealth Patient Portal offered by [facility] by registering at the following website: [email or web address]. By joining [employer]’s FollowMyHealth portal, you will also be able to view your health information using other applications (apps) compatible with our system.

## 2020-10-25 NOTE — ED PROVIDER NOTE - CONSTITUTIONAL, MLM
absent normal... Ambulating with a cane at baseline. Well appearing, awake, alert, oriented to person, place, time/situation and in no apparent distress.

## 2020-11-12 NOTE — ED ADULT NURSE NOTE - OBJECTIVE STATEMENT
assumed pt care at 1015. Pt A&OX 4 c/o B/L hip and lower back pain secondary to sickle cell crisis. Pt had 2 prescriptions for PO Dilaudid at the end of October and reports taking all of them- last dose was midnight last night. Pt denies any chest pain, SOB, N/V/D, HA, dizziness, blurred vision, numbness/tingling,  symptoms. No s/s of respiratory distress noted- respirations even & unlabored. Safety maintained. Will continue to monitor.

## 2020-11-12 NOTE — ED PROVIDER NOTE - PHYSICAL EXAMINATION
Gen: laying in stretcher still, tearful, AOx3  Head: NCAT  HEENT: EOMI, oral mucosa moist, normal conjunctiva, neck supple  Lung: CTAB, no respiratory distress  CV: rrr, no murmur, Normal perfusion no digital swelling  Abd: soft, NTND  MSK: No edema, no visible deformities, pain with ROM hips and pain low back   Neuro: No focal neurologic deficits  Skin: No rash   Psych: normal affect

## 2020-11-12 NOTE — ED PROVIDER NOTE - CLINICAL SUMMARY MEDICAL DECISION MAKING FREE TEXT BOX
pt with complaint of chronic pain, ran out of pain medications, Istopped- was given a 3 week supply that should still be active, patient is using medications inappropriately. explained can give him dose of medication now and he needs to call his PCP for refill of pain meds

## 2020-11-12 NOTE — ED PROVIDER NOTE - PATIENT PORTAL LINK FT
You can access the FollowMyHealth Patient Portal offered by [facility] by registering at the following website: [email or web address]. By joining [employer]’s FollowMyHealth portal, you will also be able to view your health information using other applications (apps) compatible with our system.

## 2020-11-12 NOTE — ED ADULT TRIAGE NOTE - CHIEF COMPLAINT QUOTE
Patient brought in by ambulance A/Ox3, reports sickle cell crisis, pain in lower back and bilateral hips.

## 2020-11-12 NOTE — ED PROVIDER NOTE - NSFOLLOWUPINSTRUCTIONS_ED_ALL_ED_FT
1. Return to ED for worsening, progressive or any other concerning symptoms   2. Follow up with your primary care doctor in 2-3days   3. Take Tylenol up to 1000 mg every 6 hours as needed for pain. and Take motrin 400mg every 6 hours as needed for pain   4. Call your primary doctor regarding your pain medication refills, be sure to take them appropriately

## 2020-11-12 NOTE — ED PROVIDER NOTE - OBJECTIVE STATEMENT
36yo M with SS disease, states 2 days of low back and b/l hip pain tupical of pain crisis. no fever. no CP/SOB/cough. no sick contacts. took last pill of 8mg dilaudid last night. hematologist/pain management @ [facility]- did not call them today. no leg swelling. no traumas. no rash.

## 2020-12-03 NOTE — ED PROVIDER NOTE - CLINICAL SUMMARY MEDICAL DECISION MAKING FREE TEXT BOX
s/p one do po meds and im toradol with decent effect. DC home in NAD with strict return precautions given.

## 2020-12-03 NOTE — ED PROVIDER NOTE - PATIENT PORTAL LINK FT
You can access the FollowMyHealth Patient Portal offered by [facility] by registering at the following website: [email or web address]. By joining [employer]’s FollowMyHealth portal, you will also be able to view your health information using other applications (apps) compatible with our system.
No

## 2020-12-03 NOTE — ED PROVIDER NOTE - OBJECTIVE STATEMENT
36yo M with SS disease, states 2 days of low back and b/l hip pain tupical of pain crisis. no fever. no CP/SOB/cough. no sick contacts. took last pill of 8mg dilaudid last night. hematologist/pain management @ [facility]- did not call them today. no leg swelling. no traumas. no rash

## 2020-12-03 NOTE — ED ADULT NURSE NOTE - NSIMPLEMENTINTERV_GEN_ALL_ED
Implemented All Universal Safety Interventions:  [place] to call system. Call bell, personal items and telephone within reach. Instruct patient to call for assistance. Room bathroom lighting operational. Non-slip footwear when patient is off stretcher. Physically safe environment: no spills, clutter or unnecessary equipment. Stretcher in lowest position, wheels locked, appropriate side rails in place.

## 2020-12-03 NOTE — ED ADULT NURSE NOTE - OBJECTIVE STATEMENT
received A&Ox3 35years old male pt with c/o of " I need some pain meds, I have sickle cell." pt reports pain 9/10. no n/v/d/fever/chills. pt talks in full sentences and walks in a stable gait. no SOB

## 2020-12-06 NOTE — ED ADULT TRIAGE NOTE - CHIEF COMPLAINT QUOTE
Pt w/ hx of sickle cell disease c/o bilateral hip and lower back pain. Pt took Dilaudid 8 mg at 8 am w/o relief of sx. denies SOB, CP, cough.

## 2020-12-06 NOTE — ED PROVIDER NOTE - NSFOLLOWUPINSTRUCTIONS_ED_ALL_ED_FT
Continue home medications and hydrate with plenty of fluids.  Follow up with your hematologist at [facility]      Sickle Cell Crisis    AMBULATORY CARE:    A sickle cell crisis is a painful episode that occurs in people who have sickle cell anemia. It happens when sickle-shaped red blood cells (RBCs) block blood vessels. Blood and oxygen cannot get to your tissues, causing pain. A sickle cell crisis can also damage your tissues and cause organ failure, such liver or kidney failure. A sickle cell crisis can become life-threatening.    Common symptoms include the following:   •Fever      •Pain      •Weakness or fatigue      •Abdominal pain and swelling      •Headaches      •A painful, erect penis (priapism)      •Fast heartbeats      •Shortness of breath      Call your local emergency number (911 in the [place]) if:   •You have shortness of breath or chest pain.      •You are a man and have an erection that is painful and does not go away.       •You lose vision in one or both eyes.      Seek care immediately if:   •You feel like you cannot cope with your pain, or you feel like hurting yourself.       •You have behavior changes, a seizure, or faint.       •You have a fever.      •You have abdominal pain, nausea, or vomiting.      •You have a different or worse headache.       •You have new weakness or numbness in your arm, leg, or face.      •You have new pain in any part of your body.      •Your urine is dark and you are urinating less than usual or not at all.       •You are dizzy, lightheaded, or faint.       Call your doctor if:   •You have any new signs or symptoms.       •You have blood in your urine.       •You are constipated or you have diarrhea.       •You have changes in your vision.       •You have increased fatigue.       •You plan to travel by airplane or to a high [facility].       •You have questions or concerns about your condition or care.      Medical alert identification: Wear medical alert jewelry or carry a card that says you have sickle cell anemia. Ask your healthcare provider where to get these items.     Medical Alert Jewelry         Treatment for a sickle cell crisis may include any of the following:   •IV fluids treat dehydration and help reduce sickling of RBCs.       •Oxygen helps increase oxygen levels in your blood and make it easier for you to breathe.      •A blood transfusion replaces blood with RBCs that are not sickle shaped.      •Surgery may be done to remove part of your spleen.       Self-care:   •Medicines may be given to decrease pain or to decrease sickling of your RBCs. You may also need medicine to prevent a bacterial infection or help you breathe more easily.       •NSAIDs, such as ibuprofen, help decrease swelling, pain, and fever. This medicine is available with or without a doctor's order. NSAIDs can cause stomach bleeding or kidney problems in certain people. If you take blood thinner medicine, always ask your healthcare provider if NSAIDs are safe for you. Always read the medicine label and follow directions.      •Acetaminophen decreases pain and fever. It is available without a doctor's order. Ask how much to take and how often to take it. Follow directions. Acetaminophen can cause liver damage if not taken correctly.      Prevent a sickle cell crisis:   •Take vitamins and minerals as directed. Folic acid may help prevent blood vessel problems that can come with sickle cell anemia. Zinc may decrease how often you have pain.       •Drink liquids as directed. Dehydration can increase your risk for a sick cell crisis. Ask how much liquid to drink each day and which liquids are best for you.      •Balance rest and exercise. Rest during a sickle cell crisis. Over time, increase your activity to a moderate amount. Exercise regularly. Avoid exercise or activities that can cause injury, such as football. Ask about the best exercise plan for you.       •Wash your hands frequently. Handwashing can help prevent illness. Wash your hands before you prepare or eat food, and after you use the bathroom.   Handwashing           •Avoid quick changes in temperature. Do not go quickly from a warm place to a cold place. Get in a pool slowly instead of jumping in. Avoid getting too hot or too cold. Dress in light clothing in the summer and warm clothing in the winter.       •Do not smoke cigarettes or drink alcohol. These increase your risk for a sickle cell crisis. Nicotine and other chemicals in cigarettes and cigars can cause lung damage. Ask your healthcare provider for information if you currently smoke and need help to quit. E-cigarettes or smokeless tobacco still contain nicotine. Talk to your healthcare provider before you use these products.       •Ask about vaccinations you need. Vaccinations can help prevent a viral infection that may lead to a sickle cell crisis. Get a flu shot every year as directed. You may need pneumonia vaccines every 5 years.       Follow up with your doctor as directed: You may need ongoing screening for conditions that can develop because of sickle cell disease. Examples include kidney disease, hypertension (high blood pressure), retinopathy (eye problems), and problems with your lungs. Write down your questions so you remember to ask them during your visits.

## 2020-12-06 NOTE — ED ADULT NURSE NOTE - NS ED NOTE ABUSE SUSPICION NEGLECT YN
Date: 10/29/2020   Patient Name: Manohar Nesbitt  Address: [address]   [facility] [address]  : 1968   Phone #: [unfilled]   MRN: [id number]     Referring Physician: No ref. provider found    Chief Complaint:    Chief Complaint   Patient presents with   • Peripheral Vascular Disease     New patient per Dr. Corin Hays for PVD. Patient states that he has had burning feeling in right leg        History of Present Illness: Manohar Nesbitt is a 52 y.o. male who is here today to establish care with Cardiothoracic Surgery.  In short this is a 52-year-old male, non-smoker, nondiabetic with severe psoriasis with psoriatic arthritis.  He has had venous stasis, and venous insufficiency for years.  He underwent right varicose vein stripping at [facility] in the late .  Due to his poor healing, the dermatologist is requested that we evaluate him both with pulse volume recordings as well as a clinical exam in an attempt to elicit any aortoiliac or peripheral ischemia.  He complains of right foot pain that is not throbbing but more of a constant burning pain.  This is worse when he does not have stockings on, or is at rest.  Activity does not elicit the pain    Review of Systems:   Review of Systems   Constitution: Negative for chills, fever, malaise/fatigue, night sweats and weight loss.   HENT: Negative for hearing loss, odynophagia and sore throat.    Cardiovascular: Positive for leg swelling. Negative for chest pain, dyspnea on exertion, orthopnea and palpitations.   Respiratory: Negative for cough and hemoptysis.    Endocrine: Negative for cold intolerance, heat intolerance, polydipsia, polyphagia and polyuria.   Hematologic/Lymphatic: Does not bruise/bleed easily.   Skin: Negative for itching and rash.   Musculoskeletal: Positive for myalgias. Negative for joint pain and joint swelling.   Gastrointestinal: Negative for abdominal pain, constipation, diarrhea, hematemesis, hematochezia, melena, nausea and vomiting.  "  Genitourinary: Negative for dysuria, frequency and hematuria.   Neurological: Positive for loss of balance, numbness (in right leg) and paresthesias (in right leg). Negative for focal weakness, headaches and seizures.   Psychiatric/Behavioral: Negative for suicidal ideas.   All other systems reviewed and are negative.       Past Medical History:   Past Medical History:   Diagnosis Date   • Hypertension        Past Surgical History:   Past Surgical History:   Procedure Laterality Date   • KNEE CARTILAGE SURGERY Bilateral        Family History:   Family History   Problem Relation Age of Onset   • Cancer Mother    • Cancer Father    • Hypertension Father        Social History:   Social History     Socioeconomic History   • Marital status:      Spouse name: Not on file   • Number of children: Not on file   • Years of education: Not on file   • Highest education level: Not on file   Tobacco Use   • Smoking status: Former Smoker     Years: 2.00     Types: Cigarettes     Quit date: 10/29/1996     Years since quittin.0   • Smokeless tobacco: Former User     Types: Chew     Quit date: 10/29/1996   Substance and Sexual Activity   • Alcohol use: Yes     Comment: beer every now and then    • Drug use: Never       Medications:     Current Outpatient Medications:   •  aspirin 81 MG chewable tablet, Chew 81 mg Daily., Disp: , Rfl:   •  Cartia  MG 24 hr capsule, Take 120 mg by mouth Daily., Disp: , Rfl:   •  Esomeprazole Magnesium (NEXIUM PO), Take  by mouth., Disp: , Rfl:   •  hydroCHLOROthiazide (HYDRODIURIL) 25 MG tablet, Take 25 mg by mouth Daily., Disp: , Rfl:   •  Otezla 30 MG tablet, Take 30 mg by mouth Daily., Disp: , Rfl:     Allergies:   No Known Allergies    Physical Exam:  Vital Signs:   Vitals:    10/29/20 0923   BP: 138/88   BP Location: Left arm   Patient Position: Sitting   Pulse: 79   Temp: 96.8 °F (36 °C)   TempSrc: Temporal   SpO2: 99%   Weight: 126 kg (277 lb 3.2 oz)   Height: 185.4 cm (73\") "     Body mass index is 36.57 kg/m².     Physical Exam  Vitals signs and nursing note reviewed.   Constitutional:       Appearance: He is well-developed. He is obese. He is not toxic-appearing.   HENT:      Head: Normocephalic.   Eyes:      Conjunctiva/sclera: Conjunctivae normal.      Pupils: Pupils are equal, round, and reactive to light.   Neck:      Musculoskeletal: Normal range of motion.      Vascular: No carotid bruit or JVD.   Cardiovascular:      Rate and Rhythm: Normal rate and regular rhythm.      Pulses:           Carotid pulses are 2+ on the right side and 2+ on the left side.       Radial pulses are 2+ on the right side and 2+ on the left side.        Femoral pulses are 2+ on the right side and 2+ on the left side.       Popliteal pulses are 2+ on the right side and 2+ on the left side.        Dorsalis pedis pulses are 2+ on the right side and 2+ on the left side.        Posterior tibial pulses are 2+ on the right side and 2+ on the left side.      Heart sounds: Normal heart sounds. No murmur. No systolic murmur. No diastolic murmur. No friction rub. No gallop.       Comments: Right leg has dry venous stasis dermatitis  Pulmonary:      Effort: Pulmonary effort is normal. No respiratory distress.      Breath sounds: Normal breath sounds. No wheezing, rhonchi or rales.   Chest:      Chest wall: No tenderness.   Abdominal:      General: Bowel sounds are normal. There is no distension.      Palpations: Abdomen is soft. There is no mass.      Tenderness: There is no abdominal tenderness. There is no guarding.   Musculoskeletal: Normal range of motion.   Lymphadenopathy:      Cervical: No cervical adenopathy.   Skin:     General: Skin is warm and dry.      Capillary Refill: Capillary refill takes less than 2 seconds.      Findings: No rash.   Neurological:      Mental Status: He is alert and oriented to person, place, and time.      Cranial Nerves: No cranial nerve deficit.   Psychiatric:         Speech:  Speech normal.         Behavior: Behavior normal.         Thought Content: Thought content normal.         Judgment: Judgment normal.         Assessment/Plan:   A 52 y.o. male with the following:     Diagnoses and all orders for this visit:    1. Venous stasis dermatitis of right lower extremity (Primary)    2. Psoriatic arthritis (CMS/HCC)        1.  Peripheral vascular disease-his pulse volume recordings were excellent demonstrating DUANE on the right of 1.1 and left of 1.1.  There is no dampening the signals indicating any calcification or peripheral vascular disease.  In addition he has excellently palpable pulses throughout.  In summary, I do not feel that he has any significant atherosclerotic disease.  2.  Venous stasis dermatitis-he has longstanding venous stasis issues on the right which are contributing to his pain.  We have placed an Unna boot and will see him back in 6 weeks        Follow Up:   Return in about 6 weeks (around 12/10/2020) for wound check w Dr Chaudhary.    Jun Hay MD  [facility] Cardiothoracic Surgery   Electronically signed by Jun Hay MD, 10/29/20, 11:55 AM EDT.             No

## 2020-12-06 NOTE — ED PROVIDER NOTE - CLINICAL SUMMARY MEDICAL DECISION MAKING FREE TEXT BOX
36 y/o M with PMHx sickle cell anemia, DVT, PE, b/l hip AVN, presents to the ED c/o low back and b/l hip pain starting at 8am this morning, typical of sickle cell pain crisis. pt afebrile, normotensive, no tachycardia, reproducible diffuse lumbar and b/l hip ttp, NVI.  given toradol/dilaudid with improvement, requesting dc.  instructed to call [facility] [place] for earlier appointment.  discussed strict return parameters

## 2020-12-06 NOTE — ED ADULT NURSE NOTE - NSIMPLEMENTINTERV_GEN_ALL_ED
Implemented All Universal Safety Interventions:  [place] to call system. Call bell, personal items and telephone within reach. Instruct patient to call for assistance. Room bathroom lighting operational. Non-slip footwear when patient is off stretcher. Physically safe environment: no spills, clutter or unnecessary equipment. Stretcher in lowest position, wheels locked, appropriate side rails in place.

## 2020-12-06 NOTE — ED PROVIDER NOTE - PHYSICAL EXAMINATION
Vitals reviewed  Gen: well appearing, nad, speaking in full sentences  Skin: wwp, no rash/lesions  HEENT: ncat, eomi, mmm  CV: rrr, no audible m/r/g  Resp: symmetrical expansion, ctab, no w/r/r  Abd: nondistended, soft/nt  Ext: reproducible tenderness to the lumbar paraspinal region and b/l hips. distal pulses 2+, distal sensation intact, FROM throughout, no peripheral edema  Neuro: alert/oriented, no focal deficits, steady gait Vitals reviewed  Gen: tired but nontoxic appearing, nad, speaking in full sentences  Skin: wwp, no rash/lesions  HEENT: ncat, eomi, mmm  CV: rrr, no audible m/r/g  Resp: symmetrical expansion, ctab, no w/r/r  Abd: nondistended, soft/nt  Ext: reproducible tenderness to the lumbar paraspinal region and b/l hips. distal pulses 2+, distal sensation intact, FROM throughout, no peripheral edema  Neuro: alert/oriented, no focal deficits, steady gait

## 2020-12-06 NOTE — ED PROVIDER NOTE - PATIENT PORTAL LINK FT
You can access the FollowMyHealth Patient Portal offered by [facility] by registering at the following website: [email or web address]. By joining [facility]’s FollowMyHealth portal, you will also be able to view your health information using other applications (apps) compatible with our system.

## 2020-12-06 NOTE — ED PROVIDER NOTE - OBJECTIVE STATEMENT
34 y/o M with PMHx sickle cell anemia, DVT, PE, b/l hip AVN, presents to the ED c/o low back and b/l hip pain starting at 8am this morning, typical of sickle cell pain crisis. Pt took 8mg of dilaudid this morning w/ little improvement. States he did not want to take more of his medication with out being checked out. Pt is scheduled for an appointment w/ his hematologist at [facility] next months. Denies fever, chills, headache, chest pain, SOB, leg swelling, numbness, or weakness.

## 2021-01-01 ENCOUNTER — INPATIENT (INPATIENT)
Facility: HOSPITAL | Age: 36
LOS: 6 days | DRG: 682 | End: 2021-09-28
Attending: INTERNAL MEDICINE | Admitting: STUDENT IN AN ORGANIZED HEALTH CARE EDUCATION/TRAINING PROGRAM
Payer: MEDICAID

## 2021-01-01 ENCOUNTER — EMERGENCY (EMERGENCY)
Facility: HOSPITAL | Age: 36
LOS: 1 days | Discharge: LEFT BEFORE DISCHARGE | End: 2021-01-01
Attending: EMERGENCY MEDICINE | Admitting: EMERGENCY MEDICINE
Payer: SELF-PAY

## 2021-01-01 ENCOUNTER — EMERGENCY (EMERGENCY)
Facility: HOSPITAL | Age: 36
LOS: 1 days | Discharge: HOME | End: 2021-01-01
Attending: EMERGENCY MEDICINE | Admitting: EMERGENCY MEDICINE
Payer: SELF-PAY

## 2021-01-01 ENCOUNTER — EMERGENCY (EMERGENCY)
Facility: HOSPITAL | Age: 36
LOS: 1 days | Discharge: HOME | End: 2021-01-01
Admitting: EMERGENCY MEDICINE
Payer: SELF-PAY

## 2021-01-01 ENCOUNTER — EMERGENCY (EMERGENCY)
Facility: HOSPITAL | Age: 36
LOS: 1 days | Discharge: HOME | End: 2021-01-01
Attending: EMERGENCY MEDICINE | Admitting: EMERGENCY MEDICINE
Payer: MEDICAID

## 2021-01-01 VITALS
WEIGHT: 160.94 LBS | SYSTOLIC BLOOD PRESSURE: 138 MMHG | HEART RATE: 105 BPM | OXYGEN SATURATION: 100 % | TEMPERATURE: 98 F | DIASTOLIC BLOOD PRESSURE: 89 MMHG | RESPIRATION RATE: 18 BRPM | HEIGHT: 68 IN

## 2021-01-01 VITALS
HEIGHT: 68 IN | SYSTOLIC BLOOD PRESSURE: 156 MMHG | HEART RATE: 167 BPM | OXYGEN SATURATION: 98 % | RESPIRATION RATE: 16 BRPM | WEIGHT: 160.94 LBS | TEMPERATURE: 98 F | DIASTOLIC BLOOD PRESSURE: 89 MMHG

## 2021-01-01 VITALS
HEART RATE: 62 BPM | RESPIRATION RATE: 20 BRPM | SYSTOLIC BLOOD PRESSURE: 113 MMHG | OXYGEN SATURATION: 97 % | TEMPERATURE: 98 F | DIASTOLIC BLOOD PRESSURE: 76 MMHG | WEIGHT: 160.94 LBS | HEIGHT: 68 IN

## 2021-01-01 VITALS
SYSTOLIC BLOOD PRESSURE: 103 MMHG | RESPIRATION RATE: 18 BRPM | OXYGEN SATURATION: 99 % | HEIGHT: 68 IN | WEIGHT: 160.94 LBS | TEMPERATURE: 98 F | HEART RATE: 128 BPM | DIASTOLIC BLOOD PRESSURE: 69 MMHG

## 2021-01-01 VITALS
OXYGEN SATURATION: 98 % | SYSTOLIC BLOOD PRESSURE: 101 MMHG | HEART RATE: 129 BPM | DIASTOLIC BLOOD PRESSURE: 71 MMHG | RESPIRATION RATE: 20 BRPM | TEMPERATURE: 97 F

## 2021-01-01 VITALS
TEMPERATURE: 98 F | HEART RATE: 111 BPM | RESPIRATION RATE: 17 BRPM | OXYGEN SATURATION: 100 % | HEIGHT: 68 IN | SYSTOLIC BLOOD PRESSURE: 144 MMHG | WEIGHT: 160.94 LBS | DIASTOLIC BLOOD PRESSURE: 83 MMHG

## 2021-01-01 VITALS
TEMPERATURE: 98 F | RESPIRATION RATE: 19 BRPM | SYSTOLIC BLOOD PRESSURE: 120 MMHG | HEART RATE: 89 BPM | DIASTOLIC BLOOD PRESSURE: 70 MMHG | OXYGEN SATURATION: 100 %

## 2021-01-01 VITALS
OXYGEN SATURATION: 98 % | SYSTOLIC BLOOD PRESSURE: 116 MMHG | RESPIRATION RATE: 16 BRPM | DIASTOLIC BLOOD PRESSURE: 68 MMHG

## 2021-01-01 VITALS
RESPIRATION RATE: 18 BRPM | HEIGHT: 68 IN | WEIGHT: 160.94 LBS | OXYGEN SATURATION: 100 % | SYSTOLIC BLOOD PRESSURE: 120 MMHG | TEMPERATURE: 98 F | DIASTOLIC BLOOD PRESSURE: 81 MMHG | HEART RATE: 100 BPM

## 2021-01-01 VITALS — OXYGEN SATURATION: 100 % | RESPIRATION RATE: 16 BRPM | HEART RATE: 91 BPM

## 2021-01-01 VITALS
WEIGHT: 160.94 LBS | HEIGHT: 68 IN | SYSTOLIC BLOOD PRESSURE: 121 MMHG | OXYGEN SATURATION: 99 % | DIASTOLIC BLOOD PRESSURE: 78 MMHG | HEART RATE: 114 BPM | TEMPERATURE: 99 F | RESPIRATION RATE: 18 BRPM

## 2021-01-01 VITALS
WEIGHT: 160.94 LBS | SYSTOLIC BLOOD PRESSURE: 122 MMHG | DIASTOLIC BLOOD PRESSURE: 83 MMHG | OXYGEN SATURATION: 99 % | TEMPERATURE: 98 F | HEIGHT: 68 IN | RESPIRATION RATE: 19 BRPM | HEART RATE: 110 BPM

## 2021-01-01 VITALS
SYSTOLIC BLOOD PRESSURE: 128 MMHG | TEMPERATURE: 98 F | HEART RATE: 100 BPM | DIASTOLIC BLOOD PRESSURE: 77 MMHG | OXYGEN SATURATION: 100 % | HEIGHT: 68 IN | WEIGHT: 160.94 LBS | RESPIRATION RATE: 18 BRPM

## 2021-01-01 VITALS
TEMPERATURE: 98 F | SYSTOLIC BLOOD PRESSURE: 124 MMHG | WEIGHT: 160.94 LBS | HEART RATE: 99 BPM | RESPIRATION RATE: 18 BRPM | DIASTOLIC BLOOD PRESSURE: 78 MMHG | OXYGEN SATURATION: 98 % | HEIGHT: 68 IN

## 2021-01-01 VITALS
SYSTOLIC BLOOD PRESSURE: 146 MMHG | DIASTOLIC BLOOD PRESSURE: 67 MMHG | HEART RATE: 120 BPM | OXYGEN SATURATION: 99 % | RESPIRATION RATE: 20 BRPM

## 2021-01-01 DIAGNOSIS — Z79.899 OTHER LONG TERM (CURRENT) DRUG THERAPY: ICD-10-CM

## 2021-01-01 DIAGNOSIS — M54.5 LOW BACK PAIN: ICD-10-CM

## 2021-01-01 DIAGNOSIS — M54.9 DORSALGIA, UNSPECIFIED: ICD-10-CM

## 2021-01-01 DIAGNOSIS — Z86.711 PERSONAL HISTORY OF PULMONARY EMBOLISM: ICD-10-CM

## 2021-01-01 DIAGNOSIS — D57.00 HB-SS DISEASE WITH CRISIS, UNSPECIFIED: ICD-10-CM

## 2021-01-01 DIAGNOSIS — R00.0 TACHYCARDIA, UNSPECIFIED: ICD-10-CM

## 2021-01-01 DIAGNOSIS — Z79.01 LONG TERM (CURRENT) USE OF ANTICOAGULANTS: ICD-10-CM

## 2021-01-01 DIAGNOSIS — N17.9 ACUTE KIDNEY FAILURE, UNSPECIFIED: ICD-10-CM

## 2021-01-01 DIAGNOSIS — I10 ESSENTIAL (PRIMARY) HYPERTENSION: ICD-10-CM

## 2021-01-01 DIAGNOSIS — Z86.718 PERSONAL HISTORY OF OTHER VENOUS THROMBOSIS AND EMBOLISM: ICD-10-CM

## 2021-01-01 DIAGNOSIS — I82.409 ACUTE EMBOLISM AND THROMBOSIS OF UNSPECIFIED DEEP VEINS OF UNSPECIFIED LOWER EXTREMITY: ICD-10-CM

## 2021-01-01 DIAGNOSIS — Z83.2 FAMILY HISTORY OF DISEASES OF THE BLOOD AND BLOOD-FORMING ORGANS AND CERTAIN DISORDERS INVOLVING THE IMMUNE MECHANISM: ICD-10-CM

## 2021-01-01 DIAGNOSIS — Z79.1 LONG TERM (CURRENT) USE OF NON-STEROIDAL ANTI-INFLAMMATORIES (NSAID): ICD-10-CM

## 2021-01-01 DIAGNOSIS — Z87.39 PERSONAL HISTORY OF OTHER DISEASES OF THE MUSCULOSKELETAL SYSTEM AND CONNECTIVE TISSUE: ICD-10-CM

## 2021-01-01 DIAGNOSIS — M25.551 PAIN IN RIGHT HIP: ICD-10-CM

## 2021-01-01 DIAGNOSIS — M25.552 PAIN IN LEFT HIP: ICD-10-CM

## 2021-01-01 DIAGNOSIS — D57.1 SICKLE-CELL DISEASE WITHOUT CRISIS: ICD-10-CM

## 2021-01-01 DIAGNOSIS — Z86.79 PERSONAL HISTORY OF OTHER DISEASES OF THE CIRCULATORY SYSTEM: ICD-10-CM

## 2021-01-01 LAB
24R-OH-CALCIDIOL SERPL-MCNC: 10.2 NG/ML — LOW (ref 30–80)
ALBUMIN SERPL ELPH-MCNC: 1.4 G/DL — LOW (ref 3.3–5.2)
ALBUMIN SERPL ELPH-MCNC: 1.4 G/DL — LOW (ref 3.3–5.2)
ALBUMIN SERPL ELPH-MCNC: 1.5 G/DL — LOW (ref 3.3–5.2)
ALBUMIN SERPL ELPH-MCNC: 1.6 G/DL — LOW (ref 3.3–5.2)
ALBUMIN SERPL ELPH-MCNC: 1.6 G/DL — LOW (ref 3.3–5.2)
ALP SERPL-CCNC: 187 U/L — HIGH (ref 40–120)
ALP SERPL-CCNC: 195 U/L — HIGH (ref 40–120)
ALP SERPL-CCNC: 208 U/L — HIGH (ref 40–120)
ALP SERPL-CCNC: 226 U/L — HIGH (ref 40–120)
ALP SERPL-CCNC: 230 U/L — HIGH (ref 40–120)
ALT FLD-CCNC: 12 U/L — SIGNIFICANT CHANGE UP
ALT FLD-CCNC: 7 U/L — SIGNIFICANT CHANGE UP
ALT FLD-CCNC: 7 U/L — SIGNIFICANT CHANGE UP
ALT FLD-CCNC: 8 U/L — SIGNIFICANT CHANGE UP
ALT FLD-CCNC: 9 U/L — SIGNIFICANT CHANGE UP
ANION GAP SERPL CALC-SCNC: 10 MMOL/L — SIGNIFICANT CHANGE UP (ref 5–17)
ANION GAP SERPL CALC-SCNC: 12 MMOL/L — SIGNIFICANT CHANGE UP (ref 5–17)
ANION GAP SERPL CALC-SCNC: 14 MMOL/L — SIGNIFICANT CHANGE UP (ref 5–17)
ANION GAP SERPL CALC-SCNC: 14 MMOL/L — SIGNIFICANT CHANGE UP (ref 5–17)
ANION GAP SERPL CALC-SCNC: 15 MMOL/L — SIGNIFICANT CHANGE UP (ref 5–17)
ANISOCYTOSIS BLD QL: SIGNIFICANT CHANGE UP
APPEARANCE UR: CLEAR — SIGNIFICANT CHANGE UP
APTT BLD: 37.7 SEC — HIGH (ref 27.5–35.5)
AST SERPL-CCNC: 12 U/L — SIGNIFICANT CHANGE UP
AST SERPL-CCNC: 13 U/L — SIGNIFICANT CHANGE UP
AST SERPL-CCNC: 13 U/L — SIGNIFICANT CHANGE UP
AST SERPL-CCNC: 16 U/L — SIGNIFICANT CHANGE UP
AST SERPL-CCNC: 29 U/L — SIGNIFICANT CHANGE UP
BASOPHILS # BLD AUTO: 0.09 K/UL — SIGNIFICANT CHANGE UP (ref 0–0.2)
BASOPHILS # BLD AUTO: 0.09 K/UL — SIGNIFICANT CHANGE UP (ref 0–0.2)
BASOPHILS # BLD AUTO: 0.1 K/UL — SIGNIFICANT CHANGE UP (ref 0–0.2)
BASOPHILS # BLD AUTO: 0.12 K/UL — SIGNIFICANT CHANGE UP (ref 0–0.2)
BASOPHILS # BLD AUTO: 0.13 K/UL — SIGNIFICANT CHANGE UP (ref 0–0.2)
BASOPHILS NFR BLD AUTO: 0.6 % — SIGNIFICANT CHANGE UP (ref 0–2)
BASOPHILS NFR BLD AUTO: 0.7 % — SIGNIFICANT CHANGE UP (ref 0–2)
BASOPHILS NFR BLD AUTO: 0.8 % — SIGNIFICANT CHANGE UP (ref 0–2)
BASOPHILS NFR BLD AUTO: 0.8 % — SIGNIFICANT CHANGE UP (ref 0–2)
BASOPHILS NFR BLD AUTO: 0.9 % — SIGNIFICANT CHANGE UP (ref 0–2)
BILIRUB SERPL-MCNC: 0.2 MG/DL — LOW (ref 0.4–2)
BILIRUB SERPL-MCNC: <0.2 MG/DL — LOW (ref 0.4–2)
BILIRUB UR-MCNC: NEGATIVE — SIGNIFICANT CHANGE UP
BLD GP AB SCN SERPL QL: SIGNIFICANT CHANGE UP
BUN SERPL-MCNC: 25.2 MG/DL — HIGH (ref 8–20)
BUN SERPL-MCNC: 25.9 MG/DL — HIGH (ref 8–20)
BUN SERPL-MCNC: 26.5 MG/DL — HIGH (ref 8–20)
BUN SERPL-MCNC: 28.8 MG/DL — HIGH (ref 8–20)
BUN SERPL-MCNC: 29.5 MG/DL — HIGH (ref 8–20)
BUN SERPL-MCNC: 30 MG/DL — HIGH (ref 8–20)
BUN SERPL-MCNC: 31.9 MG/DL — HIGH (ref 8–20)
CALCIUM SERPL-MCNC: 6.5 MG/DL — CRITICAL LOW (ref 8.6–10.2)
CALCIUM SERPL-MCNC: 6.6 MG/DL — LOW (ref 8.4–10.5)
CALCIUM SERPL-MCNC: 6.9 MG/DL — LOW (ref 8.6–10.2)
CALCIUM SERPL-MCNC: 6.9 MG/DL — LOW (ref 8.6–10.2)
CALCIUM SERPL-MCNC: 7 MG/DL — LOW (ref 8.6–10.2)
CALCIUM SERPL-MCNC: 7.2 MG/DL — LOW (ref 8.6–10.2)
CALCIUM SERPL-MCNC: 7.3 MG/DL — LOW (ref 8.6–10.2)
CALCIUM SERPL-MCNC: 7.6 MG/DL — LOW (ref 8.6–10.2)
CHLORIDE SERPL-SCNC: 106 MMOL/L — SIGNIFICANT CHANGE UP (ref 98–107)
CHLORIDE SERPL-SCNC: 108 MMOL/L — HIGH (ref 98–107)
CHLORIDE SERPL-SCNC: 109 MMOL/L — HIGH (ref 98–107)
CHLORIDE SERPL-SCNC: 110 MMOL/L — HIGH (ref 98–107)
CHLORIDE SERPL-SCNC: 111 MMOL/L — HIGH (ref 98–107)
CHLORIDE SERPL-SCNC: 112 MMOL/L — HIGH (ref 98–107)
CHLORIDE SERPL-SCNC: 113 MMOL/L — HIGH (ref 98–107)
CO2 SERPL-SCNC: 15 MMOL/L — LOW (ref 22–29)
CO2 SERPL-SCNC: 16 MMOL/L — LOW (ref 22–29)
CO2 SERPL-SCNC: 17 MMOL/L — LOW (ref 22–29)
CO2 SERPL-SCNC: 18 MMOL/L — LOW (ref 22–29)
CO2 SERPL-SCNC: 20 MMOL/L — LOW (ref 22–29)
COLOR SPEC: YELLOW — SIGNIFICANT CHANGE UP
COVID-19 SPIKE DOMAIN AB INTERP: POSITIVE
COVID-19 SPIKE DOMAIN ANTIBODY RESULT: 133 U/ML — HIGH
CREAT SERPL-MCNC: 3 MG/DL — HIGH (ref 0.5–1.3)
CREAT SERPL-MCNC: 3.22 MG/DL — HIGH (ref 0.5–1.3)
CREAT SERPL-MCNC: 3.26 MG/DL — HIGH (ref 0.5–1.3)
CREAT SERPL-MCNC: 3.32 MG/DL — HIGH (ref 0.5–1.3)
CREAT SERPL-MCNC: 3.42 MG/DL — HIGH (ref 0.5–1.3)
CREAT SERPL-MCNC: 3.44 MG/DL — HIGH (ref 0.5–1.3)
CREAT SERPL-MCNC: 4.07 MG/DL — HIGH (ref 0.5–1.3)
DACRYOCYTES BLD QL SMEAR: SLIGHT — SIGNIFICANT CHANGE UP
DIFF PNL FLD: ABNORMAL
EOSINOPHIL # BLD AUTO: 0.26 K/UL — SIGNIFICANT CHANGE UP (ref 0–0.5)
EOSINOPHIL # BLD AUTO: 0.38 K/UL — SIGNIFICANT CHANGE UP (ref 0–0.5)
EOSINOPHIL # BLD AUTO: 0.47 K/UL — SIGNIFICANT CHANGE UP (ref 0–0.5)
EOSINOPHIL # BLD AUTO: 0.63 K/UL — HIGH (ref 0–0.5)
EOSINOPHIL # BLD AUTO: 0.94 K/UL — HIGH (ref 0–0.5)
EOSINOPHIL NFR BLD AUTO: 2.1 % — SIGNIFICANT CHANGE UP (ref 0–6)
EOSINOPHIL NFR BLD AUTO: 2.6 % — SIGNIFICANT CHANGE UP (ref 0–6)
EOSINOPHIL NFR BLD AUTO: 3.4 % — SIGNIFICANT CHANGE UP (ref 0–6)
EOSINOPHIL NFR BLD AUTO: 4.7 % — SIGNIFICANT CHANGE UP (ref 0–6)
EOSINOPHIL NFR BLD AUTO: 6.5 % — HIGH (ref 0–6)
EPI CELLS # UR: SIGNIFICANT CHANGE UP
FERRITIN SERPL-MCNC: 220 NG/ML — SIGNIFICANT CHANGE UP (ref 30–400)
GIANT PLATELETS BLD QL SMEAR: PRESENT — SIGNIFICANT CHANGE UP
GLUCOSE BLDC GLUCOMTR-MCNC: 90 MG/DL — SIGNIFICANT CHANGE UP (ref 70–99)
GLUCOSE SERPL-MCNC: 73 MG/DL — SIGNIFICANT CHANGE UP (ref 70–99)
GLUCOSE SERPL-MCNC: 77 MG/DL — SIGNIFICANT CHANGE UP (ref 70–99)
GLUCOSE SERPL-MCNC: 80 MG/DL — SIGNIFICANT CHANGE UP (ref 70–99)
GLUCOSE SERPL-MCNC: 85 MG/DL — SIGNIFICANT CHANGE UP (ref 70–99)
GLUCOSE SERPL-MCNC: 86 MG/DL — SIGNIFICANT CHANGE UP (ref 70–99)
GLUCOSE SERPL-MCNC: 93 MG/DL — SIGNIFICANT CHANGE UP (ref 70–99)
GLUCOSE SERPL-MCNC: 97 MG/DL — SIGNIFICANT CHANGE UP (ref 70–99)
GLUCOSE UR QL: 50 MG/DL
HCT VFR BLD CALC: 19.2 % — CRITICAL LOW (ref 39–50)
HCT VFR BLD CALC: 19.7 % — CRITICAL LOW (ref 39–50)
HCT VFR BLD CALC: 19.8 % — CRITICAL LOW (ref 39–50)
HCT VFR BLD CALC: 21.6 % — LOW (ref 39–50)
HCT VFR BLD CALC: 21.7 % — LOW (ref 39–50)
HCT VFR BLD CALC: 23.7 % — LOW (ref 39–50)
HCT VFR BLD CALC: 32.8 % — LOW (ref 39–50)
HGB BLD-MCNC: 11.4 G/DL — LOW (ref 13–17)
HGB BLD-MCNC: 6.8 G/DL — CRITICAL LOW (ref 13–17)
HGB BLD-MCNC: 7 G/DL — CRITICAL LOW (ref 13–17)
HGB BLD-MCNC: 7 G/DL — CRITICAL LOW (ref 13–17)
HGB BLD-MCNC: 7.6 G/DL — LOW (ref 13–17)
HGB BLD-MCNC: 7.6 G/DL — LOW (ref 13–17)
HGB BLD-MCNC: 8 G/DL — LOW (ref 13–17)
IMM GRANULOCYTES NFR BLD AUTO: 0.4 % — SIGNIFICANT CHANGE UP (ref 0–1.5)
IMM GRANULOCYTES NFR BLD AUTO: 0.4 % — SIGNIFICANT CHANGE UP (ref 0–1.5)
IMM GRANULOCYTES NFR BLD AUTO: 0.7 % — SIGNIFICANT CHANGE UP (ref 0–1.5)
IMM GRANULOCYTES NFR BLD AUTO: 0.7 % — SIGNIFICANT CHANGE UP (ref 0–1.5)
INR BLD: 1.2 RATIO — HIGH (ref 0.88–1.16)
IRON SATN MFR SERPL: 31 % — SIGNIFICANT CHANGE UP (ref 16–55)
IRON SATN MFR SERPL: 41 UG/DL — LOW (ref 59–158)
KETONES UR-MCNC: NEGATIVE — SIGNIFICANT CHANGE UP
LACTATE SERPL-SCNC: 14.3 MMOL/L — CRITICAL HIGH (ref 0.5–2)
LDH SERPL L TO P-CCNC: 169 U/L — SIGNIFICANT CHANGE UP (ref 98–192)
LEUKOCYTE ESTERASE UR-ACNC: NEGATIVE — SIGNIFICANT CHANGE UP
LYMPHOCYTES # BLD AUTO: 1.43 K/UL — SIGNIFICANT CHANGE UP (ref 1–3.3)
LYMPHOCYTES # BLD AUTO: 11.5 % — LOW (ref 13–44)
LYMPHOCYTES # BLD AUTO: 2.87 K/UL — SIGNIFICANT CHANGE UP (ref 1–3.3)
LYMPHOCYTES # BLD AUTO: 2.92 K/UL — SIGNIFICANT CHANGE UP (ref 1–3.3)
LYMPHOCYTES # BLD AUTO: 21.1 % — SIGNIFICANT CHANGE UP (ref 13–44)
LYMPHOCYTES # BLD AUTO: 21.3 % — SIGNIFICANT CHANGE UP (ref 13–44)
LYMPHOCYTES # BLD AUTO: 22 % — SIGNIFICANT CHANGE UP (ref 13–44)
LYMPHOCYTES # BLD AUTO: 27.3 % — SIGNIFICANT CHANGE UP (ref 13–44)
LYMPHOCYTES # BLD AUTO: 3.19 K/UL — SIGNIFICANT CHANGE UP (ref 1–3.3)
LYMPHOCYTES # BLD AUTO: 4.04 K/UL — HIGH (ref 1–3.3)
MAGNESIUM SERPL-MCNC: 1.3 MG/DL — LOW (ref 1.6–2.6)
MAGNESIUM SERPL-MCNC: 1.6 MG/DL — SIGNIFICANT CHANGE UP (ref 1.6–2.6)
MAGNESIUM SERPL-MCNC: 1.9 MG/DL — SIGNIFICANT CHANGE UP (ref 1.6–2.6)
MANUAL SMEAR VERIFICATION: SIGNIFICANT CHANGE UP
MCHC RBC-ENTMCNC: 25.2 PG — LOW (ref 27–34)
MCHC RBC-ENTMCNC: 25.6 PG — LOW (ref 27–34)
MCHC RBC-ENTMCNC: 25.6 PG — LOW (ref 27–34)
MCHC RBC-ENTMCNC: 26.1 PG — LOW (ref 27–34)
MCHC RBC-ENTMCNC: 26.1 PG — LOW (ref 27–34)
MCHC RBC-ENTMCNC: 26.3 PG — LOW (ref 27–34)
MCHC RBC-ENTMCNC: 26.7 PG — LOW (ref 27–34)
MCHC RBC-ENTMCNC: 33.8 GM/DL — SIGNIFICANT CHANGE UP (ref 32–36)
MCHC RBC-ENTMCNC: 34.8 GM/DL — SIGNIFICANT CHANGE UP (ref 32–36)
MCHC RBC-ENTMCNC: 35 GM/DL — SIGNIFICANT CHANGE UP (ref 32–36)
MCHC RBC-ENTMCNC: 35.2 GM/DL — SIGNIFICANT CHANGE UP (ref 32–36)
MCHC RBC-ENTMCNC: 35.4 GM/DL — SIGNIFICANT CHANGE UP (ref 32–36)
MCHC RBC-ENTMCNC: 35.4 GM/DL — SIGNIFICANT CHANGE UP (ref 32–36)
MCHC RBC-ENTMCNC: 35.5 GM/DL — SIGNIFICANT CHANGE UP (ref 32–36)
MCV RBC AUTO: 71.2 FL — LOW (ref 80–100)
MCV RBC AUTO: 73.1 FL — LOW (ref 80–100)
MCV RBC AUTO: 73.5 FL — LOW (ref 80–100)
MCV RBC AUTO: 73.6 FL — LOW (ref 80–100)
MCV RBC AUTO: 75.7 FL — LOW (ref 80–100)
MCV RBC AUTO: 75.8 FL — LOW (ref 80–100)
MCV RBC AUTO: 75.8 FL — LOW (ref 80–100)
MICROCYTES BLD QL: SIGNIFICANT CHANGE UP
MONOCYTES # BLD AUTO: 0.52 K/UL — SIGNIFICANT CHANGE UP (ref 0–0.9)
MONOCYTES # BLD AUTO: 0.6 K/UL — SIGNIFICANT CHANGE UP (ref 0–0.9)
MONOCYTES # BLD AUTO: 0.75 K/UL — SIGNIFICANT CHANGE UP (ref 0–0.9)
MONOCYTES # BLD AUTO: 0.77 K/UL — SIGNIFICANT CHANGE UP (ref 0–0.9)
MONOCYTES # BLD AUTO: 0.81 K/UL — SIGNIFICANT CHANGE UP (ref 0–0.9)
MONOCYTES NFR BLD AUTO: 4.1 % — SIGNIFICANT CHANGE UP (ref 2–14)
MONOCYTES NFR BLD AUTO: 4.2 % — SIGNIFICANT CHANGE UP (ref 2–14)
MONOCYTES NFR BLD AUTO: 5.5 % — SIGNIFICANT CHANGE UP (ref 2–14)
MONOCYTES NFR BLD AUTO: 5.6 % — SIGNIFICANT CHANGE UP (ref 2–14)
MONOCYTES NFR BLD AUTO: 5.6 % — SIGNIFICANT CHANGE UP (ref 2–14)
NEUTROPHILS # BLD AUTO: 10.09 K/UL — HIGH (ref 1.8–7.4)
NEUTROPHILS # BLD AUTO: 9.07 K/UL — HIGH (ref 1.8–7.4)
NEUTROPHILS # BLD AUTO: 9.32 K/UL — HIGH (ref 1.8–7.4)
NEUTROPHILS # BLD AUTO: 9.36 K/UL — HIGH (ref 1.8–7.4)
NEUTROPHILS # BLD AUTO: 9.53 K/UL — HIGH (ref 1.8–7.4)
NEUTROPHILS NFR BLD AUTO: 63.3 % — SIGNIFICANT CHANGE UP (ref 43–77)
NEUTROPHILS NFR BLD AUTO: 64.2 % — SIGNIFICANT CHANGE UP (ref 43–77)
NEUTROPHILS NFR BLD AUTO: 67 % — SIGNIFICANT CHANGE UP (ref 43–77)
NEUTROPHILS NFR BLD AUTO: 68.6 % — SIGNIFICANT CHANGE UP (ref 43–77)
NEUTROPHILS NFR BLD AUTO: 81 % — HIGH (ref 43–77)
NITRITE UR-MCNC: NEGATIVE — SIGNIFICANT CHANGE UP
OVALOCYTES BLD QL SMEAR: SLIGHT — SIGNIFICANT CHANGE UP
PH UR: 7 — SIGNIFICANT CHANGE UP (ref 5–8)
PHOSPHATE SERPL-MCNC: 4 MG/DL — SIGNIFICANT CHANGE UP (ref 2.4–4.7)
PHOSPHATE SERPL-MCNC: 4.5 MG/DL — SIGNIFICANT CHANGE UP (ref 2.4–4.7)
PLAT MORPH BLD: NORMAL — SIGNIFICANT CHANGE UP
PLATELET # BLD AUTO: 150 K/UL — SIGNIFICANT CHANGE UP (ref 150–400)
PLATELET # BLD AUTO: 153 K/UL — SIGNIFICANT CHANGE UP (ref 150–400)
PLATELET # BLD AUTO: 158 K/UL — SIGNIFICANT CHANGE UP (ref 150–400)
PLATELET # BLD AUTO: 243 K/UL — SIGNIFICANT CHANGE UP (ref 150–400)
PLATELET # BLD AUTO: 266 K/UL — SIGNIFICANT CHANGE UP (ref 150–400)
PLATELET # BLD AUTO: 320 K/UL — SIGNIFICANT CHANGE UP (ref 150–400)
PLATELET # BLD AUTO: 408 K/UL — HIGH (ref 150–400)
POIKILOCYTOSIS BLD QL AUTO: SLIGHT — SIGNIFICANT CHANGE UP
POLYCHROMASIA BLD QL SMEAR: SLIGHT — SIGNIFICANT CHANGE UP
POTASSIUM SERPL-MCNC: 3.2 MMOL/L — LOW (ref 3.5–5.3)
POTASSIUM SERPL-MCNC: 3.3 MMOL/L — LOW (ref 3.5–5.3)
POTASSIUM SERPL-MCNC: 3.3 MMOL/L — LOW (ref 3.5–5.3)
POTASSIUM SERPL-MCNC: 3.4 MMOL/L — LOW (ref 3.5–5.3)
POTASSIUM SERPL-MCNC: 3.7 MMOL/L — SIGNIFICANT CHANGE UP (ref 3.5–5.3)
POTASSIUM SERPL-MCNC: 3.8 MMOL/L — SIGNIFICANT CHANGE UP (ref 3.5–5.3)
POTASSIUM SERPL-MCNC: 4 MMOL/L — SIGNIFICANT CHANGE UP (ref 3.5–5.3)
POTASSIUM SERPL-SCNC: 3.2 MMOL/L — LOW (ref 3.5–5.3)
POTASSIUM SERPL-SCNC: 3.3 MMOL/L — LOW (ref 3.5–5.3)
POTASSIUM SERPL-SCNC: 3.3 MMOL/L — LOW (ref 3.5–5.3)
POTASSIUM SERPL-SCNC: 3.4 MMOL/L — LOW (ref 3.5–5.3)
POTASSIUM SERPL-SCNC: 3.7 MMOL/L — SIGNIFICANT CHANGE UP (ref 3.5–5.3)
POTASSIUM SERPL-SCNC: 3.8 MMOL/L — SIGNIFICANT CHANGE UP (ref 3.5–5.3)
POTASSIUM SERPL-SCNC: 4 MMOL/L — SIGNIFICANT CHANGE UP (ref 3.5–5.3)
PROT SERPL-MCNC: 5 G/DL — LOW (ref 6.6–8.7)
PROT SERPL-MCNC: 5.4 G/DL — LOW (ref 6.6–8.7)
PROT SERPL-MCNC: 5.7 G/DL — LOW (ref 6.6–8.7)
PROT UR-MCNC: 500 MG/DL
PROTHROM AB SERPL-ACNC: 13.8 SEC — HIGH (ref 10.6–13.6)
PTH-INTACT FLD-MCNC: 195 PG/ML — HIGH (ref 15–65)
RBC # BLD: 2.61 M/UL — LOW (ref 4.2–5.8)
RBC # BLD: 2.68 M/UL — LOW (ref 4.2–5.8)
RBC # BLD: 2.78 M/UL — LOW (ref 4.2–5.8)
RBC # BLD: 2.78 M/UL — LOW (ref 4.2–5.8)
RBC # BLD: 2.85 M/UL — LOW (ref 4.2–5.8)
RBC # BLD: 2.85 M/UL — LOW (ref 4.2–5.8)
RBC # BLD: 2.97 M/UL — LOW (ref 4.2–5.8)
RBC # BLD: 3.13 M/UL — LOW (ref 4.2–5.8)
RBC # BLD: 4.33 M/UL — SIGNIFICANT CHANGE UP (ref 4.2–5.8)
RBC # BLD: 4.33 M/UL — SIGNIFICANT CHANGE UP (ref 4.2–5.8)
RBC # FLD: 18.9 % — HIGH (ref 10.3–14.5)
RBC # FLD: 18.9 % — HIGH (ref 10.3–14.5)
RBC # FLD: 19.1 % — HIGH (ref 10.3–14.5)
RBC # FLD: 19.2 % — HIGH (ref 10.3–14.5)
RBC # FLD: 19.3 % — HIGH (ref 10.3–14.5)
RBC # FLD: 19.3 % — HIGH (ref 10.3–14.5)
RBC # FLD: 19.4 % — HIGH (ref 10.3–14.5)
RBC BLD AUTO: ABNORMAL
RBC CASTS # UR COMP ASSIST: SIGNIFICANT CHANGE UP /HPF (ref 0–4)
RETICS #: 110.3 K/UL — SIGNIFICANT CHANGE UP (ref 25–125)
RETICS #: 126.4 K/UL — HIGH (ref 25–125)
RETICS #: 57.1 K/UL — SIGNIFICANT CHANGE UP (ref 25–125)
RETICS/RBC NFR: 2.1 % — SIGNIFICANT CHANGE UP (ref 0.5–2.5)
RETICS/RBC NFR: 2.9 % — HIGH (ref 0.5–2.5)
RETICS/RBC NFR: 3.9 % — HIGH (ref 0.5–2.5)
SARS-COV-2 IGG+IGM SERPL QL IA: 133 U/ML — HIGH
SARS-COV-2 IGG+IGM SERPL QL IA: POSITIVE
SARS-COV-2 RNA SPEC QL NAA+PROBE: SIGNIFICANT CHANGE UP
SMUDGE CELLS # BLD: PRESENT — SIGNIFICANT CHANGE UP
SODIUM SERPL-SCNC: 136 MMOL/L — SIGNIFICANT CHANGE UP (ref 135–145)
SODIUM SERPL-SCNC: 138 MMOL/L — SIGNIFICANT CHANGE UP (ref 135–145)
SODIUM SERPL-SCNC: 140 MMOL/L — SIGNIFICANT CHANGE UP (ref 135–145)
SODIUM SERPL-SCNC: 141 MMOL/L — SIGNIFICANT CHANGE UP (ref 135–145)
SODIUM SERPL-SCNC: 143 MMOL/L — SIGNIFICANT CHANGE UP (ref 135–145)
SP GR SPEC: 1.01 — SIGNIFICANT CHANGE UP (ref 1.01–1.02)
TARGETS BLD QL SMEAR: SLIGHT — SIGNIFICANT CHANGE UP
TIBC SERPL-MCNC: 133 UG/DL — LOW (ref 220–430)
TRANSFERRIN SERPL-MCNC: 93 MG/DL — LOW (ref 180–329)
UROBILINOGEN FLD QL: NEGATIVE MG/DL — SIGNIFICANT CHANGE UP
VARIANT LYMPHS # BLD: 2.4 % — SIGNIFICANT CHANGE UP (ref 0–6)
WBC # BLD: 12.45 K/UL — HIGH (ref 3.8–10.5)
WBC # BLD: 13.5 K/UL — HIGH (ref 3.8–10.5)
WBC # BLD: 13.87 K/UL — HIGH (ref 3.8–10.5)
WBC # BLD: 14.52 K/UL — HIGH (ref 3.8–10.5)
WBC # BLD: 14.78 K/UL — HIGH (ref 3.8–10.5)
WBC # BLD: 15.42 K/UL — HIGH (ref 3.8–10.5)
WBC # BLD: 18.77 K/UL — HIGH (ref 3.8–10.5)
WBC # FLD AUTO: 12.45 K/UL — HIGH (ref 3.8–10.5)
WBC # FLD AUTO: 13.5 K/UL — HIGH (ref 3.8–10.5)
WBC # FLD AUTO: 13.87 K/UL — HIGH (ref 3.8–10.5)
WBC # FLD AUTO: 14.52 K/UL — HIGH (ref 3.8–10.5)
WBC # FLD AUTO: 14.78 K/UL — HIGH (ref 3.8–10.5)
WBC # FLD AUTO: 15.42 K/UL — HIGH (ref 3.8–10.5)
WBC # FLD AUTO: 18.77 K/UL — HIGH (ref 3.8–10.5)
WBC UR QL: NEGATIVE — SIGNIFICANT CHANGE UP

## 2021-01-01 PROCEDURE — 71045 X-RAY EXAM CHEST 1 VIEW: CPT | Mod: 26,76

## 2021-01-01 PROCEDURE — 99233 SBSQ HOSP IP/OBS HIGH 50: CPT

## 2021-01-01 PROCEDURE — 99283 EMERGENCY DEPT VISIT LOW MDM: CPT

## 2021-01-01 PROCEDURE — 99284 EMERGENCY DEPT VISIT MOD MDM: CPT

## 2021-01-01 PROCEDURE — 93005 ELECTROCARDIOGRAM TRACING: CPT

## 2021-01-01 PROCEDURE — U0003: CPT

## 2021-01-01 PROCEDURE — 86923 COMPATIBILITY TEST ELECTRIC: CPT

## 2021-01-01 PROCEDURE — 80048 BASIC METABOLIC PNL TOTAL CA: CPT

## 2021-01-01 PROCEDURE — 81001 URINALYSIS AUTO W/SCOPE: CPT

## 2021-01-01 PROCEDURE — 82962 GLUCOSE BLOOD TEST: CPT

## 2021-01-01 PROCEDURE — 84466 ASSAY OF TRANSFERRIN: CPT

## 2021-01-01 PROCEDURE — 93975 VASCULAR STUDY: CPT

## 2021-01-01 PROCEDURE — 83540 ASSAY OF IRON: CPT

## 2021-01-01 PROCEDURE — 99285 EMERGENCY DEPT VISIT HI MDM: CPT | Mod: 25

## 2021-01-01 PROCEDURE — 99255 IP/OBS CONSLTJ NEW/EST HI 80: CPT

## 2021-01-01 PROCEDURE — 99223 1ST HOSP IP/OBS HIGH 75: CPT

## 2021-01-01 PROCEDURE — 99221 1ST HOSP IP/OBS SF/LOW 40: CPT

## 2021-01-01 PROCEDURE — 76775 US EXAM ABDO BACK WALL LIM: CPT | Mod: 26

## 2021-01-01 PROCEDURE — 85610 PROTHROMBIN TIME: CPT

## 2021-01-01 PROCEDURE — 76775 US EXAM ABDO BACK WALL LIM: CPT

## 2021-01-01 PROCEDURE — 85045 AUTOMATED RETICULOCYTE COUNT: CPT

## 2021-01-01 PROCEDURE — 93010 ELECTROCARDIOGRAM REPORT: CPT

## 2021-01-01 PROCEDURE — 36556 INSERT NON-TUNNEL CV CATH: CPT

## 2021-01-01 PROCEDURE — 82728 ASSAY OF FERRITIN: CPT

## 2021-01-01 PROCEDURE — 83970 ASSAY OF PARATHORMONE: CPT

## 2021-01-01 PROCEDURE — 96374 THER/PROPH/DIAG INJ IV PUSH: CPT

## 2021-01-01 PROCEDURE — C1751: CPT

## 2021-01-01 PROCEDURE — 93975 VASCULAR STUDY: CPT | Mod: 26

## 2021-01-01 PROCEDURE — 99284 EMERGENCY DEPT VISIT MOD MDM: CPT | Mod: 25

## 2021-01-01 PROCEDURE — 36415 COLL VENOUS BLD VENIPUNCTURE: CPT

## 2021-01-01 PROCEDURE — 99222 1ST HOSP IP/OBS MODERATE 55: CPT

## 2021-01-01 PROCEDURE — 83615 LACTATE (LD) (LDH) ENZYME: CPT

## 2021-01-01 PROCEDURE — 83605 ASSAY OF LACTIC ACID: CPT

## 2021-01-01 PROCEDURE — 82310 ASSAY OF CALCIUM: CPT

## 2021-01-01 PROCEDURE — 85027 COMPLETE CBC AUTOMATED: CPT

## 2021-01-01 PROCEDURE — 99285 EMERGENCY DEPT VISIT HI MDM: CPT

## 2021-01-01 PROCEDURE — 36569 INSJ PICC 5 YR+ W/O IMAGING: CPT

## 2021-01-01 PROCEDURE — 96375 TX/PRO/DX INJ NEW DRUG ADDON: CPT

## 2021-01-01 PROCEDURE — 85730 THROMBOPLASTIN TIME PARTIAL: CPT

## 2021-01-01 PROCEDURE — 82306 VITAMIN D 25 HYDROXY: CPT

## 2021-01-01 PROCEDURE — 71045 X-RAY EXAM CHEST 1 VIEW: CPT

## 2021-01-01 PROCEDURE — 83735 ASSAY OF MAGNESIUM: CPT

## 2021-01-01 PROCEDURE — 86901 BLOOD TYPING SEROLOGIC RH(D): CPT

## 2021-01-01 PROCEDURE — 86769 SARS-COV-2 COVID-19 ANTIBODY: CPT

## 2021-01-01 PROCEDURE — P9016: CPT

## 2021-01-01 PROCEDURE — 83550 IRON BINDING TEST: CPT

## 2021-01-01 PROCEDURE — 86900 BLOOD TYPING SEROLOGIC ABO: CPT

## 2021-01-01 PROCEDURE — U0005: CPT

## 2021-01-01 PROCEDURE — 80053 COMPREHEN METABOLIC PANEL: CPT

## 2021-01-01 PROCEDURE — 86850 RBC ANTIBODY SCREEN: CPT

## 2021-01-01 PROCEDURE — 84100 ASSAY OF PHOSPHORUS: CPT

## 2021-01-01 PROCEDURE — 36430 TRANSFUSION BLD/BLD COMPNT: CPT

## 2021-01-01 PROCEDURE — 85025 COMPLETE CBC W/AUTO DIFF WBC: CPT

## 2021-01-01 RX ORDER — ALTEPLASE 100 MG
50 KIT INTRAVENOUS ONCE
Refills: 0 | Status: DISCONTINUED | OUTPATIENT
Start: 2021-01-01 | End: 2021-01-01

## 2021-01-01 RX ORDER — POTASSIUM CHLORIDE 20 MEQ
10 PACKET (EA) ORAL
Refills: 0 | Status: COMPLETED | OUTPATIENT
Start: 2021-01-01 | End: 2021-01-01

## 2021-01-01 RX ORDER — HYDROMORPHONE HYDROCHLORIDE 2 MG/ML
30 INJECTION INTRAMUSCULAR; INTRAVENOUS; SUBCUTANEOUS
Refills: 0 | Status: DISCONTINUED | OUTPATIENT
Start: 2021-01-01 | End: 2021-01-01

## 2021-01-01 RX ORDER — NOREPINEPHRINE BITARTRATE/D5W 8 MG/250ML
0.05 PLASTIC BAG, INJECTION (ML) INTRAVENOUS
Qty: 16 | Refills: 0 | Status: DISCONTINUED | OUTPATIENT
Start: 2021-01-01 | End: 2021-01-01

## 2021-01-01 RX ORDER — PIPERACILLIN AND TAZOBACTAM 4; .5 G/20ML; G/20ML
3.38 INJECTION, POWDER, LYOPHILIZED, FOR SOLUTION INTRAVENOUS ONCE
Refills: 0 | Status: DISCONTINUED | OUTPATIENT
Start: 2021-01-01 | End: 2021-01-01

## 2021-01-01 RX ORDER — KETOROLAC TROMETHAMINE 30 MG/ML
15 SYRINGE (ML) INJECTION ONCE
Refills: 0 | Status: DISCONTINUED | OUTPATIENT
Start: 2021-01-01 | End: 2021-01-01

## 2021-01-01 RX ORDER — SODIUM CHLORIDE 9 MG/ML
1000 INJECTION INTRAMUSCULAR; INTRAVENOUS; SUBCUTANEOUS
Refills: 0 | Status: DISCONTINUED | OUTPATIENT
Start: 2021-01-01 | End: 2021-01-01

## 2021-01-01 RX ORDER — SODIUM BICARBONATE 1 MEQ/ML
0.14 SYRINGE (ML) INTRAVENOUS
Qty: 150 | Refills: 0 | Status: DISCONTINUED | OUTPATIENT
Start: 2021-01-01 | End: 2021-01-01

## 2021-01-01 RX ORDER — HYDROMORPHONE HYDROCHLORIDE 2 MG/ML
1 INJECTION INTRAMUSCULAR; INTRAVENOUS; SUBCUTANEOUS
Qty: 0 | Refills: 0 | DISCHARGE

## 2021-01-01 RX ORDER — HYDROMORPHONE HYDROCHLORIDE 2 MG/ML
2 INJECTION INTRAMUSCULAR; INTRAVENOUS; SUBCUTANEOUS ONCE
Refills: 0 | Status: DISCONTINUED | OUTPATIENT
Start: 2021-01-01 | End: 2021-01-01

## 2021-01-01 RX ORDER — IBUPROFEN 200 MG
600 TABLET ORAL ONCE
Refills: 0 | Status: COMPLETED | OUTPATIENT
Start: 2021-01-01 | End: 2021-01-01

## 2021-01-01 RX ORDER — SODIUM BICARBONATE 1 MEQ/ML
0.08 SYRINGE (ML) INTRAVENOUS
Qty: 75 | Refills: 0 | Status: DISCONTINUED | OUTPATIENT
Start: 2021-01-01 | End: 2021-01-01

## 2021-01-01 RX ORDER — HYDROMORPHONE HYDROCHLORIDE 2 MG/ML
16 INJECTION INTRAMUSCULAR; INTRAVENOUS; SUBCUTANEOUS ONCE
Refills: 0 | Status: DISCONTINUED | OUTPATIENT
Start: 2021-01-01 | End: 2021-01-01

## 2021-01-01 RX ORDER — ACETAMINOPHEN 500 MG
650 TABLET ORAL EVERY 6 HOURS
Refills: 0 | Status: DISCONTINUED | OUTPATIENT
Start: 2021-01-01 | End: 2021-01-01

## 2021-01-01 RX ORDER — VANCOMYCIN HCL 1 G
1250 VIAL (EA) INTRAVENOUS ONCE
Refills: 0 | Status: DISCONTINUED | OUTPATIENT
Start: 2021-01-01 | End: 2021-01-01

## 2021-01-01 RX ORDER — SODIUM CHLORIDE 9 MG/ML
1000 INJECTION, SOLUTION INTRAVENOUS
Refills: 0 | Status: DISCONTINUED | OUTPATIENT
Start: 2021-01-01 | End: 2021-01-01

## 2021-01-01 RX ORDER — HYDROMORPHONE HYDROCHLORIDE 2 MG/ML
2 INJECTION INTRAMUSCULAR; INTRAVENOUS; SUBCUTANEOUS
Refills: 0 | Status: DISCONTINUED | OUTPATIENT
Start: 2021-01-01 | End: 2021-01-01

## 2021-01-01 RX ORDER — HYDROMORPHONE HYDROCHLORIDE 2 MG/ML
8 INJECTION INTRAMUSCULAR; INTRAVENOUS; SUBCUTANEOUS ONCE
Refills: 0 | Status: DISCONTINUED | OUTPATIENT
Start: 2021-01-01 | End: 2021-01-01

## 2021-01-01 RX ORDER — POTASSIUM CHLORIDE 20 MEQ
40 PACKET (EA) ORAL ONCE
Refills: 0 | Status: COMPLETED | OUTPATIENT
Start: 2021-01-01 | End: 2021-01-01

## 2021-01-01 RX ORDER — HYDROMORPHONE HYDROCHLORIDE 2 MG/ML
4 INJECTION INTRAMUSCULAR; INTRAVENOUS; SUBCUTANEOUS EVERY 4 HOURS
Refills: 0 | Status: DISCONTINUED | OUTPATIENT
Start: 2021-01-01 | End: 2021-01-01

## 2021-01-01 RX ORDER — ACETAMINOPHEN 500 MG
975 TABLET ORAL ONCE
Refills: 0 | Status: COMPLETED | OUTPATIENT
Start: 2021-01-01 | End: 2021-01-01

## 2021-01-01 RX ORDER — HYDROMORPHONE HYDROCHLORIDE 2 MG/ML
4 INJECTION INTRAMUSCULAR; INTRAVENOUS; SUBCUTANEOUS ONCE
Refills: 0 | Status: DISCONTINUED | OUTPATIENT
Start: 2021-01-01 | End: 2021-01-01

## 2021-01-01 RX ORDER — ERYTHROPOIETIN 10000 [IU]/ML
10000 INJECTION, SOLUTION INTRAVENOUS; SUBCUTANEOUS
Refills: 0 | Status: DISCONTINUED | OUTPATIENT
Start: 2021-01-01 | End: 2021-01-01

## 2021-01-01 RX ORDER — ACETAMINOPHEN 500 MG
1000 TABLET ORAL ONCE
Refills: 0 | Status: COMPLETED | OUTPATIENT
Start: 2021-01-01 | End: 2021-01-01

## 2021-01-01 RX ORDER — SODIUM CHLORIDE 9 MG/ML
1000 INJECTION INTRAMUSCULAR; INTRAVENOUS; SUBCUTANEOUS ONCE
Refills: 0 | Status: DISCONTINUED | OUTPATIENT
Start: 2021-01-01 | End: 2021-01-01

## 2021-01-01 RX ORDER — LANOLIN ALCOHOL/MO/W.PET/CERES
3 CREAM (GRAM) TOPICAL AT BEDTIME
Refills: 0 | Status: DISCONTINUED | OUTPATIENT
Start: 2021-01-01 | End: 2021-01-01

## 2021-01-01 RX ORDER — INFLUENZA VIRUS VACCINE 15; 15; 15; 15 UG/.5ML; UG/.5ML; UG/.5ML; UG/.5ML
0.5 SUSPENSION INTRAMUSCULAR ONCE
Refills: 0 | Status: DISCONTINUED | OUTPATIENT
Start: 2021-01-01 | End: 2021-01-01

## 2021-01-01 RX ORDER — KETOROLAC TROMETHAMINE 30 MG/ML
30 SYRINGE (ML) INJECTION EVERY 6 HOURS
Refills: 0 | Status: DISCONTINUED | OUTPATIENT
Start: 2021-01-01 | End: 2021-01-01

## 2021-01-01 RX ORDER — POTASSIUM CHLORIDE 20 MEQ
40 PACKET (EA) ORAL EVERY 4 HOURS
Refills: 0 | Status: COMPLETED | OUTPATIENT
Start: 2021-01-01 | End: 2021-01-01

## 2021-01-01 RX ORDER — SODIUM CHLORIDE 9 MG/ML
1000 INJECTION, SOLUTION INTRAVENOUS ONCE
Refills: 0 | Status: COMPLETED | OUTPATIENT
Start: 2021-01-01 | End: 2021-01-01

## 2021-01-01 RX ORDER — HYDROMORPHONE HYDROCHLORIDE 2 MG/ML
8 INJECTION INTRAMUSCULAR; INTRAVENOUS; SUBCUTANEOUS EVERY 4 HOURS
Refills: 0 | Status: DISCONTINUED | OUTPATIENT
Start: 2021-01-01 | End: 2021-01-01

## 2021-01-01 RX ORDER — MAGNESIUM SULFATE 500 MG/ML
1 VIAL (ML) INJECTION EVERY 8 HOURS
Refills: 0 | Status: COMPLETED | OUTPATIENT
Start: 2021-01-01 | End: 2021-01-01

## 2021-01-01 RX ORDER — SODIUM CHLORIDE 9 MG/ML
1000 INJECTION INTRAMUSCULAR; INTRAVENOUS; SUBCUTANEOUS ONCE
Refills: 0 | Status: COMPLETED | OUTPATIENT
Start: 2021-01-01 | End: 2021-01-01

## 2021-01-01 RX ORDER — APIXABAN 2.5 MG/1
1 TABLET, FILM COATED ORAL
Qty: 0 | Refills: 0 | DISCHARGE

## 2021-01-01 RX ORDER — HEPARIN SODIUM 5000 [USP'U]/ML
5000 INJECTION INTRAVENOUS; SUBCUTANEOUS EVERY 8 HOURS
Refills: 0 | Status: DISCONTINUED | OUTPATIENT
Start: 2021-01-01 | End: 2021-01-01

## 2021-01-01 RX ORDER — IBUPROFEN 200 MG
800 TABLET ORAL ONCE
Refills: 0 | Status: COMPLETED | OUTPATIENT
Start: 2021-01-01 | End: 2021-01-01

## 2021-01-01 RX ORDER — FOLIC ACID 0.8 MG
1 TABLET ORAL DAILY
Refills: 0 | Status: DISCONTINUED | OUTPATIENT
Start: 2021-01-01 | End: 2021-01-01

## 2021-01-01 RX ORDER — PIPERACILLIN AND TAZOBACTAM 4; .5 G/20ML; G/20ML
3.38 INJECTION, POWDER, LYOPHILIZED, FOR SOLUTION INTRAVENOUS EVERY 8 HOURS
Refills: 0 | Status: DISCONTINUED | OUTPATIENT
Start: 2021-01-01 | End: 2021-01-01

## 2021-01-01 RX ORDER — MEROPENEM 1 G/30ML
1000 INJECTION INTRAVENOUS EVERY 12 HOURS
Refills: 0 | Status: DISCONTINUED | OUTPATIENT
Start: 2021-01-01 | End: 2021-01-01

## 2021-01-01 RX ORDER — FERROUS SULFATE 325(65) MG
325 TABLET ORAL DAILY
Refills: 0 | Status: DISCONTINUED | OUTPATIENT
Start: 2021-01-01 | End: 2021-01-01

## 2021-01-01 RX ADMIN — HYDROMORPHONE HYDROCHLORIDE 8 MILLIGRAM(S): 2 INJECTION INTRAMUSCULAR; INTRAVENOUS; SUBCUTANEOUS at 09:58

## 2021-01-01 RX ADMIN — Medication 650 MILLIGRAM(S): at 20:30

## 2021-01-01 RX ADMIN — HYDROMORPHONE HYDROCHLORIDE 8 MILLIGRAM(S): 2 INJECTION INTRAMUSCULAR; INTRAVENOUS; SUBCUTANEOUS at 10:56

## 2021-01-01 RX ADMIN — HYDROMORPHONE HYDROCHLORIDE 2 MILLIGRAM(S): 2 INJECTION INTRAMUSCULAR; INTRAVENOUS; SUBCUTANEOUS at 17:00

## 2021-01-01 RX ADMIN — HYDROMORPHONE HYDROCHLORIDE 8 MILLIGRAM(S): 2 INJECTION INTRAMUSCULAR; INTRAVENOUS; SUBCUTANEOUS at 13:36

## 2021-01-01 RX ADMIN — HEPARIN SODIUM 5000 UNIT(S): 5000 INJECTION INTRAVENOUS; SUBCUTANEOUS at 13:10

## 2021-01-01 RX ADMIN — HYDROMORPHONE HYDROCHLORIDE 8 MILLIGRAM(S): 2 INJECTION INTRAMUSCULAR; INTRAVENOUS; SUBCUTANEOUS at 12:39

## 2021-01-01 RX ADMIN — Medication 15 MILLIGRAM(S): at 16:32

## 2021-01-01 RX ADMIN — HYDROMORPHONE HYDROCHLORIDE 8 MILLIGRAM(S): 2 INJECTION INTRAMUSCULAR; INTRAVENOUS; SUBCUTANEOUS at 08:44

## 2021-01-01 RX ADMIN — HYDROMORPHONE HYDROCHLORIDE 2 MILLIGRAM(S): 2 INJECTION INTRAMUSCULAR; INTRAVENOUS; SUBCUTANEOUS at 17:26

## 2021-01-01 RX ADMIN — HYDROMORPHONE HYDROCHLORIDE 16 MILLIGRAM(S): 2 INJECTION INTRAMUSCULAR; INTRAVENOUS; SUBCUTANEOUS at 08:33

## 2021-01-01 RX ADMIN — SODIUM CHLORIDE 150 MILLILITER(S): 9 INJECTION INTRAMUSCULAR; INTRAVENOUS; SUBCUTANEOUS at 21:13

## 2021-01-01 RX ADMIN — Medication 650 MILLIGRAM(S): at 05:41

## 2021-01-01 RX ADMIN — Medication 600 MILLIGRAM(S): at 08:34

## 2021-01-01 RX ADMIN — HYDROMORPHONE HYDROCHLORIDE 2 MILLIGRAM(S): 2 INJECTION INTRAMUSCULAR; INTRAVENOUS; SUBCUTANEOUS at 20:50

## 2021-01-01 RX ADMIN — Medication 650 MILLIGRAM(S): at 12:29

## 2021-01-01 RX ADMIN — Medication 650 MILLIGRAM(S): at 17:57

## 2021-01-01 RX ADMIN — Medication 600 MILLIGRAM(S): at 19:15

## 2021-01-01 RX ADMIN — HYDROMORPHONE HYDROCHLORIDE 8 MILLIGRAM(S): 2 INJECTION INTRAMUSCULAR; INTRAVENOUS; SUBCUTANEOUS at 20:29

## 2021-01-01 RX ADMIN — HEPARIN SODIUM 5000 UNIT(S): 5000 INJECTION INTRAVENOUS; SUBCUTANEOUS at 21:16

## 2021-01-01 RX ADMIN — Medication 1 TABLET(S): at 08:30

## 2021-01-01 RX ADMIN — HYDROMORPHONE HYDROCHLORIDE 2 MILLIGRAM(S): 2 INJECTION INTRAMUSCULAR; INTRAVENOUS; SUBCUTANEOUS at 15:15

## 2021-01-01 RX ADMIN — HYDROMORPHONE HYDROCHLORIDE 2 MILLIGRAM(S): 2 INJECTION INTRAMUSCULAR; INTRAVENOUS; SUBCUTANEOUS at 14:30

## 2021-01-01 RX ADMIN — SODIUM CHLORIDE 150 MILLILITER(S): 9 INJECTION INTRAMUSCULAR; INTRAVENOUS; SUBCUTANEOUS at 08:39

## 2021-01-01 RX ADMIN — SODIUM CHLORIDE 1000 MILLILITER(S): 9 INJECTION INTRAMUSCULAR; INTRAVENOUS; SUBCUTANEOUS at 16:33

## 2021-01-01 RX ADMIN — Medication 1 MILLIGRAM(S): at 13:31

## 2021-01-01 RX ADMIN — Medication 100 MILLIEQUIVALENT(S): at 17:38

## 2021-01-01 RX ADMIN — HYDROMORPHONE HYDROCHLORIDE 8 MILLIGRAM(S): 2 INJECTION INTRAMUSCULAR; INTRAVENOUS; SUBCUTANEOUS at 16:12

## 2021-01-01 RX ADMIN — HEPARIN SODIUM 5000 UNIT(S): 5000 INJECTION INTRAVENOUS; SUBCUTANEOUS at 22:18

## 2021-01-01 RX ADMIN — HYDROMORPHONE HYDROCHLORIDE 8 MILLIGRAM(S): 2 INJECTION INTRAMUSCULAR; INTRAVENOUS; SUBCUTANEOUS at 13:30

## 2021-01-01 RX ADMIN — HYDROMORPHONE HYDROCHLORIDE 2 MILLIGRAM(S): 2 INJECTION INTRAMUSCULAR; INTRAVENOUS; SUBCUTANEOUS at 18:35

## 2021-01-01 RX ADMIN — ERYTHROPOIETIN 10000 UNIT(S): 10000 INJECTION, SOLUTION INTRAVENOUS; SUBCUTANEOUS at 15:27

## 2021-01-01 RX ADMIN — Medication 80 MEQ/KG/HR: at 13:10

## 2021-01-01 RX ADMIN — HYDROMORPHONE HYDROCHLORIDE 8 MILLIGRAM(S): 2 INJECTION INTRAMUSCULAR; INTRAVENOUS; SUBCUTANEOUS at 07:36

## 2021-01-01 RX ADMIN — Medication 1 TABLET(S): at 12:12

## 2021-01-01 RX ADMIN — Medication 100 GRAM(S): at 05:38

## 2021-01-01 RX ADMIN — Medication 600 MILLIGRAM(S): at 07:36

## 2021-01-01 RX ADMIN — Medication 1 TABLET(S): at 08:28

## 2021-01-01 RX ADMIN — HYDROMORPHONE HYDROCHLORIDE 2 MILLIGRAM(S): 2 INJECTION INTRAMUSCULAR; INTRAVENOUS; SUBCUTANEOUS at 17:57

## 2021-01-01 RX ADMIN — HYDROMORPHONE HYDROCHLORIDE 2 MILLIGRAM(S): 2 INJECTION INTRAMUSCULAR; INTRAVENOUS; SUBCUTANEOUS at 08:28

## 2021-01-01 RX ADMIN — Medication 400 MILLIGRAM(S): at 17:02

## 2021-01-01 RX ADMIN — HYDROMORPHONE HYDROCHLORIDE 16 MILLIGRAM(S): 2 INJECTION INTRAMUSCULAR; INTRAVENOUS; SUBCUTANEOUS at 19:15

## 2021-01-01 RX ADMIN — Medication 100 GRAM(S): at 21:16

## 2021-01-01 RX ADMIN — HYDROMORPHONE HYDROCHLORIDE 8 MILLIGRAM(S): 2 INJECTION INTRAMUSCULAR; INTRAVENOUS; SUBCUTANEOUS at 12:05

## 2021-01-01 RX ADMIN — HYDROMORPHONE HYDROCHLORIDE 16 MILLIGRAM(S): 2 INJECTION INTRAMUSCULAR; INTRAVENOUS; SUBCUTANEOUS at 20:46

## 2021-01-01 RX ADMIN — HYDROMORPHONE HYDROCHLORIDE 8 MILLIGRAM(S): 2 INJECTION INTRAMUSCULAR; INTRAVENOUS; SUBCUTANEOUS at 17:32

## 2021-01-01 RX ADMIN — HEPARIN SODIUM 5000 UNIT(S): 5000 INJECTION INTRAVENOUS; SUBCUTANEOUS at 17:43

## 2021-01-01 RX ADMIN — Medication 975 MILLIGRAM(S): at 14:30

## 2021-01-01 RX ADMIN — Medication 400 MILLIGRAM(S): at 00:18

## 2021-01-01 RX ADMIN — HYDROMORPHONE HYDROCHLORIDE 8 MILLIGRAM(S): 2 INJECTION INTRAMUSCULAR; INTRAVENOUS; SUBCUTANEOUS at 08:47

## 2021-01-01 RX ADMIN — Medication 100 MILLIEQUIVALENT(S): at 20:41

## 2021-01-01 RX ADMIN — Medication 975 MILLIGRAM(S): at 13:30

## 2021-01-01 RX ADMIN — HYDROMORPHONE HYDROCHLORIDE 2 MILLIGRAM(S): 2 INJECTION INTRAMUSCULAR; INTRAVENOUS; SUBCUTANEOUS at 05:05

## 2021-01-01 RX ADMIN — Medication 650 MILLIGRAM(S): at 13:36

## 2021-01-01 RX ADMIN — Medication 100 GRAM(S): at 13:10

## 2021-01-01 RX ADMIN — Medication 30 MILLIGRAM(S): at 13:52

## 2021-01-01 RX ADMIN — Medication 650 MILLIGRAM(S): at 05:49

## 2021-01-01 RX ADMIN — Medication 1 MILLIGRAM(S): at 08:31

## 2021-01-01 RX ADMIN — SODIUM CHLORIDE 1000 MILLILITER(S): 9 INJECTION, SOLUTION INTRAVENOUS at 19:54

## 2021-01-01 RX ADMIN — Medication 100 MILLIEQUIVALENT(S): at 18:26

## 2021-01-01 RX ADMIN — Medication 650 MILLIGRAM(S): at 17:43

## 2021-01-01 RX ADMIN — HEPARIN SODIUM 5000 UNIT(S): 5000 INJECTION INTRAVENOUS; SUBCUTANEOUS at 22:06

## 2021-01-01 RX ADMIN — HYDROMORPHONE HYDROCHLORIDE 2 MILLIGRAM(S): 2 INJECTION INTRAMUSCULAR; INTRAVENOUS; SUBCUTANEOUS at 13:08

## 2021-01-01 RX ADMIN — HYDROMORPHONE HYDROCHLORIDE 2 MILLIGRAM(S): 2 INJECTION INTRAMUSCULAR; INTRAVENOUS; SUBCUTANEOUS at 00:58

## 2021-01-01 RX ADMIN — HYDROMORPHONE HYDROCHLORIDE 30 MILLILITER(S): 2 INJECTION INTRAMUSCULAR; INTRAVENOUS; SUBCUTANEOUS at 15:40

## 2021-01-01 RX ADMIN — Medication 650 MILLIGRAM(S): at 16:11

## 2021-01-01 RX ADMIN — HEPARIN SODIUM 5000 UNIT(S): 5000 INJECTION INTRAVENOUS; SUBCUTANEOUS at 23:15

## 2021-01-01 RX ADMIN — Medication 650 MILLIGRAM(S): at 04:20

## 2021-01-01 RX ADMIN — HYDROMORPHONE HYDROCHLORIDE 2 MILLIGRAM(S): 2 INJECTION INTRAMUSCULAR; INTRAVENOUS; SUBCUTANEOUS at 14:52

## 2021-01-01 RX ADMIN — HYDROMORPHONE HYDROCHLORIDE 2 MILLIGRAM(S): 2 INJECTION INTRAMUSCULAR; INTRAVENOUS; SUBCUTANEOUS at 17:21

## 2021-01-01 RX ADMIN — HYDROMORPHONE HYDROCHLORIDE 2 MILLIGRAM(S): 2 INJECTION INTRAMUSCULAR; INTRAVENOUS; SUBCUTANEOUS at 22:05

## 2021-01-01 RX ADMIN — HYDROMORPHONE HYDROCHLORIDE 2 MILLIGRAM(S): 2 INJECTION INTRAMUSCULAR; INTRAVENOUS; SUBCUTANEOUS at 21:12

## 2021-01-01 RX ADMIN — HEPARIN SODIUM 5000 UNIT(S): 5000 INJECTION INTRAVENOUS; SUBCUTANEOUS at 04:20

## 2021-01-01 RX ADMIN — Medication 1000 MILLIGRAM(S): at 17:21

## 2021-01-01 RX ADMIN — Medication 40 MILLIEQUIVALENT(S): at 12:11

## 2021-01-01 RX ADMIN — SODIUM CHLORIDE 200 MILLILITER(S): 9 INJECTION, SOLUTION INTRAVENOUS at 16:34

## 2021-01-01 RX ADMIN — HYDROMORPHONE HYDROCHLORIDE 8 MILLIGRAM(S): 2 INJECTION INTRAMUSCULAR; INTRAVENOUS; SUBCUTANEOUS at 00:11

## 2021-01-01 RX ADMIN — Medication 650 MILLIGRAM(S): at 23:17

## 2021-01-01 RX ADMIN — HYDROMORPHONE HYDROCHLORIDE 8 MILLIGRAM(S): 2 INJECTION INTRAMUSCULAR; INTRAVENOUS; SUBCUTANEOUS at 13:51

## 2021-01-01 RX ADMIN — Medication 40 MILLIEQUIVALENT(S): at 19:54

## 2021-01-01 RX ADMIN — Medication 600 MILLIGRAM(S): at 20:17

## 2021-01-01 RX ADMIN — Medication 650 MILLIGRAM(S): at 05:05

## 2021-01-01 RX ADMIN — HEPARIN SODIUM 5000 UNIT(S): 5000 INJECTION INTRAVENOUS; SUBCUTANEOUS at 20:30

## 2021-01-01 RX ADMIN — HYDROMORPHONE HYDROCHLORIDE 2 MILLIGRAM(S): 2 INJECTION INTRAMUSCULAR; INTRAVENOUS; SUBCUTANEOUS at 01:20

## 2021-01-01 RX ADMIN — HEPARIN SODIUM 5000 UNIT(S): 5000 INJECTION INTRAVENOUS; SUBCUTANEOUS at 05:42

## 2021-01-01 RX ADMIN — HYDROMORPHONE HYDROCHLORIDE 16 MILLIGRAM(S): 2 INJECTION INTRAMUSCULAR; INTRAVENOUS; SUBCUTANEOUS at 18:58

## 2021-01-01 RX ADMIN — HEPARIN SODIUM 5000 UNIT(S): 5000 INJECTION INTRAVENOUS; SUBCUTANEOUS at 23:11

## 2021-01-01 RX ADMIN — Medication 325 MILLIGRAM(S): at 12:12

## 2021-01-01 RX ADMIN — HYDROMORPHONE HYDROCHLORIDE 8 MILLIGRAM(S): 2 INJECTION INTRAMUSCULAR; INTRAVENOUS; SUBCUTANEOUS at 09:59

## 2021-01-01 RX ADMIN — HYDROMORPHONE HYDROCHLORIDE 2 MILLIGRAM(S): 2 INJECTION INTRAMUSCULAR; INTRAVENOUS; SUBCUTANEOUS at 08:44

## 2021-01-01 RX ADMIN — HYDROMORPHONE HYDROCHLORIDE 2 MILLIGRAM(S): 2 INJECTION INTRAMUSCULAR; INTRAVENOUS; SUBCUTANEOUS at 21:56

## 2021-01-01 RX ADMIN — HYDROMORPHONE HYDROCHLORIDE 8 MILLIGRAM(S): 2 INJECTION INTRAMUSCULAR; INTRAVENOUS; SUBCUTANEOUS at 08:01

## 2021-01-01 RX ADMIN — Medication 15 MILLIGRAM(S): at 17:00

## 2021-01-01 RX ADMIN — Medication 650 MILLIGRAM(S): at 16:10

## 2021-01-01 RX ADMIN — Medication 1 TABLET(S): at 13:32

## 2021-01-01 RX ADMIN — Medication 650 MILLIGRAM(S): at 12:04

## 2021-01-01 RX ADMIN — HYDROMORPHONE HYDROCHLORIDE 2 MILLIGRAM(S): 2 INJECTION INTRAMUSCULAR; INTRAVENOUS; SUBCUTANEOUS at 16:38

## 2021-01-01 RX ADMIN — HEPARIN SODIUM 5000 UNIT(S): 5000 INJECTION INTRAVENOUS; SUBCUTANEOUS at 05:59

## 2021-01-01 RX ADMIN — Medication 325 MILLIGRAM(S): at 13:32

## 2021-01-01 RX ADMIN — Medication 650 MILLIGRAM(S): at 12:11

## 2021-01-01 RX ADMIN — SODIUM CHLORIDE 200 MILLILITER(S): 9 INJECTION, SOLUTION INTRAVENOUS at 17:02

## 2021-01-01 RX ADMIN — Medication 600 MILLIGRAM(S): at 20:46

## 2021-01-01 RX ADMIN — Medication 100 MILLIEQUIVALENT(S): at 17:17

## 2021-01-01 RX ADMIN — HYDROMORPHONE HYDROCHLORIDE 8 MILLIGRAM(S): 2 INJECTION INTRAMUSCULAR; INTRAVENOUS; SUBCUTANEOUS at 05:54

## 2021-01-01 RX ADMIN — HYDROMORPHONE HYDROCHLORIDE 8 MILLIGRAM(S): 2 INJECTION INTRAMUSCULAR; INTRAVENOUS; SUBCUTANEOUS at 09:27

## 2021-01-01 RX ADMIN — Medication 325 MILLIGRAM(S): at 08:01

## 2021-01-01 RX ADMIN — HYDROMORPHONE HYDROCHLORIDE 2 MILLIGRAM(S): 2 INJECTION INTRAMUSCULAR; INTRAVENOUS; SUBCUTANEOUS at 06:07

## 2021-01-01 RX ADMIN — HEPARIN SODIUM 5000 UNIT(S): 5000 INJECTION INTRAVENOUS; SUBCUTANEOUS at 05:49

## 2021-01-01 RX ADMIN — HYDROMORPHONE HYDROCHLORIDE 2 MILLIGRAM(S): 2 INJECTION INTRAMUSCULAR; INTRAVENOUS; SUBCUTANEOUS at 08:31

## 2021-01-01 RX ADMIN — HYDROMORPHONE HYDROCHLORIDE 8 MILLIGRAM(S): 2 INJECTION INTRAMUSCULAR; INTRAVENOUS; SUBCUTANEOUS at 10:13

## 2021-01-01 RX ADMIN — HYDROMORPHONE HYDROCHLORIDE 8 MILLIGRAM(S): 2 INJECTION INTRAMUSCULAR; INTRAVENOUS; SUBCUTANEOUS at 22:21

## 2021-01-01 RX ADMIN — HYDROMORPHONE HYDROCHLORIDE 16 MILLIGRAM(S): 2 INJECTION INTRAMUSCULAR; INTRAVENOUS; SUBCUTANEOUS at 08:34

## 2021-01-01 RX ADMIN — HEPARIN SODIUM 5000 UNIT(S): 5000 INJECTION INTRAVENOUS; SUBCUTANEOUS at 15:27

## 2021-01-01 RX ADMIN — HEPARIN SODIUM 5000 UNIT(S): 5000 INJECTION INTRAVENOUS; SUBCUTANEOUS at 13:32

## 2021-01-01 RX ADMIN — HYDROMORPHONE HYDROCHLORIDE 8 MILLIGRAM(S): 2 INJECTION INTRAMUSCULAR; INTRAVENOUS; SUBCUTANEOUS at 02:17

## 2021-01-01 RX ADMIN — HYDROMORPHONE HYDROCHLORIDE 2 MILLIGRAM(S): 2 INJECTION INTRAMUSCULAR; INTRAVENOUS; SUBCUTANEOUS at 11:32

## 2021-01-01 RX ADMIN — HYDROMORPHONE HYDROCHLORIDE 16 MILLIGRAM(S): 2 INJECTION INTRAMUSCULAR; INTRAVENOUS; SUBCUTANEOUS at 20:17

## 2021-01-01 RX ADMIN — Medication 80 MEQ/KG/HR: at 20:31

## 2021-01-01 RX ADMIN — HYDROMORPHONE HYDROCHLORIDE 8 MILLIGRAM(S): 2 INJECTION INTRAMUSCULAR; INTRAVENOUS; SUBCUTANEOUS at 01:06

## 2021-01-01 RX ADMIN — HEPARIN SODIUM 5000 UNIT(S): 5000 INJECTION INTRAVENOUS; SUBCUTANEOUS at 05:05

## 2021-01-01 RX ADMIN — Medication 600 MILLIGRAM(S): at 00:18

## 2021-01-01 RX ADMIN — Medication 100 MILLIEQUIVALENT(S): at 13:43

## 2021-01-01 RX ADMIN — HYDROMORPHONE HYDROCHLORIDE 8 MILLIGRAM(S): 2 INJECTION INTRAMUSCULAR; INTRAVENOUS; SUBCUTANEOUS at 15:08

## 2021-01-01 RX ADMIN — Medication 600 MILLIGRAM(S): at 05:59

## 2021-01-01 RX ADMIN — HYDROMORPHONE HYDROCHLORIDE 4 MILLIGRAM(S): 2 INJECTION INTRAMUSCULAR; INTRAVENOUS; SUBCUTANEOUS at 15:38

## 2021-01-01 RX ADMIN — Medication 100 GRAM(S): at 05:49

## 2021-01-01 RX ADMIN — Medication 650 MILLIGRAM(S): at 18:15

## 2021-01-01 RX ADMIN — Medication 650 MILLIGRAM(S): at 05:56

## 2021-01-01 RX ADMIN — Medication 100 GRAM(S): at 22:17

## 2021-01-01 RX ADMIN — SODIUM CHLORIDE 125 MILLILITER(S): 9 INJECTION INTRAMUSCULAR; INTRAVENOUS; SUBCUTANEOUS at 16:57

## 2021-01-01 RX ADMIN — HYDROMORPHONE HYDROCHLORIDE 8 MILLIGRAM(S): 2 INJECTION INTRAMUSCULAR; INTRAVENOUS; SUBCUTANEOUS at 04:19

## 2021-01-01 RX ADMIN — HYDROMORPHONE HYDROCHLORIDE 2 MILLIGRAM(S): 2 INJECTION INTRAMUSCULAR; INTRAVENOUS; SUBCUTANEOUS at 10:26

## 2021-01-01 RX ADMIN — HEPARIN SODIUM 5000 UNIT(S): 5000 INJECTION INTRAVENOUS; SUBCUTANEOUS at 05:37

## 2021-01-01 RX ADMIN — Medication 1 MILLIGRAM(S): at 08:01

## 2021-01-01 RX ADMIN — Medication 800 MILLIGRAM(S): at 18:58

## 2021-01-01 RX ADMIN — Medication 1 MILLIGRAM(S): at 12:12

## 2021-01-01 RX ADMIN — HYDROMORPHONE HYDROCHLORIDE 2 MILLIGRAM(S): 2 INJECTION INTRAMUSCULAR; INTRAVENOUS; SUBCUTANEOUS at 00:06

## 2021-01-01 RX ADMIN — HYDROMORPHONE HYDROCHLORIDE 2 MILLIGRAM(S): 2 INJECTION INTRAMUSCULAR; INTRAVENOUS; SUBCUTANEOUS at 03:09

## 2021-01-01 RX ADMIN — Medication 650 MILLIGRAM(S): at 07:52

## 2021-01-01 RX ADMIN — HYDROMORPHONE HYDROCHLORIDE 8 MILLIGRAM(S): 2 INJECTION INTRAMUSCULAR; INTRAVENOUS; SUBCUTANEOUS at 16:11

## 2021-01-01 RX ADMIN — HYDROMORPHONE HYDROCHLORIDE 8 MILLIGRAM(S): 2 INJECTION INTRAMUSCULAR; INTRAVENOUS; SUBCUTANEOUS at 13:45

## 2021-01-01 RX ADMIN — SODIUM CHLORIDE 150 MILLILITER(S): 9 INJECTION INTRAMUSCULAR; INTRAVENOUS; SUBCUTANEOUS at 01:45

## 2021-01-01 RX ADMIN — Medication 100 MILLIEQUIVALENT(S): at 16:00

## 2021-01-01 RX ADMIN — Medication 650 MILLIGRAM(S): at 12:40

## 2021-01-01 RX ADMIN — HYDROMORPHONE HYDROCHLORIDE 8 MILLIGRAM(S): 2 INJECTION INTRAMUSCULAR; INTRAVENOUS; SUBCUTANEOUS at 12:41

## 2021-01-01 RX ADMIN — Medication 650 MILLIGRAM(S): at 05:35

## 2021-01-01 RX ADMIN — Medication 1 MILLIGRAM(S): at 12:29

## 2021-01-01 RX ADMIN — Medication 600 MILLIGRAM(S): at 02:17

## 2021-01-01 RX ADMIN — Medication 600 MILLIGRAM(S): at 08:27

## 2021-01-01 RX ADMIN — HYDROMORPHONE HYDROCHLORIDE 2 MILLIGRAM(S): 2 INJECTION INTRAMUSCULAR; INTRAVENOUS; SUBCUTANEOUS at 04:42

## 2021-01-01 RX ADMIN — HYDROMORPHONE HYDROCHLORIDE 8 MILLIGRAM(S): 2 INJECTION INTRAMUSCULAR; INTRAVENOUS; SUBCUTANEOUS at 20:50

## 2021-01-01 RX ADMIN — Medication 1 MILLIGRAM(S): at 08:28

## 2021-01-01 RX ADMIN — HYDROMORPHONE HYDROCHLORIDE 2 MILLIGRAM(S): 2 INJECTION INTRAMUSCULAR; INTRAVENOUS; SUBCUTANEOUS at 09:26

## 2021-01-01 RX ADMIN — HYDROMORPHONE HYDROCHLORIDE 2 MILLIGRAM(S): 2 INJECTION INTRAMUSCULAR; INTRAVENOUS; SUBCUTANEOUS at 01:32

## 2021-01-01 RX ADMIN — SODIUM CHLORIDE 1000 MILLILITER(S): 9 INJECTION INTRAMUSCULAR; INTRAVENOUS; SUBCUTANEOUS at 17:40

## 2021-01-01 RX ADMIN — HEPARIN SODIUM 5000 UNIT(S): 5000 INJECTION INTRAVENOUS; SUBCUTANEOUS at 05:56

## 2021-01-01 RX ADMIN — HEPARIN SODIUM 5000 UNIT(S): 5000 INJECTION INTRAVENOUS; SUBCUTANEOUS at 21:13

## 2021-01-01 RX ADMIN — HYDROMORPHONE HYDROCHLORIDE 8 MILLIGRAM(S): 2 INJECTION INTRAMUSCULAR; INTRAVENOUS; SUBCUTANEOUS at 16:33

## 2021-01-01 RX ADMIN — Medication 1 TABLET(S): at 12:29

## 2021-01-01 RX ADMIN — HYDROMORPHONE HYDROCHLORIDE 2 MILLIGRAM(S): 2 INJECTION INTRAMUSCULAR; INTRAVENOUS; SUBCUTANEOUS at 21:02

## 2021-01-01 RX ADMIN — Medication 325 MILLIGRAM(S): at 12:38

## 2021-01-01 RX ADMIN — HYDROMORPHONE HYDROCHLORIDE 8 MILLIGRAM(S): 2 INJECTION INTRAMUSCULAR; INTRAVENOUS; SUBCUTANEOUS at 07:52

## 2021-01-01 RX ADMIN — HEPARIN SODIUM 5000 UNIT(S): 5000 INJECTION INTRAVENOUS; SUBCUTANEOUS at 13:51

## 2021-01-01 RX ADMIN — Medication 1 TABLET(S): at 08:01

## 2021-01-01 RX ADMIN — Medication 100 GRAM(S): at 13:51

## 2021-01-01 RX ADMIN — HYDROMORPHONE HYDROCHLORIDE 8 MILLIGRAM(S): 2 INJECTION INTRAMUSCULAR; INTRAVENOUS; SUBCUTANEOUS at 22:22

## 2021-01-01 RX ADMIN — HYDROMORPHONE HYDROCHLORIDE 2 MILLIGRAM(S): 2 INJECTION INTRAMUSCULAR; INTRAVENOUS; SUBCUTANEOUS at 20:31

## 2021-01-01 RX ADMIN — HYDROMORPHONE HYDROCHLORIDE 8 MILLIGRAM(S): 2 INJECTION INTRAMUSCULAR; INTRAVENOUS; SUBCUTANEOUS at 02:51

## 2021-01-01 RX ADMIN — HYDROMORPHONE HYDROCHLORIDE 4 MILLIGRAM(S): 2 INJECTION INTRAMUSCULAR; INTRAVENOUS; SUBCUTANEOUS at 13:59

## 2021-01-01 RX ADMIN — HYDROMORPHONE HYDROCHLORIDE 2 MILLIGRAM(S): 2 INJECTION INTRAMUSCULAR; INTRAVENOUS; SUBCUTANEOUS at 11:30

## 2021-01-01 RX ADMIN — Medication 80 MEQ/KG/HR: at 05:37

## 2021-01-01 RX ADMIN — HYDROMORPHONE HYDROCHLORIDE 2 MILLIGRAM(S): 2 INJECTION INTRAMUSCULAR; INTRAVENOUS; SUBCUTANEOUS at 00:48

## 2021-01-04 NOTE — ED PROVIDER NOTE - CLINICAL SUMMARY MEDICAL DECISION MAKING FREE TEXT BOX
Patient with sickle cell crisis present with acute flare up of pain. Patient afebrile well appearing, NAD and vSS. Pain was treated according to sickle cell protocol. Patient resting on stretch in no distress. Recommend to hydrate and follow up with  pain management.

## 2021-01-04 NOTE — ED PROVIDER NOTE - ATTENDING CONTRIBUTION TO CARE
I discussed the plan of care of the patient directly with the PA while the patient was in the Emergency Department. I did not perform a face to face diagnostic evaluation on this patient. I have reviewed the ACP note and agree with the history, exam and plan of care. Signed as per Hospital policy.

## 2021-01-04 NOTE — ED ADULT TRIAGE NOTE - CHIEF COMPLAINT QUOTE
pt c.o lower back pain, b/l hip pain since 12 am last night. "Im in sickle cell crisis." pt too 8mg Dilaudid at 12am without relief. pt ambulates with cane without assistance.

## 2021-01-04 NOTE — ED PROVIDER NOTE - OBJECTIVE STATEMENT
36 y/o M is a sickle cell patient, visits frequently. Presents lower back pain and bilateral hip pain, which are baseline areas of discomfort when he has a flare-up. Currently taking Dilaudid, took 8mg today (01/04/2021) at 12:00 AM. Denies recent cold symptoms, fever, cp, sob, abd pain. No N/V/D

## 2021-01-04 NOTE — ED PROVIDER NOTE - NSFOLLOWUPINSTRUCTIONS_ED_ALL_ED_FT
SICKLE CELL CRISIS - General Information           Sickle Cell Crisis    WHAT YOU NEED TO KNOW:    What is a sickle cell crisis? A sickle cell crisis is a painful episode that occurs in people who have sickle cell anemia. It happens when sickle-shaped red blood cells (RBCs) block blood vessels. Blood and oxygen cannot get to your tissues, causing pain. A sickle cell crisis can also damage your tissues and cause organ failure, such liver or kidney failure. A sickle cell crisis can become life-threatening.    What are signs and symptoms of a sickle cell crisis? Your symptoms may change each time you have a crisis. They will depend on the area of your body where blood flow has been blocked.   •Fever      •Pain      •Weakness or fatigue      •Abdominal pain and swelling      •Headaches      •A painful, erect penis (priapism) in men      •Fast heartbeats      •Shortness of breath      What can trigger a sickle cell crisis?   •Dehydration      •Infection, such as a cold or the flu      •Low oxygen levels from difficult exercise, flying, or high altitude       •Getting cold or going from warm to cold quickly      •Medical procedures, surgery, or having a baby      •Strong emotions, such as anger or depression      How is pain managed during a sickle cell crisis?   •Medicines may be given to decrease pain or to decrease sickling of your RBCs. You may also need medicine to prevent a bacterial infection or help you breathe more easily.       •NSAIDs, such as ibuprofen, help decrease swelling, pain, and fever. This medicine is available with or without a doctor's order. NSAIDs can cause stomach bleeding or kidney problems in certain people. If you take blood thinner medicine, always ask your healthcare provider if NSAIDs are safe for you. Always read the medicine label and follow directions.      •Acetaminophen decreases pain and fever. It is available without a doctor's order. Ask how much to take and how often to take it. Follow directions. Acetaminophen can cause liver damage if not taken correctly.      How else is a sickle cell crisis treated?   •IV fluids treat dehydration and help reduce sickling of RBCs.       •Oxygen helps increase oxygen levels in your blood and make it easier for you to breathe.      •A blood transfusion replaces blood with RBCs that are not sickle shaped.      •Surgery may be done to remove part of your spleen.       How can I prevent a sickle cell crisis?   •Take vitamins and minerals as directed. Folic acid may help prevent blood vessel problems that can come with sickle cell anemia. Zinc may decrease how often you have pain.       •Drink liquids as directed. Dehydration can increase your risk for a sick cell crisis. Ask how much liquid to drink each day and which liquids are best for you.      •Balance rest and exercise. Rest during a sickle cell crisis. Over time, increase your activity to a moderate amount. Exercise regularly. Avoid exercise or activities that can cause injury, such as football. Ask about the best exercise plan for you.       •Wash your hands frequently. Handwashing can help prevent illness. Wash your hands before you prepare or eat food, and after you use the bathroom.   Handwashing           •Avoid quick changes in temperature. Do not go quickly from a warm place to a cold place. Get in a pool slowly instead of jumping in. Avoid getting too hot or too cold. Dress in light clothing in the summer and warm clothing in the winter.       •Do not smoke cigarettes or drink alcohol. These increase your risk for a sickle cell crisis. Nicotine and other chemicals in cigarettes and cigars can cause lung damage. Ask your healthcare provider for information if you currently smoke and need help to quit. E-cigarettes or smokeless tobacco still contain nicotine. Talk to your healthcare provider before you use these products.       •Ask about vaccinations you need. Vaccinations can help prevent a viral infection that may lead to a sickle cell crisis. Get a flu shot every year as directed. You may need pneumonia vaccines every 5 years.       Call your local emergency number (911 in the [place]) if:   •You have shortness of breath or chest pain.      •You are a man and have an erection that is painful and does not go away.       •You lose vision in one or both eyes.      When should I seek immediate care?   •You feel like you cannot cope with your pain, or you feel like hurting yourself.       •You have behavior changes, a seizure, or faint.       •You have a fever.      •You have abdominal pain, nausea, or vomiting.      •You have a different or worse headache.       •You have new weakness or numbness in your arm, leg, or face.      •You have new pain in any part of your body.      •Your urine is dark and you are urinating less than usual or not at all.       •You are dizzy, lightheaded, or faint.       When should I call my doctor?   •You have any new signs or symptoms.       •You have blood in your urine.       •You are constipated or you have diarrhea.       •You have changes in your vision.       •You have increased fatigue.       •You plan to travel by airplane or to a high elevation.       •You have questions or concerns about your condition or care.      CARE AGREEMENT:    You have the right to help plan your care. Learn about your health condition and how it may be treated. Discuss treatment options with your healthcare providers to decide what care you want to receive. You always have the right to refuse treatment.        © Copyright [employer] 2021           back to top                          © Copyright [employer] 2021

## 2021-01-04 NOTE — ED PROVIDER NOTE - MUSCULOSKELETAL, MLM
At baseline able to do straight leg raise minimally. Ambulates with cane. Ambulates with cane at baseline, able to do straight leg raise minimally. NVI, no motor-sensory deficits.

## 2021-01-04 NOTE — ED ADULT NURSE NOTE - OBJECTIVE STATEMENT
36 y/o male w/ PMH SCD presents to ED for c/o SCC, c/o 9/10 pain to bilateral hips and lower back x1 days. Pt reports taking PO Dilaudid 8mg at approx 0030 this am w/o relief. Reports last crisis ~2 weeks ago. Denies CP/SOB/difficulty breathing/cough/fevers/chills.

## 2021-01-04 NOTE — ED PROVIDER NOTE - EYES, MLM
Clear bilaterally, pupils equal, round and reactive to light. Clear bilaterally, pupils equal, round Helical Rim Advancement Flap Text: The defect edges were debeveled with a #15 blade scalpel.  Given the location of the defect and the proximity to free margins (helical rim) a double helical rim advancement flap was deemed most appropriate.  Using a sterile surgical marker, the appropriate advancement flaps were drawn incorporating the defect and placing the expected incisions between the helical rim and antihelix where possible.  The area thus outlined was incised through and through with a #15 scalpel blade.  With a skin hook and iris scissors, the flaps were gently and sharply undermined and freed up.

## 2021-01-04 NOTE — ED PROVIDER NOTE - PATIENT PORTAL LINK FT
You can access the FollowMyHealth Patient Portal offered by [facility] by registering at the following website: [email or web address]. By joining [employer]’s FollowMyHealth portal, you will also be able to view your health information using other applications (apps) compatible with our system.

## 2021-01-08 NOTE — ED PROVIDER NOTE - CPE EDP NEURO NORM
Goal Outcome Evaluation:  Plan of Care Reviewed With: patient  Progress: improving  Outcome Summary: patient adair irregularly/irritable. Patient sleeping through the night   normal...

## 2021-01-12 NOTE — ED PROVIDER NOTE - NSFOLLOWUPINSTRUCTIONS_ED_ALL_ED_FT
Please follow up with your primary physician in 1-2 days for re evaluation.  Please return to ER immediately should your symptoms worsen or if you have any concern prior to this recommended follow up.          Sickle Cell Crisis    WHAT YOU NEED TO KNOW:    A sickle cell crisis is a painful episode that occurs in people who have sickle cell anemia. It happens when sickle-shaped red blood cells (RBCs) block blood vessels. Blood and oxygen cannot get to tissues, causing pain. A sickle cell crisis can also damage your tissues and cause organ failure, such liver or kidney failure. A sickle cell crisis can become life-threatening.    DISCHARGE INSTRUCTIONS:    Call your local emergency number (911 in the [place]) if:   •You have shortness of breath or chest pain.      •You are a man and have an erection that is painful and does not go away.       •You lose vision in one or both eyes.      Return to the emergency department if:   •You have a fever.      •You feel like you cannot cope with your pain, or you feel like hurting yourself.       •You have behavior changes, a seizure, or faint.       •You have abdominal pain, nausea, or vomiting.      •You have a headache that is worse or different from those that you have had in the past.       •You have new weakness or numbness in your arm, leg, or face.      •You have new pain in any part of your body.      •Your urine is dark and you are urinating less than usual or not at all.       •You are dizzy, lightheaded, or faint.       Call your doctor if:   •You have any new signs or symptoms.       •You have blood in your urine.       •You are constipated or you have diarrhea.       •You have changes in your vision.       •You have increased fatigue.       •You plan to travel by airplane or to a high [facility].       •You have questions or concerns about your condition or care.      Medicines: You may need any of the following:   •Prescription pain medicine may be given. Ask how to take this medicine safely. Medicine may also be given to decrease sickling of your RBCs. You may also need medicine to treat or prevent a bacterial infection.       •NSAIDs, such as ibuprofen, help decrease swelling, pain, and fever. This medicine is available with or without a doctor's order. NSAIDs can cause stomach bleeding or kidney problems in certain people. If you take blood thinner medicine, always ask your healthcare provider if NSAIDs are safe for you. Always read the medicine label and follow directions.      •Acetaminophen decreases pain and fever. It is available without a doctor's order. Ask how much to take and how often to take it. Follow directions. Acetaminophen can cause liver damage if not taken correctly.      •Take your medicine as directed. Contact your healthcare provider if you think your medicine is not helping or if you have side effects. Tell him or her if you are allergic to any medicine. Keep a list of the medicines, vitamins, and herbs you take. Include the amounts, and when and why you take them. Bring the list or the pill bottles to follow-up visits. Carry your medicine list with you in case of an emergency.      Medical alert identification: Wear medical alert jewelry or carry a card that says you have sickle cell anemia. Ask your healthcare provider where to get these items.     Medical Alert Jewelry         Prevent a sickle cell crisis:   •Take vitamins and minerals as directed. Folic acid may help prevent blood vessel problems that can occur with sickle cell anemia. Zinc may decrease how often you have pain.       •Drink liquids as directed. Dehydration can increase your risk for a sickle cell crisis. Ask how much liquid to drink each day and which liquids are best for you.      •Balance rest and exercise. Rest during a sickle cell crisis. Over time, increase your activity to a moderate amount. Exercise regularly. Avoid exercise or activities that can cause injury, such as football. Ask about the best exercise plan for you.       •Wash your hands frequently. Handwashing can help prevent illness. Wash your hands before you prepare or eat food, and after you use the bathroom.   Handwashing           •Avoid quick changes in temperature. Do not go quickly from a warm place to a cold place. Get in a pool slowly instead of jumping in. Avoid getting too hot or too cold. Dress in light clothing in the summer and warm clothing in the winter.       •Do not smoke cigarettes or drink alcohol. These increase your risk for a sickle cell crisis. Nicotine and other chemicals in cigarettes and cigars can cause lung damage. Ask your healthcare provider for information if you currently smoke and need help to quit. E-cigarettes or smokeless tobacco still contain nicotine. Talk to your healthcare provider before you use these products.       •Ask about vaccinations you need. Vaccinations can help prevent a viral infection that may lead to a sickle cell crisis. Get a flu shot every year as directed. You may need a pneumonia vaccine every 5 years.       Follow up with your doctor as directed: You may need ongoing screening for conditions that can develop because of sickle cell disease. Examples include kidney disease, hypertension (high blood pressure), retinopathy (eye problems), and problems with your lungs. Write down your questions so you remember to ask them during your visits.       © Copyright [employer] 2021           back to top                          © Copyright [employer] 2021

## 2021-01-12 NOTE — ED ADULT NURSE NOTE - OBJECTIVE STATEMENT
pt received into [facility] A&Ox3 ambulatory with cane appears comfortable arrives via walk in triage for eval of hip pain and low back pain typical of his sickle cell flare ups has hx of avascular necrosis and needs surgery. denies cp sob n/v cough runny nose fever chills

## 2021-01-12 NOTE — ED PROVIDER NOTE - NS ED ROS FT
Constitutional: No fever or chills.   Eyes: No pain, blurry vision, or discharge.  ENMT: No hearing changes, pain, discharge or infections. No neck pain or stiffness.  Cardiac: No chest pain, SOB or edema. No chest pain with exertion.  Respiratory: No cough or respiratory distress. No hemoptysis. No history of asthma or RAD.  GI: No nausea, vomiting, diarrhea or abdominal pain.  : No dysuria, frequency or burning.  MS: + Back and lower extremity pain.  Neuro: No headache or weakness. No LOC.  Skin: No skin rash.   Endocrine: No history of thyroid disease or diabetes.  Except as documented in the HPI, all other systems are negative.

## 2021-01-12 NOTE — ED PROVIDER NOTE - PATIENT PORTAL LINK FT
You can access the FollowMyHealth Patient Portal offered by [facility] by registering at the following website: [email or web address]. By joining [employer]’s FollowMyHealth portal, you will also be able to view your health information using other applications (apps) compatible with our system.

## 2021-01-12 NOTE — ED PROVIDER NOTE - OBJECTIVE STATEMENT
35 year old male with history of Sickle Cell Disease, PE/DVT, Avascular Necrosis presents to ED with concern for sickle cell pain crisis today.  Patient notes pain to back and legs, consistent with his typical pain crisis.  He notes he is out of his prescribed oral Dilaudid and is not due to see his PCP for refill until later this week.  He is requesting oral dose to hold him over today.  He denies associated fever, chills, chest pain, shortness of breath, abdominal pain, nausea, emesis, changes to bowel movements, recent travel, known sick contacts or any additional acute complaints or concerns at this time.

## 2021-01-12 NOTE — ED PROVIDER NOTE - PHYSICAL EXAMINATION
VITAL SIGNS: I have reviewed nursing notes and confirm.  CONSTITUTIONAL: Well-developed; well-nourished; in no acute distress.   SKIN:  Warm and dry, no acute rash.   HEAD:  normocephalic, atraumatic.  EYES: PERRL.  EOM intact; conjunctiva and sclera clear.  ENT: No nasal discharge; airway clear.   NECK: Supple; non tender.  CARD: S1, S2 normal; no murmurs, gallops, or rubs. Regular rate and rhythm.   RESP:  Clear to auscultation b/l, no wheezes, rales or rhonchi.  ABD: Normal bowel sounds; soft; non-distended; non-tender; no guarding/rebound.  MSK:  + TTP over bilateral low lumbar paraspinal musculature; no midline thoracic or lumbar spine pain/tenderness/stepoff/deformity.   EXT: Normal ROM. No clubbing, cyanosis or edema. 2+ pulses to b/l ue/le.  NEURO: Alert, oriented, grossly unremarkable.  5/5 strength x 4 extremities against gravity and external force.  No drift x 4 extremities.  Sensation intact and symmetric x 4 extremities.  No facial asymmetry.    PSYCH: Cooperative, mood and affect appropriate.

## 2021-01-12 NOTE — ED PROVIDER NOTE - CLINICAL SUMMARY MEDICAL DECISION MAKING FREE TEXT BOX
Patient in ED w concern for sickle cell pain crisis, non toxic in appearance.  No cp, sob or fever/ chills.  Given dose of oral dilaudid per SS protocol and patient to be discharged home with instruction for prompt PCP follow up.  Patient is advised to follow up with their PCP in 1-2 days without fail.  Patient instructed to return to ED immediately should their symptoms worsen or if there is any concern prior to the recommended PCP follow up.  Patient is aware of plan and verbalizes their understanding.  Will discharge home at this time.

## 2021-01-18 NOTE — ED ADULT TRIAGE NOTE - WEIGHT METHOD
LOV 7/17/2020.  atorvastatin (LIPITOR) 20 MG tablet 90 tablet 1 7/27/2020     Sig - Route: Take 1 tablet by mouth daily. - Oral      hydrochlorothiazide (HYDRODIURIL) 25 MG tablet 90 tablet 1 7/17/2020     Sig - Route: Take 1 tablet by mouth daily. - Oral      Rx e-prescribed.   stated

## 2021-01-28 NOTE — ED ADULT NURSE NOTE - OBJECTIVE STATEMENT
34 y/o female received into the ED, axox3, stating that he is having back pain due to sickle cell disease.  Pt. states that he is also having pain to the right hip.  Pt. is under pain management but has finished his course of pain medicine.

## 2021-01-28 NOTE — ED PROVIDER NOTE - PHYSICAL EXAMINATION
Limited PE performed in the setting of the COVID10 pandemic, in efforts to limit exposure and cross-contamination  Constitutional: Well appearing, well nourished, awake, alert, oriented to person, place, time/situation and in no apparent distress.  ENMT: Airway patent.   Eyes: Clear bilaterally  Cardiac: Normal rate, regular rhythm.   Respiratory: No increased WOB, tachypnea, hypoxia, or accessory mm use. Pt speaks in full sentences.    Musculoskeletal: Range of motion is not limited  Neuro: Alert and oriented x 3, face symmetric and speech fluent. Nml gross motor movement, grossly non focal   Skin: Skin normal color for race, warm, dry and intact. No evidence of rash.  Psych: Alert and oriented to person, place, time/situation. normal mood and affect. no apparent risk to self or others.

## 2021-01-28 NOTE — ED PROVIDER NOTE - OBJECTIVE STATEMENT
Pt w/ PMHx HTN Pt w/ PMHx SCD, acute chest, AVN, PE p/w b/l lower back pain and b/l hip pain c/w SCC for this pt. Pt reports pain typical. Denies f/c, cough, CP, SOB. Pt took Motrin at home w/o relief. Pt reports he ran out of pain meds and needs to see his MD. Pt denies other acute issues.

## 2021-01-28 NOTE — ED PROVIDER NOTE - CLINICAL SUMMARY MEDICAL DECISION MAKING FREE TEXT BOX
Pt w/ SCD p/w pain typical for pain crisis. No sx to indicate acute chest. Pt counseled on the importance of smoking cessation, steph w/ known dx SCD. Analgesia, re-eval. Dispo pending clinical status

## 2021-01-28 NOTE — ED PROVIDER NOTE - PATIENT PORTAL LINK FT
You can access the FollowMyHealth Patient Portal offered by [facility] by registering at the following website: [email or web address]. By joining [email or web address] [email or web address] portal, you will also be able to view your health information using other applications (apps) compatible with our system.

## 2021-01-28 NOTE — ED ADULT TRIAGE NOTE - CHIEF COMPLAINT QUOTE
pt c/o sickle cell pain to lower back, b/l hips beginning today around 4pm. pt ran out of meds, took motrin without relief.

## 2021-01-28 NOTE — ED PROVIDER NOTE - NSFOLLOWUPINSTRUCTIONS_ED_ALL_ED_FT
Please follow up with your regular medical doctor.      Sickle Cell Crisis    WHAT YOU NEED TO KNOW:    A sickle cell crisis is a painful episode that occurs in people who have sickle cell anemia. It happens when sickle-shaped red blood cells (RBCs) block blood vessels. Blood and oxygen cannot get to tissues, causing pain. A sickle cell crisis can also damage your tissues and cause organ failure, such liver or kidney failure. A sickle cell crisis can become life-threatening.    DISCHARGE INSTRUCTIONS:    Call your local emergency number (911 in the ) if:   •You have shortness of breath or chest pain.      •You are a man and have an erection that is painful and does not go away.       •You lose vision in one or both eyes.      Return to the emergency department if:   •You have a fever.      •You feel like you cannot cope with your pain, or you feel like hurting yourself.       •You have behavior changes, a seizure, or faint.       •You have abdominal pain, nausea, or vomiting.      •You have a headache that is worse or different from those that you have had in the past.       •You have new weakness or numbness in your arm, leg, or face.      •You have new pain in any part of your body.      •Your urine is dark and you are urinating less than usual or not at all.       •You are dizzy, lightheaded, or faint.       Call your doctor if:   •You have any new signs or symptoms.       •You have blood in your urine.       •You are constipated or you have diarrhea.       •You have changes in your vision.       •You have increased fatigue.       •You plan to travel by airplane or to a high elevation.       •You have questions or concerns about your condition or care.      Medicines: You may need any of the following:   •Prescription pain medicine may be given. Ask how to take this medicine safely. Medicine may also be given to decrease sickling of your RBCs. You may also need medicine to treat or prevent a bacterial infection.       •NSAIDs, such as ibuprofen, help decrease swelling, pain, and fever. This medicine is available with or without a doctor's order. NSAIDs can cause stomach bleeding or kidney problems in certain people. If you take blood thinner medicine, always ask your healthcare provider if NSAIDs are safe for you. Always read the medicine label and follow directions.      •Acetaminophen decreases pain and fever. It is available without a doctor's order. Ask how much to take and how often to take it. Follow directions. Acetaminophen can cause liver damage if not taken correctly.      •Take your medicine as directed. Contact your healthcare provider if you think your medicine is not helping or if you have side effects. Tell him or her if you are allergic to any medicine. Keep a list of the medicines, vitamins, and herbs you take. Include the amounts, and when and why you take them. Bring the list or the pill bottles to follow-up visits. Carry your medicine list with you in case of an emergency.      Medical alert identification: Wear medical alert jewelry or carry a card that says you have sickle cell anemia. Ask your healthcare provider where to get these items.     Medical Alert Jewelry         Prevent a sickle cell crisis:   •Take vitamins and minerals as directed. Folic acid may help prevent blood vessel problems that can occur with sickle cell anemia. Zinc may decrease how often you have pain.       •Drink liquids as directed. Dehydration can increase your risk for a sickle cell crisis. Ask how much liquid to drink each day and which liquids are best for you.      •Balance rest and exercise. Rest during a sickle cell crisis. Over time, increase your activity to a moderate amount. Exercise regularly. Avoid exercise or activities that can cause injury, such as football. Ask about the best exercise plan for you.       •Wash your hands frequently. Handwashing can help prevent illness. Wash your hands before you prepare or eat food, and after you use the bathroom.   Handwashing           •Avoid quick changes in temperature. Do not go quickly from a warm place to a cold place. Get in a pool slowly instead of jumping in. Avoid getting too hot or too cold. Dress in light clothing in the summer and warm clothing in the winter.       •Do not smoke cigarettes or drink alcohol. These increase your risk for a sickle cell crisis. Nicotine and other chemicals in cigarettes and cigars can cause lung damage. Ask your healthcare provider for information if you currently smoke and need help to quit. E-cigarettes or smokeless tobacco still contain nicotine. Talk to your healthcare provider before you use these products.       •Ask about vaccinations you need. Vaccinations can help prevent a viral infection that may lead to a sickle cell crisis. Get a flu shot every year as directed. You may need a pneumonia vaccine every 5 years.       Follow up with your doctor as directed: You may need ongoing screening for conditions that can develop because of sickle cell disease. Examples include kidney disease, hypertension (high blood pressure), retinopathy (eye problems), and problems with your lungs. Write down your questions so you remember to ask them during your visits.

## 2021-02-05 NOTE — ED PROVIDER NOTE - NSFOLLOWUPINSTRUCTIONS_ED_ALL_ED_FT
Sickle Cell Crisis    WHAT YOU NEED TO KNOW:    A sickle cell crisis is a painful episode that occurs in people who have sickle cell anemia. It happens when sickle-shaped red blood cells (RBCs) block blood vessels. Blood and oxygen cannot get to tissues, causing pain. A sickle cell crisis can also damage your tissues and cause organ failure, such liver or kidney failure. A sickle cell crisis can become life-threatening.    DISCHARGE INSTRUCTIONS:    Call your local emergency number (911 in the ) if:   •You have shortness of breath or chest pain.      •You are a man and have an erection that is painful and does not go away.       •You lose vision in one or both eyes.      Seek care immediately if:   •You feel like you cannot cope with your pain, or you feel like hurting yourself.       •You have behavior changes, a seizure, or faint.       •You have a fever.      •You have abdominal pain, nausea, or vomiting.      •You have a different or worse headache.       •You have new weakness or numbness in your arm, leg, or face.      •You have new pain in any part of your body.      •Your urine is dark and you are urinating less than usual or not at all.       •You are dizzy, lightheaded, or faint.       Call your doctor if:   •You have any new signs or symptoms.       •You have blood in your urine.       •You are constipated or you have diarrhea.       •You have changes in your vision.       •You have increased fatigue.       •You plan to travel by airplane or to a high elevation.       •You have questions or concerns about your condition or care.      Medicines: You may need any of the following:   •Prescription pain medicine may be given. Ask how to take this medicine safely. Medicine may also be given to decrease sickling of your RBCs. You may also need medicine to treat or prevent a bacterial infection.       •NSAIDs, such as ibuprofen, help decrease swelling, pain, and fever. This medicine is available with or without a doctor's order. NSAIDs can cause stomach bleeding or kidney problems in certain people. If you take blood thinner medicine, always ask your healthcare provider if NSAIDs are safe for you. Always read the medicine label and follow directions.      •Acetaminophen decreases pain and fever. It is available without a doctor's order. Ask how much to take and how often to take it. Follow directions. Acetaminophen can cause liver damage if not taken correctly.      •Take your medicine as directed. Contact your healthcare provider if you think your medicine is not helping or if you have side effects. Tell him or her if you are allergic to any medicine. Keep a list of the medicines, vitamins, and herbs you take. Include the amounts, and when and why you take them. Bring the list or the pill bottles to follow-up visits. Carry your medicine list with you in case of an emergency.      Medical alert identification: Wear medical alert jewelry or carry a card that says you have sickle cell anemia. Ask your healthcare provider where to get these items.    Prevent a sickle cell crisis:   •Take vitamins and minerals as directed. Folic acid may help prevent blood vessel problems that can occur with sickle cell anemia. Zinc may decrease how often you have pain.       •Drink liquids as directed. Dehydration can increase your risk for a sickle cell crisis. Ask how much liquid to drink each day and which liquids are best for you.      •Balance rest and exercise. Rest during a sickle cell crisis. Over time, increase your activity to a moderate amount. Exercise regularly. Avoid exercise or activities that can cause injury, such as football. Ask about the best exercise plan for you.       •Wash your hands frequently. Handwashing can help prevent illness. Wash your hands before you prepare or eat food, and after you use the bathroom.       •Avoid quick changes in temperature. Do not go quickly from a warm place to a cold place. Get in a pool slowly instead of jumping in. Avoid getting too hot or too cold. Dress in light clothing in the summer and warm clothing in the winter.       •Do not smoke cigarettes or drink alcohol. These increase your risk for a sickle cell crisis. Nicotine and other chemicals in cigarettes and cigars can cause lung damage. Ask your healthcare provider for information if you currently smoke and need help to quit. E-cigarettes or smokeless tobacco still contain nicotine. Talk to your healthcare provider before you use these products.       •Ask about vaccinations you need. Vaccinations can help prevent a viral infection that may lead to a sickle cell crisis. Get a flu shot every year as directed. You may need a pneumonia vaccine every 5 years.       Follow up with your doctor as directed: You may need ongoing screening for conditions that can develop because of sickle cell disease. Examples include kidney disease, hypertension (high blood pressure), retinopathy (eye problems), and problems with your lungs. Write down your questions so you remember to ask them during your visits.

## 2021-02-05 NOTE — ED ADULT TRIAGE NOTE - AS TEMP SITE
Atrium Health Wake Forest Baptist High Point Atrium Health Wake Forest Baptist High Point DARNELL  


 


Non-Formulary Medication  1 drop  10/13/20 22:00  10/14/20 22:11





  Bimatoprost [Lumigan]  IO   Not Given





  HS [facility]  


 


oral

## 2021-02-05 NOTE — ED PROVIDER NOTE - CLINICAL SUMMARY MEDICAL DECISION MAKING FREE TEXT BOX
lower back pain, hip pain, states typical of his sickle cell crisis, no chest pain, no SOB, afebrile, no focal neuro deficits, doubt cord compression  -dilaudid  -motrin

## 2021-02-05 NOTE — ED PROVIDER NOTE - MUSCULOSKELETAL MINIMAL EXAM
b/l paraspinal lower back ttp, no midline pain, no stepoff, pain with ranging hips, decreased rom/neck supple

## 2021-02-05 NOTE — ED ADULT NURSE NOTE - OBJECTIVE STATEMENT
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 pt walks in a cane. the current lower back pain is 8/10, aching started 5 hours ago and radiates to the bilateral hips. no head trauma, or trauma to the lower back. no fall. no burning sensation upon urination, hematuria, or dysuria.

## 2021-02-05 NOTE — ED PROVIDER NOTE - PATIENT PORTAL LINK FT
You can access the FollowMyHealth Patient Portal offered by [facility] by registering at the following website: [email or web address]. By joining [employer]’s FollowMyHealth portal, you will also be able to view your health information using other applications (apps) compatible with our system.

## 2021-02-05 NOTE — ED PROVIDER NOTE - PROGRESS NOTE DETAILS
pt feeling better, recommend f/u with hematologist  I have discussed the discharge plan with the patient. The patient agrees with the plan, as discussed.  The patient understands Emergency Department diagnosis is a preliminary diagnosis often based on limited information and that the patient must adhere to the follow-up plan as discussed.  The patient understands that if the symptoms worsen the patient may return to the Emergency Department at any time for further evaluation and treatment.

## 2021-02-05 NOTE — ED PROVIDER NOTE - OBJECTIVE STATEMENT
35M hx SCD, AVN, c/o typical sickle cell pain. pt states pain to lower back radiating to both hips. worse with ambulation. pt walks with cane.  states took his medication at home however still having pain. no fevers. no chest pian. no SOB. no cough, no incontinence. states will try and see his hematologist monday.

## 2021-03-01 NOTE — ED ADULT TRIAGE NOTE - CHIEF COMPLAINT QUOTE
sickle cell pt with lower back pain and bilateral hip pain since 4pm today, took his Dilaudid 8mg po @4pm still with pain now

## 2021-03-01 NOTE — ED PROVIDER NOTE - PATIENT PORTAL LINK FT
You can access the FollowMyHealth Patient Portal offered by [facility] by registering at the following website: [email or web address]. By joining [employer]’s FollowMyHealth portal, you will also be able to view your health information using other applications (apps) compatible with our system.

## 2021-03-01 NOTE — ED ADULT NURSE NOTE - OBJECTIVE STATEMENT
35Y Male A&Ox 3 reporting Lower back pain and hip pain. Pt has hx of sickle cell anemia. Pt took 8mg Dilaudid at 4pm with no effect in pain. Pt denies cp, fever, chills, n/v, night sweats. Pt ambulates with a cane.

## 2021-03-01 NOTE — ED PROVIDER NOTE - PHYSICAL EXAMINATION
CONSTITUTIONAL: Well-appearing; well-nourished; in no apparent distress.   HEAD: Normocephalic; atraumatic.   EYES: PERRL; EOM intact; conjunctiva and sclera clear  ENT: normal nose; no rhinorrhea; normal pharynx with no erythema or lesions.   NECK: Supple; non-tender; no LAD  CARDIOVASCULAR: Normal S1, S2; no murmurs, rubs, or gallops. Regular rate and rhythm.   RESPIRATORY: Breathing easily; breath sounds clear and equal bilaterally; no wheezes, rhonchi, or rales.  GI: Soft; non-distended; non-tender; no palpable organomegaly.   MSK: +tenderness to low back and b/l hips   EXT: No cyanosis or edema; N/V intact  SKIN: Normal for age and race; warm; dry; good turgor; no apparent lesions or rash.   NEURO: A & O x 3; face symmetric; grossly unremarkable.   PSYCHOLOGICAL: The patient’s mood and manner are appropriate.

## 2021-03-01 NOTE — ED PROVIDER NOTE - CLINICAL SUMMARY MEDICAL DECISION MAKING FREE TEXT BOX
35M hx SCD, AVN, c/o typical sickle cell pain in hips and low back since 8am this morning. Took dilaudid 8mg at 4pm with no relief. Denies fever, chills, cough, cp, sob, n/v/d, abd pain. States saw his hematologist 2 weeks ago. +tenderness to low back and b/l hips. 35M hx SCD, AVN, c/o typical sickle cell pain in hips and low back since 8am this morning. Took dilaudid 8mg at 4pm with no relief. Denies fever, chills, cough, cp, sob, n/v/d, abd pain. States saw his hematologist 2 weeks ago. +tenderness to low back and b/l hips. Pain treated.

## 2021-03-01 NOTE — ED ADULT NURSE NOTE - NSIMPLEMENTINTERV_GEN_ALL_ED
Implemented All Fall Risk Interventions:  [place] to call system. Call bell, personal items and telephone within reach. Instruct patient to call for assistance. Room bathroom lighting operational. Non-slip footwear when patient is off stretcher. Physically safe environment: no spills, clutter or unnecessary equipment. Stretcher in lowest position, wheels locked, appropriate side rails in place. Provide visual cue, wrist band, yellow gown, etc. Monitor gait and stability. Monitor for mental status changes and reorient to person, place, and time. Review medications for side effects contributing to fall risk. Reinforce activity limits and safety measures with patient and family.

## 2021-03-01 NOTE — ED PROVIDER NOTE - OBJECTIVE STATEMENT
35M hx SCD, AVN, c/o typical sickle cell pain in hips and low back since 8am this morning. Took dilaudid 8mg at 4pm with no relief. Denies fever, chills, cough, cp, sob, n/v/d, abd pain. States saw his hematologist 2 weeks ago.

## 2021-03-03 NOTE — ED ADULT NURSE NOTE - NSFALLRSKASSESSTYPE_ED_ALL_ED
FUTURE VISIT INFORMATION      SURGERY INFORMATION:    Date: 4.30.21    Location: [facility] OR    Surgeon:  Mee    Anesthesia Type:  General    Procedure: Transrectal US guided    Consult: 1.4.21    RECORDS REQUESTED FROM:       Primary Care Provider: Dr. Cristina    Most recent EKG+ Tracing: 3.7.06        
Initial (On Arrival)

## 2021-04-07 NOTE — ED ADULT NURSE NOTE - OBJECTIVE STATEMENT
Patient alert and oriented x 3 came c/o lower back pain radiating to hips since 8am this am . History of sickle cell , took dilaudid 8mg this am with no relief . Denies any injury , awaiting to be seen . Noted with elevated HR , reported is due to pain . Denies any chest pain , sob nor palpitations . Placed on cardiac monitor and pulse oximeter , safety maintained .

## 2021-04-07 NOTE — ED PROVIDER NOTE - NSFOLLOWUPINSTRUCTIONS_ED_ALL_ED_FT
Back Pain    Back pain is very common in adults. The cause of back pain is rarely dangerous and the pain often gets better over time. The cause of your back pain may not be known and may include strain of muscles or ligaments, degeneration of the spinal disks, or arthritis. Occasionally the pain may radiate down your leg(s). Over-the-counter medicines to reduce pain and inflammation are often the most helpful. Stretching and remaining active frequently helps the healing process.     SEEK IMMEDIATE MEDICAL CARE IF YOU HAVE ANY OF THE FOLLOWING SYMPTOMS: bowel or bladder control problems, unusual weakness or numbness in your arms or legs, nausea or vomiting, abdominal pain, fever, dizziness/lightheadedness.        Sickle Cell Crisis    WHAT YOU NEED TO KNOW:    What is a sickle cell crisis? A sickle cell crisis is a painful episode that occurs in people who have sickle cell anemia. It happens when sickle-shaped red blood cells (RBCs) block blood vessels. Blood and oxygen cannot get to your tissues, causing pain. A sickle cell crisis can also damage your tissues and cause organ failure, such liver or kidney failure. A sickle cell crisis can become life-threatening.    What are signs and symptoms of a sickle cell crisis? Your symptoms may change each time you have a crisis. They will depend on the area of your body where blood flow has been blocked.  •Fever      •Pain      •Weakness or fatigue      •Abdominal pain and swelling      •Headaches      •A painful, erect penis (priapism) in men      •Fast heartbeats      •Shortness of breath      What can trigger a sickle cell crisis?   •Dehydration      •Infection, such as a cold or the flu      •Low oxygen levels from difficult exercise, flying, or high altitude      •Getting cold or going from warm to cold quickly      •Medical procedures, surgery, or having a baby      •Strong emotions, such as anger or depression      How is pain managed during a sickle cell crisis?   •Medicines may be given to decrease sickling of your RBCs. You may also need medicine to prevent a bacterial infection or help you breathe more easily.      •Prescription pain medicine may be given. Ask your healthcare provider how to take this medicine safely. Some prescription pain medicines contain acetaminophen. Do not take other medicines that contain acetaminophen without talking to your healthcare provider. Too much acetaminophen may cause liver damage. Prescription pain medicine may cause constipation. Ask your healthcare provider how to prevent or treat constipation.       •NSAIDs, such as ibuprofen, help decrease swelling, pain, and fever. This medicine is available with or without a doctor's order. NSAIDs can cause stomach bleeding or kidney problems in certain people. If you take blood thinner medicine, always ask your healthcare provider if NSAIDs are safe for you. Always read the medicine label and follow directions.      •Acetaminophen decreases pain and fever. It is available without a doctor's order. Ask how much to take and how often to take it. Follow directions. Read the labels of all other medicines you are using to see if they also contain acetaminophen, or ask your doctor or pharmacist. Acetaminophen can cause liver damage if not taken correctly. Do not use more than 4 grams (4,000 milligrams) total of acetaminophen in one day.       How else is a sickle cell crisis treated?   •IV fluids treat dehydration and help reduce sickling of RBCs.      •Oxygen helps increase oxygen levels in your blood and make it easier for you to breathe.      •A blood transfusion replaces blood with RBCs that are not sickle shaped.      •Surgery may be done to remove part of your spleen.      How can I prevent a sickle cell crisis?   •Take vitamins and minerals as directed. Folic acid may help prevent blood vessel problems that can come with sickle cell anemia. Zinc may decrease how often you have pain.      •Drink liquids as directed. Dehydration can increase your risk for a sick cell crisis. Ask how much liquid to drink each day and which liquids are best for you.      •Balance rest and exercise. Rest during a sickle cell crisis. Over time, increase your activity to a moderate amount. Exercise as directed. Avoid exercise or activities that can cause injury, such as football. Ask about the best exercise plan for you.      •Wash your hands frequently. Handwashing can help prevent illness. Wash your hands before you prepare or eat food, and after you use the bathroom.  Handwashing           •Avoid quick changes in temperature. Do not go quickly from a warm place to a cold place. Get in a pool slowly instead of jumping in. Avoid getting too hot or too cold. Dress in light clothing in the summer and warm clothing in the winter.      •Do not smoke cigarettes or drink alcohol. These increase your risk for a sickle cell crisis. Nicotine and other chemicals in cigarettes and cigars can cause lung damage. Ask your healthcare provider for information if you currently smoke and need help to quit. E-cigarettes or smokeless tobacco still contain nicotine. Talk to your healthcare provider before you use these products.      •Ask about vaccines you may need. Vaccines can help prevent a viral infection that may lead to a sickle cell crisis. Get a flu shot as soon as recommended each year, usually in September or October. You may need pneumonia vaccines every 5 years. Your healthcare provider can tell you if you need other vaccines, and when to get them.      Call your local emergency number (911 in the [place]) if:   •You have shortness of breath or chest pain.      •You are a man and have an erection that is painful and does not go away.      •You lose vision in one or both eyes.      When should I seek immediate care?   •You feel like you cannot cope with your pain, or you feel like hurting yourself.      •You have behavior changes, a seizure, or faint.      •You have a fever.      •You have abdominal pain, nausea, or vomiting.      •You have a different or worse headache.      •You have new weakness or numbness in your arm, leg, or face.      •You have new pain in any part of your body.      •Your urine is dark and you are urinating less than usual or not at all.      •You are dizzy, lightheaded, or faint.      When should I call my doctor?   •You have any new signs or symptoms.      •You have blood in your urine.      •You are constipated or you have diarrhea.      •You have changes in your vision.      •You have increased fatigue.      •You plan to travel by airplane or to a high elevation.      •You have questions or concerns about your condition or care.      CARE AGREEMENT:    You have the right to help plan your care. Learn about your health condition and how it may be treated. Discuss treatment options with your healthcare providers to decide what care you want to receive. You always have the right to refuse treatment.        © Copyright [employer] 2021

## 2021-04-07 NOTE — ED PROVIDER NOTE - OBJECTIVE STATEMENT
Pt is a 35M who presents to ED for back pain. Pt states the pain is consistent with his sickle cell pain. No CP, no SOB. no fever. No bladder or bowel dysfunction.   pt states he took his home meds with no improvement of symptoms.

## 2021-04-07 NOTE — ED ADULT TRIAGE NOTE - CHIEF COMPLAINT QUOTE
Pt c/o sickle cell pain starting at 8am in lower back and B/L hips. Took 8mg PO Dilaudid at 8am with no relief.

## 2021-04-07 NOTE — ED ADULT NURSE REASSESSMENT NOTE - NS ED NURSE REASSESS COMMENT FT1
Patient insists on leaving, reported that he has a family emergency, his daughter in the hospital and he has to leave immediately, MD made aware, explained that patients HR is elevated, patient still insist that he has to go, left AMA did not sign paperwork.

## 2021-04-07 NOTE — ED PROVIDER NOTE - PATIENT PORTAL LINK FT
You can access the FollowMyHealth Patient Portal offered by [facility] by registering at the following website: [email or web address]. By joining [employer]’s FollowMyHealth portal, you will also be able to view your health information using other applications (apps) compatible with our system.

## 2021-04-07 NOTE — ED PROVIDER NOTE - CLINICAL SUMMARY MEDICAL DECISION MAKING FREE TEXT BOX
Pt with back pain, sickle cell pain. Will check labs, EKG. CXR. Pain meds given for pain control.    Pt declining work up at this time. Pt requesting to be discharged home. pt aware of risks/benefit of discharge prior to completion of work up. Pt discharged AMA.

## 2021-04-29 NOTE — ED ADULT NURSE NOTE - OBJECTIVE STATEMENT
pt is a 35y male complaining of bilateral hip and lower back pain. hx of sickle cell disease. pt has Avascular Necrosis in bilateral hips and left shoulder. pt reports taking 8mg Dilaudid this morning with no relief. pain is 9/10. Pt walks in ED with cane. pt denies fever, chills, N/V, CP, SOB.

## 2021-04-29 NOTE — ED ADULT NURSE NOTE - NSFALLRSKASSESSDT_ED_ALL_ED
LM for patient that appt was made with rmm on 2/23/2 at 1:30 . Asked patient to call to confirm .
Reviewed test results including echo, nuclear stress test, and holter monitor which showed 22% PVC burden. Dr. George Perez would like patient referred to EP partners to discuss management options for PVCs. Patient is agreeable to referral to either Dr. Antonio Davis or Dr. Anne Pinto. Please call patient to schedule new patient appointment for \" 22% PVC burden on holter monitor\".
29-Apr-2021 17:56

## 2021-04-29 NOTE — ED PROVIDER NOTE - NS_EDPROVIDERDISPOUSERTYPE_ED_A_ED
I have personally evaluated and examined the patient. The Attending was available to me as a supervising provider if needed. I have personally evaluated and examined the patient. The Attending was available to me as a supervising provider if needed./Scribe Attestation (For Scribes USE Only)... I have personally evaluated and examined the patient. The Attending was available to me as a supervising provider if needed./Attending Attestation (For Attendings USE Only).../Scribe Attestation (For Scribes USE Only)...

## 2021-04-29 NOTE — ED ADULT TRIAGE NOTE - CHIEF COMPLAINT QUOTE
Pt w/ hx of sickle cell disease presents to the ED c/o bilateral hip pain and lower back pain. Pt reports it feels like his typical sickle cell pain. Pt took Dilaudid 8 mg at 8 AM today. Pt denies fever, chills, SOB, CP.

## 2021-04-29 NOTE — ED PROVIDER NOTE - PATIENT PORTAL LINK FT
You can access the FollowMyHealth Patient Portal offered by [facility] by registering at the following website: [email or web address]. By joining [employer]’s FollowMyHealth portal, you will also be able to view your health information using other applications (apps) compatible with our system.

## 2021-04-29 NOTE — ED ADULT TRIAGE NOTE - ARRIVAL FROM
Home
46 y/o female with no significant pmh presenting to ED via [facility] from [facility] for re-implantation of left fourth finger avulsion that occurred s/p pt slipping and falling down her basement stairs while attempting to grab onto railing. Surgeon and ED attending at the bedside upon pts arrival, pts finger arrives with pt at the bedside on ice and was obtained by ortho surgeon and taken to OR for re-implantation and prep. signed consents completed and in pts chart at bedside. pt denies any pain or discomfort at the time of arrival and assessment. denies any other injuries sustained during the fall. no head injury or loc. finger and hand wrapped with cling and kept elevated to control bleeding. VS stable. UCG negative. valuables secured in safe with additional RN at the bedside as witness. pts clothing secured and labeled and  will come to  pts belongings. report given to OR RN. pt pending transport. type and screen sent.

## 2021-04-29 NOTE — ED PROVIDER NOTE - OBJECTIVE STATEMENT
35M hx SCD, AVN, c/o typical sickle cell pain in hips and low back since this morning. Took Dilaudid 8mg this morning with no relief. Denies fever, chills, cough, cp, sob, n/v/d, abd pain, falls. States has appointment beginning next month with hematologist.

## 2021-04-29 NOTE — ED PROVIDER NOTE - NSFOLLOWUPINSTRUCTIONS_ED_ALL_ED_FT
AfterCare(R) Instructions(ER/ED)           Sickle Cell Pain    WHAT YOU NEED TO KNOW:    A sickle cell crisis is a painful episode that occurs in people who have sickle cell anemia. It happens when sickle-shaped red blood cells (RBCs) block blood vessels. Blood and oxygen cannot get to tissues, causing pain. A sickle cell crisis can also damage your tissues and cause organ failure, such liver or kidney failure. A sickle cell crisis can become life-threatening.    DISCHARGE INSTRUCTIONS:    Call your local emergency number (911 in the [place]) if:   •You have shortness of breath or chest pain.      •You are a man and have an erection that is painful and does not go away.      •You lose vision in one or both eyes.      Return to the emergency department if:   •You have a fever.      •You feel like you cannot cope with your pain, or you feel like hurting yourself.      •You have behavior changes, a seizure, or faint.      •You have abdominal pain, nausea, or vomiting.      •You have a headache that is worse or different from those that you have had in the past.      •You have new weakness or numbness in your arm, leg, or face.      •You have new pain in any part of your body.      •Your urine is dark and you are urinating less than usual or not at all.      •You are dizzy, lightheaded, or faint.      Call your doctor if:   •You have any new signs or symptoms.      •You have blood in your urine.      •You are constipated or you have diarrhea.      •You have changes in your vision.      •You have increased fatigue.      •You plan to travel by airplane or to a high elevation.      •You have questions or concerns about your condition or care.      Medicines: You may need any of the following:  •Medicine may be given to decrease sickling of your RBCs. You may also need medicine to treat or prevent a bacterial infection.      •Prescription pain medicine may be given. Ask your healthcare provider how to take this medicine safely. Some prescription pain medicines contain acetaminophen. Do not take other medicines that contain acetaminophen without talking to your healthcare provider. Too much acetaminophen may cause liver damage. Prescription pain medicine may cause constipation. Ask your healthcare provider how to prevent or treat constipation.       •NSAIDs, such as ibuprofen, help decrease swelling, pain, and fever. This medicine is available with or without a doctor's order. NSAIDs can cause stomach bleeding or kidney problems in certain people. If you take blood thinner medicine, always ask your healthcare provider if NSAIDs are safe for you. Always read the medicine label and follow directions.      •Acetaminophen decreases pain and fever. It is available without a doctor's order. Ask how much to take and how often to take it. Follow directions. Read the labels of all other medicines you are using to see if they also contain acetaminophen, or ask your doctor or pharmacist. Acetaminophen can cause liver damage if not taken correctly. Do not use more than 4 grams (4,000 milligrams) total of acetaminophen in one day.       •Take your medicine as directed. Contact your healthcare provider if you think your medicine is not helping or if you have side effects. Tell him or her if you are allergic to any medicine. Keep a list of the medicines, vitamins, and herbs you take. Include the amounts, and when and why you take them. Bring the list or the pill bottles to follow-up visits. Carry your medicine list with you in case of an emergency.      Medical alert identification: Wear medical alert jewelry or carry a card that says you have sickle cell anemia. Ask your healthcare provider where to get these items.    Medical Alert Jewelry         Prevent a sickle cell crisis:   •Take vitamins and minerals as directed. Folic acid may help prevent blood vessel problems that can occur with sickle cell anemia. Zinc may decrease how often you have pain.      •Drink liquids as directed. Dehydration can increase your risk for a sickle cell crisis. Ask how much liquid to drink each day and which liquids are best for you.      •Balance rest and exercise. Rest during a sickle cell crisis. Over time, increase your activity to a moderate amount. Exercise as directed. Avoid exercise or activities that can cause injury, such as football. Ask about the best exercise plan for you.      •Wash your hands frequently. Handwashing can help prevent illness. Wash your hands before you prepare or eat food, and after you use the bathroom.  Handwashing           •Avoid quick changes in temperature. Do not go quickly from a warm place to a cold place. Get in a pool slowly instead of jumping in. Avoid getting too hot or too cold. Dress in light clothing in the summer and warm clothing in the winter.      •Do not smoke cigarettes or drink alcohol. These increase your risk for a sickle cell crisis. Nicotine and other chemicals in cigarettes and cigars can cause lung damage. Ask your healthcare provider for information if you currently smoke and need help to quit. E-cigarettes or smokeless tobacco still contain nicotine. Talk to your healthcare provider before you use these products.      •Ask about vaccines you may need. Vaccines can help prevent a viral infection that may lead to a sickle cell crisis. Get a flu shot as soon as recommended each year, usually in September or October. You may need pneumonia vaccines every 5 years. Your healthcare provider can tell you if you need other vaccines, and when to get them.      Follow up with your doctor as directed: You may need ongoing screening for conditions that can develop because of sickle cell disease. Examples include kidney disease, hypertension (high blood pressure), retinopathy (eye problems), and problems with your lungs. Write down your questions so you remember to ask them during your visits.

## 2021-04-29 NOTE — ED PROVIDER NOTE - CLINICAL SUMMARY MEDICAL DECISION MAKING FREE TEXT BOX
36 y/o M with PMHx SCD here w/ regular sickle cell pain to hips, lower back since this morning, home dilaudid w/o improvement. No fever, chills, chest pain, SOB, falls, or any other complaints. Will give Dilaudid 16mg pO and d/c home.

## 2021-04-30 NOTE — ED ADULT NURSE NOTE - OBJECTIVE STATEMENT
received A&oX4 ambulatory 35years old male pt with c/o of ' I am having a sickle cell exacerbation." pt reports lower back pain 10/10 that started last night. the pain radiates to the bilateral lower legs. no redness or edema noted on the back or legs. took 8mg of Dilaudid with little to no relieved of the pain. sensation intact, pt able to move all extremities. denies SOB, fall, trauma, fever, nausea, vomiting, SOB. pt is tachycardic. EKG is in progress.

## 2021-04-30 NOTE — ED ADULT TRIAGE NOTE - CHIEF COMPLAINT QUOTE
pt c/o lower back pain, b/l hip pain beginning at 12am last night. "Im in sickle cell crisis." pt took 8mg Dilaudid  no relief.

## 2021-04-30 NOTE — ED PROVIDER NOTE - PATIENT PORTAL LINK FT
You can access the FollowMyHealth Patient Portal offered by [facility] by registering at the following website: [email or web address]. By joining [employer]’s FollowMyHealth portal, you will also be able to view your health information using other applications (apps) compatible with our system.

## 2021-04-30 NOTE — ED PROVIDER NOTE - OBJECTIVE STATEMENT
· HPI Objective Statement: 35M hx SCD, AVN, c/o typical sickle cell pain in hips and low back since this morning. seen last night for same, felt better, woke up in pain again. states when rains sickle pain is worse, thinks this is the trigger. Denies fever, chills, cough, cp, sob, n/v/d, abd pain, falls. States has appointment beginning next month with hematologist.  · Presenting Symptoms: low back pain  · Negative Findings: no fever, no numbness, no tingling, no weakness  · Timing: constant  · Duration: today   · Aggravated Factors: none  · Relieving Factors: none

## 2021-05-03 NOTE — ED ADULT NURSE NOTE - NS ED NOTE ABUSE RESPONSE YN
What Type Of Note Output Would You Prefer (Optional)?: Bullet Format
Hpi Title: Evaluation of a Skin Lesion
How Severe Are Your Spot(S)?: mild
Have Your Spot(S) Been Treated In The Past?: has not been treated
Yes
Yes

## 2021-05-03 NOTE — ED PROVIDER NOTE - OBJECTIVE STATEMENT
35M SCD, acute chest syndrome, AVN, pe, multipled ED visits, c/o bl hip/lbp pain upon waking up around 8a today req him to ambulate w/ his cane, not improved s/p home analgesia Rx. states sx are similar to prior pain crises and is only requesting dilaudid 16 po/ibuprofen 600 po and discharge, declining ivf and lab testing at this time. outpatient heme fu in 2w. no fever/chills, no uri/cough, no cp/sob, no abd pain/n/v, no change in appetite/po intake, no dysuria, no rash, no trauma, no etoh-dpt/ivdu.     heme: ahmar butt

## 2021-05-03 NOTE — ED ADULT NURSE NOTE - PMH
Acute Chest Syndrome  2003 and possibly 2008, with intubation, exchange transfusion  Avascular Necrosis of Bone  hips  DVT (deep venous thrombosis)    Personal History of PE (Pulmonary Embolism)  2008, post 6mo coumadin  Sickle Cell Anemia  [place]  
Acute Chest Syndrome  2003 and possibly 2008, with intubation, exchange transfusion  Avascular Necrosis of Bone  hips  DVT (deep venous thrombosis)    Personal History of PE (Pulmonary Embolism)  2008, post 6mo coumadin  Sickle Cell Anemia  [place]

## 2021-05-03 NOTE — ED PROVIDER NOTE - PHYSICAL EXAMINATION
CONST: nontoxic NAD speaking in full sentences ambulating w/ cane  HEAD: atraumatic  EYES: conjunctivae clear, PERRL, EOMI  ENT: mmm  NECK: supple/FROM  CARD: rrr no murmurs  CHEST: ctab no r/r/w, no stridor/retractions/tripoding  ABD: soft, nd, nttp, no rebound/guarding  PELVIS: +ttp bl hips  BACK: +ttp bl low back, no midline ttp, atraumatic, no rashes  EXT: FROM, symmetric distal pulses intact  SKIN: warm, dry, no rash, no pedal edema/ttp/rash, cap refill <2sec  NEURO: a+ox3, 5/5 strength x4, gross sensation intact x4, antalgic gait

## 2021-05-03 NOTE — ED PROVIDER NOTE - NSFOLLOWUPINSTRUCTIONS_ED_ALL_ED_FT
PLEASE FOLLOW-UP WITH YOUR HEMATOLOGIST AS SCHEDULED.     PLEASE RETURN TO THE EMERGENCY DEPARTMENT IF FEVER, CHEST PAIN, SHORTNESS OF BREATH, ABDOMINAL PAIN, VOMITING, OTHER CONCERNING SYMPTOMS.

## 2021-05-03 NOTE — ED PROVIDER NOTE - PROGRESS NOTE DETAILS
nurse observing pt swallowing oral medication but when asked to lift his tongue, pt refused and suddenly ran to bathroom w/ ease (no cane). security called to escort pt out.

## 2021-05-03 NOTE — ED ADULT NURSE NOTE - CHPI ED NUR SYMPTOMS NEG
no bladder dysfunction/no bowel dysfunction/no constipation/no difficulty bearing weight/no fatigue/no motor function loss/no numbness/no tingling

## 2021-05-03 NOTE — ED ADULT NURSE NOTE - OBJECTIVE STATEMENT
Pt AOX4. Pt c/o lower back pain that started this morning. Pt reports hx of sickle cell. Pt reports taking 6mg of Dilaudid this morning with no pain relief. Pt denies fevers, chills, n/v, SOB, chest pain, numbness, tingling. Pt speaking in full complete sentences. Respirations even and unlabored.

## 2021-05-03 NOTE — ED PROVIDER NOTE - PATIENT PORTAL LINK FT
You can access the FollowMyHealth Patient Portal offered by [facility] by registering at the following website: [email or web address]. By joining [employer]’s FollowMyHealth portal, you will also be able to view your health information using other applications (apps) compatible with our system.

## 2021-05-03 NOTE — ED PROVIDER NOTE - CLINICAL SUMMARY MEDICAL DECISION MAKING FREE TEXT BOX
pt reportedly w/ bl hip pain/lbp c/f acute pain crisis. pt requesting dilaudid 16po/ibuprofen 600po, however upon being observed taking Rx and asked to lift tongue to verify that pills were swallowed, pt refused and quickly ran to bathroom. pt subsequently escorted out by security.

## 2021-07-30 NOTE — ED ADULT NURSE NOTE - PAIN RATING/NUMBER SCALE (0-10): ACTIVITY
9 Initiate Treatment: clobetasol 0.05 % topical ointment,Apply thin film to affected areas on chest twice daily for two weeks. Detail Level: Zone Continue Regimen: Oral antibiotics given by PCP

## 2021-08-20 NOTE — ED ADULT TRIAGE NOTE - OTHER COMPLAINTS
Over the last 2 weeks, how often have you been bothered by the following problems?          PHQ2 Score: 0  PHQ2 Score Interpretation: No further screening needed  1. Little interest or pleasure in activity?: 0  2. Feeling down, depressed, or hopeless?: 0        verbalized sickle cell pt on Dilaudid c/o back and hip pain. Denies cp nor sob. EKG in progress

## 2021-09-03 NOTE — ED ADULT NURSE NOTE - SUICIDE SCREENING QUESTION 1
Patient Education        Insect Stings and Bites: Care Instructions  Your Care Instructions  Stings and bites from bees, wasps, ants, and other insects often cause pain, swelling, redness, and itching. In some people, especially children, the redness and swelling may be worse. It may extend several inches beyond the affected area. But in most cases, stings and bites don't cause reactions all over the body. If you have had a reaction to an insect sting or bite, you are at risk for a reaction if you get stung or bitten again. Follow-up care is a key part of your treatment and safety. Be sure to make and go to all appointments, and call your doctor if you are having problems. It's also a good idea to know your test results and keep a list of the medicines you take. How can you care for yourself at home? · Do not scratch or rub the skin where the sting or bite occurred. · Put a cold pack or ice cube on the area. Put a thin cloth between the ice and your skin. For some people, a paste of baking soda mixed with a little water helps relieve pain and decrease the reaction. · Take an over-the-counter antihistamine, such as diphenhydramine (Benadryl) or loratadine (Claritin), to relieve swelling, redness, and itching. Calamine lotion or hydrocortisone cream may also help. Do not give antihistamines to your child unless you have checked with the doctor first.  · Be safe with medicines. If your doctor prescribed medicine for your allergy, take it exactly as prescribed. Call your doctor if you think you are having a problem with your medicine. You will get more details on the specific medicines your doctor prescribes. · Your doctor may prescribe a shot of epinephrine to carry with you in case you have a severe reaction. Learn how and when to give yourself the shot, and keep it with you at all times. Make sure it has not . · Go to the emergency room anytime you have a severe reaction.  Go even if you have given Cellulitis is a skin infection caused by bacteria, most often strep or staph. It often occurs after a break in the skin from a scrape, cut, bite, or puncture, or after a rash. Cellulitis may be treated without doing tests to find out what caused it. But your doctor may do tests, if needed, to look for a specific bacteria, like methicillin-resistant Staphylococcus aureus (MRSA). The doctor has checked you carefully, but problems can develop later. If you notice any problems or new symptoms, get medical treatment right away. Follow-up care is a key part of your treatment and safety. Be sure to make and go to all appointments, and call your doctor if you are having problems. It's also a good idea to know your test results and keep a list of the medicines you take. How can you care for yourself at home? · Take your antibiotics as directed. Do not stop taking them just because you feel better. You need to take the full course of antibiotics. · Prop up the infected area on pillows to reduce pain and swelling. Try to keep the area above the level of your heart as often as you can. · If your doctor told you how to care for your wound, follow your doctor's instructions. If you did not get instructions, follow this general advice:  ? Wash the wound with clean water 2 times a day. Don't use hydrogen peroxide or alcohol, which can slow healing. ? You may cover the wound with a thin layer of petroleum jelly, such as Vaseline, and a nonstick bandage. ? Apply more petroleum jelly and replace the bandage as needed. · Be safe with medicines. Take pain medicines exactly as directed. ? If the doctor gave you a prescription medicine for pain, take it as prescribed. ? If you are not taking a prescription pain medicine, ask your doctor if you can take an over-the-counter medicine. To prevent cellulitis in the future  · Try to prevent cuts, scrapes, or other injuries to your skin.  Cellulitis most often occurs where there is No

## 2021-09-21 NOTE — ED PROVIDER NOTE - CLINICAL SUMMARY MEDICAL DECISION MAKING FREE TEXT BOX
37 yo male presents with likely SCD pain crisis. Will control pain, obtain basic labs, IVF, EKG, CXR. Monitor and reassess.

## 2021-09-21 NOTE — ED PROVIDER NOTE - PHYSICAL EXAMINATION
Const: Awake, alert and oriented. In no acute distress. Well appearing.  HEENT: NC/AT. Moist mucous membranes.  Eyes: No scleral icterus. EOMI.  Neck:. Soft and supple. Full ROM without pain.  Cardiac: Regular rate and regular rhythm. +S1/S2. Peripheral pulses 2+ and symmetric. No LE edema.  Resp: Speaking in full sentences. No evidence of respiratory distress. No wheezes, rales or rhonchi.  Abd: Soft, non-tender, non-distended. Normal bowel sounds in all 4 quadrants. No guarding or rebound.  Back: Spine midline and non-tender. No CVAT.  Skin: Large 4x4 cm wound noted right thigh, cream covering most of the wound. Cream covered by gauze.   Lymph: No cervical lymphadenopathy.  Neuro: Awake, alert & oriented x 3. Moves all extremities symmetrically.

## 2021-09-21 NOTE — ED ADULT NURSE NOTE - NSHOSCREENINGQ1_ED_ALL_ED
pt started pulling at lines and attemtng to climb out of bed.  spouse and
sitter present at bedside. notified md and recieved orders.  will continue to
monitor. No

## 2021-09-21 NOTE — H&P ADULT - HISTORY OF PRESENT ILLNESS
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  Pt reports that he was following with hematology in the city however has since relocated to [place] and needs a new doctor.  He reports poor po intake 2/2 pain but denies N/V.  He also denies fever, chills, chest pain, palpitations, sob, HA, dysuria, hematuria.

## 2021-09-21 NOTE — ED PROVIDER NOTE - NSICDXFAMILYHX_GEN_ALL_CORE_FT
FAMILY HISTORY:  Family history of sickle cell trait in father    Father  Still living? Unknown  Family history of sickle cell trait in mother, Age at diagnosis: Age Unknown

## 2021-09-21 NOTE — H&P ADULT - ASSESSMENT
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  On admission pt found to have anemia and recieved 1u prbc in ED and Cr 4.07.  Pt admitted for sickle cell crisis.        Sickle cell crisis   - Admit to medicine  - Aggressive IV hydration   - PRN pain control   - If pain remains uncontrolled consider pain management consult for PCA pump   - Retics and absolute retics WNL  - Folate and MV  - Refer to hematology on discharge       Microcytic anemia 2/2 sickle cell anemia  - Baseline Hb unknown.  Hb 8.3 in 2020  - Will order iron studies  - s/p 1u PRBC in ED, f/u post transfusion CBC  - Pt is hemodynamically stable at this time w/ no active bleeding  - Monitor H/H and transfuse if Hb <7      ARF etiology unclear at this time  - Possibly 2/2 hypoperfusion vs sickle cell dx given risk of papillary necrosis    - Last known Cr WNL in 2020  - IV hydration and monitor I/O's   - Renal US and UA ordered  - Low serum HCO3, monitor levels and consider ABG to better assess acid base disturbance if drops further   - Avoid nephrotoxic medications or renally dose if needed  - Nephrology consulted by ED      Hypokalemia  - Will supplement  - Monitor levels and replete as needed      Leukocytosis likely reactive 2/2 sickle cell crisis   - Pt afebrile and nontoxic appearing  - Will monitor off antibiotics for now  - Trend CBC and monitor temperature       Elevated ALP   - Could be 2/2 AVN   - Asymptomatic and benign exam   - AST/AL and bili WNL  - Trend LFTs       Chronic pain 2/2 AVN  - Known AVN of b/l hips and L-shoulder  - PRN pain control        VTE ppx: heparin sq

## 2021-09-21 NOTE — ED PROVIDER NOTE - NSICDXPASTMEDICALHX_GEN_ALL_CORE_FT
PAST MEDICAL HISTORY:  Acute Chest Syndrome 2003 and possibly 2008, with intubation, exchange transfusion    Avascular Necrosis of Bone hips    DVT (deep venous thrombosis)     Personal History of PE (Pulmonary Embolism) 2008, post 6mo coumadin    Sickle Cell Anemia SC

## 2021-09-21 NOTE — H&P ADULT - NSHPPHYSICALEXAM_GEN_ALL_CORE
GENERAL: pt examined bedside, laying comfortably in bed in NAD  HEENT: NC/AT, moist oral mucosa, clear conjunctiva, sclera nonicteric  RESPIRATORY: Normal respiratory effort; CTA b/l, no wheezing, rhonchi, rales  CARDIOVASCULAR: RRR, normal S1 and S2, no murmur/rub/gallop  ABDOMEN: soft, NT/ND, normoactive bowel sounds, no rebound/guarding  MSK: No joint deformities, edema, erythema  EXTREMITIES: No cynaosis, no clubbing, no lower extremity edema; Peripheral pulses are 2+ bilaterally  PSYCH: affect appropriate and cooperative  NEUROLOGY: A+O to person, place, and time, no focal neurologic deficits appreciated   SKIN: No rashes or no palpable lesions GENERAL: pt examined bedside, appears uncomfortable but in NAD  HEENT: NC/AT, dry oral mucosa, clear conjunctiva, sclera nonicteric  NECK: +Rt IJ central line, supple   RESPIRATORY: Normal respiratory effort; CTA b/l, no wheezing, rhonchi, rales  CARDIOVASCULAR: RRR, normal S1 and S2, no murmur/rub/gallop  ABDOMEN: soft, NT/ND, +bowel sounds, no rebound/guarding  MSK: No joint deformities, edema, erythema  EXTREMITIES: No cynaosis, no clubbing, no lower extremity edema; Peripheral pulses are 2+ bilaterally  PSYCH: affect appropriate and cooperative  NEUROLOGY: A+O to person, place, and time, no focal neurologic deficits appreciated   SKIN: no rashes or no palpable lesions

## 2021-09-21 NOTE — H&P ADULT - NSHPREVIEWOFSYSTEMS_GEN_ALL_CORE
CONSTITUTIONAL: no fevers, chills, night sweats, weight changes  HEENT: no vision changes or diplopia, no tinnitus, no sore throat  RESPIRATORY: denies dyspnea, sob, nocturnal cough, sputum or hemoptysis  CARDIOVASCULAR: no CP, palpitations or lower extremity edema  GI: no dysphagia, nausea, abd pain, constipation, diarrhea, stool change or blood in stool  : no dysuria or hematuria, no flank pain, no incontinence or urinary retention  MSK: no joint pain or swelling  INTEGUMENTARY: no rashes or lesions  NEUROLOGICAL: no HA, confusion, syncope, numbness, weakness, tremors or ataxia  PSYCH: denies depression  ENDOCRINE: no polyuria, polydipsia, no temp intolerance, no tremors, no changes in skin, hair or nails  HEMATOLOGIC/LYMPHATIC: no lymph node enlargement, abnormal bruising or bleeding

## 2021-09-21 NOTE — ED ADULT NURSE NOTE - OBJECTIVE STATEMENT
Patient A&Ox4 complaining of chronic back pain & hip pain. Stated took 80mg Dilaudid with no relief. Patient A&Ox4 complaining of chronic back pain & hip pain. Stated took 8mg Dilaudid with no relief.

## 2021-09-21 NOTE — ED PROVIDER NOTE - ATTENDING CONTRIBUTION TO CARE
I, Jameson Ceja, performed a face to face bedside interview with this patient regarding history of present illness, review of symptoms and relevant past medical, social and family history.  I completed an independent physical examination. I have communicated the patient’s plan of care and disposition with the resident.  36 year old male presents with diffuse body aches. Has hx of sickle cell, this pain is consistent with prior episodes. No chets pain, SOB  Gen: NAD, well appearing  CV: RRR  Pul: CTA b/l  Abd: Soft, non-distended, non-tender  Neuro: no focal deficits  Admitted for ARF and sickle cell disease

## 2021-09-21 NOTE — ED PROVIDER NOTE - OBJECTIVE STATEMENT
37 yo male history of SCD presents with back pain, b/l hip pain since waking this am at 0500. Patient states that the pain reminds him of his typical sickle cell pain crises. The patient states that he took 8 mg PO Dilaudid at home this am. He states that he does not have any more pain medication at home. The patient states that he is working on getting doctors out on [place] since moving here from the city. Other than his current pain, patient reports a chronic wound on his right thigh, he manages the wound himself and changes the dressing every three days. He denies fever/chills, cough, sore throat, abdominal pain, n/v/d, dysuria, hematuria, headache, focal weakness/numbness/tingling in extremities, recent trauma/falls.

## 2021-09-21 NOTE — H&P ADULT - NSHPLABSRESULTS_GEN_ALL_CORE
7.0    14.78 )-----------( 408      ( 21 Sep 2021 16:31 )             19.8       09-21    138  |  106  |  31.9<H>  ----------------------------<  85  3.4<L>   |  18.0<L>  |  4.07<H>    Ca    7.6<L>      21 Sep 2021 16:31    TPro  5.7<L>  /  Alb  1.6<L>  /  TBili  <0.2<L>  /  DBili  x   /  AST  12  /  ALT  7   /  AlkPhos  226<H>  09-21

## 2021-09-21 NOTE — ED ADULT TRIAGE NOTE - CHIEF COMPLAINT QUOTE
" today at 0500 I started having lower back pain and bilateral hip, also I'm having diarrhea" pt took 80mg of Dilaudid today with no relief, pt has hx of Sickle cell.

## 2021-09-21 NOTE — ED PROCEDURE NOTE - PROCEDURE ADDITIONAL DETAILS
US used to place triple lumen catheter in the right IJ. Wire removed during procedure. Appropriate line positioning confirmed by chest XRay. Patient tolerated procedure well. No complications.

## 2021-09-22 NOTE — CONSULT NOTE ADULT - SUBJECTIVE AND OBJECTIVE BOX
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  Pt reports that he was following with hematology in the city however has since relocated to [place] and needs a new doctor.  He reports poor po intake 2/2 pain but denies N/V.  He also denies fever, chills, chest pain, palpitations, sob, HA, dysuria, hematuria.           .seen       T(C): 36.8 (21 @ 07:32), Max: 37.2 (21 @ 05:11)  HR: 73 (21 @ 07:32) (62 - 91)  BP: 105/67 (21 @ 07:32) (99/63 - 123/77)  RR: 19 (21 @ 07:32) (14 - 20)  SpO2: 100% (21 @ 07:32) (95% - 100%)        acetaminophen   Tablet .. 650 milliGRAM(s) Oral every 6 hours PRN  aluminum hydroxide/magnesium hydroxide/simethicone Suspension 30 milliLiter(s) Oral every 4 hours PRN  folic acid 1 milliGRAM(s) Oral daily  heparin   Injectable 5000 Unit(s) SubCutaneous every 8 hours  HYDROmorphone  Injectable 2 milliGRAM(s) IV Push four times a day PRN  influenza   Vaccine 0.5 milliLiter(s) IntraMuscular once  melatonin 3 milliGRAM(s) Oral at bedtime PRN  multivitamin Oral Tab/Cap - Peds 1 Tablet(s) Oral daily  sodium chloride 0.9%. 1000 milliLiter(s) IV Continuous <Continuous>                          7.0    14.78 )-----------( 408      ( 21 Sep 2021 16:31 )             19.8         138  |  106  |  31.9<H>  ----------------------------<  85  3.4<L>   |  18.0<L>  |  4.07<H>    Ca    7.6<L>      21 Sep 2021 16:31    TPro  5.7<L>  /  Alb  1.6<L>  /  TBili  <0.2<L>  /  DBili  x   /  AST  12  /  ALT  7   /  AlkPhos  226<H>  -    PT/INR - ( 21 Sep 2021 17:54 )   PT: 13.8 sec;   INR: 1.20 ratio         PTT - ( 21 Sep 2021 17:54 )  PTT:37.7 sec  Urinalysis Basic - ( 21 Sep 2021 23:08 )    Color: Yellow / Appearance: Clear / S.015 / pH: x  Gluc: x / Ketone: Negative  / Bili: Negative / Urobili: Negative mg/dL   Blood: x / Protein: 500 mg/dL / Nitrite: Negative   Leuk Esterase: Negative / RBC: 0-2 /HPF / WBC Negative   Sq Epi: x / Non Sq Epi: Occasional / Bacteria: x        Pain Service   [phone] CC: [facility]    HPI: per chart review:   35 y/o M w/ PMH of sickle cell dx w/ frequent crisis episodes (last episode 1 month ago), 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  Pt reports that he was following with hematology in the city however has since relocated to [place] and needs a new doctor.  He reports poor po intake 2/2 pain but denies N/V.  He also denies fever, chills, chest pain, palpitations, sob, HA, dysuria, hematuria.           Interval Hx:  Patient seen during rounds  c/o widespread pain, worst in back and hips  similiar to his prior SCC  states that it usually lasts about a week  has required dilaudid ivp for analgesia in the past  prefers not to use PCA at this time  endorses good relief with dilaudid ivp 2mg, but requesting increased frequency  Patient denies sedation with medications         T(C): 36.8 (21 @ 07:32), Max: 37.2 (21 @ 05:11)  HR: 73 (21 @ 07:32) (62 - 91)  BP: 105/67 (21 @ 07:32) (99/63 - 123/77)  RR: 19 (21 @ 07:32) (14 - 20)  SpO2: 100% (21 @ 07:32) (95% - 100%)        acetaminophen   Tablet .. 650 milliGRAM(s) Oral every 6 hours PRN  aluminum hydroxide/magnesium hydroxide/simethicone Suspension 30 milliLiter(s) Oral every 4 hours PRN  folic acid 1 milliGRAM(s) Oral daily  heparin   Injectable 5000 Unit(s) SubCutaneous every 8 hours  HYDROmorphone  Injectable 2 milliGRAM(s) IV Push four times a day PRN  influenza   Vaccine 0.5 milliLiter(s) IntraMuscular once  melatonin 3 milliGRAM(s) Oral at bedtime PRN  multivitamin Oral Tab/Cap - Peds 1 Tablet(s) Oral daily  sodium chloride 0.9%. 1000 milliLiter(s) IV Continuous <Continuous>                          7.0    14.78 )-----------( 408      ( 21 Sep 2021 16:31 )             19.8         138  |  106  |  31.9<H>  ----------------------------<  85  3.4<L>   |  18.0<L>  |  4.07<H>    Ca    7.6<L>      21 Sep 2021 16:31    TPro  5.7<L>  /  Alb  1.6<L>  /  TBili  <0.2<L>  /  DBili  x   /  AST  12  /  ALT  7   /  AlkPhos  226<H>  -    PT/INR - ( 21 Sep 2021 17:54 )   PT: 13.8 sec;   INR: 1.20 ratio         PTT - ( 21 Sep 2021 17:54 )  PTT:37.7 sec  Urinalysis Basic - ( 21 Sep 2021 23:08 )    Color: Yellow / Appearance: Clear / S.015 / pH: x  Gluc: x / Ketone: Negative  / Bili: Negative / Urobili: Negative mg/dL   Blood: x / Protein: 500 mg/dL / Nitrite: Negative   Leuk Esterase: Negative / RBC: 0-2 /HPF / WBC Negative   Sq Epi: x / Non Sq Epi: Occasional / Bacteria: x        Pain Service   [phone]

## 2021-09-22 NOTE — PROGRESS NOTE ADULT - ASSESSMENT
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  On admission pt found to have anemia and recieved 1u prbc in ED and Cr 4.07.  Pt admitted for sickle cell crisis.        Sickle cell crisis   - Continue w/ aggressive IV hydration   - On IV dilaudid however given only minimal pain control, pain management consulted and recs noted   - Retics and absolute retics WNL  - Folate and MV  - Refer to hematology on discharge       Microcytic anemia 2/2 sickle cell anemia  - Baseline Hb unknown.  Hb 8.3 in 2020  - Iron studies ordered; will f/u results once available  - s/p 1u PRBC in ED  - AM CBC pending to reassess H/H  - Pt is hemodynamically stable at this time w/ no active bleeding  - Monitor H/H and transfuse if Hb <7      ARF etiology unclear at this time   - Possibly 2/2 hypoperfusion vs sickle cell dx given risk of papillary necrosis    - Last known Cr WNL in 2020  - Will c/w aggressive IV hydration and monitor I/O's   - Renal US reviewed and echogenic kidneys noted  - Per nephro recs duplex US of kidneys ordered; will f/u results   - Serum HCO3 low on admission; AM labs pending to reassess and if lower will order ABG to better assess acid base disturbance   - Avoid nephrotoxic medications or renally dose if needed  - Nephrology consulted and recs noted       Hypokalemia  - Supplemented  - Pending AM labs to reassess levels  - Replete as needed to maintain K>4      Leukocytosis likely reactive 2/2 sickle cell crisis   - Pt afebrile and nontoxic appearing  - Will monitor off antibiotics for now  - AM labs pending to trend WBC   - Monitor temperature       Elevated ALP   - Could be 2/2 AVN   - Asymptomatic and benign exam   - AST/AL and bili WNL  - AM labs pending to trend LFTs       Chronic pain 2/2 AVN  - Known AVN of b/l hips and L-shoulder  - Pain management consulted        VTE ppx: heparin sq    Dispo: Pt remains acute w/ uncontrolled sickle crisis and ongoing w/u for acute renal failure.

## 2021-09-22 NOTE — PROGRESS NOTE ADULT - SUBJECTIVE AND OBJECTIVE BOX
Chief Complaint:  Sickel cell crisis    SUBJECTIVE / OVERNIGHT EVENTS:  No acute events reported overnight.  Pt reports diffuse body pain w/ slight improvement on current IV dilaudid regimen.  Pt denies chest pain, SOB, abd pain, N/V, fever, chills, dysuria or any other complaints. All remainder ROS negative.       I&O's Summary        PHYSICAL EXAM:  Vital Signs Last 24 Hrs  T(C): 36.8 (22 Sep 2021 07:32), Max: 37.2 (22 Sep 2021 05:11)  T(F): 98.3 (22 Sep 2021 07:32), Max: 98.9 (22 Sep 2021 05:11)  HR: 73 (22 Sep 2021 07:32) (62 - 91)  BP: 105/67 (22 Sep 2021 07:32) (99/63 - 123/77)  BP(mean): --  RR: 19 (22 Sep 2021 07:32) (14 - 20)  SpO2: 100% (22 Sep 2021 07:32) (95% - 100%)      GENERAL: pt examined bedside, appears uncomfortable but in NAD  HEENT: NC/AT, dry oral mucosa, clear conjunctiva, sclera nonicteric  NECK: +Rt IJ central line, supple   RESPIRATORY: Normal respiratory effort; CTA b/l, no wheezing, rhonchi, rales  CARDIOVASCULAR: RRR, normal S1 and S2, no murmur/rub/gallop  ABDOMEN: soft, NT/ND, +bowel sounds, no rebound/guarding  MSK: No joint deformities, edema, erythema  EXTREMITIES: No cynaosis, no clubbing, no lower extremity edema; Peripheral pulses are 2+ bilaterally  PSYCH: affect appropriate and cooperative  NEUROLOGY: A+O to person, place, and time, no focal neurologic deficits appreciated   SKIN: no rashes or no palpable lesions        LABS:                        7.0    14.78 )-----------( 408      ( 21 Sep 2021 16:31 )             19.8     09-21    138  |  106  |  31.9<H>  ----------------------------<  85  3.4<L>   |  18.0<L>  |  4.07<H>    Ca    7.6<L>      21 Sep 2021 16:31    TPro  5.7<L>  /  Alb  1.6<L>  /  TBili  <0.2<L>  /  DBili  x   /  AST  12  /  ALT  7   /  AlkPhos  226<H>  -    PT/INR - ( 21 Sep 2021 17:54 )   PT: 13.8 sec;   INR: 1.20 ratio         PTT - ( 21 Sep 2021 17:54 )  PTT:37.7 sec      Urinalysis Basic - ( 21 Sep 2021 23:08 )    Color: Yellow / Appearance: Clear / S.015 / pH: x  Gluc: x / Ketone: Negative  / Bili: Negative / Urobili: Negative mg/dL   Blood: x / Protein: 500 mg/dL / Nitrite: Negative   Leuk Esterase: Negative / RBC: 0-2 /HPF / WBC Negative   Sq Epi: x / Non Sq Epi: Occasional / Bacteria: x        CAPILLARY BLOOD GLUCOSE            RADIOLOGY & ADDITIONAL TESTS:          MEDICATIONS  (STANDING):  folic acid 1 milliGRAM(s) Oral daily  heparin   Injectable 5000 Unit(s) SubCutaneous every 8 hours  influenza   Vaccine 0.5 milliLiter(s) IntraMuscular once  multivitamin Oral Tab/Cap - Peds 1 Tablet(s) Oral daily  sodium chloride 0.9%. 1000 milliLiter(s) (150 mL/Hr) IV Continuous <Continuous>    MEDICATIONS  (PRN):  acetaminophen   Tablet .. 650 milliGRAM(s) Oral every 6 hours PRN Temp greater or equal to 38.5C (101.3F), Mild Pain (1 - 3)  aluminum hydroxide/magnesium hydroxide/simethicone Suspension 30 milliLiter(s) Oral every 4 hours PRN Dyspepsia  HYDROmorphone  Injectable 2 milliGRAM(s) IV Push four times a day PRN Severe Pain (7 - 10)  melatonin 3 milliGRAM(s) Oral at bedtime PRN Insomnia

## 2021-09-22 NOTE — CONSULT NOTE ADULT - SUBJECTIVE AND OBJECTIVE BOX
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  Pt reports that he was following with hematology in the city however has since relocated to [place] and needs a new doctor.  He reports poor po intake 2/2 pain but denies N/V.  He also denies fever, chills, chest pain, palpitations, sob, HA, dysuria, hematuria.       Seen/examined in CDU  Lying in bed in NAD; improved pain control; worsening renal failure; normal fx last year now with Cr of 4+      PAST HISTORY  --------------------------------------------------------------------------------  PAST MEDICAL & SURGICAL HISTORY:  Sickle Cell Anemia  SC    Personal History of PE (Pulmonary Embolism)  2008, post 6mo coumadin    Acute Chest Syndrome  2003 and possibly 2008, with intubation, exchange transfusion    Avascular Necrosis of Bone  hips    DVT (deep venous thrombosis)    No Past Surgical History      FAMILY HISTORY:  Family history of sickle cell trait in father    Family history of sickle cell trait in mother (Father)      PAST SOCIAL HISTORY:    ALLERGIES & MEDICATIONS  --------------------------------------------------------------------------------  Allergies    No Known Allergies    Intolerances      Standing Inpatient Medications  acetaminophen   Tablet .. 650 milliGRAM(s) Oral every 6 hours  folic acid 1 milliGRAM(s) Oral daily  heparin   Injectable 5000 Unit(s) SubCutaneous every 8 hours  influenza   Vaccine 0.5 milliLiter(s) IntraMuscular once  multivitamin Oral Tab/Cap - Peds 1 Tablet(s) Oral daily  sodium chloride 0.9%. 1000 milliLiter(s) IV Continuous <Continuous>    PRN Inpatient Medications  aluminum hydroxide/magnesium hydroxide/simethicone Suspension 30 milliLiter(s) Oral every 4 hours PRN  HYDROmorphone  Injectable 2 milliGRAM(s) IV Push every 3 hours PRN  melatonin 3 milliGRAM(s) Oral at bedtime PRN      REVIEW OF SYSTEMS  --------------------------------------------------------------------------------  Gen: No weight changes, fatigue, fevers/chills, weakness  Skin: No rashes  Head/Eyes/Ears/Mouth: No headache; Normal hearing; Normal vision w/o blurriness; No sinus pain/discomfort, sore throat  Respiratory: No dyspnea, cough, wheezing, hemoptysis  CV: No chest pain, PND, orthopnea  GI: No abdominal pain, diarrhea, constipation, nausea, vomiting, melena, hematochezia  : No increased frequency, dysuria, hematuria, nocturia  MSK: No joint pain/swelling; no back pain; no edema  Neuro: No dizziness/lightheadedness, weakness, seizures, numbness, tingling  Heme: No easy bruising or bleeding  Endo: No heat/cold intolerance  Psych: No significant nervousness, anxiety, stress, depression    All other systems were reviewed and are negative, except as noted.    VITALS/PHYSICAL EXAM  --------------------------------------------------------------------------------  T(C): 37 (09-22-21 @ 15:30), Max: 37.4 (09-22-21 @ 12:15)  HR: 74 (09-22-21 @ 15:30) (70 - 91)  BP: 113/73 (09-22-21 @ 15:30) (99/63 - 123/77)  RR: 18 (09-22-21 @ 15:30) (14 - 19)  SpO2: 100% (09-22-21 @ 15:30) (95% - 100%)  Wt(kg): --  Height (cm): 172.7 (09-21-21 @ 11:51)  Weight (kg): 73 (09-21-21 @ 11:51)  BMI (kg/m2): 24.5 (09-21-21 @ 11:51)  BSA (m2): 1.86 (09-21-21 @ 11:51)      Physical Exam:  	Gen: NAD, well-appearing  	HEENT: PERRL, supple neck, clear oropharynx  	Pulm: CTA B/L  	CV: RRR, S1S2; no rub  	Back: No spinal or CVA tenderness; no sacral edema  	Abd: +BS, soft, nontender/nondistended  	: No suprapubic tenderness  	UE: Warm, FROM, no clubbing, intact strength; no edema; no asterixis  	LE: Warm, FROM, no clubbing, intact strength; no edema  	Neuro: No focal deficits, intact gait  	Psych: Normal affect and mood  	Skin: Warm, without rashes  	Vascular access:    LABS/STUDIES  --------------------------------------------------------------------------------              7.6    13.87 >-----------<  320      [09-22-21 @ 11:40]              21.7     136  |  108  |  28.8  ----------------------------<  97      [09-22-21 @ 11:40]  4.0   |  16.0  |  3.44        Ca     7.2     [09-22-21 @ 11:40]      Mg     1.3     [09-22-21 @ 11:40]    TPro  5.4  /  Alb  1.5  /  TBili  <0.2  /  DBili  x   /  AST  13  /  ALT  7   /  AlkPhos  208  [09-22-21 @ 11:40]    PT/INR: PT 13.8 , INR 1.20       [09-21-21 @ 17:54]  PTT: 37.7       [09-21-21 @ 17:54]          [09-21-21 @ 16:31]    Creatinine Trend:  SCr 3.44 [09-22 @ 11:40]  SCr 4.07 [09-21 @ 16:31]    Urinalysis - [09-21-21 @ 23:08]      Color Yellow / Appearance Clear / SG 1.015 / pH 7.0      Gluc 50 / Ketone Negative  / Bili Negative / Urobili Negative       Blood Small / Protein 500 / Leuk Est Negative / Nitrite Negative      RBC 0-2 / WBC Negative / Hyaline  / Gran  / Sq Epi  / Non Sq Epi Occasional / Bacteria

## 2021-09-23 NOTE — PROGRESS NOTE ADULT - ASSESSMENT
DELAYING PROGRESSION OF CKD  The following section describes neville recommendations and  guidance for people with CKD with respect to delaying  progression of CKD. General lifestyle recommendations are  provided as well as caveats given for those with diabetes.  Cardiovascular risk reduction including management of  hypertension, dyslipidemia, and hyperuricemia is further  addressed. Unless otherwise stated, the guidance is intended  to apply to adults with CKD.  For the practicing clinician, ideally working with a team of  health-care professionals, it is important to institute general  lifestyle modification practices in people with CKD so that  they may gain the benefit of these in addition to more  kidney-specific strategies. Often these general measures are  overlooked or disregarded in people with CKD, thus their  utility is underscored here.  3.1 PREVENTION OF CKD PROGRESSION  The management of progression of CKD is aimed at  addressing a multiplicity of factors known to be associated  with progression. There are general measures which have  been shown to address cardiovascular health and CKD  together, or each separately. Addressing CVD risk factors may  indirectly and directly impact CKD progression. Strategies  include general lifestyle measures which improve cardiovascular  health, BP control, and interruption of the RAAS.  In addition, control of other metabolic parameters such as  blood sugar, uric acid, acidosis, and dyslipidemia may also be  important. This section deals with management of BP, RAAS  interruption, glycemic control and dietary/lifestyle manipulations  which have been examined in the context of  delaying progression of CKD.  BP and RAAS interruption  The following statements are excerpted and where  necessary, condensed, from the KDIGO Clinical Practice  Guideline for the Management of Blood Pressure in CKD.  10  3.1.1: Individualize BP targets and agents according to age,  coexistent cardiovascular disease and other comorbidities,  risk of progression of CKD, presence or absence  of retinopathy (in CKD patients with diabetes), and  tolerance of treatment as described in the KDIGO  2012 Blood Pressure Guideline. (Not Graded)  3.1.2: Inquire about postural dizziness and check for  postural hypotension regularly when treating CKD  patients with BP-lowering drugs. (Not Graded)  3.1.3: Tailor BP treatment regimens in elderly patients with  CKD by carefully considering age, comorbidities and  other therapies, with gradual escalation of treatment  and close attention to adverse events related to BP  treatment, including electrolyte disorders, acute  deterioration in kidney function, orthostatic hypotension  and drug side effects. (Not Graded)  3.1.4: We recommend that in both diabetic and nondiabetic  adults with CKD and urine albumin  excretion o30 mg/24 hours (or equivalent*) whose  office BP is consistently 4140 mm Hg systolic or  490 mm Hg diastolic be treated with BP-lowering  drugs to maintain a BP that is consistently r140 mm  Hg systolic and r90 mm Hg diastolic. (1B)  3.1.5: We suggest that in both diabetic and non-diabetic  adults with CKD and with urine albumin excretion  of Z30 mg/24 hours (or equivalent*) whose  office BP is consistently 4130 mm Hg systolic or  480 mm Hg diastolic be treated with BP-lowering  drugs to maintain a BP that is consistently r130 mm  Hg systolic and r80 mm Hg diastolic. (2D)  3.1.6: We suggest that an ARB or ACE-I be used in diabetic  adults with CKD and urine albumin excretion   mg/24 hours (or equivalent*). (2D)  3.1.7: We recommend that an ARB or ACE-I be used in  both diabetic and non-diabetic adults with CKD  and urine albumin excretion 4300 mg/24 hours  (or equivalent*). (1B)  3.1.8: There is insufficient evidence to recommend combining  an ACE-I with ARBs to prevent progression  of CKD. (Not Graded)  3.1.9: We recommend that in children with CKD, BPlowering  treatment is started when BP is consistently  above the 90th percentile for age, sex, and height. (1C)  3.1.10: We suggest that in children with CKD (particularly  those with proteinuria), BP is lowered to consistently  achieve systolic and diastolic readings less than or  equal to the 50th percentile for age, sex, and height,  unless achieving these targets is limited by signs or  symptoms of hypotension. (2D)  http://www.kidney-international.org chapter 3  & 2013 KDIGO  *Approximate equivalents for albumin excretion rate per 24 hours—  expressed as protein excretion rate per 24 hours, albumin-to-creatinine  ratio, protein-to-creatinine ratio, and protein reagent strip results— are given  in Table 7, Chapter 1  Kidney International Supplements (2013) 3, 73–90 73  3.1.11: We suggest that an ARB or ACE-I be used in children  with CKD in whom treatment with BP-lowering  drugs is indicated, irrespective of the level of proteinuria.  (2D): MOM NOTIFIED

## 2021-09-23 NOTE — PROGRESS NOTE ADULT - ASSESSMENT
acetaminophen   Tablet .. 650 milliGRAM(s) Oral every 6 hours standing  HYDROmorphone  Injectable 2 milliGRAM(s) IV Push q3h PRN severe pain    Pain controlled  Pain service will sign off  Please reconsult as needed     when due for discharge planning:  DC dilaudid IVP  start dialudid PO 4mg/8mg q4h PRn mod/sev pain

## 2021-09-23 NOTE — PROGRESS NOTE ADULT - ASSESSMENT
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  On admission pt found to have anemia and recieved 1u prbc in ED and Cr 4.07.  Pt admitted for sickle cell crisis.        Sickle cell crisis   - Continue w/ aggressive IV hydration   - c/w IV dilaudid regimen  - Pain management consulted and recs noted   - Retics and absolute retics WNL on admission  - Folate and MV  - Refer to hematology on discharge       Microcytic anemia 2/2 sickle cell anemia  - Baseline Hb unknown.  Hb 8.3 in 2020  - Hb trending down but could have dilutional component   - Iron studies ordered; will f/u results once available  - s/p 1u PRBC in ED   - Pt is hemodynamically stable at this time w/ no active bleeding  - Monitor H/H and transfuse if Hb <7      ARF / Hypocalcemia / Hypomagnesemia / Hypokalemia   - Possibly 2/2 hypoperfusion vs sickle cell dx given risk of papillary necrosis    - Last known Cr WNL in 2020  - c/w aggressive IV hydration and monitor I/O's   - Serum HCO3 trending down; on HCO3 gtt   - On magnesium supplementation   - Corrected calcium for hypoalbuminemia 8.6   - Potasium WNL after supplementation   - Renal US reviewed and echogenic kidneys noted  - Duplex US of kidneys negative for renal artery stenosis   - Avoid nephrotoxic medications or renally dose if needed  - Nephrology consulted and recs noted       Leukocytosis likely reactive 2/2 sickle cell crisis   - Pt afebrile, WBC trending down and clinically nontoxic appearing  - Will monitor off antibiotics for now  - AM labs pending to trend WBC   - Monitor temperature       Elevated ALP   - Could be 2/2 AVN   - Asymptomatic and benign exam   - ALP trending down  - AST/AL and bili WNL  - Trend LFTs       Chronic pain 2/2 AVN  - Known AVN of b/l hips and L-shoulder  - Pain management consulted        VTE ppx: heparin sq    Dispo: Pt remains acute w/ uncontrolled sickle crisis and ongoing w/u for acute renal failure and electrolyte abnormalities.      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  On admission pt found to have anemia and recieved 1u prbc in ED and Cr 4.07.  Pt admitted for sickle cell crisis.        Sickle cell crisis   - Continue w/ aggressive IV hydration   - c/w IV dilaudid regimen  - Pain management consulted and recs noted   - Retics and absolute retics WNL on admission  - Folate and MV  - Refer to hematology on discharge       Microcytic anemia 2/2 sickle cell anemia  - Baseline Hb unknown.  Hb 8.3 in 2020  - Hb trending down but could have dilutional component   - Iron studies ordered; will f/u results once available  - s/p 1u PRBC in ED   - Pt is hemodynamically stable at this time w/ no active bleeding  - Monitor H/H and transfuse if Hb <7      ARF / Hypocalcemia / Hypomagnesemia / Hypokalemia   - Possibly 2/2 hypoperfusion vs sickle cell dx given risk of papillary necrosis    - Last known Cr WNL in 2020  - c/w aggressive IV hydration and monitor I/O's   - Serum HCO3 trending down; on HCO3 gtt   - On magnesium supplementation   - Corrected calcium for hypoalbuminemia 8.6   - Potasium WNL after supplementation   - Follow up PTH and Vitamin D levels  - Renal US reviewed and echogenic kidneys noted  - Duplex US of kidneys negative for renal artery stenosis   - Avoid nephrotoxic medications or renally dose if needed  - Nephrology consulted and recs noted       Leukocytosis likely reactive 2/2 sickle cell crisis   - Pt afebrile, WBC trending down and clinically nontoxic appearing  - Will monitor off antibiotics for now  - AM labs pending to trend WBC   - Monitor temperature       Elevated ALP   - Could be 2/2 AVN   - Asymptomatic and benign exam   - ALP trending down  - AST/AL and bili WNL  - Trend LFTs       Chronic pain 2/2 AVN  - Known AVN of b/l hips and L-shoulder  - Pain management consulted        VTE ppx: heparin sq    Dispo: Pt remains acute w/ uncontrolled sickle crisis and ongoing w/u for acute renal failure and electrolyte abnormalities.

## 2021-09-23 NOTE — PROGRESS NOTE ADULT - SUBJECTIVE AND OBJECTIVE BOX
Interval Hx:  Patient seen during rounds  pain controlled on current meds  Patient denies sedation with medications       T(C): 37.1 (21 @ 08:12), Max: 37.1 (21 @ 08:12)  HR: 71 (21 @ 08:12) (71 - 87)  BP: 118/69 (21 @ 08:12) (113/73 - 127/87)  RR: 18 (21 @ 08:12) (18 - 20)  SpO2: 99% (21 @ 08:12) (98% - 100%)        acetaminophen   Tablet .. 650 milliGRAM(s) Oral every 6 hours  epoetin kristen-epbx (RETACRIT) Injectable 50017 Unit(s) SubCutaneous <User Schedule>  folic acid 1 milliGRAM(s) Oral daily  heparin   Injectable 5000 Unit(s) SubCutaneous every 8 hours  HYDROmorphone  Injectable 2 milliGRAM(s) IV Push every 3 hours PRN  influenza   Vaccine 0.5 milliLiter(s) IntraMuscular once  magnesium sulfate  IVPB 1 Gram(s) IV Intermittent every 8 hours  melatonin 3 milliGRAM(s) Oral at bedtime PRN  multivitamin Oral Tab/Cap - Peds 1 Tablet(s) Oral daily  sodium bicarbonate  Infusion 0.082 mEq/kG/Hr IV Continuous <Continuous>                          7.0    13.50 )-----------( 266      ( 23 Sep 2021 11:48 )             19.7         140  |  110<H>  |  29.5<H>  ----------------------------<  93  3.7   |  15.0<L>  |  3.32<H>    Ca    6.5<LL>      23 Sep 2021 11:48  Mg     1.3         TPro  5.0<L>  /  Alb  1.4<L>  /  TBili  <0.2<L>  /  DBili  x   /  AST  13  /  ALT  8   /  AlkPhos  195<H>      PT/INR - ( 21 Sep 2021 17:54 )   PT: 13.8 sec;   INR: 1.20 ratio         PTT - ( 21 Sep 2021 17:54 )  PTT:37.7 sec  Urinalysis Basic - ( 21 Sep 2021 23:08 )    Color: Yellow / Appearance: Clear / S.015 / pH: x  Gluc: x / Ketone: Negative  / Bili: Negative / Urobili: Negative mg/dL   Blood: x / Protein: 500 mg/dL / Nitrite: Negative   Leuk Esterase: Negative / RBC: 0-2 /HPF / WBC Negative   Sq Epi: x / Non Sq Epi: Occasional / Bacteria: x        Pain Service   [phone]

## 2021-09-23 NOTE — PROGRESS NOTE ADULT - SUBJECTIVE AND OBJECTIVE BOX
Consult Note Adult-Nephrology Attending [Charted Location: [facility] 1605 [id number]] [Authored: 22-Sep-2021 18:36]- for Visit: [id number], Complete, Entered, Signed in Full, General    Consult Note:    Provider Specialty:  Nephrology.    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  Pt reports that he was following with hematology in the city however has since relocated to [place] and needs a new doctor.  He reports poor po intake 2/2 pain but denies N/V.  He also denies fever, chills, chest pain, palpitations, sob, HA, dysuria, hematuria.       Seen/examined in CDU  Lying in bed in NAD; improved pain control; worsening renal failure; normal fx last year now with Cr of 4+      PAST HISTORY  --------------------------------------------------------------------------------  PAST MEDICAL & SURGICAL HISTORY:  Sickle Cell Anemia  SC    Personal History of PE (Pulmonary Embolism)  2008, post 6mo coumadin    Acute Chest Syndrome  2003 and possibly 2008, with intubation, exchange transfusion    Avascular Necrosis of Bone  hips    DVT (deep venous thrombosis)    No Past Surgical History      FAMILY HISTORY:  Family history of sickle cell trait in father    Family history of sickle cell trait in mother (Father)      PAST SOCIAL HISTORY:    ALLERGIES & MEDICATIONS  --------------------------------------------------------------------------------  Allergies    No Known Allergies    Intolerances      Standing Inpatient Medications  acetaminophen   Tablet .. 650 milliGRAM(s) Oral every 6 hours  folic acid 1 milliGRAM(s) Oral daily  heparin   Injectable 5000 Unit(s) SubCutaneous every 8 hours  influenza   Vaccine 0.5 milliLiter(s) IntraMuscular once  multivitamin Oral Tab/Cap - Peds 1 Tablet(s) Oral daily  sodium chloride 0.9%. 1000 milliLiter(s) IV Continuous <Continuous>    PRN Inpatient Medications  aluminum hydroxide/magnesium hydroxide/simethicone Suspension 30 milliLiter(s) Oral every 4 hours PRN  HYDROmorphone  Injectable 2 milliGRAM(s) IV Push every 3 hours PRN  melatonin 3 milliGRAM(s) Oral at bedtime PRN      REVIEW OF SYSTEMS  --------------------------------------------------------------------------------  Gen: No weight changes, fatigue, fevers/chills, weakness  Skin: No rashes  Head/Eyes/Ears/Mouth: No headache; Normal hearing; Normal vision w/o blurriness; No sinus pain/discomfort, sore throat  Respiratory: No dyspnea, cough, wheezing, hemoptysis  CV: No chest pain, PND, orthopnea  GI: No abdominal pain, diarrhea, constipation, nausea, vomiting, melena, hematochezia  : No increased frequency, dysuria, hematuria, nocturia  MSK: No joint pain/swelling; no back pain; no edema  Neuro: No dizziness/lightheadedness, weakness, seizures, numbness, tingling  Heme: No easy bruising or bleeding  Endo: No heat/cold intolerance  Psych: No significant nervousness, anxiety, stress, depression    All other systems were reviewed and are negative, except as noted.    VITALS/PHYSICAL EXAM  --------------------------------------------------------------------------------  T(C): 37 (09-22-21 @ 15:30), Max: 37.4 (09-22-21 @ 12:15)  HR: 74 (09-22-21 @ 15:30) (70 - 91)  BP: 113/73 (09-22-21 @ 15:30) (99/63 - 123/77)  RR: 18 (09-22-21 @ 15:30) (14 - 19)  SpO2: 100% (09-22-21 @ 15:30) (95% - 100%)  Wt(kg): --  Height (cm): 172.7 (09-21-21 @ 11:51)  Weight (kg): 73 (09-21-21 @ 11:51)  BMI (kg/m2): 24.5 (09-21-21 @ 11:51)  BSA (m2): 1.86 (09-21-21 @ 11:51)      Physical Exam:  	Gen: NAD, well-appearing  	HEENT: PERRL, supple neck, clear oropharynx  	Pulm: CTA B/L  	CV: RRR, S1S2; no rub  	Back: No spinal or CVA tenderness; no sacral edema  	Abd: +BS, soft, nontender/nondistended  	: No suprapubic tenderness  	UE: Warm, FROM, no clubbing, intact strength; no edema; no asterixis  	LE: Warm, FROM, no clubbing, intact strength; no edema  	Neuro: No focal deficits, intact gait  	Psych: Normal affect and mood  	Skin: Warm, without rashes  	Vascular access:    LABS/STUDIES  --------------------------------------------------------------------------------              7.6    13.87 >-----------<  320      [09-22-21 @ 11:40]              21.7     136  |  108  |  28.8  ----------------------------<  97      [09-22-21 @ 11:40]  4.0   |  16.0  |  3.44        Ca     7.2     [09-22-21 @ 11:40]      Mg     1.3     [09-22-21 @ 11:40]    TPro  5.4  /  Alb  1.5  /  TBili  <0.2  /  DBili  x   /  AST  13  /  ALT  7   /  AlkPhos  208  [09-22-21 @ 11:40]    PT/INR: PT 13.8 , INR 1.20       [09-21-21 @ 17:54]  PTT: 37.7       [09-21-21 @ 17:54]          [09-21-21 @ 16:31]    Creatinine Trend:  SCr 3.44 [09-22 @ 11:40]  SCr 4.07 [09-21 @ 16:31]    Urinalysis - [09-21-21 @ 23:08]      Color Yellow / Appearance Clear / SG 1.015 / pH 7.0      Gluc 50 / Ketone Negative  / Bili Negative / Urobili Negative       Blood Small / Protein 500 / Leuk Est Negative / Nitrite Negative      RBC 0-2 / WBC Negative / Hyaline  / Gran  / Sq Epi  / Non Sq Epi Occasional / Bacteria     US Duplex Kidneys (09.22.21 @ 10:47) >    EXAM:  US DPLX KIDNEY(IES)                          PROCEDURE DATE:  09/22/2021      INTERPRETATION:  CLINICAL INFORMATION: Acute renal failure    COMPARISON: 09/21/2020    TECHNIQUE: Color and spectral Doppler evaluation of the kidneys.    FINDINGS:    There is increased renal cortical echogenicity bilaterally.  Right kidney: 11.9 cm. No hydronephrosis.  Left kidney: 11.4 cm. No hydronephrosis.    Urinary bladder: Not visualized.    Color and spectral Doppler reveals normal, symmetric blood flow throughout both kidneys.  Peak aortic velocity is 151 cm/sec.  IVC/Renal Veins: Patent.    RIGHT    Renal artery origins are not well-visualized bilaterally.    Renal Artery:  Peak systolic velocity is 105 cm/sec proximal, 105 cm/sec mid, 99 cm/sec distal and 76 cm/sec hilum.  Upper Segmental Artery:  RI = 0.67  Middle Segmental Artery: RI = 0.61  Lower Segmental Artery: RI = 0.68    LEFT  Renal Artery:  Peak systolic velocity is 102 cm/sec proximal, 120 cm/sec mid, 43 cm/sec distal and 52cm/sec hilum.  Upper Segmental Artery:  RI = 0.71  Middle Segmental Artery: RI = 0.67  Lower Segmental Artery: RI = 0.64    IMPRESSION:    No evidence of a significant renal artery stenosis.    Bilateral echogenic kidneys compatible with medical renal disease.    CORWIN THOMPSON MD; Attending Radiologist  This document has been electronically signed. Sep 22 2021 11:45AM  Creatinine, Serum: 3.44 mg/dL (09.22.21 @ 11:40)   Historical Values  Creatinine, Serum: 3.44 mg/dL (09.22.21 @ 11:40)   Creatinine, Serum: 4.07 mg/dL (09.21.21 @ 16:31)   Creatinine, Serum: 0.61 mg/dL (05.14.20 @ 09:56)     1) ATN ; in the  setting of SCD pain crisis - Improving,   2) SCD  3) AVN  4) Acidosis    Continue with IVF;    Pain Management,

## 2021-09-23 NOTE — PROGRESS NOTE ADULT - SUBJECTIVE AND OBJECTIVE BOX
Chief Complaint:  Sickel cell crisis    SUBJECTIVE / OVERNIGHT EVENTS:  No acute events reported overnight.  Pt reports diffuse body pain w/ improving.  Pt denies chest pain, SOB, abd pain, N/V, fever, chills, dysuria or any other complaints. All remainder ROS negative.         PHYSICAL EXAM:  Vital Signs Last 24 Hrs  T(C): 37.1 (23 Sep 2021 08:12), Max: 37.1 (23 Sep 2021 08:12)  T(F): 98.7 (23 Sep 2021 08:12), Max: 98.7 (23 Sep 2021 08:12)  HR: 71 (23 Sep 2021 08:12) (71 - 87)  BP: 118/69 (23 Sep 2021 08:12) (113/73 - 127/87)  BP(mean): --  RR: 18 (23 Sep 2021 08:12) (18 - 20)  SpO2: 99% (23 Sep 2021 08:12) (98% - 100%)      GENERAL: pt examined bedside, appears uncomfortable but in NAD  HEENT: NC/AT, dry oral mucosa, clear conjunctiva, sclera nonicteric  NECK: +Rt IJ central line, supple   RESPIRATORY: Normal respiratory effort; CTA b/l, no wheezing, rhonchi, rales  CARDIOVASCULAR: RRR, normal S1 and S2, no murmur/rub/gallop  ABDOMEN: soft, NT/ND, +bowel sounds, no rebound/guarding  MSK: No joint deformities, edema, erythema  EXTREMITIES: No cynaosis, no clubbing, no lower extremity edema; Peripheral pulses are 2+ bilaterally  PSYCH: affect appropriate and cooperative  NEUROLOGY: A+O to person, place, and time, no focal neurologic deficits appreciated   SKIN: no rashes or no palpable lesions        LABS:                                   7.0    13.50 )-----------( 266      ( 23 Sep 2021 11:48 )             19.7       09-23    140  |  110<H>  |  29.5<H>  ----------------------------<  93  3.7   |  15.0<L>  |  3.32<H>    Ca    6.5<LL>      23 Sep 2021 11:48  Mg     1.3     09-22    TPro  5.0<L>  /  Alb  1.4<L>  /  TBili  <0.2<L>  /  DBili  x   /  AST  13  /  ALT  8   /  AlkPhos  195<H>               Urinalysis Basic - ( 21 Sep 2021 23:08 )    Color: Yellow / Appearance: Clear / S.015 / pH: x  Gluc: x / Ketone: Negative  / Bili: Negative / Urobili: Negative mg/dL   Blood: x / Protein: 500 mg/dL / Nitrite: Negative   Leuk Esterase: Negative / RBC: 0-2 /HPF / WBC Negative   Sq Epi: x / Non Sq Epi: Occasional / Bacteria: x        CAPILLARY BLOOD GLUCOSE            RADIOLOGY & ADDITIONAL TESTS:          MEDICATIONS  (STANDING):  folic acid 1 milliGRAM(s) Oral daily  heparin   Injectable 5000 Unit(s) SubCutaneous every 8 hours  influenza   Vaccine 0.5 milliLiter(s) IntraMuscular once  multivitamin Oral Tab/Cap - Peds 1 Tablet(s) Oral daily  sodium chloride 0.9%. 1000 milliLiter(s) (150 mL/Hr) IV Continuous <Continuous>    MEDICATIONS  (PRN):  acetaminophen   Tablet .. 650 milliGRAM(s) Oral every 6 hours PRN Temp greater or equal to 38.5C (101.3F), Mild Pain (1 - 3)  aluminum hydroxide/magnesium hydroxide/simethicone Suspension 30 milliLiter(s) Oral every 4 hours PRN Dyspepsia  HYDROmorphone  Injectable 2 milliGRAM(s) IV Push four times a day PRN Severe Pain (7 - 10)  melatonin 3 milliGRAM(s) Oral at bedtime PRN Insomnia

## 2021-09-24 NOTE — PROGRESS NOTE ADULT - ASSESSMENT
1) MAREK ; in setting of SCD pain crisis  2) SCD  3) AVN  4) Acidosis    SCr slowly improving  Continue with bicarb infusion for 48 hours;  US renal and arterial dopplers reviewed;  Continue to trend SCr;  If worsens; please recall; has been steadily downtrending  Signing off    dw Dr Kaur

## 2021-09-24 NOTE — PROGRESS NOTE ADULT - ASSESSMENT
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  On admission pt found to have anemia and recieved 1u prbc in ED and Cr 4.07.  Pt admitted for sickle cell crisis.      Acute sickle cell crisis   - Continue w/ aggressive IV hydration   - IV Dilaudid transitioned to PO regimen per pain management recs  - Retics and absolute retics WNL on admission  - Folate and MV  - Refer to hematology on discharge       Acute on chronic microcytic anemia 2/2 sickle cell anemia  - Baseline Hb unknown.  Hb 8.3 in 2020  - CBC reviewed and Hb <7; 1u PRBC ordered and will f/u post transfusion CBC to reassess H/H  - Iron studies reviewed and will start on po supplementation   - s/p 1u PRBC in ED on admission     - Pt is hemodynamically stable at this time w/ no active bleeding  - Monitor H/H and transfuse if Hb <7      ARF / Hypocalcemia / Hypomagnesemia / Hypokalemia   - Possibly 2/2 hypoperfusion vs sickle cell dx given risk of papillary necrosis    - Last known Cr WNL in 2020  - c/w aggressive IV hydration and monitor I/O's   - Serum HCO3 improving with HCO3 gtt  - On magnesium supplementation and Mg trending up  - Corrected calcium for hypoalbuminemia 9.0   - Potasium WNL after supplementation   - Follow up PTH and Vitamin D levels  - Renal US reviewed and echogenic kidneys noted  - Duplex US of kidneys negative for renal artery stenosis   - Avoid nephrotoxic medications or renally dose if needed  - Nephrology consulted and recs noted       Leukocytosis likely reactive 2/2 sickle cell crisis   - Pt afebrile, WBC trending down and clinically nontoxic appearing  - Will monitor off antibiotics for now  - Trend WBCs and monitor temperature       Elevated ALP   - Could be 2/2 AVN   - Asymptomatic and benign exam   - ALP trending down  - AST/AL and bili WNL  - Trend LFTs       Chronic pain 2/2 AVN  - Known AVN of b/l hips and L-shoulder  - PRN pain control        VTE ppx: heparin sq    Dispo: Pt remains acute w/ uncontrolled sickle crisis, worsening anemia requiring transfusion and ongoing w/u for acute renal failure and electrolyte abnormalities.

## 2021-09-24 NOTE — PROGRESS NOTE ADULT - SUBJECTIVE AND OBJECTIVE BOX
Chief Complaint:  Sickel cell crisis    SUBJECTIVE / OVERNIGHT EVENTS:  No acute events reported overnight.  Pt reporting better pain control.  Pt denies chest pain, SOB, abd pain, N/V, fever, chills, dysuria or any other complaints. All remainder ROS negative.         PHYSICAL EXAM:  Vital Signs Last 24 Hrs  T(C): 36.4 (24 Sep 2021 09:57), Max: 37.5 (23 Sep 2021 15:22)  T(F): 97.6 (24 Sep 2021 09:57), Max: 99.5 (23 Sep 2021 15:22)  HR: 78 (24 Sep 2021 09:57) (66 - 84)  BP: 117/74 (24 Sep 2021 09:57) (108/60 - 134/89)  BP(mean): --  RR: 18 (24 Sep 2021 09:57) (18 - 18)  SpO2: 99% (24 Sep 2021 09:57) (98% - 100%)      GENERAL: pt examined bedside, appears uncomfortable but in NAD  HEENT: NC/AT, dry oral mucosa, clear conjunctiva, sclera nonicteric  NECK: +Rt IJ central line, supple   RESPIRATORY: Normal respiratory effort; CTA b/l, no wheezing, rhonchi, rales  CARDIOVASCULAR: RRR, normal S1 and S2, no murmur/rub/gallop  ABDOMEN: soft, NT/ND, +bowel sounds, no rebound/guarding  MSK: No joint deformities, edema, erythema  EXTREMITIES: No cynaosis, no clubbing, no lower extremity edema; Peripheral pulses are 2+ bilaterally  PSYCH: affect appropriate and cooperative  NEUROLOGY: A+O to person, place, and time, no focal neurologic deficits appreciated   SKIN: no rashes or no palpable lesions        LABS:                                         6.8    14.52 )-----------( 243      ( 24 Sep 2021 06:47 )             19.2       09-24    141  |  112<H>  |  30.0<H>  ----------------------------<  77  3.8   |  17.0<L>  |  3.26<H>    Ca    6.9<L>      24 Sep 2021 06:47  Phos  4.5     09-24  Mg     1.6     09-24    TPro  5.0<L>  /  Alb  1.4<L>  /  TBili  <0.2<L>  /  DBili  x   /  AST  29  /  ALT  12  /  AlkPhos  187<H>  -             Urinalysis Basic - ( 21 Sep 2021 23:08 )    Color: Yellow / Appearance: Clear / S.015 / pH: x  Gluc: x / Ketone: Negative  / Bili: Negative / Urobili: Negative mg/dL   Blood: x / Protein: 500 mg/dL / Nitrite: Negative   Leuk Esterase: Negative / RBC: 0-2 /HPF / WBC Negative   Sq Epi: x / Non Sq Epi: Occasional / Bacteria: x        CAPILLARY BLOOD GLUCOSE            RADIOLOGY & ADDITIONAL TESTS:          MEDICATIONS  (STANDING):  folic acid 1 milliGRAM(s) Oral daily  heparin   Injectable 5000 Unit(s) SubCutaneous every 8 hours  influenza   Vaccine 0.5 milliLiter(s) IntraMuscular once  multivitamin Oral Tab/Cap - Peds 1 Tablet(s) Oral daily  sodium chloride 0.9%. 1000 milliLiter(s) (150 mL/Hr) IV Continuous <Continuous>    MEDICATIONS  (PRN):  acetaminophen   Tablet .. 650 milliGRAM(s) Oral every 6 hours PRN Temp greater or equal to 38.5C (101.3F), Mild Pain (1 - 3)  aluminum hydroxide/magnesium hydroxide/simethicone Suspension 30 milliLiter(s) Oral every 4 hours PRN Dyspepsia  HYDROmorphone  Injectable 2 milliGRAM(s) IV Push four times a day PRN Severe Pain (7 - 10)  melatonin 3 milliGRAM(s) Oral at bedtime PRN Insomnia

## 2021-09-24 NOTE — PROGRESS NOTE ADULT - SUBJECTIVE AND OBJECTIVE BOX
[facility] DIVISION OF KIDNEY DISEASES AND HYPERTENSION -- FOLLOW UP NOTE  --------------------------------------------------------------------------------  Chief Complaint:  MAREK    24 hour events/subjective:  SCr improving;      PAST HISTORY  --------------------------------------------------------------------------------  No significant changes to PMH, PSH, FHx, SHx, unless otherwise noted    ALLERGIES & MEDICATIONS  --------------------------------------------------------------------------------  Allergies    No Known Allergies    Intolerances      Standing Inpatient Medications  acetaminophen   Tablet .. 650 milliGRAM(s) Oral every 6 hours  epoetin kristen-epbx (RETACRIT) Injectable 30028 Unit(s) SubCutaneous <User Schedule>  ferrous    sulfate 325 milliGRAM(s) Oral daily  folic acid 1 milliGRAM(s) Oral daily  heparin   Injectable 5000 Unit(s) SubCutaneous every 8 hours  influenza   Vaccine 0.5 milliLiter(s) IntraMuscular once  magnesium sulfate  IVPB 1 Gram(s) IV Intermittent every 8 hours  multivitamin Oral Tab/Cap - Peds 1 Tablet(s) Oral daily  sodium bicarbonate  Infusion 0.082 mEq/kG/Hr IV Continuous <Continuous>    PRN Inpatient Medications  HYDROmorphone   Tablet 4 milliGRAM(s) Oral every 4 hours PRN  HYDROmorphone   Tablet 8 milliGRAM(s) Oral every 4 hours PRN  melatonin 3 milliGRAM(s) Oral at bedtime PRN      REVIEW OF SYSTEMS  --------------------------------------------------------------------------------  Gen: No weight changes, fatigue, fevers/chills, weakness  Skin: No rashes  Head/Eyes/Ears/Mouth: No headache; Normal hearing; Normal vision w/o blurriness; No sinus pain/discomfort, sore throat  Respiratory: No dyspnea, cough, wheezing, hemoptysis  CV: No chest pain, PND, orthopnea  GI: No abdominal pain, diarrhea, constipation, nausea, vomiting, melena, hematochezia  : No increased frequency, dysuria, hematuria, nocturia  MSK: No joint pain/swelling; no back pain; no edema  Neuro: No dizziness/lightheadedness, weakness, seizures, numbness, tingling  Heme: No easy bruising or bleeding  Endo: No heat/cold intolerance  Psych: No significant nervousness, anxiety, stress, depression    All other systems were reviewed and are negative, except as noted.    VITALS/PHYSICAL EXAM  --------------------------------------------------------------------------------  T(C): 36.4 (09-24-21 @ 09:57), Max: 37.5 (09-23-21 @ 15:22)  HR: 78 (09-24-21 @ 09:57) (66 - 84)  BP: 117/74 (09-24-21 @ 09:57) (108/60 - 131/88)  RR: 18 (09-24-21 @ 09:57) (18 - 18)  SpO2: 99% (09-24-21 @ 09:57) (98% - 100%)  Wt(kg): --  Height (cm): 172.7 (09-24-21 @ 02:34)      Physical Exam:  	Gen: NAD   	HEENT: PERRL, supple neck, clear oropharynx  	Pulm: CTA B/L  	CV: RRR, S1S2; no rub  	Back: No spinal or CVA tenderness; no sacral edema  	Abd: +BS, soft, nontender/nondistended  	: No suprapubic tenderness  	UE: Warm, FROM, no clubbing, intact strength; no edema; no asterixis  	LE: Warm, FROM, no clubbing, intact strength; no edema  	Neuro: No focal deficits, intact gait  	Psych: Normal affect and mood  	Skin: Warm, without rashes     LABS/STUDIES  --------------------------------------------------------------------------------              6.8    14.52 >-----------<  243      [09-24-21 @ 06:47]              19.2     141  |  112  |  30.0  ----------------------------<  77      [09-24-21 @ 06:47]  3.8   |  17.0  |  3.26        Ca     6.9     [09-24-21 @ 06:47]      Mg     1.6     [09-24-21 @ 06:47]      Phos  4.5     [09-24-21 @ 06:47]    TPro  5.0  /  Alb  1.4  /  TBili  <0.2  /  DBili  x   /  AST  29  /  ALT  12  /  AlkPhos  187  [09-24-21 @ 06:47]          Creatinine Trend:  SCr 3.26 [09-24 @ 06:47]  SCr 3.32 [09-23 @ 11:48]  SCr 3.44 [09-22 @ 11:40]  SCr 4.07 [09-21 @ 16:31]    Urinalysis - [09-21-21 @ 23:08]      Color Yellow / Appearance Clear / SG 1.015 / pH 7.0      Gluc 50 / Ketone Negative  / Bili Negative / Urobili Negative       Blood Small / Protein 500 / Leuk Est Negative / Nitrite Negative      RBC 0-2 / WBC Negative / Hyaline  / Gran  / Sq Epi  / Non Sq Epi Occasional / Bacteria       Iron 41, TIBC 133, %sat 31      [09-24-21 @ 06:47]  Ferritin 220      [09-24-21 @ 06:47]

## 2021-09-25 NOTE — PROGRESS NOTE ADULT - ASSESSMENT
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  On admission pt found to have anemia and recieved 1u prbc in ED and Cr 4.07.  Pt admitted for sickle cell crisis.      Acute sickle cell crisis   - Continue w/ aggressive IV hydration   - IV Dilaudid transitioned to PO regimen on 9/24 however pt still not well controlled, retic count increasing - monitor - low threshold to start iv pain meds if needed again   - Retics and absolute retics WNL on admission  - Folate and MV  - Refer to hematology on discharge       Acute on chronic microcytic anemia 2/2 sickle cell anemia  - Baseline Hb unknown.  Hb 8.3 in 2020  - CBC reviewed and Hb <7; 1u PRBC ordered and will f/u post transfusion CBC to reassess H/H  - Iron studies reviewed and will start on po supplementation   - s/p 1u PRBC in ED on admission     - Pt is hemodynamically stable at this time w/ no active bleeding  - Monitor H/H and transfuse if Hb <7      ARF / Hypocalcemia / Hypomagnesemia / Hypokalemia   - Possibly 2/2 hypoperfusion vs sickle cell dx given risk of papillary necrosis    - Last known Cr WNL in 2020  - c/w aggressive IV hydration and monitor I/O's   - Serum HCO3 improving with HCO3 gtt  - On magnesium supplementation and Mg trending up  - Corrected calcium for hypoalbuminemia 9.0   - Potasium WNL after supplementation   - Follow up PTH and Vitamin D levels  - Renal US reviewed and echogenic kidneys noted  - Duplex US of kidneys negative for renal artery stenosis   - Avoid nephrotoxic medications or renally dose if needed  - Nephrology consulted and recs noted       Leukocytosis likely reactive 2/2 sickle cell crisis   - Pt afebrile, WBC trending down and clinically nontoxic appearing  - Will monitor off antibiotics for now  - Trend WBCs and monitor temperature       Elevated ALP   - Could be 2/2 AVN   - Asymptomatic and benign exam   - ALP trending down  - AST/AL and bili WNL  - Trend LFTs       Chronic pain 2/2 AVN  - Known AVN of b/l hips and L-shoulder  - PRN pain control        VTE ppx: heparin sq    Dispo: Pt remains acute w/ uncontrolled sickle crisis, worsening anemia requiring transfusion and ongoing w/u for acute renal failure and electrolyte abnormalities.

## 2021-09-25 NOTE — PROGRESS NOTE ADULT - SUBJECTIVE AND OBJECTIVE BOX
Chief Complaint:  Sickel cell crisis    SUBJECTIVE / OVERNIGHT EVENTS:  No acute events reported overnight.  Pt reporting about 30% better pain control, still has 7/10 pain.  Pt denies chest pain, SOB, abd pain, N/V, fever, chills, dysuria or any other complaints. All remainder ROS negative.         PHYSICAL EXAM:    Vital Signs Last 24 Hrs  T(C): 37.3 (25 Sep 2021 04:00), Max: 37.3 (25 Sep 2021 04:00)  T(F): 99.1 (25 Sep 2021 04:00), Max: 99.1 (25 Sep 2021 04:00)  HR: 110 (25 Sep 2021 07:05) (70 - 110)  BP: 118/72 (25 Sep 2021 07:05) (115/77 - 140/89)  BP(mean): --  RR: 18 (24 Sep 2021 22:16) (18 - 18)  SpO2: 96% (25 Sep 2021 04:00) (96% - 100%)      GENERAL: pt examined bedside, appears uncomfortable but in NAD  HEENT: NC/AT, dry oral mucosa, clear conjunctiva, sclera nonicteric  NECK: +Rt IJ central line, supple   RESPIRATORY: Normal respiratory effort; CTA b/l, no wheezing  CARDIOVASCULAR: RRR, normal S1 and S2, no murmur/rub/gallop  ABDOMEN: soft, NT/ND, +bowel sounds, no rebound/guarding  MSK: No joint deformities, edema, erythema  EXTREMITIES: No cynaosis, no clubbing, no lower extremity edema  PSYCH: affect appropriate and cooperative  NEUROLOGY: A+O to person, place, and time  SKIN: no rashes or no palpable lesions        LABS:                                       11.4   12.45 )-----------( 150      ( 25 Sep 2021 08:01 )             32.8   09-25    141  |  109<H>  |  26.5<H>  ----------------------------<  73  3.2<L>   |  18.0<L>  |  3.00<H>    Ca    6.9<L>      25 Sep 2021 08:01  Phos  4.0     09-25  Mg     1.9     09-25    TPro  5.0<L>  /  Alb  1.6<L>  /  TBili  0.2<L>  /  DBili  x   /  AST  16  /  ALT  9   /  AlkPhos  230<H>  09-25              RADIOLOGY & ADDITIONAL TESTS:        MEDICATIONS  (STANDING):  acetaminophen   Tablet .. 650 milliGRAM(s) Oral every 6 hours  epoetin kristen-epbx (RETACRIT) Injectable 80857 Unit(s) SubCutaneous <User Schedule>  ferrous    sulfate 325 milliGRAM(s) Oral daily  folic acid 1 milliGRAM(s) Oral daily  heparin   Injectable 5000 Unit(s) SubCutaneous every 8 hours  influenza   Vaccine 0.5 milliLiter(s) IntraMuscular once  multivitamin Oral Tab/Cap - Peds 1 Tablet(s) Oral daily  sodium bicarbonate  Infusion 0.082 mEq/kG/Hr (80 mL/Hr) IV Continuous <Continuous>    MEDICATIONS  (PRN):  HYDROmorphone   Tablet 4 milliGRAM(s) Oral every 4 hours PRN Moderate Pain (4 - 6)  HYDROmorphone   Tablet 8 milliGRAM(s) Oral every 4 hours PRN Severe Pain (7 - 10)  melatonin 3 milliGRAM(s) Oral at bedtime PRN Insomnia

## 2021-09-26 NOTE — PROGRESS NOTE ADULT - ASSESSMENT
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  On admission pt found to have anemia and recieved 1u prbc in ED and Cr 4.07.  Pt admitted for sickle cell crisis.      Acute sickle cell crisis   - Continue w/ aggressive IV hydration   - IV Dilaudid transitioned to PO regimen on 9/24 however pt still not well controlled, retic count increasing - monitor - low threshold to start iv pain meds if needed again  - Hematology consult palced given drop in H&H again  - active sickling  - may need transfusion again   - Folate and MV      Acute on chronic microcytic anemia 2/2 sickle cell anemia  - Baseline Hb unknown.  Hb 8.3 in 2020  - CBC reviewed and Hb <7; 1u PRBC ordered and will f/u post transfusion CBC to reassess H/H  - Iron studies reviewed and will start on po supplementation   - s/p 1u PRBC in ED on admission     - Pt is hemodynamically stable at this time w/ no active bleeding  - Monitor H/H and transfuse if Hb <7      ARF / Hypocalcemia / Hypomagnesemia / Hypokalemia   - Possibly 2/2 hypoperfusion vs sickle cell dx given risk of papillary necrosis    - Last known Cr WNL in 2020  - c/w aggressive IV hydration and monitor I/O's   - Serum HCO3 improving with HCO3 gtt  - Follow up PTH and Vitamin D levels  - Renal US reviewed and echogenic kidneys noted  - Duplex US of kidneys negative for renal artery stenosis   - Nephrology consulted and recs noted     Electrolytes abnormalities -  replete and monitor     Leukocytosis likely reactive 2/2 sickle cell crisis   - Pt afebrile, WBC trending down and clinically nontoxic appearing  - Will monitor off antibiotics for now  - Trend WBCs and monitor temperature       Elevated ALP   - Could be 2/2 AVN   - Asymptomatic and benign exam   - ALP trending down  - AST/AL and bili WNL  - Trend LFTs       Chronic pain 2/2 AVN  - Known AVN of b/l hips and L-shoulder  - PRN pain control        VTE ppx: heparin sq    Dispo: Pt remains acute w/ uncontrolled sickle crisis, worsening anemia required transfusion and ongoing w/u for acute renal failure and electrolyte abnormalities.       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  On admission pt found to have anemia and recieved 1u prbc in ED and Cr 4.07.  Pt admitted for sickle cell crisis.      Acute sickle cell crisis   - Continue w/ aggressive IV hydration   - IV Dilaudid transitioned to PO regimen on 9/24 however pt still not well controlled, retic count increasing - monitor - low threshold to start iv pain meds if needed again  - Hematology consult palced given drop in H&H again  - active sickling  - may need transfusion again   - Folate and MV      Acute on chronic microcytic anemia 2/2 sickle cell anemia  - Baseline Hb unknown.  Hb 8.3 in 2020  - s/p 2units PRBC so far, h&H dropping again with reticulocytosis   - Monitor H/H and transfuse if Hb <7      ARF / Hypocalcemia / Hypomagnesemia / Hypokalemia   - Possibly 2/2 hypoperfusion vs sickle cell dx given risk of papillary necrosis    - Last known Cr WNL in 2020  - c/w aggressive IV hydration and monitor I/O's   - Serum HCO3 improving with HCO3 gtt  - Follow up PTH and Vitamin D levels  - Renal US reviewed and echogenic kidneys noted  - Duplex US of kidneys negative for renal artery stenosis   - Nephrology consulted and recs noted     Electrolytes abnormalities -  replete and monitor     Leukocytosis likely reactive 2/2 sickle cell crisis   - Pt afebrile, WBC trending down and clinically nontoxic appearing  - Will monitor off antibiotics for now  - Trend WBCs and monitor temperature       Elevated ALP   - Could be 2/2 AVN   - Asymptomatic and benign exam   - ALP trending down  - AST/AL and bili WNL  - Trend LFTs       Chronic pain 2/2 AVN  - Known AVN of b/l hips and L-shoulder  - PRN pain control        VTE ppx: heparin sq    Dispo: Pt remains acute w/ uncontrolled sickle crisis, worsening anemia required transfusion and ongoing w/u for acute renal failure and electrolyte abnormalities.

## 2021-09-26 NOTE — PROGRESS NOTE ADULT - SUBJECTIVE AND OBJECTIVE BOX
Chief Complaint:  Sickel cell crisis    SUBJECTIVE / OVERNIGHT EVENTS:  No acute events reported overnight.  Pt still has pain especially his hips.  Pt denies chest pain, SOB, abd pain, N/V, fever, chills, dysuria or any other complaints. All remainder ROS negative.       PHYSICAL EXAM:    Vital Signs Last 24 Hrs  T(C): 36.9 (26 Sep 2021 08:33), Max: 37.7 (25 Sep 2021 16:19)  T(F): 98.5 (26 Sep 2021 08:33), Max: 99.9 (25 Sep 2021 16:19)  HR: 76 (26 Sep 2021 08:33) (76 - 108)  BP: 109/69 (26 Sep 2021 08:33) (109/69 - 127/70)  BP(mean): --  RR: 20 (26 Sep 2021 08:33) (18 - 20)  SpO2: 98% (26 Sep 2021 08:33) (95% - 99%)      GENERAL: pt examined bedside, appears uncomfortable but in NAD  HEENT: NC/AT, dry oral mucosa, clear conjunctiva, sclera nonicteric  NECK: +Rt IJ central line, supple   RESPIRATORY: Normal respiratory effort; CTA b/l, no wheezing  CARDIOVASCULAR: RRR, normal S1 and S2, no murmur/rub/gallop  ABDOMEN: soft, NT/ND, +bowel sounds, no rebound/guarding  MSK: No joint deformities, edema, erythema  EXTREMITIES: No cynaosis, no clubbing, no lower extremity edema  PSYCH: affect appropriate and cooperative  NEUROLOGY: A+O to person, place, and time  SKIN: no rashes or no palpable lesions        LABS:                                       7.6    15.42 )-----------( 153      ( 26 Sep 2021 06:46 )             21.6   09-26    143  |  113<H>  |  25.9<H>  ----------------------------<  86  3.3<L>   |  20.0<L>  |  3.42<H>    Ca    7.0<L>      26 Sep 2021 06:46  Phos  4.0     09-25  Mg     1.9     09-25    TPro  5.0<L>  /  Alb  1.6<L>  /  TBili  0.2<L>  /  DBili  x   /  AST  16  /  ALT  9   /  AlkPhos  230<H>  09-25                RADIOLOGY & ADDITIONAL TESTS:      MEDICATIONS  (STANDING):  acetaminophen   Tablet .. 650 milliGRAM(s) Oral every 6 hours  epoetin kristen-epbx (RETACRIT) Injectable 32544 Unit(s) SubCutaneous <User Schedule>  ferrous    sulfate 325 milliGRAM(s) Oral daily  folic acid 1 milliGRAM(s) Oral daily  heparin   Injectable 5000 Unit(s) SubCutaneous every 8 hours  influenza   Vaccine 0.5 milliLiter(s) IntraMuscular once  multivitamin Oral Tab/Cap - Peds 1 Tablet(s) Oral daily  potassium chloride    Tablet ER 40 milliEquivalent(s) Oral once  sodium bicarbonate  Infusion 0.082 mEq/kG/Hr (80 mL/Hr) IV Continuous <Continuous>  sodium chloride 0.45%. 1000 milliLiter(s) (200 mL/Hr) IV Continuous <Continuous>    MEDICATIONS  (PRN):  HYDROmorphone   Tablet 4 milliGRAM(s) Oral every 4 hours PRN Moderate Pain (4 - 6)  HYDROmorphone   Tablet 8 milliGRAM(s) Oral every 4 hours PRN Severe Pain (7 - 10)  melatonin 3 milliGRAM(s) Oral at bedtime PRN Insomnia

## 2021-09-27 NOTE — CONSULT NOTE ADULT - SUBJECTIVE AND OBJECTIVE BOX
HPI: Patient is a 36y Male seen on consultation for the evaluation and management of SSA. Pt has hx of frequent crisis pain; management elsewhere.  Gives hx of AVN bilateral hip. Has hx of PE in past and hx of Acute chest Sx 2003 and 2008 requiring exchange tx.  Admitted with c/o pain in back and shoulder.  Seen this PM, very uncomfortable; now has PCA  Denies c/o SOB, chest pain or palpitations.  No fevers, nausea or vomiting.  No headaches or dizziness    PAST MEDICAL & SURGICAL HISTORY:  Sickle Cell Anemia  SC    Personal History of PE (Pulmonary Embolism)  2008, post 6mo coumadin    Acute Chest Syndrome  2003 and possibly 2008, with intubation, exchange transfusion    Avascular Necrosis of Bone  hips    DVT (deep venous thrombosis)    No Past Surgical History        REVIEW OF SYSTEMS      General:Back pain, bone pain	    Skin/Breast:no rash  	  Ophthalmologic:denies visual complaints  	  ENMT:	no sore throat    Respiratory and Thorax:no SOB  	  Cardiovascular:	no chest pain    Gastrointestinal:	no nausea, vomiting or abd pain      Musculoskeletal:	joint pain, back pain  MEDICATIONS  (STANDING):  epoetin kristen-epbx (RETACRIT) Injectable 99335 Unit(s) SubCutaneous <User Schedule>  ferrous    sulfate 325 milliGRAM(s) Oral daily  folic acid 1 milliGRAM(s) Oral daily  heparin   Injectable 5000 Unit(s) SubCutaneous every 8 hours  HYDROmorphone PCA (1 mG/mL) 30 milliLiter(s) PCA Continuous PCA Continuous  influenza   Vaccine 0.5 milliLiter(s) IntraMuscular once  multivitamin Oral Tab/Cap - Peds 1 Tablet(s) Oral daily  potassium chloride  10 mEq/100 mL IVPB 10 milliEquivalent(s) IV Intermittent every 1 hour  sodium bicarbonate  Infusion 0.082 mEq/kG/Hr (80 mL/Hr) IV Continuous <Continuous>  sodium chloride 0.45%. 1000 milliLiter(s) (200 mL/Hr) IV Continuous <Continuous>    MEDICATIONS  (PRN):  melatonin 3 milliGRAM(s) Oral at bedtime PRN Insomnia      Allergies    No Known Allergies    Intolerances        FAMILY HISTORY:  Family history of sickle cell trait in father    Family history of sickle cell trait in mother (Father)              Vital Signs Last 24 Hrs  T(C): 37.2 (27 Sep 2021 11:32), Max: 37.4 (27 Sep 2021 06:03)  T(F): 99 (27 Sep 2021 11:32), Max: 99.3 (27 Sep 2021 06:03)  HR: 99 (27 Sep 2021 11:32) (89 - 99)  BP: 132/78 (27 Sep 2021 11:32) (105/79 - 132/78)  BP(mean): --  RR: 20 (27 Sep 2021 11:32) (20 - 20)  SpO2: 98% (27 Sep 2021 11:32) (98% - 99%)    PHYSICAL EXAM:      Constitutional:  Appears uncomfortable    Eyes:mildly icteric        Neck:no adenopathy          Respiratory:clear    Cardiovascular:RRR    Gastrointestinal:Soft, non-distended, non-tender        Extremities:no edema        Neurological:awake, alert, oriented      LABS:                        8.0    18.77 )-----------( 158      ( 27 Sep 2021 06:59 )             23.7     09-27    141  |  111<H>  |  25.2<H>  ----------------------------<  80  3.3<L>   |  18.0<L>  |  3.22<H>    Ca    7.3<L>      27 Sep 2021 06:59            RADIOLOGY & ADDITIONAL STUDIES:

## 2021-09-27 NOTE — PROGRESS NOTE ADULT - ASSESSMENT
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  On admission pt found to have anemia and recieved 1u prbc in ED and Cr 4.07.  Pt admitted for sickle cell crisis.      Acute sickle cell crisis   - Continue w/ aggressive IV hydration   - IV Dilaudid transitioned to PO regimen on 9/24 however pt still not well controlled, retic count increasing - monitor - low threshold to start iv pain meds if needed again  - Hematology consult palced again today - he was not seen yesterday given drop in H&H again  - active sickling  - may need transfusion again   - Folate and MV      Acute on chronic microcytic anemia 2/2 sickle cell anemia  - Baseline Hb unknown.  Hb 8.3 in 2020  - s/p 2units PRBC so far, h&H dropping again with reticulocytosis   - Monitor H/H and transfuse if Hb <7      ARF / Hypocalcemia / Hypomagnesemia / Hypokalemia   - Possibly 2/2 hypoperfusion vs sickle cell dx given risk of papillary necrosis    - Last known Cr WNL in 2020  - c/w aggressive IV hydration and monitor I/O's   - Serum HCO3 improving with HCO3 gtt  - Follow up PTH and Vitamin D levels  - Renal US reviewed and echogenic kidneys noted  - Duplex US of kidneys negative for renal artery stenosis   - Nephrology consulted and recs noted     Electrolytes abnormalities -  replete and monitor     Leukocytosis likely reactive 2/2 sickle cell crisis   - Pt afebrile, WBC trending down and clinically nontoxic appearing  - Will monitor off antibiotics for now  - Trend WBCs and monitor temperature       Elevated ALP   - Could be 2/2 AVN   - Asymptomatic and benign exam   - ALP trending down  - AST/AL and bili WNL  - Trend LFTs       Chronic pain 2/2 AVN  - Known AVN of b/l hips and L-shoulder  - PRN pain control        VTE ppx: heparin sq    Dispo: Pt remains acute w/ uncontrolled sickle crisis, worsening anemia required transfusion and ongoing w/u for acute renal failure and electrolyte abnormalities.       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  On admission pt found to have anemia and recieved 1u prbc in ED and Cr 4.07.  Pt admitted for sickle cell crisis.      Acute sickle cell crisis   - Continue w/ aggressive IV hydration   - start PCA analgesia as pt looks uncomfortable   - Hematology consult palced again today - he was not seen yesterday given drop in H&H again  - active sickling  - may need transfusion again   - Folate and MV      Acute on chronic microcytic anemia 2/2 sickle cell anemia  - Baseline Hb unknown.  Hb 8.3 in 2020  - s/p 2units PRBC so far, h&H dropping again with reticulocytosis   - Monitor H/H and transfuse if Hb <7      ARF / Hypocalcemia / Hypomagnesemia / Hypokalemia   - Possibly 2/2 hypoperfusion vs sickle cell dx given risk of papillary necrosis    - Last known Cr WNL in 2020  - c/w aggressive IV hydration and monitor I/O's   - Serum HCO3 improving with HCO3 gtt  - Follow up PTH and Vitamin D levels  - Renal US reviewed and echogenic kidneys noted  - Duplex US of kidneys negative for renal artery stenosis   - Nephrology consulted and recs noted     Electrolytes abnormalities -  replete and monitor     Leukocytosis likely reactive 2/2 sickle cell crisis   - Pt afebrile, WBC trending down and clinically nontoxic appearing  - Will monitor off antibiotics for now  - Trend WBCs and monitor temperature       Elevated ALP   - Could be 2/2 AVN   - Asymptomatic and benign exam   - ALP trending down  - AST/AL and bili WNL  - Trend LFTs       Chronic pain 2/2 AVN  - Known AVN of b/l hips and L-shoulder  - PRN pain control        VTE ppx: heparin sq    Dispo: Pt remains acute w/ uncontrolled sickle crisis, worsening anemia required transfusion and ongoing w/u for acute renal failure and electrolyte abnormalities.

## 2021-09-27 NOTE — PROGRESS NOTE ADULT - SUBJECTIVE AND OBJECTIVE BOX
Chief Complaint:  Sickel cell crisis  c/o severe back pain not controlled with po medications.   SUBJECTIVE / OVERNIGHT EVENTS:  No acute events reported overnight.   Pt denies chest pain, SOB, abd pain, N/V, fever, chills, dysuria or any other complaints. All remainder ROS negative.       PHYSICAL EXAM:    Vital Signs Last 24 Hrs  T(C): 37.2 (27 Sep 2021 11:32), Max: 37.4 (27 Sep 2021 06:03)  T(F): 99 (27 Sep 2021 11:32), Max: 99.3 (27 Sep 2021 06:03)  HR: 99 (27 Sep 2021 11:32) (89 - 99)  BP: 132/78 (27 Sep 2021 11:32) (105/79 - 132/78)  BP(mean): --  RR: 20 (27 Sep 2021 11:32) (20 - 20)  SpO2: 98% (27 Sep 2021 11:32) (98% - 99%)      GENERAL: pt examined bedside, appears uncomfortable but in NAD  HEENT: NC/AT, dry oral mucosa, clear conjunctiva, sclera nonicteric  NECK: +Rt IJ central line, supple   RESPIRATORY: Normal respiratory effort; CTA b/l, no wheezing  CARDIOVASCULAR: RRR, normal S1 and S2, no murmur/rub/gallop  ABDOMEN: soft, NT/ND, +bowel sounds, no rebound/guarding  MSK: No joint deformities, edema, erythema  EXTREMITIES: No cynaosis, no clubbing, no lower extremity edema  PSYCH: affect appropriate and cooperative  NEUROLOGY: A+O to person, place, and time  SKIN: no rashes or no palpable lesions        LABS:                                              8.0    18.77 )-----------( 158      ( 27 Sep 2021 06:59 )             23.7   09-27    141  |  111<H>  |  25.2<H>  ----------------------------<  80  3.3<L>   |  18.0<L>  |  3.22<H>    Ca    7.3<L>      27 Sep 2021 06:59                  RADIOLOGY & ADDITIONAL TESTS:    MEDICATIONS  (STANDING):  acetaminophen   Tablet .. 650 milliGRAM(s) Oral every 6 hours  epoetin kristen-epbx (RETACRIT) Injectable 65283 Unit(s) SubCutaneous <User Schedule>  ferrous    sulfate 325 milliGRAM(s) Oral daily  folic acid 1 milliGRAM(s) Oral daily  heparin   Injectable 5000 Unit(s) SubCutaneous every 8 hours  HYDROmorphone PCA (1 mG/mL) 30 milliLiter(s) PCA Continuous PCA Continuous  influenza   Vaccine 0.5 milliLiter(s) IntraMuscular once  multivitamin Oral Tab/Cap - Peds 1 Tablet(s) Oral daily  sodium bicarbonate  Infusion 0.082 mEq/kG/Hr (80 mL/Hr) IV Continuous <Continuous>  sodium chloride 0.45%. 1000 milliLiter(s) (200 mL/Hr) IV Continuous <Continuous>    MEDICATIONS  (PRN):  melatonin 3 milliGRAM(s) Oral at bedtime PRN Insomnia
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
## 2021-09-28 NOTE — PROGRESS NOTE ADULT - REASON FOR ADMISSION
sickle cell crisis

## 2021-09-28 NOTE — CONSULT NOTE ADULT - SUBJECTIVE AND OBJECTIVE BOX
General surgery consult    Consult for assessment of central venous access and chronic wound  -HPI as below  Briefly.  Patient is a 36 M who presents with sickle cell crisis at this time with ongoing back pain. Medical consult placed for request of central venous access. Per report, IV access team unable to obtain IV access in patient that requires IV analgesia via PCA and IV antibiotics. They were able to obtain a peripheral 20 guage IV. Bedside discussion with infectious disease,  reports patient does not require IV antibiotics at this time. Patient states he does not want a central line. He had one previously on admission but "fell out." He refuses central venous access and states he understands why he may need it, but his current IV is sufficient for him. Regarding consult for chronic right thigh wound. Patient states it has been present since July and has been well managed with daily wound care. He has no pain or burning sensation from site. No drainage. Has no fevers or chills.     HPI:  37 y/o M w/ PMH of sickle cell dx w/ frequent crisis episodes (last episode 1 month ago), 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  Pt reports that he was following with hematology in the city however has since relocated to [place] and needs a new doctor.  He reports poor po intake 2/2 pain but denies N/V.  He also denies fever, chills, chest pain, palpitations, sob, HA, dysuria, hematuria.  (21 Sep 2021 19:10)      PAST MEDICAL & SURGICAL HISTORY:  Sickle Cell Anemia  SC    Personal History of PE (Pulmonary Embolism)  2008, post 6mo coumadin    Acute Chest Syndrome  2003 and possibly 2008, with intubation, exchange transfusion    Avascular Necrosis of Bone  hips    DVT (deep venous thrombosis)    No Past Surgical History        MEDICATIONS  (STANDING):  clindamycin   Capsule 600 milliGRAM(s) Oral every 8 hours  epoetin kristen-epbx (RETACRIT) Injectable 02255 Unit(s) SubCutaneous <User Schedule>  ferrous    sulfate 325 milliGRAM(s) Oral daily  folic acid 1 milliGRAM(s) Oral daily  heparin   Injectable 5000 Unit(s) SubCutaneous every 8 hours  HYDROmorphone PCA (1 mG/mL) 30 milliLiter(s) PCA Continuous PCA Continuous  influenza   Vaccine 0.5 milliLiter(s) IntraMuscular once  multivitamin Oral Tab/Cap - Peds 1 Tablet(s) Oral daily  piperacillin/tazobactam IVPB. 3.375 Gram(s) IV Intermittent once  piperacillin/tazobactam IVPB.. 3.375 Gram(s) IV Intermittent every 8 hours  sodium bicarbonate  Infusion 0.082 mEq/kG/Hr (80 mL/Hr) IV Continuous <Continuous>  sodium chloride 0.45%. 1000 milliLiter(s) (200 mL/Hr) IV Continuous <Continuous>    MEDICATIONS  (PRN):  melatonin 3 milliGRAM(s) Oral at bedtime PRN Insomnia      Allergies    No Known Allergies    Intolerances        SOCIAL HISTORY:    FAMILY HISTORY:  Family history of sickle cell trait in father    Family history of sickle cell trait in mother (Father)        Vital Signs Last 24 Hrs  T(C): 36.3 (28 Sep 2021 04:00), Max: 37.2 (27 Sep 2021 11:32)  T(F): 97.3 (28 Sep 2021 04:00), Max: 99 (27 Sep 2021 11:32)  HR: 112 (28 Sep 2021 04:00) (99 - 112)  BP: 103/70 (28 Sep 2021 04:00) (103/70 - 132/78)  BP(mean): --  RR: 20 (28 Sep 2021 04:00) (20 - 20)  SpO2: 98% (28 Sep 2021 04:00) (98% - 98%)    PHYSICAL EXAM:  GEN: NAD, laying in bed, appears stated age  HEENT: NCAT, clear oral mucosa, normal conjunctiva  Chest: non-tender  CV:  non-tachycardic, 2+ radial pulse  Pulm: no increased work of breathing, no conversational dyspnea  GI: soft, nontender  MSK: moving all extremities. Right thigh wound with irregular dimensions granulating well without drainage, erythema or tenderness. no induration. well healing.    LABS:                        8.0    18.77 )-----------( 158      ( 27 Sep 2021 06:59 )             23.7     09-27    141  |  111<H>  |  25.2<H>  ----------------------------<  80  3.3<L>   |  18.0<L>  |  3.22<H>    Ca    7.3<L>      27 Sep 2021 06:59            RADIOLOGY & ADDITIONAL STUDIES:

## 2021-09-28 NOTE — ANESTHESIA FOLLOW-UP NOTE - NSEVALATIONFT_GEN_ALL_CORE
responded to code blue, pt was being actively coded, non responsive, intubated x1 attempt grade 3 view DL, atraumatic

## 2021-09-28 NOTE — DIETITIAN INITIAL EVALUATION ADULT. - CHIEF COMPLAINT
The patient is a 36y Male complaining of pain, low back. W Plasty Text: The lesion was extirpated to the level of the fat with a #15 scalpel blade.  Given the location of the defect, shape of the defect and the proximity to free margins a W-plasty was deemed most appropriate for repair.  Using a sterile surgical marker, the appropriate transposition arms of the W-plasty were drawn incorporating the defect and placing the expected incisions within the relaxed skin tension lines where possible.    The area thus outlined was incised deep to adipose tissue with a #15 scalpel blade.  The skin margins were undermined to an appropriate distance in all directions utilizing iris scissors.  The opposing transposition arms were then transposed into place in opposite direction and anchored with interrupted buried subcutaneous sutures.

## 2021-09-28 NOTE — DIETITIAN INITIAL EVALUATION ADULT. - OTHER INFO
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  On admission pt found to have anemia and received 1u prbc in ED and Cr 4.07.  Pt admitted for sickle cell crisis. Pt in pain during assessment, limited information obtained. Per H&P pt with poor po intake PTA 2/2 pain. Pt tolerating regular diet, consumed 75% of meal x1. Pt denied wt loss. K+/Ca remain low. RD to follow up.

## 2021-09-28 NOTE — PROCEDURE NOTE - ADDITIONAL PROCEDURE DETAILS
20G IV good heme return ns flush right forearm
ALCS  protocol in progress    emergent  left  femoral a line placed  good return
ACLS in progress   emergent   right femoral  TLC placed with good return three ports

## 2021-09-28 NOTE — PROGRESS NOTE ADULT - SUBJECTIVE AND OBJECTIVE BOX
Patient is a 36y old  Male who presents with a chief complaint of sickle cell crisis admit date   9/21/21 (28 Sep 2021 20:54)    BRIEF HOSPITAL COURSE: **37 y/o M w/ PMH of sickle cell dx w/ frequent crisis episodes (last episode 1 month ago), 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  Pt reports that he was following with hematology in the city however has since relocated to [place] and needs a new doctor.  He reports poor po intake 2/2 pain but denies N/V.  He also denies fever, chills, chest pain, palpitations, sob, HA, dysuria, hematuria.     Patient on chronic  Dilaudid   Patient was admitted with   sickle cell crisis treated with pain meds and  IVF.  -Found   ARF  with creatine  4  seen by  renal   -pain service  increased medications    tylenol  standing     dilaudid q 3hour  prn   then PCA pump   -seen by heme onc 9/27/2021    -seen by surgery 9/28/2021 for  TLC  and   chronic wounds  -   non infected  thigh wound   -seen by ID 9/28/2021 non infected wound  no antibiotics  -maintained on   heparin sq only      - patient was to be discharged home when found unresponsive  -  CODE BLUE  10:20 -pm called  ALCS protocol  10:21 pm  --    8 epi   bicarbonate  calcium given    regained ROSC 22:35   given TPA during ACLS    then  transfered to  MICU  and   CODE BLUE  with asystolic arrest    ACLS in progress -- patient was having blood from ET tube ,   poor oxygenation  ---pupils fixed    prognosis was   poor and  ACLS   code was terminated**    PAST MEDICAL & SURGICAL HISTORY:  Sickle Cell Anemia  SC    Personal History of PE (Pulmonary Embolism)  2008, post 6mo coumadin    Acute Chest Syndrome  2003 and possibly 2008, with intubation, exchange transfusion    Avascular Necrosis of Bone  hips    DVT (deep venous thrombosis)    No Past Surgical History      Medications:  meropenem  IVPB 1000 milliGRAM(s) IV Intermittent every 12 hours  vancomycin  IVPB 1250 milliGRAM(s) IV Intermittent once    norepinephrine Infusion 0.05 MICROgram(s)/kG/Min IV Continuous <Continuous>    HYDROmorphone  Injectable 2 milliGRAM(s) IV Push every 3 hours PRN  melatonin 3 milliGRAM(s) Oral at bedtime PRN  alteplase    IVPB 50 milliGRAM(s) IV Intermittent once  heparin   Injectable 5000 Unit(s) SubCutaneous every 8 hour    ferrous    sulfate 325 milliGRAM(s) Oral daily  folic acid 1 milliGRAM(s) Oral daily  multivitamin Oral Tab/Cap - Peds 1 Tablet(s) Oral daily  sodium bicarbonate  Infusion 0.139 mEq/kG/Hr IV Continuous <Continuous>    epoetin kristen-epbx (RETACRIT) Injectable 53435 Unit(s) SubCutaneous <User Schedule>  influenza   Vaccine 0.5 milliLiter(s) IntraMuscular once              ICU Vital Signs Last 24 Hrs  T(C): 36.2 (28 Sep 2021 21:04), Max: 36.6 (28 Sep 2021 16:41)  T(F): 97.2 (28 Sep 2021 21:04), Max: 97.8 (28 Sep 2021 16:41)  HR: 129 (28 Sep 2021 21:04) (107 - 129)  BP: 101/71 (28 Sep 2021 21:04) (101/71 - 113/71)  BP(mean): --  ABP: --  ABP(mean): --  RR: 20 (28 Sep 2021 21:04) (18 - 20)  SpO2: 98% (28 Sep 2021 21:04) (98% - 98%)          I&O's Detail        LABS:                        8.0    18.77 )-----------( 158      ( 27 Sep 2021 06:59 )             23.7     09-28    150<H>  |  111<H>  |  52.5<H>  ----------------------------<  56<L>  6.3<HH>   |  13.0<L>  |  4.73<H>    Ca    9.2      28 Sep 2021 23:22            CAPILLARY BLOOD GLUCOSE      POCT Blood Glucose.: 90 mg/dL (28 Sep 2021 22:18)        CULTURES:      Physical Examination:    General:   frail   male     HEENT:  pupils dilated   PULM:  intubated  bilateral air entry   NECK: Supple, no lymphadenopathy, trachea midline    CVS: Regular rate and rhythm, no murmurs, rubs, or gallops    ABD: Soft,  distended    ng  with   coffee grounds   EXT:  skin breaks   open wound right thigh   no erythema   SKIN:   NEURO:   DEVICES:     RADIOLOGY: *Renal Artery:  Peak systolic velocity is 105 cm/sec proximal, 105 cm/sec mid, 99 cm/sec distal and 76 cm/sec hilum.  Upper Segmental Artery:  RI = 0.67  Middle Segmental Artery: RI = 0.61  Lower Segmental Artery: RI = 0.68    LEFT  Renal Artery:  Peak systolic velocity is 102 cm/sec proximal, 120 cm/sec mid, 43 cm/sec distal and 52cm/sec hilum.  Upper Segmental Artery:  RI = 0.71  Middle Segmental Artery: RI = 0.67  Lower Segmental Artery: RI = 0.64    IMPRESSION:    No evidence of a significant renal artery stenosis.    Bilateral echogenic kidneys compatible with medical renal disease.    --- End of Report ---            CORWIN THOMPSON MD; Attending Radiologist  This document has been electronically signed. Sep 22 2021 11:45AM  *IMPRESSION: Improved slight infiltrates on the latest film. Right jugular line inserted. Findings of sickle cell disease in the humeral heads.    --- End of Report ---            BOWEN DOHERTY MD; Attending Radiologist  This document has been electronically signed. Sep 21 2021  4:16PM  *  35y/o male with     1- cardiac arrest  2-acute hypoxemic respiratory failure  3- sickle cell crisis  with ekg changes  4- h/o  Acute chest  and  PE in past  5- found   bag with  needle syringe  dilaudid pills   pill   on bed    - was admitted to ICU  - upon arrival to ICU     asystolic arrest   with ACLS protocol  in progress  -did not regain  pulse  - pronounced    23:53                   Patient is a 36y old  Male who presents with a chief complaint of sickle cell crisis admit date   9/21/21 (28 Sep 2021 20:54)    BRIEF HOSPITAL COURSE: **37 y/o M w/ PMH of sickle cell dx w/ frequent crisis episodes (last episode 1 month ago), 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  Pt reports that he was following with hematology in the city however has since relocated to [place] and needs a new doctor.  He reports poor po intake 2/2 pain but denies N/V.  He also denies fever, chills, chest pain, palpitations, sob, HA, dysuria, hematuria.     Patient on chronic  Dilaudid   Patient was admitted with   sickle cell crisis treated with pain meds and  IVF.  -Found   ARF  with creatine  4  seen by  renal   -pain service  increased medications    tylenol  standing     dilaudid q 3hour  prn   then PCA pump   -seen by heme onc 9/27/2021    -seen by surgery 9/28/2021 for  TLC  and   chronic wounds  -   non infected  thigh wound   -seen by ID 9/28/2021 non infected wound  no antibiotics  -maintained on   heparin sq only      PAST MEDICAL & SURGICAL HISTORY:  Sickle Cell Anemia  SC    Personal History of PE (Pulmonary Embolism)  2008, post 6mo coumadin    Acute Chest Syndrome  2003 and possibly 2008, with intubation, exchange transfusion    Avascular Necrosis of Bone  hips    DVT (deep venous thrombosis)    No Past Surgical History      Medications:  meropenem  IVPB 1000 milliGRAM(s) IV Intermittent every 12 hours  vancomycin  IVPB 1250 milliGRAM(s) IV Intermittent once    HYDROmorphone  Injectable 2 milliGRAM(s) IV Push every 3 hours PRN  melatonin 3 milliGRAM(s) Oral at bedtime PRN  alteplase    IVPB 50 milliGRAM(s) IV Intermittent once  heparin   Injectable 5000 Unit(s) SubCutaneous every 8 hour    ferrous    sulfate 325 milliGRAM(s) Oral daily  folic acid 1 milliGRAM(s) Oral daily  multivitamin Oral Tab/Cap - Peds 1 Tablet(s) Oral daily  sodium bicarbonate  Infusion 0.139 mEq/kG/Hr IV Continuous <Continuous>    epoetin kristen-epbx (RETACRIT) Injectable 54232 Unit(s) SubCutaneous <User Schedule>  influenza   Vaccine 0.5 milliLiter(s) IntraMuscular once    ICU Vital Signs Last 24 Hrs  T(C): 36.2 (28 Sep 2021 21:04), Max: 36.6 (28 Sep 2021 16:41)  T(F): 97.2 (28 Sep 2021 21:04), Max: 97.8 (28 Sep 2021 16:41)  HR: 129 (28 Sep 2021 21:04) (107 - 129)  BP: 101/71 (28 Sep 2021 21:04) (101/71 - 113/71)  RR: 20 (28 Sep 2021 21:04) (18 - 20)  SpO2: 98% (28 Sep 2021 21:04) (98% - 98%)    LABS:                        8.0    18.77 )-----------( 158      ( 27 Sep 2021 06:59 )             23.7     09-28    150<H>  |  111<H>  |  52.5<H>  ----------------------------<  56<L>  6.3<HH>   |  13.0<L>  |  4.73<H>    Ca    9.2      28 Sep 2021 23:22    CAPILLARY BLOOD GLUCOSE :    POCT Blood Glucose.: 90 mg/dL (28 Sep 2021 22:18)    Physical Examination:    General:   frail   male     HEENT:  pupils Reactive,  PULM:  bilateral air entry   NECK: Supple, no lymphadenopathy, trachea midline  CVS: Regular rate and rhythm, no murmurs, rubs, or gallops  ABD: Soft,  distended    ng  with   coffee grounds   EXT:  skin breaks   open wound right thigh   no erythema     RADIOLOGY: *Renal Artery:  Peak systolic velocity is 105 cm/sec proximal, 105 cm/sec mid, 99 cm/sec distal and 76 cm/sec hilum.  Upper Segmental Artery:  RI = 0.67  Middle Segmental Artery: RI = 0.61  Lower Segmental Artery: RI = 0.68    LEFT  Renal Artery:  Peak systolic velocity is 102 cm/sec proximal, 120 cm/sec mid, 43 cm/sec distal and 52cm/sec hilum.  Upper Segmental Artery:  RI = 0.71  Middle Segmental Artery: RI = 0.67  Lower Segmental Artery: RI = 0.64    IMPRESSION:    No evidence of a significant renal artery stenosis.    Bilateral echogenic kidneys compatible with medical renal disease.      CORWIN THOMPSON MD; Attending Radiologist  This document has been electronically signed. Sep 22 2021 11:45AM  *IMPRESSION: Improved slight infiltrates on the latest film. Right jugular line inserted. Findings of sickle cell disease in the humeral heads.  *  35y/o male with     1- Frail,  2- mukul  3- sickle cell crisis  with ekg changes  4- h/o  Acute chest  and  PE in past

## 2021-09-28 NOTE — PROCEDURE NOTE - NSPOSTCAREGUIDE_GEN_A_CORE
Verbal/written post procedure instructions were given to patient/caregiver/Instructed patient/caregiver to follow-up with primary care physician/Instructed patient/caregiver regarding signs and symptoms of infection/Keep the cast/splint/dressing clean and dry/Care for catheter as per unit/ICU protocols
Care for catheter as per unit/ICU protocols

## 2021-09-28 NOTE — PROGRESS NOTE ADULT - SUBJECTIVE AND OBJECTIVE BOX
HPI: Patient is a 36y Male seen on consultation for the evaluation and management of SSA. Pt has hx of frequent crisis pain; management elsewhere.  Gives hx of AVN bilateral hip. Has hx of PE in past and hx of Acute chest Sx 2003 and 2008 requiring exchange tx.  Admitted with c/o pain in back and shoulder.  Seen this PM, very uncomfortable; now has PCA  Denies c/o SOB, chest pain or palpitations.  No fevers, nausea or vomiting.  No headaches or dizziness  Still c/o back pain; requesting IV medication  Refused blood work today    PAST MEDICAL & SURGICAL HISTORY:  Sickle Cell Anemia  SC    Personal History of PE (Pulmonary Embolism)  2008, post 6mo coumadin    Acute Chest Syndrome  2003 and possibly 2008, with intubation, exchange transfusion    Avascular Necrosis of Bone  hips    DVT (deep venous thrombosis)    No Past Surgical History        REVIEW OF SYSTEMS      General:Back pain, bone pain	    Skin/Breast:no rash  	  Ophthalmologic:denies visual complaints  	  ENMT:	no sore throat    Respiratory and Thorax:no SOB  	  Cardiovascular:	no chest pain    Gastrointestinal:	no nausea, vomiting or abd pain      Musculoskeletal:	joint pain, back pain  MEDICATIONS  (STANDING):  epoetin kristen-epbx (RETACRIT) Injectable 15975 Unit(s) SubCutaneous <User Schedule>  ferrous    sulfate 325 milliGRAM(s) Oral daily  folic acid 1 milliGRAM(s) Oral daily  heparin   Injectable 5000 Unit(s) SubCutaneous every 8 hours  HYDROmorphone PCA (1 mG/mL) 30 milliLiter(s) PCA Continuous PCA Continuous  influenza   Vaccine 0.5 milliLiter(s) IntraMuscular once  multivitamin Oral Tab/Cap - Peds 1 Tablet(s) Oral daily  potassium chloride  10 mEq/100 mL IVPB 10 milliEquivalent(s) IV Intermittent every 1 hour  sodium bicarbonate  Infusion 0.082 mEq/kG/Hr (80 mL/Hr) IV Continuous <Continuous>  sodium chloride 0.45%. 1000 milliLiter(s) (200 mL/Hr) IV Continuous <Continuous>    MEDICATIONS  (PRN):  melatonin 3 milliGRAM(s) Oral at bedtime PRN Insomnia      Allergies    No Known Allergies    Intolerances        FAMILY HISTORY:  Family history of sickle cell trait in father    Family history of sickle cell trait in mother (Father)              Vital Signs Last 24 Hrs  T(C): 37.2 (27 Sep 2021 11:32), Max: 37.4 (27 Sep 2021 06:03)  T(F): 99 (27 Sep 2021 11:32), Max: 99.3 (27 Sep 2021 06:03)  HR: 99 (27 Sep 2021 11:32) (89 - 99)  BP: 132/78 (27 Sep 2021 11:32) (105/79 - 132/78)  BP(mean): --  RR: 20 (27 Sep 2021 11:32) (20 - 20)  SpO2: 98% (27 Sep 2021 11:32) (98% - 99%)    PHYSICAL EXAM:      Constitutional:  Appears uncomfortable    Eyes:mildly icteric        Neck:no adenopathy          Respiratory:clear    Cardiovascular:RRR    Gastrointestinal:Soft, non-distended, non-tender        Extremities:no edema        Neurological:awake, alert, oriented      LABS:                        8.0    18.77 )-----------( 158      ( 27 Sep 2021 06:59 )             23.7     09-27    141  |  111<H>  |  25.2<H>  ----------------------------<  80  3.3<L>   |  18.0<L>  |  3.22<H>    Ca    7.3<L>      27 Sep 2021 06:59            RADIOLOGY & ADDITIONAL STUDIES:

## 2021-09-28 NOTE — PROGRESS NOTE ADULT - PROVIDER SPECIALTY LIST ADULT
Pain Medicine
Heme/Onc
Hospitalist
Hospitalist
Nephrology
Hospitalist
Nephrology
Hospitalist
Pain Medicine

## 2021-09-28 NOTE — DIETITIAN INITIAL EVALUATION ADULT. - ADD RECOMMEND
Add Ensure Enlive TID to optimize po intake and provide an additional 350kcal, 20g protein per serving. Continue MVI, ferrous sulfate, folic acid. Monitor wts and labs.

## 2021-09-28 NOTE — PROGRESS NOTE ADULT - SUBJECTIVE AND OBJECTIVE BOX
Chief Complaint:  Sickle cell crisis  c/o severe back pain not controlled with po medications.     SUBJECTIVE / OVERNIGHT EVENTS:  No acute events reported overnight.   Pt denies chest pain, SOB, abd pain, N/V, fever, chills, dysuria or any other complaints. All remainder ROS negative.       PHYSICAL EXAM:    Vital Signs Last 24 Hrs  T(C): 36.3 (28 Sep 2021 11:15), Max: 36.3 (28 Sep 2021 04:00)  T(F): 97.3 (28 Sep 2021 11:15), Max: 97.3 (28 Sep 2021 04:00)  HR: 107 (28 Sep 2021 11:15) (107 - 112)  BP: 106/73 (28 Sep 2021 11:15) (103/70 - 106/73)  BP(mean): --  RR: 19 (28 Sep 2021 11:15) (19 - 20)  SpO2: 98% (28 Sep 2021 11:15) (98% - 98%)      GENERAL: pt examined bedside, appears uncomfortable but in NAD  HEENT: NC/AT, dry oral mucosa, clear conjunctiva, sclera nonicteric  NECK: +Rt IJ central line, supple   RESPIRATORY: Normal respiratory effort; CTA b/l, no wheezing  CARDIOVASCULAR: RRR, normal S1 and S2, no murmur/rub/gallop  ABDOMEN: soft, NT/ND, +bowel sounds, no rebound/guarding  MSK: No joint deformities, edema, erythema  EXTREMITIES: No cyanosis, no clubbing, no lower extremity edema  PSYCH: affect appropriate and cooperative  NEUROLOGY: A+O to person, place, and time  SKIN: no rashes or no palpable lesions        LABS:                                                         8.0    18.77 )-----------( 158      ( 27 Sep 2021 06:59 )             23.7   09-27    141  |  111<H>  |  25.2<H>  ----------------------------<  80  3.3<L>   |  18.0<L>  |  3.22<H>    Ca    7.3<L>      27 Sep 2021 06:59                  RADIOLOGY & ADDITIONAL TESTS:    MEDICATIONS  (STANDING):  epoetin kristen-epbx (RETACRIT) Injectable 46454 Unit(s) SubCutaneous <User Schedule>  ferrous    sulfate 325 milliGRAM(s) Oral daily  folic acid 1 milliGRAM(s) Oral daily  heparin   Injectable 5000 Unit(s) SubCutaneous every 8 hours  influenza   Vaccine 0.5 milliLiter(s) IntraMuscular once  multivitamin Oral Tab/Cap - Peds 1 Tablet(s) Oral daily  potassium chloride  10 mEq/100 mL IVPB 10 milliEquivalent(s) IV Intermittent every 1 hour  sodium bicarbonate  Infusion 0.139 mEq/kG/Hr (80 mL/Hr) IV Continuous <Continuous>    MEDICATIONS  (PRN):  HYDROmorphone  Injectable 2 milliGRAM(s) IV Push every 3 hours PRN Severe Pain (7 - 10)  melatonin 3 milliGRAM(s) Oral at bedtime PRN Insomnia

## 2021-09-28 NOTE — PROGRESS NOTE ADULT - ASSESSMENT
Imp:  SSA; gives hx of complications in past and should be monitored closely as is being done, steph as retic ct not elevated as expected;  no evidence of CV compromise  Rec:  Analgesia per pain management  Aggresive hydration, noting elevated Cr; renal following  Monitor CBC/Retic/LDH; tx for Hb less than 7.  Does not need blood tx now.  Encouragedpt to allow blood draws

## 2021-09-28 NOTE — PROGRESS NOTE ADULT - SUBJECTIVE AND OBJECTIVE BOX
Interval Hx:  Patient seen during rounds  c/o uncontrolled pain on PCA  has good control on dilaudid IVP prn  requesting to return to previous pain regimen  Patient denies sedation with medications       T(C): 36.3 (09-28-21 @ 11:15), Max: 36.3 (09-28-21 @ 04:00)  HR: 107 (09-28-21 @ 11:15) (107 - 112)  BP: 106/73 (09-28-21 @ 11:15) (103/70 - 106/73)  RR: 19 (09-28-21 @ 11:15) (19 - 20)  SpO2: 98% (09-28-21 @ 11:15) (98% - 98%)        epoetin kristen-epbx (RETACRIT) Injectable 21333 Unit(s) SubCutaneous <User Schedule>  ferrous    sulfate 325 milliGRAM(s) Oral daily  folic acid 1 milliGRAM(s) Oral daily  heparin   Injectable 5000 Unit(s) SubCutaneous every 8 hours  HYDROmorphone  Injectable 2 milliGRAM(s) IV Push every 3 hours PRN  influenza   Vaccine 0.5 milliLiter(s) IntraMuscular once  melatonin 3 milliGRAM(s) Oral at bedtime PRN  multivitamin Oral Tab/Cap - Peds 1 Tablet(s) Oral daily  potassium chloride  10 mEq/100 mL IVPB 10 milliEquivalent(s) IV Intermittent every 1 hour  sodium bicarbonate  Infusion 0.139 mEq/kG/Hr IV Continuous <Continuous>                          8.0    18.77 )-----------( 158      ( 27 Sep 2021 06:59 )             23.7     09-27    141  |  111<H>  |  25.2<H>  ----------------------------<  80  3.3<L>   |  18.0<L>  |  3.22<H>    Ca    7.3<L>      27 Sep 2021 06:59            Pain Service   [phone]

## 2021-09-28 NOTE — PROGRESS NOTE ADULT - ASSESSMENT
restarted HYDROmorphone  Injectable 2 milliGRAM(s) IV Push q3h PRN severe pain    when due for discharge planning:  BETH dilaudid IVP  start dialudid PO 4mg/8mg q4h PRn mod/sev pain
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
## 2021-09-28 NOTE — PROGRESS NOTE ADULT - SUBJECTIVE AND OBJECTIVE BOX
1) MAREK ; in the setting of SCD  crisis  2) SCD  3) AVN  4) Metabolic Acidosis    US renal and arterial dopplers reviewed;    Vital Signs Last 24 Hrs,    T(C): 36.3 (28 Sep 2021 11:15), Max: 36.3 (28 Sep 2021 04:00)  T(F): 97.3 (28 Sep 2021 11:15), Max: 97.3 (28 Sep 2021 04:00)  HR: 107 (28 Sep 2021 11:15) (107 - 112)  BP: 106/73 (28 Sep 2021 11:15) (103/70 - 106/73)  RR: 19 (28 Sep 2021 11:15) (19 - 20)  SpO2: 98% (28 Sep 2021 11:15) (98% - 98%)    141    |  111<H>  |  25.2<H>  ----------------------------<  80     Ca:7.3<L> (27 Sep 2021 06:59)  3.3<L>   |  18.0<L>  |  3.22<H>                                8.0<L>  18.77<H> )-----------( 158      ( 27 Sep 2021 06:59 )                 23.7<L>    Phos:4.0 mg/dL M.9 mg/dL ( @ 08:01)
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
## 2021-09-28 NOTE — PROGRESS NOTE ADULT - ASSESSMENT
ATN    Drug Dependance,    SCD    On IVF,    Poor prognosis,  Supportive Care,    Please call as Needed, Ty

## 2021-09-29 LAB
ANION GAP SERPL CALC-SCNC: 26 MMOL/L — HIGH (ref 5–17)
BUN SERPL-MCNC: 52.5 MG/DL — HIGH (ref 8–20)
CALCIUM SERPL-MCNC: 9.2 MG/DL — SIGNIFICANT CHANGE UP (ref 8.6–10.2)
CHLORIDE SERPL-SCNC: 111 MMOL/L — HIGH (ref 98–107)
CO2 SERPL-SCNC: 13 MMOL/L — LOW (ref 22–29)
CREAT SERPL-MCNC: 4.73 MG/DL — HIGH (ref 0.5–1.3)
GLUCOSE SERPL-MCNC: 56 MG/DL — LOW (ref 70–99)
POTASSIUM SERPL-MCNC: 6.3 MMOL/L — CRITICAL HIGH (ref 3.5–5.3)
POTASSIUM SERPL-SCNC: 6.3 MMOL/L — CRITICAL HIGH (ref 3.5–5.3)
SODIUM SERPL-SCNC: 150 MMOL/L — HIGH (ref 135–145)

## 2021-09-29 NOTE — CONSULT NOTE ADULT - CONSULT REQUESTED DATE/TIME
28-Sep-2021 10:43
28-Sep-2021
22-Sep-2021 18:36
28-Sep-2021 13:21
27-Sep-2021 19:44
22-Sep-2021 08:55

## 2021-09-29 NOTE — CONSULT NOTE ADULT - SUBJECTIVE AND OBJECTIVE BOX
MICU    Patient is a 36y old  Male who presents with a chief complaint of sickle cell crisis admit date   9/21/21 (28 Sep 2021 20:54)      BRIEF HOSPITAL COURSE: **35 y/o M w/ PMH of sickle cell dx w/ frequent crisis episodes (last episode 1 month ago), 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  Pt reports that he was following with hematology in the city however has since relocated to [place] and needs a new doctor.  He reports poor po intake 2/2 pain but denies N/V.  He also denies fever, chills, chest pain, palpitations, sob, HA, dysuria, hematuria.     Patient on chronic  Dilaudid   Patient was admitted with   sickle cell crisis treated with pain meds and  IVF.  -Found   ARF  with creatine  4  seen by  renal   -pain service  increased medications    tylenol  standing     dilaudid q 3hour  prn   then PCA pump   -seen by heme onc 9/27/2021    -seen by surgery 9/28/2021 for  TLC  and   chronic wounds  -   non infected  thigh wound   -seen by ID 9/28/2021 non infected wound  no antibiotics  -maintained on   heparin sq only        - patient was to be discharged home when found unresponsive  -  CODE BLUE  10:20 -pm called  ALCS protocol  10:20   regained ROSC  then  transfered to  MICU  and   CODE BLUE  with asystolic arrest    ACLS in progress     after    1.5 hours  of ACLS   code was terminated**    PAST MEDICAL & SURGICAL HISTORY:  Sickle Cell Anemia  SC    Personal History of PE (Pulmonary Embolism)  2008, post 6mo coumadin    Acute Chest Syndrome  2003 and possibly 2008, with intubation, exchange transfusion    Avascular Necrosis of Bone  hips    DVT (deep venous thrombosis)    No Past Surgical History          Medications:  meropenem  IVPB 1000 milliGRAM(s) IV Intermittent every 12 hours  vancomycin  IVPB 1250 milliGRAM(s) IV Intermittent once    norepinephrine Infusion 0.05 MICROgram(s)/kG/Min IV Continuous <Continuous>      HYDROmorphone  Injectable 2 milliGRAM(s) IV Push every 3 hours PRN  melatonin 3 milliGRAM(s) Oral at bedtime PRN      alteplase    IVPB 50 milliGRAM(s) IV Intermittent once  heparin   Injectable 5000 Unit(s) SubCutaneous every 8 hours          ferrous    sulfate 325 milliGRAM(s) Oral daily  folic acid 1 milliGRAM(s) Oral daily  multivitamin Oral Tab/Cap - Peds 1 Tablet(s) Oral daily  sodium bicarbonate  Infusion 0.139 mEq/kG/Hr IV Continuous <Continuous>    epoetin kristen-epbx (RETACRIT) Injectable 66622 Unit(s) SubCutaneous <User Schedule>  influenza   Vaccine 0.5 milliLiter(s) IntraMuscular once              ICU Vital Signs Last 24 Hrs  T(C): 36.2 (28 Sep 2021 21:04), Max: 36.6 (28 Sep 2021 16:41)  T(F): 97.2 (28 Sep 2021 21:04), Max: 97.8 (28 Sep 2021 16:41)  HR: 129 (28 Sep 2021 21:04) (107 - 129)  BP: 101/71 (28 Sep 2021 21:04) (101/71 - 113/71)  BP(mean): --  ABP: --  ABP(mean): --  RR: 20 (28 Sep 2021 21:04) (18 - 20)  SpO2: 98% (28 Sep 2021 21:04) (98% - 98%)          I&O's Detail        LABS:                        8.0    18.77 )-----------( 158      ( 27 Sep 2021 06:59 )             23.7     09-28    150<H>  |  111<H>  |  52.5<H>  ----------------------------<  56<L>  6.3<HH>   |  13.0<L>  |  4.73<H>    Ca    9.2      28 Sep 2021 23:22            CAPILLARY BLOOD GLUCOSE      POCT Blood Glucose.: 90 mg/dL (28 Sep 2021 22:18)        CULTURES:      Physical Examination:    General:   frail   male     HEENT:  pupils dilated   PULM:  intubated  bilateral air entry   NECK: Supple, no lymphadenopathy, trachea midline    CVS: Regular rate and rhythm, no murmurs, rubs, or gallops    ABD: Soft,  distended    ng  with   coffee grounds   EXT:  skin breaks   open wound right thigh   no erythema   SKIN:   NEURO:   DEVICES:     RADIOLOGY: *Renal Artery:  Peak systolic velocity is 105 cm/sec proximal, 105 cm/sec mid, 99 cm/sec distal and 76 cm/sec hilum.  Upper Segmental Artery:  RI = 0.67  Middle Segmental Artery: RI = 0.61  Lower Segmental Artery: RI = 0.68    LEFT  Renal Artery:  Peak systolic velocity is 102 cm/sec proximal, 120 cm/sec mid, 43 cm/sec distal and 52cm/sec hilum.  Upper Segmental Artery:  RI = 0.71  Middle Segmental Artery: RI = 0.67  Lower Segmental Artery: RI = 0.64    IMPRESSION:    No evidence of a significant renal artery stenosis.    Bilateral echogenic kidneys compatible with medical renal disease.    --- End of Report ---            CORWIN THOMPSON MD; Attending Radiologist  This document has been electronically signed. Sep 22 2021 11:45AM  *IMPRESSION: Improved slight infiltrates on the latest film. Right jugular line inserted. Findings of sickle cell disease in the humeral heads.    --- End of Report ---            BOWEN DOHERTY MD; Attending Radiologist  This document has been electronically signed. Sep 21 2021  4:16PM  *    CRITICAL CARE TIME SPENT: ***   MICU    Patient is a 36y old  Male who presents with a chief complaint of sickle cell crisis admit date   9/21/21 (28 Sep 2021 20:54)      BRIEF HOSPITAL COURSE: **37 y/o M w/ PMH of sickle cell dx w/ frequent crisis episodes (last episode 1 month ago), 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  Pt reports that he was following with hematology in the city however has since relocated to [place] and needs a new doctor.  He reports poor po intake 2/2 pain but denies N/V.  He also denies fever, chills, chest pain, palpitations, sob, HA, dysuria, hematuria.     Patient on chronic  Dilaudid   Patient was admitted with   sickle cell crisis treated with pain meds and  IVF.  -Found   ARF  with creatine  4  seen by  renal   -pain service  increased medications    tylenol  standing     dilaudid q 3hour  prn   then PCA pump   -seen by heme onc 9/27/2021    -seen by surgery 9/28/2021 for  TLC  and   chronic wounds  -   non infected  thigh wound   -seen by ID 9/28/2021 non infected wound  no antibiotics  -maintained on   heparin sq only        - patient was to be discharged home when found unresponsive  -  CODE BLUE  10:20 -pm called  ALCS protocol  10:21 pm  --    8 epi   bicarbonate  calcium given    regained ROSC 22:35   given TPA during ACLS    then  transfered to  MICU  and   CODE BLUE  with asystolic arrest    ACLS in progress -- patient was having blood from ET tube ,   poor oxygenation  ---pupils fixed    prognosis was   poor and  ACLS   code was terminated**    PAST MEDICAL & SURGICAL HISTORY:  Sickle Cell Anemia  SC    Personal History of PE (Pulmonary Embolism)  2008, post 6mo coumadin    Acute Chest Syndrome  2003 and possibly 2008, with intubation, exchange transfusion    Avascular Necrosis of Bone  hips    DVT (deep venous thrombosis)    No Past Surgical History          Medications:  meropenem  IVPB 1000 milliGRAM(s) IV Intermittent every 12 hours  vancomycin  IVPB 1250 milliGRAM(s) IV Intermittent once    norepinephrine Infusion 0.05 MICROgram(s)/kG/Min IV Continuous <Continuous>      HYDROmorphone  Injectable 2 milliGRAM(s) IV Push every 3 hours PRN  melatonin 3 milliGRAM(s) Oral at bedtime PRN      alteplase    IVPB 50 milliGRAM(s) IV Intermittent once  heparin   Injectable 5000 Unit(s) SubCutaneous every 8 hours          ferrous    sulfate 325 milliGRAM(s) Oral daily  folic acid 1 milliGRAM(s) Oral daily  multivitamin Oral Tab/Cap - Peds 1 Tablet(s) Oral daily  sodium bicarbonate  Infusion 0.139 mEq/kG/Hr IV Continuous <Continuous>    epoetin kristen-epbx (RETACRIT) Injectable 82550 Unit(s) SubCutaneous <User Schedule>  influenza   Vaccine 0.5 milliLiter(s) IntraMuscular once              ICU Vital Signs Last 24 Hrs  T(C): 36.2 (28 Sep 2021 21:04), Max: 36.6 (28 Sep 2021 16:41)  T(F): 97.2 (28 Sep 2021 21:04), Max: 97.8 (28 Sep 2021 16:41)  HR: 129 (28 Sep 2021 21:04) (107 - 129)  BP: 101/71 (28 Sep 2021 21:04) (101/71 - 113/71)  BP(mean): --  ABP: --  ABP(mean): --  RR: 20 (28 Sep 2021 21:04) (18 - 20)  SpO2: 98% (28 Sep 2021 21:04) (98% - 98%)          I&O's Detail        LABS:                        8.0    18.77 )-----------( 158      ( 27 Sep 2021 06:59 )             23.7     09-28    150<H>  |  111<H>  |  52.5<H>  ----------------------------<  56<L>  6.3<HH>   |  13.0<L>  |  4.73<H>    Ca    9.2      28 Sep 2021 23:22            CAPILLARY BLOOD GLUCOSE      POCT Blood Glucose.: 90 mg/dL (28 Sep 2021 22:18)        CULTURES:      Physical Examination:    General:   frail   male     HEENT:  pupils dilated   PULM:  intubated  bilateral air entry   NECK: Supple, no lymphadenopathy, trachea midline    CVS: Regular rate and rhythm, no murmurs, rubs, or gallops    ABD: Soft,  distended    ng  with   coffee grounds   EXT:  skin breaks   open wound right thigh   no erythema   SKIN:   NEURO:   DEVICES:     RADIOLOGY: *Renal Artery:  Peak systolic velocity is 105 cm/sec proximal, 105 cm/sec mid, 99 cm/sec distal and 76 cm/sec hilum.  Upper Segmental Artery:  RI = 0.67  Middle Segmental Artery: RI = 0.61  Lower Segmental Artery: RI = 0.68    LEFT  Renal Artery:  Peak systolic velocity is 102 cm/sec proximal, 120 cm/sec mid, 43 cm/sec distal and 52cm/sec hilum.  Upper Segmental Artery:  RI = 0.71  Middle Segmental Artery: RI = 0.67  Lower Segmental Artery: RI = 0.64    IMPRESSION:    No evidence of a significant renal artery stenosis.    Bilateral echogenic kidneys compatible with medical renal disease.    --- End of Report ---            CORWIN THOMPSON MD; Attending Radiologist  This document has been electronically signed. Sep 22 2021 11:45AM  *IMPRESSION: Improved slight infiltrates on the latest film. Right jugular line inserted. Findings of sickle cell disease in the humeral heads.    --- End of Report ---            BOWEN DOHERTY MD; Attending Radiologist  This document has been electronically signed. Sep 21 2021  4:16PM  *    CRITICAL CARE TIME SPENT: ***

## 2021-09-29 NOTE — CHART NOTE - NSCHARTNOTEFT_GEN_A_CORE
Called by RN, patient with right thigh wound which was not noted in any chart notes   Patient seen and evaluated at bedside   Patient with dressing on R thigh, dressing peeled up and revealed purulent erythematous wound ~10x10 cm    Patient states he has had this wound since July 2021 and was taking abx outpatient but had stopped taking them  He does not remember the name of the abx   Patient with 1 IV at this time which has PCA pump attached, Patient refusing further attempts to obtain more access at this time and is complaining of severe pain 2/2 to his sickle cell   Ofirmev 1g x 1 to be given over 15mins then PCA pump to be reattached   As patient does not have IV access at time for IV abx - will order Clindamycin 600mg q 8hr x 7 days   Will consult ID for the AM   RN to notify with any acute changes
I called Ms Flavia Fink to notify her of Joe's death.  She began crying after hearing the news, a co-worker had to take the phone as Ms Fink was hysterical.
Spoke with medical examiner, case accepted, will fax over info to
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
## 2021-11-08 NOTE — ED ADULT TRIAGE NOTE - BMI (KG/M2)
Pt arrived to ED room 6 via MFD, pt lives at home, pt has not been taking her antibiotic except for yesterday at noon. Pt was diagnosed Friday with a uti. Pt c/o headache.    Price (Do Not Change): 0.00 Detail Level: Simple Instructions: This plan will send the code FBSD to the PM system.  DO NOT or CHANGE the price. 24.5

## 2021-11-15 NOTE — ED PROVIDER NOTE - CARE PLAN
<--- Click to Launch ICDx for PreOp, PostOp and Procedure Principal Discharge DX:	Renal failure, acute  Secondary Diagnosis:	Sickle cell anemia   1

## 2021-11-17 NOTE — ED PROVIDER NOTE - EYES, MLM
Subjective:     Salma Whitney  is a 38 y.o. female who presents for follow-up about 7 years following laparoscopic sleeve gastrectomy. She has lost a total of 87 pounds since surgery. Body mass index is 23.48 kg/m². . EBWL is (92%). The patient presents today to assess their progress toward their goal of weight loss and to address any issues that may be present. Today the patient and I have reviewed their diet and how appropriate their food choices are. The following issues have been identified - none from a surgical standpoint. Weight Loss Metrics 11/17/2021 10/1/2020 3/26/2019 3/8/2019 2/21/2018 8/30/2014 8/28/2014   Today's Wt 145 lb 8 oz 157 lb 142 lb 145 lb 8 oz 132 lb 12.8 oz 212 lb 214 lb   BMI 23.48 kg/m2 25.34 kg/m2 22.92 kg/m2 23.48 kg/m2 21.43 kg/m2 34.23 kg/m2 34.56 kg/m2       Surgery related complication: NA       She reports RUQ pain since 2018 and serous drainage from umbilical incision around menses for past year. She denies vomiting, diarrhea, nausea and reflux. Hx of ovarian cysts, seeing GYN this month    2019 HIDA EF 52%    2018 US normal      The patients diet choices have been reviewed today and are as follows:       Fluids: good, water + 2 cups of coffee ([employer] once a day, Tropical Smoothie)   Protein: no supplements; pot roast, deli meat, cream cheese   Meals/day: 1    Patients pain score:0/10    Weight Loss Metrics 11/17/2021 10/1/2020 3/26/2019 3/8/2019 2/21/2018 8/30/2014 8/28/2014   Today's Wt 145 lb 8 oz 157 lb 142 lb 145 lb 8 oz 132 lb 12.8 oz 212 lb 214 lb   BMI 23.48 kg/m2 25.34 kg/m2 22.92 kg/m2 23.48 kg/m2 21.43 kg/m2 34.23 kg/m2 34.56 kg/m2        The patient's exercise level: staying active. Changes in her medical history and medications have been reviewed. Mom just passed from pancreatic cancer.     Patient Active Problem List   Diagnosis Code    Arthritis M19.90    Chronic pain G89.29    Morbid obesity ([address] E66.01    Sleep apnea G47.30    S/P bariatric surgery Z98.84    Intestinal malabsorption K90.9    RUQ abdominal pain R10.11    Postprandial nausea R11.0    Vaginal candidiasis B37.3     Past Medical History:   Diagnosis Date    Arthritis     Chronic pain     lower pain    Diabetes ([facility] [address] 2013    GERD (gastroesophageal reflux disease)     hx of. not a problem now. denies current symptoms    GERD (gastroesophageal reflux disease)     Morbid obesity (HCC)     Other ill-defined conditions(799.89)     sciatica    Other ill-defined conditions(799.89)     carpal tunnel    Other ill-defined conditions(799.89)     pinched nerve in cervical    Other ill-defined conditions(799.89)     fibromyalgia    Other ill-defined conditions(799.89)     h/o migraines    Psychiatric disorder     h/o anxiety, panic attack, depression    Sleep apnea     does not use c-pap     Past Surgical History:   Procedure Laterality Date    HX CARPAL TUNNEL RELEASE      bilateral    HX ORTHOPAEDIC      carpal tunnel bilateral    [place] LAP, JAQUAN RESTRICT PROC, LONGITUDINAL GASTRECTOMY  8/1/14    Sleeve by Dr Ya Srivastava     Current Outpatient Medications   Medication Sig Dispense Refill    acetaminophen (TylenoL) 325 mg tablet Take 325 mg by mouth every four (4) hours as needed for Pain.  diphenhydrAMINE (BenadryL) 25 mg capsule Take 25 mg by mouth every six (6) hours as needed.  cyanocobalamin (Vitamin B-12) 1,000 mcg/mL injection 1 mL by SubCUTAneous route every thirty (30) days. Indications: prevention of vitamin B12 deficiency 3 Vial 3    Syringe with Needle, Safety (3cc Safety Syringe 25Gx5/8\") 3 mL 25 gauge x 5/8\" syrg 1 mL by Does Not Apply route every thirty (30) days.  3 Pen Needle 3        Review of Symptoms:       General - No history or complaints of unexpected fever or chills  Head/Neck - No history or complaints of headache or dizziness  Cardiac - No history or complaints of chest pain, palpitations, or shortness of breath  Pulmonary - No history or complaints of shortness of breath or productive cough  Gastrointestinal - as noted above  Genitourinary - No history or complaints of hematuria/dysuria or renal lithiasis  Musculoskeletal - No history or complaints of joint  muscular weakness  Hematologic - No history of any bleeding episodes  Neurologic - No history or complaints of  migraine headaches or neurologic symptoms                     Objective:     Visit Vitals  /85   Pulse 84   Ht 5' 6\" (1.676 m)   Wt 66 kg (145 lb 8 oz)   BMI 23.48 kg/m²        Physical Exam:    General appearance:  alert, cooperative, no distress, appears stated age   Mental status   alert, oriented to person, place, and time, normal mood, behavior, speech, dress, motor activity, and thought processes   Neck  supple, no significant adenopathy     Lymphatics  no palpable lymphadenopathy, no hepatosplenomegaly   Chest  clear to auscultation, no wheezes, rales or rhonchi, symmetric air entry   Heart  normal rate, regular rhythm, normal S1, S2, no murmurs, rubs, clicks or gallops    Abdomen: soft, nontender, nondistended, no masses or organomegaly   Incision:  healing well, no drainage, no erythema, no hernia, no seroma, no swelling, no dehiscence, incision well approximated      Neurological  alert, oriented, normal speech, no focal findings or movement disorder noted   Musculoskeletal no joint tenderness, deformity or swelling   Extremities peripheral pulses normal, no pedal edema, no clubbing or cyanosis   Skin normal coloration and turgor, no rashes, no suspicious skin lesions noted     Lab Results   Component Value Date/Time    WBC 6.9 08/30/2014 09:35 AM    HGB 11.7 (L) 08/30/2014 09:35 AM    HCT 36.1 08/30/2014 09:35 AM    PLATELET 297 28/47/3458 09:35 AM    MCV 75.5 08/30/2014 09:35 AM     Lab Results   Component Value Date/Time    Sodium 142 08/30/2014 09:35 AM    Potassium 3.8 08/30/2014 09:35 AM    Chloride 106 08/30/2014 09:35 AM    CO2 29 08/30/2014 09:35 AM    Anion gap 7 08/30/2014 09:35 AM    Glucose 88 08/30/2014 09:35 AM    BUN 13 08/30/2014 09:35 AM    Creatinine 0.90 08/30/2014 09:35 AM    BUN/Creatinine ratio 14 08/30/2014 09:35 AM    GFR est AA >60 08/30/2014 09:35 AM    GFR est non-AA >60 08/30/2014 09:35 AM    Calcium 8.3 (L) 08/30/2014 09:35 AM    Bilirubin, total 0.3 08/30/2014 09:35 AM    Alk. phosphatase 86 08/30/2014 09:35 AM    Protein, total 7.3 08/30/2014 09:35 AM    Albumin 3.3 (L) 08/30/2014 09:35 AM    Globulin 4.0 08/30/2014 09:35 AM    A-G Ratio 0.8 08/30/2014 09:35 AM    ALT (SGPT) 18 08/30/2014 09:35 AM     No results found for: IRON, FE, TIBC, IBCT, PSAT, FERR  No results found for: FOL, RBCF  No results found for: Rosendo Lightning, VD3RIA      HIDA 3/2019  FINDINGS:     Normal tracer uptake by the liver. Normal excretion into the biliary tree with  prompt visualization of small bowel. Normal filling of the gallbladder.     Calculated ejection fraction is 52% (normal greater than 35%).     _______________     IMPRESSION  IMPRESSION:     Normal filling of the gallbladder. Normal gallbladder ejection fraction. ABD US LIMITED 7/2018  Limited abdomen ultrasound was performed. Pancreas:  Partially obscured by bowel gas. Normal echotexture where visualized. Liver: Normal echotexture.  15.2 cm long axis. Color doppler evaluation shows hepatopedal flow in the portal vein. Gallbladder: Normal. The technologist reports a negative sonographic Davis's sign. Common Hepatic Duct: 5 mm transverse. Right Kidney: Normal echotexture.  9.8 cm long axis.  No hydronephrosis. IMPRESSION   Impression:     1. Normal abdomen ultrasound limited. Assessment:     1. History of Morbid obesity, status post  laparoscopic sleeve gastrectomy. The patient is doing well from a well loss standpoint, but has had recent poor dietary habits due to acute stress and aware to increase protein. The concern is for this persistent RUQ pain.  It has been more than 2.5 years since we evaluated how well her gallbladder is functioning and will repeat the HIDA scan. She is aware to call us when she is actively having the serous drainage from her umbilical incision and will try and evaluate that more closely. In the meantime, she is going to see GYN as well to see if related to her history of ovarian cysts. Plan:     1. Remember to measure portions, continue low carbohydrate diet  2. Continue to concentrate on protein intake meeting daily requirements  3. Remember vitamin supplements. The importance of such was discussed regarding the malabsorptive issues that the surgery creates. 4. Exercise regimen appears to be: inadequate, increase cardio  5. Try and attend support group if feasible. 6. Follow-up after HIDA and labs. 7. Lab reviewed and appropriate changes made. 8. Total time spent with the patient 30 minutes. Clear bilaterally

## 2021-11-22 NOTE — ED ADULT NURSE NOTE - NSIMPLEMENTINTERV_GEN_ALL_ED
Michael Horton is a 52 y.o. female who was seen by synchronous (real-time) audio-video technology on 11/22/2021 for Peripheral Neuropathy        Assessment & Plan:   Diagnoses and all orders for this visit:    1. Numbness and tingling of both legs  -     EMG ONE EXTREMITY LOWER RT  -     EMG ONE EXTREMITY LOWER LT    2. Medication monitoring encounter  -     METABOLIC PANEL, COMPREHENSIVE; Future    3. Carpal tunnel syndrome of right wrist    4. Cubital tunnel syndrome of both upper extremities    5. Tobacco use    Other orders  -     DULoxetine (CYMBALTA) 30 mg capsule; Take 1 Capsule by mouth daily. Indications: neuropathic pain         Patient presents for follow-up bilateral upper extremity numbness and tingling. In the interim she had EMG complete to the bilateral upper extremities. Here for results. We discussed diagnostic test results consistent with bilateral median and ulnar neuropathies. She has established with Dr. Angelique Hilario of [facility] and has upcoming surgery on December 10th. She would like to have LE EMG complete for LE numbness and tingling. Doing well on Cymbalta 30mg. Has some concerns due to fatty liver disease. She feels symptoms are well controlled on 30 mg. Will order CMP. Excerpt from EMG testing:    \"INTERPRETATION     This is an abnormal electrodiagnostic examination. These findings may be consistent with:   1. Moderate-to-severe median mononeuropathy at the right wrist (carpal tunnel syndrome)   2. Mild ulnar mononeuropathy at the right elbow (cubital tunnel syndrome)   3. Moderate-to-severe median mononeuropathy at the right wrist (carpal tunnel syndrome)   4.  Mild-to-moderate ulnar mononeuropathy at the left elbow (cubital tunnel syndrome)     There is no electrodiagnostic evidence of any cervical radiculopathy, brachial plexopathy, peripheral polyneuropathy, or any other mononeuropathy.     CLINICAL INTERPRETATION     Her electrodiagnostic findings of bilateral median and ulnar
mononeuropathies are consistent with her bilateral hand symptoms. \"    Encourage smoking cessation. She will follow-up after testing is complete. All questions addressed and patient is agreeable with plan of care. I spent at least 30 minutes on this visit with this established patient. [id number]  Subjective: This is a 78-year-old female who presents for follow-up results. Last seen in September for concern of hand numbness and tingling. In the interim she had bilateral upper extremity EMG complete. Tolerating Cymbalta 30 mg daily. She does have some concerns regarding Cymbalta as she has a history of fatty liver disease. Does not report jaundice. No other concerns at this time. Prior to Admission medications    Medication Sig Start Date End Date Taking? Authorizing Provider   DULoxetine (CYMBALTA) 30 mg capsule Take 1 Capsule by mouth daily. Indications: neuropathic pain 11/22/21  Yes Tammy Penn NP   cyanocobalamin 1,000 mcg tablet Take 1 Tablet by mouth daily. 11/11/21  Yes Hailey Pedraza PA-C   triamcinolone acetonide (KENALOG) 0.1 % topical cream Apply  to affected area two (2) times a day. use thin layer 11/11/21  Yes Hailey Pedraza PA-C   lansoprazole (PREVACID) 30 mg capsule Take 1 Capsule by mouth Daily (before breakfast). 11/11/21  Yes Hailey Pedraza PA-C   Euthyrox 100 mcg tablet TAKE 1 TABLET BY MOUTH ONCE DAILY BEFORE BREAKFAST 11/1/21  Yes Hailey Pedraza PA-C   ergocalciferol (ERGOCALCIFEROL) 1,250 mcg (50,000 unit) capsule Take 1 capsule by mouth once a week 11/1/21  Yes Hailey Pedraza PA-C   spironolactone (ALDACTONE) 25 mg tablet Take 1 tablet by mouth once daily 11/1/21  Yes Dia Santiago MD   metoprolol tartrate (LOPRESSOR) 25 mg tablet Take 1 Tablet by mouth two (2) times a day. 10/25/21  Yes Dia Santiago MD   clotrimazole (LOTRIMIN) 1 % topical cream Apply  to affected area two (2) times a day.  10/13/21  Yes Kelly Pedraza PA-C   aspirin 81 mg chewable
tablet Now, then every 24 hours   Yes Provider, Historical   lisinopriL (PRINIVIL, ZESTRIL) 2.5 mg tablet Take 1 tablet by mouth once daily 8/2/21  Yes Kenneth Carvajal MD   albuterol (PROVENTIL HFA, VENTOLIN HFA, PROAIR HFA) 90 mcg/actuation inhaler Take 2 Puffs by inhalation every four (4) hours as needed for Wheezing. 5/24/21  Yes Hailey Pedraza PA-C   magnesium oxide (MAG-OX) 400 mg tablet Take 1 Tablet by mouth daily. Patient taking differently: Take 400 mg by mouth two (2) times a day. 5/24/21  Yes Hailey Pedraza PA-C   potassium chloride (K-DUR, KLOR-CON) 20 mEq tablet Take 1 Tab by mouth daily. 4/26/21  Yes Kenneth Carvajal MD   furosemide (Lasix) 40 mg tablet Take 1 Tab by mouth daily. 4/26/21  Yes Kenneth Carvajal MD   EPINEPHrine HCl, PF, (ADRENALIN) 1 mg/mL (1 mL) injection once. Yes Provider, Historical   traMADoL (ULTRAM) 50 mg tablet Take 50 mg by mouth as needed for Pain. Yes Provider, Historical   cyclobenzaprine (FLEXERIL) 10 mg tablet Take  by mouth as needed for Muscle Spasm(s). Yes Provider, Historical   cetirizine (ZyrTEC) 10 mg tablet Take 10 mg by mouth two (2) times a day. Yes Provider, Historical   amoxicillin 500 mg tab Take 500 mg by mouth every eight (8) hours. Patient not taking: Reported on 11/22/2021    Provider, Historical   DULoxetine (CYMBALTA) 30 mg capsule Take 1 Capsule by mouth daily.  Indications: neuropathic pain 10/4/21 11/22/21  Dale Craven NP     Patient Active Problem List   Diagnosis Code    Acute on chronic diastolic congestive heart failure ([facility]) I50.33    Neuropathy G62.9    Dyspnea R06.00    Severe obesity (BMI >= 40) ([facility]) E66.01    Community acquired pneumonia of left lung J18.9    Congestive heart failure ([facility]) I50.9    Diverticulitis K57.92    Gastroesophageal reflux disease K21.9    Essential hypertension I10    Class 3 severe obesity due to excess calories with serious comorbidity and body mass index (BMI) of 50.0 to 59.9 in
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    Asthma J45.909    Acquired hypothyroidism E03.9    Vitamin D deficiency E55.9    Venous stasis I87.8    Tobacco abuse Z72.0    Post traumatic stress disorder F43.10    Polycystic ovary syndrome E28.2    Mild intermittent asthma with acute exacerbation J45.21    Lyme disease A69.20    Tobacco abuse counseling Z71.6    Atypical angina ([facility]) I20.8     Patient Active Problem List    Diagnosis Date Noted    Tobacco abuse counseling 10/25/2021    Atypical angina ([facility] 10/25/2021    Polycystic ovary syndrome 06/13/2021    Lyme disease 06/13/2021    Congestive heart failure ([facility] 06/11/2021    Diverticulitis 06/11/2021    Gastroesophageal reflux disease 06/11/2021    Essential hypertension 06/11/2021    Asthma 06/11/2021    Acute on chronic diastolic congestive heart failure ([facility]) 05/25/2021    Neuropathy 05/25/2021    Dyspnea 05/25/2021    Severe obesity (BMI >= 40) ([facility]) 05/25/2021    Chorioretinal scar, left eye 02/18/2021    Chorioretinal inflammation, bilateral 02/18/2021    Vitamin D deficiency 10/27/2020    Post traumatic stress disorder 10/10/2019    Venous stasis 10/09/2019    Tobacco abuse 10/09/2019    Community acquired pneumonia of left lung 10/08/2019    Class 3 severe obesity due to excess calories with serious comorbidity and body mass index (BMI) of 50.0 to 59.9 in adult ([facility] 10/08/2019    Acquired hypothyroidism 10/08/2019    Mild intermittent asthma with acute exacerbation 10/08/2019     Current Outpatient Medications   Medication Sig Dispense Refill    DULoxetine (CYMBALTA) 30 mg capsule Take 1 Capsule by mouth daily. Indications: neuropathic pain 90 Capsule 1    cyanocobalamin 1,000 mcg tablet Take 1 Tablet by mouth daily. 30 Tablet 2    triamcinolone acetonide (KENALOG) 0.1 % topical cream Apply  to affected area two (2) times a day.  use thin
layer 15 g 2    lansoprazole (PREVACID) 30 mg capsule Take 1 Capsule by mouth Daily (before breakfast). 30 Capsule 2    Euthyrox 100 mcg tablet TAKE 1 TABLET BY MOUTH ONCE DAILY BEFORE BREAKFAST 30 Tablet 2    ergocalciferol (ERGOCALCIFEROL) 1,250 mcg (50,000 unit) capsule Take 1 capsule by mouth once a week 12 Capsule 0    spironolactone (ALDACTONE) 25 mg tablet Take 1 tablet by mouth once daily 30 Tablet 0    metoprolol tartrate (LOPRESSOR) 25 mg tablet Take 1 Tablet by mouth two (2) times a day. 60 Tablet 3    clotrimazole (LOTRIMIN) 1 % topical cream Apply  to affected area two (2) times a day. 24 g 0    aspirin 81 mg chewable tablet Now, then every 24 hours      lisinopriL (PRINIVIL, ZESTRIL) 2.5 mg tablet Take 1 tablet by mouth once daily 90 Tablet 1    albuterol (PROVENTIL HFA, VENTOLIN HFA, PROAIR HFA) 90 mcg/actuation inhaler Take 2 Puffs by inhalation every four (4) hours as needed for Wheezing. 1 Inhaler 0    magnesium oxide (MAG-OX) 400 mg tablet Take 1 Tablet by mouth daily. (Patient taking differently: Take 400 mg by mouth two (2) times a day.) 30 Tablet 1    potassium chloride (K-DUR, KLOR-CON) 20 mEq tablet Take 1 Tab by mouth daily. 30 Tab 5    furosemide (Lasix) 40 mg tablet Take 1 Tab by mouth daily. 90 Tab 1    EPINEPHrine HCl, PF, (ADRENALIN) 1 mg/mL (1 mL) injection once.  traMADoL (ULTRAM) 50 mg tablet Take 50 mg by mouth as needed for Pain.  cyclobenzaprine (FLEXERIL) 10 mg tablet Take  by mouth as needed for Muscle Spasm(s).  cetirizine (ZyrTEC) 10 mg tablet Take 10 mg by mouth two (2) times a day.  amoxicillin 500 mg tab Take 500 mg by mouth every eight (8) hours.  (Patient not taking: Reported on 11/22/2021)       Allergies   Allergen Reactions    Latex, Natural Rubber Itching    Latex Itching    Ansaid [Flurbiprofen] Anaphylaxis    Iron Dextran Anaphylaxis and Itching    Levofloxacin Myalgia     Tendon rupture  Tendon rupture    Adhesive Rash   
Codeine Itching and Rash    Keflex [Cephalexin] Hives and Itching    Penicillins Other (comments)     Past Medical History:   Diagnosis Date    Anemia     Angio-edema     Asthma     Depression     Diverticulosis     Enlargement, spleen     Esophagospasm     Essential hypertension     Fatty liver     GERD (gastroesophageal reflux disease)     Histoplasmosis     HPV (human papilloma virus) anogenital infection     Lyme disease     Neuropathy     PCOS (polycystic ovarian syndrome)     PTSD (post-traumatic stress disorder)     Tachycardia     Thyroid disease     Tricuspid insufficiency      Past Surgical History:   Procedure Laterality Date    HX CHOLECYSTECTOMY      HX HEART CATHETERIZATION  2010 approx    normal per patient    HX HEMORRHOIDECTOMY      HX OTHER SURGICAL      Vocal cord polyps     Family History   Problem Relation Age of Onset    Cancer Mother         stomach    Seizures Mother     Cancer Sister         double mastectomy    Cancer Sister         lung    Heart Surgery Maternal Grandmother         CABG    Stroke Neg Hx      Social History     Tobacco Use    Smoking status: Current Every Day Smoker     Packs/day: 0.50     Years: 15.00     Pack years: 7.50     Start date: 1985    Smokeless tobacco: Never Used    Tobacco comment: less than 10 depending on the day   Substance Use Topics    Alcohol use: Not Currently       Review of Systems  GENERAL: Denies fever or fatigue  CARDIAC: No CP or SOB  PULMONARY: No cough of SOB  MUSCULOSKELETAL: No new joint pain  NEURO: SEE HPI      Objective:     Patient-Reported Vitals 11/22/2021   Patient-Reported Weight 268.8lb      General: alert, cooperative, no distress   Mental  status: normal mood, behavior, speech, dress, motor activity, and thought processes, able to follow commands   HENT: NCAT   Neck: no visualized mass   Resp: no respiratory distress   Neuro: no gross deficits   Skin: no discoloration or lesions of concern on
visible areas   Psychiatric: normal affect, consistent with stated mood, no evidence of hallucinations     Additional exam findings: This is a very pleasant 75-year-old female. She is alert and in no apparent distress. Full fund of knowledge. Speech is clear. No facial asymmetry. No signs of ataxia or incoordination. Gait deferred. We discussed the expected course, resolution and complications of the diagnosis(es) in detail. Medication risks, benefits, costs, interactions, and alternatives were discussed as indicated. I advised her to contact the office if her condition worsens, changes or fails to improve as anticipated. She expressed understanding with the diagnosis(es) and plan. Emily [facility] Meter, was evaluated through a synchronous (real-time) audio-video encounter. The patient (or guardian if applicable) is aware that this is a billable service. Verbal consent to proceed has been obtained within the past 12 months. The visit was conducted pursuant to the emergency declaration under the [address] [place] [address],  waiver authority and the [employer] General Act. Patient identification was verified, and a caregiver was present when appropriate. The patient was located in a state where the provider was credentialed to provide care.     Philippe Ivan NP
Implemented All Universal Safety Interventions:  [place] to call system. Call bell, personal items and telephone within reach. Instruct patient to call for assistance. Room bathroom lighting operational. Non-slip footwear when patient is off stretcher. Physically safe environment: no spills, clutter or unnecessary equipment. Stretcher in lowest position, wheels locked, appropriate side rails in place.
Implemented All Universal Safety Interventions:  [place] to call system. Call bell, personal items and telephone within reach. Instruct patient to call for assistance. Room bathroom lighting operational. Non-slip footwear when patient is off stretcher. Physically safe environment: no spills, clutter or unnecessary equipment. Stretcher in lowest position, wheels locked, appropriate side rails in place.

## 2021-11-26 NOTE — ED PROVIDER NOTE - CPE EDP NEURO NORM
Telemetry Bed?: No   Admitting Physician: [facility], [address] [facility] [[id number]] normal...

## 2021-12-07 NOTE — ED ADULT NURSE NOTE - PAIN: PRESENCE, MLM
Length Of Therapy: 9 months Add High Risk Medication Management Associated Diagnosis?: Yes Detail Level: Zone complains of pain/discomfort

## 2021-12-08 NOTE — ED PROVIDER NOTE - NS_EDPROVIDERDISPOUSERTYPE_ED_A_ED
[FreeTextEntry1] : MS\par sees neuro regularly\par on amitriptyline which is helping\par \par ibs\par  on hyoscyamine\par \par dense breasts\par mammo/sonogram\par \par had covid vaccine with booster\par moderna\par \par Patient was counseled on healthy eating habits, on daily exercise and stress relief. All medications and allergies were reviewed with the patient and any adjustments necessary were made. Patient was counseled to try engage in an exercise activity for at least 30 minutes 3-4 times a week.  Patient was advised to eat a diet low in fat and carbohydrates and high in protein, with plenty of vegetables. Patient was advised to try and engage in relaxing activities whenever possible.\par The patients blood was draw in office and will be followed and assessed for any issues.  Patient was told to return to the office if any issues arise.  Unless otherwise stated, the patient is to continue all other medications as previously prescribed.\par \par \par  I have personally evaluated and examined the patient. The Attending was available to me as a supervising provider if needed.

## 2021-12-31 NOTE — ED ADULT TRIAGE NOTE - SPO2 (%)
[facility] EMERGENCY DEPT  [address] [place] [address] [place] [id number]  [phone]    Work/School Note    Date: 12/31/2021    To Whom It May concern:    Keisha You was seen and treated today in the emergency room by the following provider(s):  Nurse Practitioner: Kourtney Hernandez NP. Keisha You is excused from work/school on 12/31/21 and 01/01/22. She is medically clear to return to work/school on 1/2/2022.        Sincerely,          Antonio Connolly NP 99

## 2022-01-11 NOTE — ED PROVIDER NOTE - RADIATION
TM tested negative for COVID-19 on 12/27/21, reported via VM to  and in Safecheck. TM reported symptoms via Safecheck 12/26/21. Continue working after negative COVID test email to TM and manager. Safecheck alert dismissed  
no radiation

## 2022-01-24 NOTE — ED PROVIDER NOTE - NS ED MD TWO NIGHTS YN
Problem: ANTEPARTUM/LABOR and DELIVERY  Goal: Maintain pregnancy as long as maternal and/or fetal condition is stable  Description: INTERVENTIONS:  - Maternal surveillance  - Fetal surveillance  - Monitor uterine activity  - Medications as ordered  - Bed Short Term Goal: Understand activity parameters appropriate for diagnosis.     Interventions:   - Education and patient demonstrates comprehension.  - Patient verbalizes understanding.   - See additional Care Plan goals for specific interventions        Int Yes

## 2022-01-31 NOTE — ED PROVIDER NOTE - CPE EDP PSYCH NORM
Render In Strict Bullet Format?: No
Detail Level: Zone
Initiate Treatment: Clindamycin swabs QD (if still on back order will use Clindamycin solution). \\nTretinoin 0.025% cream QHS.
normal...

## 2022-02-03 NOTE — ED PROVIDER NOTE - LATERALITY
[facility]                                               Progress note    Summary:   Elfego Torres is being seen by nephrology for ESRD. This is a 75 yo man with ESRD on HD three times weekly via left AVF who is here after a fall and AMS. He has been diagnosed with colon cancer and has been having rectal bleeding off and on for the past several weeks. From [place]. Had [facility] last month so has been dialyzing at a different dialysis unit for 20 day period. Last HD on Thursday this week. No rectal bleeding since being here at [facility]. GI has no plans for him. Daughter is at bedside. Apparently her father and mother both had a fall yesterday prompting the ED visit. Interval History  Seen and examined on dialsyis. Constipation issue resolved. No chest pain no SOB.   s/p sigmoid colectomy 1/26/2022  Last Post HD weight 96.7 kilos  Set for 2 L     Labs and vitals reviewed. Plan:   - HD today per TTS, UF to last post weight  - BP acceptable. Appears euvolemic.  - retacrit 20 units TIW. Sujata Lewis MD  [facility]  Office: [phone]    Assessment:   ESRD  Does dialysis Monday Wednesday Friday usually but has been doing TTS at the [facility] unit near [place]  He has a left AV fistula, positive thrill and bruit  Last at dialysis was on Thursday 1/14     Electrolytes  No acute issues.     Hypertension  Blood pressure is acceptable. No changes.      S HPT  Calcium ok  Check phosphorus     GI bleed  Has known colon cancer  No active bleeding  Hemoglobin stable  General surgery consulted.      Altered mental status  MRI showed small acute stroke  Neurology consulted  Mental status has improved. ROS:   Positives Listed Bold. All other remaining systems are negative.     Constitutional:  fever, chills, weakness, weight change, fatigue,      Skin:  rash, pruritus, hair loss, bruising, dry skin, petechiae.   Head, Face, Neck   headaches, swelling,  cervical adenopathy.     Respiratory: shortness of breath, cough, or wheezing  Cardiovascular: chest pain, palpitations, dizzy, edema  Gastrointestinal: nausea, vomiting, diarrhea, constipation,belly pain    Yellow skin, blood in stool  Musculoskeletal:  back pain, muscle weakness, gait problems,       joint pain or swelling. Genitourinary:  dysuria, poor urine flow, flank pain, blood in urine  Neurologic:  vertigo, TIA'S, syncope, seizures, focal weakness  Psychosocial:  insomnia, anxiety, or depression. Additional positive findings: -     PMH:   Past medical history, surgical history, social history, family history are reviewed and updated as appropriate. Reviewed current medication list.   Allergies reviewed and updated as needed. PE:   Vitals:    02/03/22 0828   BP: 119/68   Pulse: 79   Resp: 18   Temp: 98 °F (36.7 °C)   SpO2:        General appearance: Male in no acute distress,awake and alert. HEENT: no icterus, EOM intact, trachea midline. Neck : no masses, appears symmetrical and no JVD appreciated. Respiratory: Respiratory effort normal, bilateral equal chest rise. Cardiovascular: Ausculation shows RRR and no edema   Abdomen: abdomen is soft, non distended  Musculoskeletal:  no joint swelling, no deformity  Skin: no rashes, no induration, no tightening, no jaundice   Neuro:   Follows commands, moves all extremities spontaneously   Left AV fistula positive thrill and bruit      Lab Results   Component Value Date    CREATININE 6.6 (HH) 02/03/2022    BUN 31 (H) 02/03/2022     02/03/2022    K 4.2 02/03/2022    CL 97 (L) 02/03/2022    CO2 25 02/03/2022      Lab Results   Component Value Date    WBC 6.0 02/03/2022    HGB 9.5 (L) 02/03/2022    HCT 28.3 (L) 02/03/2022    MCV 89.5 02/03/2022     02/03/2022     Lab Results   Component Value Date    CALCIUM 8.4 02/03/2022 bilateral

## 2022-02-27 NOTE — ED ADULT NURSE NOTE - DISCHARGE DATE/TIME
[facility]    2/27/2022 2:02 PM        Name: Jenny Awan . Admitted: 2/25/2022  Primary Care Provider: No primary care provider on file.  (Tel: None)          Subjective:    Patient lying in bed no chest pain shortness breath fevers or chills    Reviewed interval ancillary notes    Current Medications  [START ON 2/28/2022] cefTRIAXone (ROCEPHIN) 2000 mg IVPB in D5W 50ml minibag, Q24H  insulin glargine (LANTUS;BASAGLAR) injection pen 20 Units, Daily  insulin lispro (1 Unit Dial) 7 Units, TID WC  sodium chloride flush 0.9 % injection 5-40 mL, 2 times per day  sodium chloride flush 0.9 % injection 5-40 mL, PRN  0.9 % sodium chloride infusion, PRN  enoxaparin (LOVENOX) injection 40 mg, Daily  ondansetron (ZOFRAN-ODT) disintegrating tablet 4 mg, Q8H PRN   Or  ondansetron (ZOFRAN) injection 4 mg, Q6H PRN  polyethylene glycol (GLYCOLAX) packet 17 g, Daily PRN  acetaminophen (TYLENOL) tablet 650 mg, Q6H PRN   Or  acetaminophen (TYLENOL) suppository 650 mg, Q6H PRN  insulin lispro (1 Unit Dial) 0-12 Units, TID WC  insulin lispro (1 Unit Dial) 0-6 Units, Nightly  morphine (PF) injection 2 mg, Q4H PRN  glucose (GLUTOSE) 40 % oral gel 15 g, PRN  glucagon (rDNA) injection 1 mg, PRN  dextrose 5 % solution, PRN  dextrose bolus (hypoglycemia) 10% 125 mL, PRN   Or  dextrose bolus (hypoglycemia) 10% 250 mL, PRN  tamsulosin (FLOMAX) capsule 0.4 mg, Daily        Objective:  /77   Pulse 89   Temp 96.8 °F (36 °C) (Temporal)   Resp 16   Ht 5' 11\" (1.803 m)   Wt 260 lb 2.3 oz (118 kg)   SpO2 93%   BMI 36.28 kg/m²     Intake/Output Summary (Last 24 hours) at 2/27/2022 1402  Last data filed at 2/27/2022 1113  Gross per 24 hour   Intake 480 ml   Output 2250 ml   Net -1770 ml      Wt Readings from Last 3 Encounters:   02/27/22 260 lb 2.3 oz (118 kg)   05/10/17 279 lb (126.6 kg)       General appearance:  Appears comfortable. AAOx3  HEENT: atraumatic, Pupils equal, muscous membranes moist, no masses appreciated  Cardiovascular: Regular rate and rhythm no murmurs appreciated  Respiratory: CTAB no wheezing  Gastrointestinal: Abdomen soft, non-tender, BS+  EXT: no edema  Neurology: no gross focal deficts  Psychiatry: Appropriate affect. Not agitated  Skin: Warm, dry, no rashes appreciated    Labs and Tests:  CBC:   Recent Labs     02/25/22  1045 02/26/22  0528 02/27/22  0509   WBC 22.5* 15.0* 9.4   HGB 15.7 13.6 14.0    255 282     BMP:    Recent Labs     02/25/22  1045 02/26/22  0528 02/27/22  0509   * 133* 136   K 3.8 3.8 4.3   CL 96* 99 101   CO2 22 20* 24   BUN 14 17 16   CREATININE 1.0 0.8* 0.8*   GLUCOSE 285* 246* 146*     Hepatic:   Recent Labs     02/25/22  1045 02/26/22  0528 02/27/22  0509   AST 15 12* 17   ALT 18 12 13   BILITOT 1.2* 1.0 0.5   ALKPHOS 135* 92 92     FL RETROGRADE PYELOGRAM RIGHT   Final Result   Intraprocedural fluoroscopic spot images as above. See separate procedure   report for more information. CT ABDOMEN PELVIS WO CONTRAST Additional Contrast? None   Final Result   Mild right hydronephrosis secondary to partially obstructing proximal   ureteral stone. RECOMMENDATIONS:   Unavailable             Recent imaging reviewed    Problem List  Principal Problem:    Sepsis ([address]  Active Problems:    Ureterolithiasis    Nausea and vomiting    Hydronephrosis, right    Class 2 obesity due to excess calories with body mass index (BMI) of 38.0 to 38.9 in adult    Diabetes mellitus type 2 in obese [facility])    Diabetes education, encounter for  Resolved Problems:    * No resolved hospital problems.  *  Assessment/Plan:   Sepsis secondary to mild right hydronephrosis with ureteral stone with UTI   and strep agalactiae bacteremia  - s/p right ureteral stent  - iv rocephin  - repeat blood cultures pending  - id on board  - urology onboard  - echo    Dm2 with hyperglycemia non complaint with medication  -improved monitor  - a1c 11. 3        DVT prophylaxis lovenox  Code status full      Zahira Lawrence MD   2/27/2022 2:02 PM 23-Jan-2020 16:24

## 2022-03-05 NOTE — ED ADULT NURSE NOTE - NSIMPLEMENTINTERV_GEN_ALL_ED
Diabetes Implemented All Universal Safety Interventions:  [place] to call system. Call bell, personal items and telephone within reach. Instruct patient to call for assistance. Room bathroom lighting operational. Non-slip footwear when patient is off stretcher. Physically safe environment: no spills, clutter or unnecessary equipment. Stretcher in lowest position, wheels locked, appropriate side rails in place.

## 2022-03-08 NOTE — ED PROVIDER NOTE - PROGRESS NOTE
Pt states that she started her menses yesterday.  Pt states that she started to have \"heavy\" bleeding last night.  Pt reports changing a regular sized tampon every 2 hours.  Pt also reports passing 1 medium sized grape clot.  Pt states that her LMP prior to this was 1/26/22.  Pt states that she did do a Nuvaring cycle to induce this period.  Pt reports cramping and using Tylenol.  Pt denies any other environmental changes to activity, diet, stress, medications, etc.  Reassured pt of bleeding.  Discussed heavy bleeding precautions of when to call back- if soaking a pad/ super tampon in less than an hour.  Discussed pushing water intake, and adding in ibuprofen for cramping. Advised pt to continue to monitor at this time.  Pt appreciative and verbalizes understanding.  No further questions.   Stable.

## 2022-04-11 NOTE — ED ADULT TRIAGE NOTE - RESPIRATORY RATE (BREATHS/MIN)
Please take progesterone course for 10 days   Continue with good work with healthy eating and exercise!   Follow up with me in 4 months  Please see neurology
16

## 2022-04-16 NOTE — ED PROVIDER NOTE - TEMPLATE, MLM
Patient's urine culture positive for the following, however no sensitivity available. She is being treated with macrobid. Please advise.    REFLEXIVE URINE CULTURE 60,000 ,000 CFU/mL Streptococcus alpha hemolytic            General

## 2022-06-28 NOTE — ED ADULT NURSE NOTE - NSSUHOSCREENINGYN_ED_ALL_ED
History and physical documented and up to date, allergies reviewed, lab results reviewed, pre-procedure education provided, patient verbalized understanding of procedure, procedural consent signed and patient is NPO. Yes - the patient is able to be screened

## 2022-07-16 NOTE — ED ADULT NURSE NOTE - NSHOSCREENINGQ1_ED_ALL_ED
- Improving  - See papilledema-- spoke w/ophthalmology- expect papilledema to improve over the course of 2wks if infection remains well-controlled and ICP remains wnl     No

## 2022-07-29 NOTE — PROGRESS NOTE ADULT - ASSESSMENT
33yoM hx sickle cell disease complicated by acute chest syndrome (2003, ?2008), requiring intubation and exchange transfusion, PE (2008), s/p coumadin, LUE DVT (10/2018), now on Eliquis presenting with worsening lower back pain and bilateral hip and leg pain.  Reports pain is characteristic of his prior sickle cell crisis and wasn't relieved with his PO dilaudid at home.  Pain is severe to the point where it's affecting his ability to ambulate and he required a cane to walk.  Denies any fevers, chills, chest pain, dyspnea, abdominal pain, nausea, vomiting. Received IV Dilaudid 1mg in ED with partial relief.  CXR on my read appears clear.  iSTOP confirms pt on dilaudid 8mg PO. (04 Apr 2019 03:48)    > Acute Sickle Cell Crisis - continue  IVF, pain control with Dilaudid.  Suplemental oxygen. O2.     > Chronic LUE DVT	- Continue anticoagulation with apixaban    > Smoker - Nicotine Patch.  Counselled to t to quit smoking.  The patient understood these effects and agreed on smoking cessation.    Supportive care 1. Acute hypoxemic respiratory failure due to aspiration and possible covid pneumonia. Pt also with COPD.       Continue current AC vent settings.PS wean as tolerated after sedation off. Trial of extubation today.      Zosyn for Aspiration pneumonia. WBC decreasing.       Dexamethasone and Remdesivir for COVID.      COPD. on dex. Start bronchodilators. D/C Symbicort.  2. Rhabdomyolysis. Pt found down at home.  Continue IVF. CPKS  Continue to trending down.  3. Parkinson's disease. Continue amantadine.  4. DVT prophylaxis Lovenox  5. [facility] full code  6. Tolerating  tube feeds.

## 2022-09-07 NOTE — ED PROVIDER NOTE - TIMING
frequent FAMILY HISTORY:  Mother  Still living? Unknown  FH: HTN (hypertension), Age at diagnosis: Age Unknown

## 2022-09-18 NOTE — ED ADULT NURSE NOTE - PAIN: BODY LOCATION
Regarding: wi has sores on side of face   ----- Message from Nancy Salgado sent at 9/18/2022  3:04 PM CDT -----  Patient Name: Geoffrey G Gerhardt    Full Name of Provider seen for current symptoms:Alex Lepe    Symptoms: has sores on side of face     Pregnant (If Yes, how long?):na     Call Back #:[phone]     Clinic Site / Call Center Account # for provider seen for current symptoms:[id number]    Which State are you currently located in? (enter State name in Summary field): [place]    Patients needing callback from the RN are informed of the following:   Please be aware the return phone call may come from an unidentified phone number or out of state phone number. Also keep in mind that call back times vary based on call volumes.  If your condition becomes life threatening while you wait for a callback, you should seek immediate medical assistance by calling 911 or going to the Emergency Department for evaluation.       Back

## 2022-09-21 NOTE — ED PROVIDER NOTE - EXTREMITY EXAM
Anesthesia Post Evaluation    Patient: Yoan Xavier    Procedure(s) Performed: Procedure(s) (LRB):  HEMIARTHROPLASTY, HIP, POSTERIOR APPROACH (Left)    Final Anesthesia Type: general      Patient location during evaluation: PACU  Patient participation: Yes- Able to Participate  Level of consciousness: awake and alert  Post-procedure vital signs: reviewed and stable  Pain management: adequate  Airway patency: patent    PONV status at discharge: No PONV  Anesthetic complications: no      Cardiovascular status: hemodynamically stable  Respiratory status: unassisted  Hydration status: euvolemic  Follow-up not needed.          Vitals Value Taken Time   /68 09/21/22 1412   Temp ** 09/21/22 1413   Pulse 69 09/21/22 1413   Resp 18 09/21/22 1413   SpO2 99 % 09/21/22 1413   Vitals shown include unvalidated device data.      No case tracking events are documented in the log.      Pain/Moses Score: Pain Rating Prior to Med Admin: 8 (9/21/2022  7:25 AM)  Pain Rating Post Med Admin: 0 (9/21/2022  6:59 AM)        
no deformity, pain or tenderness, no restriction of movement

## 2022-10-03 NOTE — ED ADULT NURSE NOTE - NS ED NOTE ABUSE SUSPICION NEGLECT YN
Hypertriglyceridemia Monitoring: I explained this is common when taking isotretinoin. If this worsens they will contact us. No

## 2022-10-04 NOTE — ED ADULT TRIAGE NOTE - PRO INTERPRETER NEED 2
Date of Service: 10/03/2022    HISTORY OF PRESENT ILLNESS:    The patient is an 80-year-old female who came for followup for:  1.  CKD stage III, stable.  2.  Hypertension, well controlled.  3.  Proteinuria, non-nephrotic.  4.  Secondary hyperparathyroidism, mild.    The patient is feeling okay without any specific complaint at this time.  Denies any headache, lightheadedness, dizziness, cough, fever or chills.  No shortness of breath at rest.  No nausea, no vomiting, no chest pain or abdominal pain.  Appetite fairly good.  Bowel movements are okay and has no specific urinary complaint.  No pain, burning during urination reported by patient.    PAST MEDICAL HISTORY:    As indicated above, is significant for CKD stage III, proteinuria, hypertension, secondary hyperparathyroidism, history of ischemic colitis, colonic polyp, GERD, dyslipidemia, atrial fibrillation, history of pulmonary nodule, status post left arm surgery due to fracture and cardiac pacemaker placement.  History of glaucoma, hypothyroidism, history of atrial flutter, COPD, anxiety, depression, status post cataract surgery with lens implant.    SYSTEMIC REVIEW:    As indicated above in the history.    PERSONAL AND SOCIAL HISTORY:    Reviewed and documented in the EMR is unchanged.    FAMILY HISTORY:    Reviewed and documented in the EMR is unchanged.    CURRENT MEDICATIONS:    Reviewed and documented in the EMR, unchanged as well.    PHYSICAL EXAMINATION:    GENERAL:  Awake, alert, oriented x3, sitting up comfortably, not in acute distress or respiratory distress.  VITAL SIGNS:  Blood pressure 126/62, heart rate 56 per minute, weight 63.8 kg, which is about 3 kg less since last visit.  HEENT:  Normocephalic, atraumatic.  Eyes:  PERRLA.  Sclerae nonicteric.  NECK:  Supple.  Trachea central.  LUNGS:  Essentially clear on auscultation bilaterally, no wheezing, rhonchi or crackles appreciated.  HEART:  S1, S2 audible.  ABDOMEN:  Bowel sounds positive,  soft, nontender.  No hepatosplenomegaly appreciated.  No flank tenderness or suprapubic dullness.  No abdominal bruit noticed.  EXTREMITIES:  No clubbing or cyanosis, no leg edema.  Bilateral radial pulses palpable.  SKIN:  Turgor normal.  No skin rash noticed.  NEUROLOGIC:  No focal motor deficit noticed.    LABORATORY DATA:    Drawn on 03/30/2022.  Serum sodium, potassium, calcium, within normal limit.  BUN 29, creatinine 1.4.  Urine protein random specimen 0.2 grams.  PTH 96.  Hemoglobin 13.2.    DIAGNOSES:    1.  Chronic kidney disease, multifactorial, stable, stage III, estimated GFR 38 mL per minute based on history secondary to hypertensive nephrosclerosis with age-related nephrosclerosis.  2.  Proteinuria, non-nephrotic, is stable.  3.  Secondary hyperparathyroidism, mild.  4.  Hypertension, well controlled.    PLAN:    From nephrology viewpoint is to continue current medication, continue low-salt diet.  Avoid nephrotoxic medication, especially NSAIDs.  Follow up with me after 6 months for CKD care or sooner if needed and also advised to continue follow up with her PCP for other medical needs.    All of the above again was discussed with the patient and all her questions answered to her apparent satisfaction.        Dictated By: Estefany Rojas MD  Signing Provider: Estefany Rojas MD    QMIL/sherrell ([id number])   DD: 10/03/2022 10:12:32 AM TD: 10/04/2022 4:38:32 AM      Copy Sent To:  Trice Silva MD   English

## 2022-11-01 NOTE — ED ADULT NURSE NOTE - CHIEF COMPLAINT QUOTE
Pt BIBA presents with c/o lower back and bilateral hip pain since 11am, states hx of sickle cell. Pt states took 8mg Dilaudid at 11:00. none

## 2022-11-28 NOTE — ED PROVIDER NOTE - NSTIMEPROVIDERCAREINITIATE_GEN_ER
[Depressed Mood] : depressed mood [Anhedonia] : no anhedonia [Guilt] : not feeling guilty [Decreased Concentration] : no decrease in concentrating ability [Hyperphagia] : no hyperphagia [Insomnia] : no insomnia disorder [Hypersomnia] : no ~T hypersomnia [Psychomotor Retardation] : no psychomotor retardation [Anorexia] : no anorexia [Euphoria] : no euphoria [Highly Irritable] : no high irritability [Increased Activity] : no increased in activity [Distractibility] : not distracted [Grandeur] : no feelings of grandeur [Buying Sprees] : no buying sprees [Hypersexuality] : denied hypersexuality [Dec Need For Sleep] : no decreased need for sleep [Delusions] : no ~T delusions 21-Sep-2021 12:10 [Hallucination Auditory] : no auditory hallucinations [Thought Disorder] : ~T a thought disorder was not noted [Excessive Worry] : excessive worry [Ruminations] : ruminations [Obsessions] : no obsessions [Re-experiencing] : no re-experiencing [Restlessness] : no restlessness [Hypochondriasis] : no hypochondriasis [Panic] : no panic disorder [Recent Onset] : no recent onset of confusion [Fluctuating Course] : no fluctuating course [Reversed sleep-wake] : no reversed sleep- wake [Inattentive] : not nattentive [Tremor] : ~T no ~M tremor was seen [Hallucination Visual] : no visual hallucinations [Hallucination Olfactory] : no olfactory hallucinations [Nausea] : no nausea [Hallucination Tactile] : no tactile hallucinations [Hallucination Gustatory] : no gustatory hallucinations [Diaphoresis] : showed no ~M diaphoresis [Vomiting] : no vomiting [Yawning] : no yawning [Mydriasis] : showed no ~M mydriasis [Gastrointestinal Sxs] : no ~T gastrointestinal symptoms [Piloerection] : the hair did not demonstrate piloerection [Rhinorrhea] : no ~M rhinorrhea discharge was seen [de-identified] : less depressed [de-identified] : at times, improved

## 2022-11-30 NOTE — ED ADULT NURSE NOTE - NSFALLRSKASSESSDT_ED_ALL_ED
[FreeTextEntry1] : In summary, Ms. Sarita Saleem  is a 59 year female with stage  ( pT1c, N0, Mn/a)  ER/PgR positive, HER-2 Negative  invasive moderately differentiated ductal carcinoma of the left breast. She is s/p lumpectomy 11/1/2022 Oncotype score 0.\par \par \par  22-Sep-2019 11:33

## 2022-12-06 NOTE — ED PROVIDER NOTE - NS ED MD DISPO DISCHARGE
Procedure Note    Patient Name: Sim Mix    Date of Procedure: December 6, 2022    Preoperative Diagnosis:Sacroiliac Dysfunction    Post Operative Diagnosis: same    Procedure: SI Joint Injection, Intra-articular and extra-articular - Unilateral  right    Consent: Informed consent was obtained prior to the procedure. The patient was given the opportunity to ask questions regarding the procedure and its associated risks. In addition to the potential risks associated with the procedure itself, the patient was informed both verbally and in writing of potential side effects of the use of corticosteroids. The patient appeared to comprehend the informed consent and desired to have the procedure performed. Procedure: The patient was placed in the prone position on the flouroscopy table and the back was prepped and draped in the usual sterile manner. After local Lidocaine 1% infiltration, a #22 gauge spinal needle was then advanced to lie within the Sacroiliac Joint. No vascular uptake was identified. A total of 10 mg of Dexamethasone  and 5 ml of Lidocaine was introduced in and around the joint. The injection area was cleaned and bandaids applied. No excessive bleeding was noted. Patient dressed and was discharged to home with instructions. Discussion:  The patient tolerated the procedure well. Patient reported noah-procedural pain on Visual Analog Scale:  pre-6; post-6.         Heber Hernandez MD  December 6, 2022 Home

## 2023-01-01 NOTE — ED PROVIDER NOTE - CARDIOVASCULAR [+], MLM
Information given by: mother and guardian    Chief Complaint   Patient presents with   • Weight Check       Subjective:     Kelly Welch is a 5 days female who was brought in for this weight check    Review of Nutrition:  Current diet: formula (sim sensitive)  Current feeding patterns: q 3 hrs  Difficulties with feeding? no  Current stooling frequency: 2-3 times a day  Current voiding frequency:  4-5 times a day      Pasty stools with crying and straining while feeding sim adv and parents switched formula to sim sensitive yesterday        Birth History   • Birth     Length: 19" (48.3 cm)     Weight: 3205 g (7 lb 1.1 oz)   • Apgar     One: 9     Five: 9   • Discharge Weight: 3000 g (6 lb 9.8 oz)   • Delivery Method: , Low Transverse   • Gestation Age: 44 1/7 wks   • Days in Hospital: 2.0   • Hospital Name: [address] [place] [address] Location: [place], [place]     The following portions of the patient's history were reviewed and updated as appropriate: allergies, current medications, past family history, past medical history, past social history, past surgical history and problem list.    Immunization History   Administered Date(s) Administered   • Hep B, Adolescent or Pediatric 2023       Current Issues:  Parental concerns: Yes  Pasty stools with crying and straining while feeding sim adv and parents switched formula to sim sensitive yesterday    Review of Systems   Constitutional: Positive for crying and irritability. No current outpatient medications on file prior to visit. No current facility-administered medications on file prior to visit. Objective:    Vitals:    23 1036   Temp: 97.8 °F (36.6 °C)   TempSrc: Tympanic   Weight: 3158 g (6 lb 15.4 oz)               Physical Exam  Vitals and nursing note reviewed. Constitutional:       General: She is active. She has a strong cry. She is not in acute distress. Appearance: Normal appearance. HENT:      Head: Normocephalic and atraumatic. No cranial deformity or facial anomaly. Anterior fontanelle is flat. Right Ear: Tympanic membrane normal.      Left Ear: Tympanic membrane normal.      Nose: Nose normal.      Mouth/Throat:      Mouth: Mucous membranes are moist.      Pharynx: Oropharynx is clear. Eyes:      General: Red reflex is present bilaterally. Conjunctiva/sclera: Conjunctivae normal.   Cardiovascular:      Rate and Rhythm: Normal rate and regular rhythm. Pulses: Normal pulses. Heart sounds: Normal heart sounds. No murmur heard. Pulmonary:      Effort: Pulmonary effort is normal.      Breath sounds: Normal breath sounds. Abdominal:      General: Bowel sounds are normal.      Palpations: Abdomen is soft. There is no mass. Hernia: No hernia is present. Genitourinary:     Labia: No labial fusion. Musculoskeletal:         General: No deformity. Normal range of motion. Cervical back: Normal range of motion and neck supple. Right hip: Negative right Ortolani and negative right Arshad. Left hip: Negative left Ortolani and negative left Arshad. Skin:     General: Skin is warm. Capillary Refill: Capillary refill takes less than 2 seconds. Coloration: Skin is not jaundiced. Findings: No rash. Neurological:      General: No focal deficit present. Mental Status: She is alert. Motor: No abnormal muscle tone. Primitive Reflexes: Suck normal. Symmetric [place]. Deep Tendon Reflexes: Reflexes are normal and symmetric. Assessment/Plan:   9 days female infant. 1. Weight gain        2. Formula intolerance              Plan:         1. Anticipatory guidance discussed. Gave handout on well-child issues at this age.   Specific topics reviewed: adequate diet for breastfeeding, avoid putting to bed with bottle, call for jaundice, decreased feeding, or fever, car seat issues, including proper placement, encouraged that any formula used be iron-fortified, fluoride supplementation if unfluoridated water supply, impossible to "spoil" infants at this age, limit daytime sleep to 3-4 hours at a time, normal crying, obtain and know how to use thermometer, place in crib before completely asleep, safe sleep furniture, set hot water heater less than 120 degrees F, sleep face up to decrease chances of SIDS, smoke detectors and carbon monoxide detectors, typical  feeding habits and umbilical cord stump care. 4. Follow-up visit in 1 weeks for next well child visit, or sooner as needed.      Continue sim sensitive  If symptoms persist in a week will consider sim total comfort PALPITATIONS

## 2023-01-09 NOTE — ED ADULT TRIAGE NOTE - TEMPERATURE IN CELSIUS (DEGREES C)
37.3 Solaraze Counseling:  I discussed with the patient the risks of Solaraze including but not limited to erythema, scaling, itching, weeping, crusting, and pain.

## 2023-02-06 NOTE — ED PROVIDER NOTE - SECONDARY DIAGNOSIS.
Detail Level: Simple Render Risk Assessment In Note?: yes Additional Notes: Patient consent was obtained to proceed with the visit and recommended plan of care after discussion of all risks and benefits, including the risks of COVID-19 exposure. Sickle cell anemia Avascular necrosis of bone

## 2023-02-08 NOTE — ED ADULT NURSE NOTE - NSIMPLEMENTINTERV_GEN_ALL_ED
1
Implemented All Universal Safety Interventions:  [place] to call system. Call bell, personal items and telephone within reach. Instruct patient to call for assistance. Room bathroom lighting operational. Non-slip footwear when patient is off stretcher. Physically safe environment: no spills, clutter or unnecessary equipment. Stretcher in lowest position, wheels locked, appropriate side rails in place.

## 2023-03-20 NOTE — ED ADULT NURSE NOTE - DISCHARGE DATE/TIME
Quality 130: Documentation Of Current Medications In The Medical Record: Current Medications Documented Quality 431: Preventive Care And Screening: Unhealthy Alcohol Use - Screening: Patient not identified as an unhealthy alcohol user when screened for unhealthy alcohol use using a systematic screening method Detail Level: Detailed Quality 226: Preventive Care And Screening: Tobacco Use: Screening And Cessation Intervention: Patient screened for tobacco use and is an ex/non-smoker 28-Dec-2019 14:14

## 2023-04-10 NOTE — H&P ADULT - CARDIOVASCULAR
Problem: Skin Injury Risk Increased  Goal: Skin Health and Integrity  Outcome: Ongoing, Progressing  Intervention: Optimize Skin Protection  Recent Flowsheet Documentation  Taken 4/10/2023 0400 by Adriana Abbott RN  Head of Bed (HOB) Positioning: HOB at 30-45 degrees  Taken 4/10/2023 0221 by Adriana Abbott RN  Pressure Reduction Techniques:   frequent weight shift encouraged   heels elevated off bed   positioned off wounds   pressure points protected   weight shift assistance provided  Head of Bed (HOB) Positioning: HOB at 30-45 degrees  Pressure Reduction Devices:   pressure-redistributing mattress utilized   positioning supports utilized  Skin Protection:   adhesive use limited   incontinence pads utilized   tubing/devices free from skin contact  Taken 4/10/2023 0015 by Adriana Abbott RN  Head of Bed (HOB) Positioning: HOB at 30-45 degrees  Taken 4/9/2023 2200 by Adriana Abbott RN  Head of Bed (HOB) Positioning: HOB at 30-45 degrees  Taken 4/9/2023 2030 by Adriana Abbott RN  Pressure Reduction Techniques:   frequent weight shift encouraged   heels elevated off bed   positioned off wounds   pressure points protected   weight shift assistance provided  Head of Bed (HOB) Positioning: HOB at 30-45 degrees  Pressure Reduction Devices:   pressure-redistributing mattress utilized   positioning supports utilized  Skin Protection:   adhesive use limited   incontinence pads utilized   tubing/devices free from skin contact     Problem: Fall Injury Risk  Goal: Absence of Fall and Fall-Related Injury  Outcome: Ongoing, Progressing  Intervention: Identify and Manage Contributors  Recent Flowsheet Documentation  Taken 4/9/2023 2030 by Adriana Abbott RN  Medication Review/Management: medications reviewed  Taken 4/9/2023 1900 by Adriana Abbott RN  Medication Review/Management: medications reviewed  Intervention: Promote Injury-Free Environment  Recent Flowsheet Documentation  Taken 4/10/2023 0400 by Scar  SHAHNAZ Wright  Safety Promotion/Fall Prevention: safety round/check completed  Taken 4/10/2023 0300 by Adriana Abbott RN  Safety Promotion/Fall Prevention: safety round/check completed  Taken 4/10/2023 0221 by Adriana Abbott RN  Safety Promotion/Fall Prevention: safety round/check completed  Taken 4/10/2023 0100 by Adriana Abbott RN  Safety Promotion/Fall Prevention: safety round/check completed  Taken 4/10/2023 0015 by Adriana Abbott RN  Safety Promotion/Fall Prevention: safety round/check completed  Taken 4/9/2023 2300 by Adriana Abbott RN  Safety Promotion/Fall Prevention: safety round/check completed  Taken 4/9/2023 2200 by Adriana Abbott RN  Safety Promotion/Fall Prevention: safety round/check completed  Taken 4/9/2023 2107 by Adriana Abbott RN  Safety Promotion/Fall Prevention: safety round/check completed  Taken 4/9/2023 2030 by Adriana Abbott RN  Safety Promotion/Fall Prevention: safety round/check completed  Taken 4/9/2023 1900 by Adriana Abbott RN  Safety Promotion/Fall Prevention: safety round/check completed     Problem: Diabetes Comorbidity  Goal: Blood Glucose Level Within Targeted Range  Outcome: Ongoing, Progressing  Intervention: Monitor and Manage Glycemia  Recent Flowsheet Documentation  Taken 4/9/2023 2030 by Adriana Abbott RN  Glycemic Management: blood glucose monitored     Problem: Adult Inpatient Plan of Care  Goal: Plan of Care Review  Outcome: Ongoing, Progressing  Flowsheets (Taken 4/10/2023 0633)  Progress: improving  Plan of Care Reviewed With: patient  Goal: Patient-Specific Goal (Individualized)  Outcome: Ongoing, Progressing  Goal: Absence of Hospital-Acquired Illness or Injury  Outcome: Ongoing, Progressing  Intervention: Identify and Manage Fall Risk  Recent Flowsheet Documentation  Taken 4/10/2023 0400 by Adriana Abbott RN  Safety Promotion/Fall Prevention: safety round/check completed  Taken 4/10/2023 0300 by Adriana Abbott RN  Safety  Promotion/Fall Prevention: safety round/check completed  Taken 4/10/2023 0221 by Adriana Abbott RN  Safety Promotion/Fall Prevention: safety round/check completed  Taken 4/10/2023 0100 by Adriana Abbott RN  Safety Promotion/Fall Prevention: safety round/check completed  Taken 4/10/2023 0015 by Adriana Abbott RN  Safety Promotion/Fall Prevention: safety round/check completed  Taken 4/9/2023 2300 by Adriana Abbott RN  Safety Promotion/Fall Prevention: safety round/check completed  Taken 4/9/2023 2200 by Adriana Abbott RN  Safety Promotion/Fall Prevention: safety round/check completed  Taken 4/9/2023 2107 by Adriana Abbott RN  Safety Promotion/Fall Prevention: safety round/check completed  Taken 4/9/2023 2030 by Adriana Abbott RN  Safety Promotion/Fall Prevention: safety round/check completed  Taken 4/9/2023 1900 by Adriana Abbott RN  Safety Promotion/Fall Prevention: safety round/check completed  Intervention: Prevent Skin Injury  Recent Flowsheet Documentation  Taken 4/10/2023 0400 by Adriana Abbott RN  Body Position:   turned   side-lying   left  Taken 4/10/2023 0221 by Adriana Abbott RN  Body Position:   turned   side-lying   right  Skin Protection:   adhesive use limited   incontinence pads utilized   tubing/devices free from skin contact  Taken 4/10/2023 0015 by Adriana Abbott RN  Body Position:   turned   side-lying   left  Taken 4/9/2023 2200 by Adriana Abbott RN  Body Position:   turned   side-lying   right  Taken 4/9/2023 2030 by Adriana Abbott RN  Body Position:   turned   side-lying   left  Skin Protection:   adhesive use limited   incontinence pads utilized   tubing/devices free from skin contact  Intervention: Prevent and Manage VTE (Venous Thromboembolism) Risk  Recent Flowsheet Documentation  Taken 4/10/2023 0400 by Adriana Abbott RN  Activity Management: bedrest  Taken 4/10/2023 0221 by Adriana Abbott RN  Activity Management: bedrest  Taken 4/10/2023 0015 by  Adriana Abbott RN  Activity Management: bedrest  Taken 4/9/2023 2200 by Adriana Abbott RN  Activity Management: bedrest  Taken 4/9/2023 2030 by Adriana Abbott RN  Activity Management: bedrest  VTE Prevention/Management: (see MAR) other (see comments)  Taken 4/9/2023 1900 by Adriana Abbott RN  Activity Management: bedrest  Intervention: Prevent Infection  Recent Flowsheet Documentation  Taken 4/9/2023 2030 by Adriana Abbott RN  Infection Prevention: rest/sleep promoted  Goal: Optimal Comfort and Wellbeing  Outcome: Ongoing, Progressing  Intervention: Provide Person-Centered Care  Recent Flowsheet Documentation  Taken 4/10/2023 0221 by Adriana Abbott RN  Trust Relationship/Rapport:   care explained   choices provided   reassurance provided   thoughts/feelings acknowledged  Taken 4/9/2023 2030 by Adriana Abbott RN  Trust Relationship/Rapport:   thoughts/feelings acknowledged   care explained   choices provided   emotional support provided   empathic listening provided   questions answered   reassurance provided  Goal: Readiness for Transition of Care  Outcome: Ongoing, Progressing   Goal Outcome Evaluation:  Plan of Care Reviewed With: patient        Progress: improving      details… detailed exam

## 2023-06-01 NOTE — ED PROVIDER NOTE - CONSTITUTIONAL NEGATIVE STATEMENT, MLM
Post-Care Instructions: I reviewed with the patient in detail post-care instructions. Patient is to keep the biopsy site dry overnight, and then apply bacitracin twice daily until healed. Patient may apply hydrogen peroxide soaks to remove any crusting. no fever and no chills.

## 2023-08-14 NOTE — ED PROVIDER NOTE - DISCHARGE DATE
[facility] ED  eMERGENCYdEPARTMENT eNCOUnter      Pt Name: Gerson Jones  MRN: [id number]  [address] [facility] 1982[facility]: 8/14/2023  Provider:Lata Morris, BOUCHRA - CNP    CHIEF COMPLAINT       Chief Complaint   Patient presents with    Chest Pain     Midsternal chest pain into back and L. shoulder Onset this am 0430         HISTORY OF PRESENT ILLNESS  (Location/Symptom, Timing/Onset, Context/Setting, Quality, Duration, Modifying Factors, Severity.)   Gerson Friend is a 35 y.o. female hx of anxiety, Sx hx cholecystectomy  who presents to the emergency department with chest pain. Patient presents to the emergency department with complaints of midsternal chest pain that radiates into her back and left shoulder onset around 430 this morning. She took antacid medication this morning for the chest pain that woke her up and has had little relief. She currently rates her chest pain 6 out of 10 pressure that is continuous in nature. She denies any previous cardiac hx but states she does have a hx of anxiety. She also is a daily cigarette smoker. She denies any ETOH or drug usage. She denies any shortness of breath, abdominal pain, nausea, vomiting, fever, chills, headache, or recent illness. HPI    Nursing Notes were reviewed and I agree. REVIEW OF SYSTEMS    (2-9 systems for level 4, 10 or more for level 5)     Review of Systems   Constitutional:  Negative for activity change, chills and fever. HENT:  Negative for ear pain and sore throat. Eyes:  Negative for visual disturbance. Respiratory:  Negative for cough and shortness of breath. Cardiovascular:  Positive for chest pain. Negative for palpitations and leg swelling. Gastrointestinal:  Negative for abdominal pain, diarrhea, nausea and vomiting. Genitourinary:  Negative for dysuria. Musculoskeletal:  Negative for back pain. Skin:  Negative for rash. Neurological:  Negative for dizziness and weakness.       as noted above the
24-Sep-2020

## 2023-09-13 NOTE — ED PROVIDER NOTE - PRO INTERPRETER NEED 2
English

## 2023-10-06 NOTE — ED ADULT TRIAGE NOTE - NS ED NURSE DIRECT TO ROOM YN
Use erythromycin ointment as prescribed    Recommended warm, moist compresses three to five times per day in order to facilitate drainage    Massage and gently wipe the affected eyelid after the warm compress to aid in drainage.      Keep apt with ophthalmology on 10/16/23    Rtc or f/u with peds if worsening
No

## 2023-10-10 NOTE — ED ADULT TRIAGE NOTE - TEMPERATURE IN CELSIUS (DEGREES C)
Called patient to confirm VV with Jim Doran for tomorrow. Patient answered and confirmed she will be available. 36.8

## 2023-10-11 NOTE — ED PROVIDER NOTE - NEUROLOGICAL, MLM
Problem: Discharge Planning  Goal: Discharge to home or other facility with appropriate resources  Outcome: Progressing     Problem: Pain  Goal: Verbalizes/displays adequate comfort level or baseline comfort level  Outcome: Progressing     Problem: Skin/Tissue Integrity  Goal: Absence of new skin breakdown  Description: 1. Monitor for areas of redness and/or skin breakdown  2. Assess vascular access sites hourly  3. Every 4-6 hours minimum:  Change oxygen saturation probe site  4. Every 4-6 hours:  If on nasal continuous positive airway pressure, respiratory therapy assess nares and determine need for appliance change or resting period.   Outcome: Progressing     Problem: ABCDS Injury Assessment  Goal: Absence of physical injury  Outcome: Progressing     Problem: Neurosensory - Adult  Goal: Achieves stable or improved neurological status  Outcome: Progressing  Goal: Achieves maximal functionality and self care  Outcome: Progressing  Goal: Absence of seizures  Outcome: Progressing  Goal: Remains free of injury related to seizures activity  Outcome: Progressing     Problem: Skin/Tissue Integrity - Adult  Goal: Skin integrity remains intact  Outcome: Progressing  Goal: Oral mucous membranes remain intact  Outcome: Progressing     Problem: Musculoskeletal - Adult  Goal: Return mobility to safest level of function  Outcome: Progressing  Goal: Maintain proper alignment of affected body part  Outcome: Progressing  Goal: Return ADL status to a safe level of function  Outcome: Progressing     Problem: Infection - Adult  Goal: Absence of infection at discharge  Outcome: Progressing  Goal: Absence of infection during hospitalization  Outcome: Progressing  Goal: Absence of fever/infection during anticipated neutropenic period  Outcome: Progressing     Problem: Respiratory - Adult  Goal: Achieves optimal ventilation and oxygenation  Outcome: Progressing     Problem: Cardiovascular - Adult  Goal: Maintains optimal cardiac Alert and oriented, no focal deficits, no motor or sensory deficits.

## 2023-11-30 NOTE — ED PROVIDER NOTE - DISPOSITION TYPE
Caller: Patient     Doctor: Dahlia Chan    Reason for call: Patient had 2 missed calls 1 left a vm but he was unable to understand any of it, no message the 2nd call.  Unsure who called no messages in chart, no referrals to be scheduled    Call back#: [phone] DISCHARGE

## 2023-12-13 NOTE — ED ADULT NURSE NOTE - CHPI ED NUR SEVERITY2
Instructions: Increase morning 70/30 insulin to 15 units every morning and continue evening insulin at 20 units.   PAIN SCALE 9 OF 10.

## 2023-12-29 NOTE — ED ADULT NURSE NOTE - CHPI ED NUR SYMPTOMS NEG
Anesthesia Pre Eval Note    Anesthesia ROS/Med Hx          Cardiovascular Review:     Positive for dysrhythmias  Positive for hypertension  Positive for hyperlipidemia    End/Other Review:    Positive for anemia  Additional Results:      ALLERGIES:   -- Captopril -- Other (See Comments)   Last Labs        Component                Value               Date/Time                  Sodium                   144                 12/18/2023 0940            Potassium                4.6                 12/18/2023 0940            Chloride                 108                 12/18/2023 0940            Carbon Dioxide           29                  12/18/2023 0940            Glucose                  113 (H)             12/18/2023 0940            BUN                      21 (H)              12/18/2023 0940            Creatinine               1.07 (H)            12/18/2023 0940            Glomerular Filtrati*     52 (L)              12/18/2023 0940            Calcium                  9.6                 12/18/2023 0940        Past Medical History:  No date: A-fib (CMD)  No date: Allergy  No date: Anemia  No date: Essential (primary) hypertension  No date: Gout  No date: Hematuria  No date: Osteoarthritis  Past Surgical History:  No date: Removal gallbladder   Prior to Admission medications :  Medication rivaroxaban (Xarelto) 15 MG Tab, Sig Take 15 mg by mouth daily (with breakfast). FOLLOW MD INSTRUCTIONS, Start Date , End Date , Taking? Yes, Authorizing Provider Provider, Outside    Medication olmesartan (BENICAR) 20 MG tablet, Sig TAKE 1 TABLET(20 MG) BY MOUTH DAILY. STOP IRBESARTAN  Patient taking differently: Take 20 mg by mouth every evening., Start Date 10/23/23, End Date , Taking? Yes, Authorizing Provider Karey Bowen CNP    Medication FeroSul 325 (65 Fe) MG tablet, Sig TAKE 1 TABLET BY MOUTH TWICE DAILY, Start Date 9/18/23, End Date , Taking? Yes, Authorizing Provider Karey Bowen CNP    Medication carvedilol (COREG) 25  MG tablet, Sig TAKE 1 TABLET(25 MG) BY MOUTH TWICE DAILY WITH FOOD, Start Date 9/18/23, End Date , Taking? Yes, Authorizing Provider Karey Bowen CNP    Medication amLODIPine (NORVASC) 5 MG tablet, Sig TAKE 1 TABLET(5 MG) BY MOUTH DAILY  Patient taking differently: Take 5 mg by mouth in the morning and 5 mg in the evening., Start Date 7/24/23, End Date , Taking? Yes, Authorizing Provider Karey Bowen CNP    Medication allopurinol (ZYLOPRIM) 100 MG tablet, Sig Take 2 tablets (200mg) by mouth daily.  Patient taking differently: Take 100 mg by mouth every morning. Take 2 tablets (200mg) by mouth daily., Start Date 7/15/23, End Date , Taking? Yes, Authorizing Provider Rivka Seaman MD    Medication Multiple Minerals-Vitamins (Ammon-Mag-Zinc-D) Tab, Sig STOP NOW, Start Date , End Date , Taking? , Authorizing Provider Provider, Outside         Patient Vitals for the past 24 hrs:   BP Temp Temp src Pulse Resp SpO2 Height Weight   12/29/23 1129 -- -- -- -- -- -- 5' 1\" (1.549 m) 66.4 kg (146 lb 6.2 oz)   12/29/23 1125 (!) 158/97 36.1 °C (96.9 °F) Temporal 76 17 98 % -- --       Social history reviewed:  Social History     Tobacco Use   Smoking Status Never   Smokeless Tobacco Never        E-Cigarette/Vaping Substances & Devices    E-Cigarette/Vaping Use Never Used     Nicotine No     THC No     CBD No     Flavoring No     Disposable No     Pre-filled or Refillable Cartridge No     Refillable Tank No     Pre-filled Pod No        Social History     Substance and Sexual Activity   Alcohol Use Never           Relevant Problems   No relevant active problems       Physical Exam     Airway     TM Distance: >3 FB    Cardiovascular    Cardio Rhythm: Irregular  Cardio Rate: Normal    Head Assessment  Head assessment: Normocephalic and Atraumatic    Dental Exam    Patient has:  Upper dentures and Lower dentures    Pulmonary Exam  Pulmonary exam normal  Breath sounds clear to auscultation:  Yes  Patient Demonstrates:   Non-labored Breathing    Abdominal Exam  Abdominal exam normal      Anesthesia Plan:    ASA Status: 3  Anesthesia Type: MAC    Induction: Intravenous  Patient does not have a difficult airway or is not at risk of aspiration.     Postoperative analgesia plan does NOT include opiods    Checklist  Reviewed: EKG  Consent/Risks Discussed Statement:  The proposed anesthetic plan, including its risks and benefits, have been discussed with the Patient along with the risks and benefits of alternatives. Questions were encouraged and answered and the patient and/or representative understands and agrees to proceed.        I discussed with the patient (and/or patient's legal representative) the risks and benefits of the proposed anesthesia plan, MAC, which may include services performed by other anesthesia providers.    Alternative anesthesia plans, if available, were reviewed with the patient (and/or patient's legal representative). Discussion has been held with the patient (and/or patient's legal representative) regarding risks of anesthesia, which include  emergent situations that may require change in anesthesia plan.    The patient (and/or patient's legal representative) has indicated understanding, his/her questions have been answered, and he/she wishes to proceed with the planned anesthetic.    Blood Products: Not Anticipated     no fatigue

## 2024-01-02 NOTE — ED ADULT TRIAGE NOTE - HEIGHT IN INCHES
The DP pulses were 2+ bilaterally. The PT pulses were 2+ bilaterally. The radial pulses were +2 bilaterally. 8

## 2024-02-26 NOTE — ED ADULT TRIAGE NOTE - TEMPERATURE IN FAHRENHEIT (DEGREES F)
Georgialynne Block presents to Urgent Care Patient arriving with: alone with complaint of chest and sinus congestion, cough, sinus HA.  Onset 3 weeks      Can leave detailed message on home phone:  [phone] (home) or mobile phone:    Mobile [phone]        98.2

## 2024-03-14 NOTE — ED PROVIDER NOTE - CPE EDP RESP NORM
No CP, SOB    Vital Signs Last 24 Hrs  T(C): 37.1 (03-14-24 @ 16:58), Max: 37.3 (03-14-24 @ 04:00)  T(F): 98.7 (03-14-24 @ 16:58), Max: 99.2 (03-14-24 @ 13:47)  HR: 88 (03-14-24 @ 16:58) (73 - 88)  BP: 114/60 (03-14-24 @ 16:58) (114/60 - 121/65)  RR: 18 (03-14-24 @ 16:58) (18 - 18)  SpO2: 100% (03-14-24 @ 16:58) (94% - 100%)    I&O's Detail    13 Mar 2024 07:01  -  14 Mar 2024 07:00  --------------------------------------------------------  OUT:    Other (mL): 2000 mL    Voided (mL): 0 mL  Total OUT: 2000 mL    s1s2  b/l air entry  soft, ND  sm edema                                                                7.3    4.40  )-----------( 264      ( 13 Mar 2024 07:05 )             23.8     13 Mar 2024 07:04    141    |  97     |  68     ----------------------------<  108    4.7     |  27     |  8.53     Ca    9.0        13 Mar 2024 07:04  Phos  5.9       13 Mar 2024 07:04    TPro  6.6    /  Alb  3.3    /  TBili  0.4    /  DBili  x      /  AST  11     /  ALT  <5     /  AlkPhos  668    13 Mar 2024 07:04    LIVER FUNCTIONS - ( 13 Mar 2024 07:04 )  Alb: 3.3 g/dL / Pro: 6.6 g/dL / ALK PHOS: 668 U/L / ALT: <5 U/L / AST: 11 U/L / GGT: x           acetaminophen     Tablet .. 975 milliGRAM(s) Oral every 8 hours  aspirin enteric coated 81 milliGRAM(s) Oral two times a day  calcium acetate 1334 milliGRAM(s) Oral three times a day with meals  epoetin carito-epbx (RETACRIT) Injectable 58845 Unit(s) IV Push <User Schedule>  heparin   Injectable 5000 Unit(s) SubCutaneous every 8 hours  metoprolol tartrate 25 milliGRAM(s) Oral two times a day  ondansetron Injectable 4 milliGRAM(s) IV Push every 6 hours PRN  oxyCODONE    IR 10 milliGRAM(s) Oral every 4 hours PRN  oxyCODONE    IR 15 milliGRAM(s) Oral every 4 hours PRN  polyethylene glycol 3350 17 Gram(s) Oral daily  senna 2 Tablet(s) Oral at bedtime    A/P:    ESRD on HD   S/p fall, R hip fx  S/p R hip HA 3/7  HD tomorrow as ordered  Epoetin w/HD 3 x week   Renal diet  Phoslo as ordered   PT    [phone]       normal...

## 2024-04-16 NOTE — DISCHARGE NOTE FOR THE EXPIRED PATIENT - HOSPITAL COURSE
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  Pt reports that he was following with hematology in the city however has since relocated to [place] and needs a new doctor.  He reports poor po intake 2/2 pain but denies N/V.  He also denies fever, chills, chest pain, palpitations, sob, HA, dysuria, hematuria.     Patient on chronic  Dilaudid   Patient was admitted with   sickle cell crisis treated with pain meds and  IVF.  -Found   ARF  with creatine  4  seen by  renal   -pain service  increased medications    tylenol  standing     dilaudid q 3hour  prn   then PCA pump   -seen by heme onc 9/27/2021    -seen by surgery 9/28/2021 for  TLC  and   chronic wounds  -   non infected  thigh wound   -seen by ID 9/28/2021 non infected wound  no antibiotics  -maintained on   heparin sq only        - patient was to be discharged home when found unresponsive.  Found   bag with  needle syringe  dilaudid pills,  benadryl  gabapentin tylenol  and  pill   on bed      -[facility] was called 9/28/2021  ACLS protocol  started  10:20   with ROSC then   asystolic arrest with  termination of ACLS  at  23:53    -phone numbers on  file called  no answer left messages  x   2   35 y/o M w/ PMH of sickle cell dx w/ frequent crisis episodes (last episode 1 month ago), 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  Pt reports that he was following with hematology in the city however has since relocated to [place] and needs a new doctor.  He reports poor po intake 2/2 pain but denies N/V.  He also denies fever, chills, chest pain, palpitations, sob, HA, dysuria, hematuria.     Patient on chronic  Dilaudid   Patient was admitted with   sickle cell crisis treated with pain meds and  IVF.  -Found   ARF  with creatine  4  seen by  renal   -pain service  increased medications    tylenol  standing     dilaudid q 3hour  prn   then PCA pump   -seen by heme onc 9/27/2021    -seen by surgery 9/28/2021 for  TLC  and   chronic wounds  -   non infected  thigh wound   -seen by ID 9/28/2021 non infected wound  no antibiotics  -maintained on   heparin sq only        - patient was to be discharged home when found unresponsive.  Found   bag with  needle syringe  dilaudid pills,  benadryl  gabapentin tylenol  and  pill   on bed      -[facility] was called 9/28/2021  ACLS protocol  started  10:21   with ROSC then   asystolic arrest with  termination of ACLS  at  23:53    -phone numbers on  file called  no answer left messages  x   2    -called  ME   to discuss  no answer   tried several times    home

## 2024-05-01 NOTE — ED ADULT TRIAGE NOTE - CADM TRG TX PRIOR TO ARRIVAL
see ambulance record
I personally performed the service described in the documentation recorded by the scribe in my presence, and it accurately and completely records my words and actions.

## 2024-05-07 NOTE — ED ADULT NURSE NOTE - GENITOURINARY ASSESSMENT
Next appointment: 06/19/2024  Last appointment: 03/27/2024 ([place])    Refill request for:   promethazine (PHENERGAN) 25 MG tablet 30 tablet 1 11/22/2023 --       Refill unable to be completed per standing protocol due to medication having no protocol.  Orders pended and routed to provider for approval.    WDL

## 2024-07-07 NOTE — ED PROVIDER NOTE - INTERNATIONAL TRAVEL
I recommend following up with your pediatrician as soon as possible.  Also recommend bowel regimen for patient's constipation is increasing MiraLAX dose by 1 cap daily until patient has soft bowel movement and then decreasing the dose by 1 cap daily until stools become regular and maintaining that dose.    PLEASE RETURN TO THE EMERGENCY DEPARTMENT IMMEDIATELY for worsening symptoms, inability to have a bowel movement or if you develop any concerning symptoms such as: high fever not relieved by acetaminophen (Tylenol) and/or ibuprofen (Motrin / Advil), chills, shortness of breath, chest pain, feeling of your heart fluttering or racing, persistent nausea and/or vomiting, vomiting up blood, blood in your stool, numbness, loss of consciousness, weakness or tingling in the arms or legs or change in color of the extremities, changes in mental status, persistent headache, blurry vision, loss of bladder / bowel control, unable to follow up with your physician, or other any other care or concern.    
No

## 2024-07-11 NOTE — ED ADULT TRIAGE NOTE - ARRIVAL INFO ADDITIONAL COMMENTS
Spoke with pt, she states 12-15 probably. Says her migraines haven't changed or increased.    Pt noted with temp 99.4, /min. Denies associated symptoms. States he's in Sickle Cell crisis

## 2024-08-08 NOTE — ED ADULT NURSE NOTE - ISOLATION TYPE:
TRIAGE  Chief Complaint   Patient presents with    Shortness of Breath        Patient Ariane is 83 year old female presents to ED with c/o sob x 30 mins PTA.  Per EMS pt found with 80% O2 saturation.  Pt arrived on CPAP, switched to BiPAP.  Pt c/o sob.  [place]er  utilized for triage.   unable to decipher what pt was saying, due to pt's voice muffled while wearing BiPAP.  PT presents with edema to B eyes.        Patient has a past surgical history that includes Cardiac catheterization; Colonoscopy (12/16/2009); Esophagogastroduodenoscopy (12/16/2009); Breast lumpectomy (2015); Mastectomy (Right); and Coronary Angiogram/Poss PTCA (01/10/2021).        None

## 2024-09-09 NOTE — ED ADULT NURSE NOTE - NSHOSCREENINGQ1_ED_ALL_ED
Pt called, lvm, concerned about recent heart flutters/palpitations. Pt has an appt for a Endoscopy on Thursday, and is asking if she should reschedule. Pt is very worried.    
Scheduled appt tomorrow with WG.   
No

## 2024-10-14 NOTE — PATIENT PROFILE ADULT - INTERNATIONAL TRAVEL
Samina Mahajan 62 year old female 1962    Chief Complaint   Patient presents with    Office Visit    Pain     Bilateral thumbs.        HISTORY OF PRESENT ILLNESS:  The patient is a 62 year old female with cervical spinal stenosis and right shoulder rotator cuff tear. She presents today for follow up. Patient under went discectomy and fusion at C3-C7  (2023) and doing well. Her generalized pain is improved with use of gabapentin.     She currently complains of bilateral thumb pain with overuse. Offers no other complaints.     REVIEW OF SYSTEMS:  + joint pain.  No fevers, no oral ulcers, no alopecia, no Raynaud phenomenon, no eye inflammation, no visual changes, no rashes, no chest pain, no pleurisy, no shortness of breath, no dyspnea on exertion, no hematuria, no melena, no bright red blood per rectum, no nausea, no vomiting, no dysphagia, no bleeding, no seizures.    Past Medical History:   Diagnosis Date    Allergy     Anemia     Anxiety     Arthritis     Asthma (CMD)     Bulging of cervical intervertebral disc     Cervical stenosis of spinal canal 04/10/2023    Depressive disorder     Essential (primary) hypertension     Fibromyalgia     GERD (gastroesophageal reflux disease)     Hot flashes     Insomnia     Migraines     Osteoporosis     RAD (reactive airway disease) (CMD)     Rotator cuff disorder 04/10/2023    Sleep apnea     uses CPAP    Substance abuse  (CMD)     Vitamin D deficiency         Past Surgical History:   Procedure Laterality Date    Abdomen surgery      Anterior cervical discectomy w/ fusion  2023    ANTERIOR CERVICAL DISCECTOMY AND FUSION CERVICAL 3 - 7  FUSION (Dr. Flash Quinteros, [facility])    Appendectomy      Bunionectomy  2016    bunionectomy with 1st metatarsal osteotomy foot, excision neuroma 3rd intermetatarsal space foot    Carpal tunnel release  1998     section, classic  1983, 1985, 1987     section, low transverse      Cholecystectomy       Colonoscopy  04/22/2016    Diverticulosis/hem/polyps-hyperplastic    Colonoscopy  05/17/2022    Cyst removal      Eye surgery      Hand surgery      cyst removed- palm    Hysterectomy  11/27/2000    still has ovaries    Removal gallbladder      Tubal ligation         Current Outpatient Medications   Medication Sig Dispense Refill    gabapentin (NEURONTIN) 300 MG capsule Take 2 capsules by mouth in the morning and 2 capsules at noon and 2 capsules in the evening. 540 capsule 3    naproxen (NAPROSYN) 500 MG tablet Take 1 tablet by mouth in the morning and 1 tablet in the evening. Take with meals. 30 tablet 5    sertraline (ZOLOFT) 50 MG tablet Take 1 tablet by mouth daily. 90 tablet 3    SUMAtriptan (IMITREX) 25 MG tablet Take 1 tablet by mouth at onset of migraine. May repeat after 2 hours if needed. Max dose: 200mg daily. 20 tablet 5    tiZANidine (ZANAFLEX) 2 MG tablet Take 1 tablet by mouth every 6 hours as needed for Muscle spasms. Do not take at work, before driving or operating machinery. 56 tablet 0    spironolactone (ALDACTONE) 50 MG tablet Take 1 tablet by mouth daily. 90 tablet 3    albuterol 108 (90 Base) MCG/ACT inhaler INHALE 2 PUFFS INTO THE LUNGS EVERY 4 HOURS AS NEEDED FOR SHORTNESS OF BREATH OR WHEEZING 3 each 3    buPROPion (WELLBUTRIN) 100 MG tablet TAKE 1 TABLET BY MOUTH THREE TIMES DAILY 270 tablet 3    vitamin E 200 units capsule Take 1 capsule by mouth daily. Do not start before October 4, 2023. Take 200 Units by mouth daily      trazodone (DESYREL) 150 MG tablet Take 1 tablet by mouth nightly. 90 tablet 3    fluticasone propionate (Flovent HFA) 44 MCG/ACT inhaler Inhale 2 puffs into the lungs in the morning and 2 puffs in the evening. 1 each 3    cloNIDine (CATAPRES) 0.1 MG tablet Take 1 tablet by mouth 2 times daily as needed (Hot flashes). 180 tablet 0    cholecalciferol (VITAMIN D3) 1000 UNITS tablet Take 1,000 Units by mouth daily.      ascorbic acid (VITAMIN C) 500 MG tablet Take 500 mg  by mouth daily. Take 500 mg by mouth daily       No current facility-administered medications for this visit.        ALLERGIES:   Allergen Reactions    Amlodipine Other (See Comments)     Lower extremity edema    Lisinopril Other (See Comments)     cough    Oxycontin Other (See Comments)     vomiting    Ranitidine Other (See Comments)     vomiting        Social History     Tobacco Use    Smoking status: Former     Current packs/day: 0.00     Average packs/day: 1.5 packs/day for 12.0 years (18.0 ttl pk-yrs)     Types: Cigarettes     Start date: 1999     Quit date: 2011     Years since quittin.7     Passive exposure: Past    Smokeless tobacco: Never   Vaping Use    Vaping status: never used   Substance Use Topics    Alcohol use: No     Alcohol/week: 0.0 standard drinks of alcohol    Drug use: No       Family History   Problem Relation Age of Onset    Cancer Mother 40        NHL    Hypertension Mother     Hypertension Father             Stroke/TIA Father     Other Father         emphysema    Cancer Father         lung; smoker    High blood pressure Father     Hypertension Brother     Cancer Brother         Lung cancer             PHYSICAL EXAMINATION:  VITAL SIGNS:   Vitals:    10/14/24 1002   BP: 110/70   Pulse: (!) 56   Resp: 16   SpO2: 95%   Weight: 83.9 kg (185 lb)     GENERAL:  Well dressed and well developed.  HEENT:  Normocephalic, atraumatic.  Normal appearing sclerae and  conjunctivae.    NECK:  Supple and nontender.  No cervical or supraclavicular  lymphadenopathy.  CARDIOVASCULAR:  Regular rate and rhythm.  Normal S1, S2.  No murmurs or  rubs.  LUNGS:  Clear to auscultation.  No rales or wheezing. Breathing is  unlabored.  EXTREMITIES:  No edema, cyanosis, or clubbing.  SKIN:  No rashes, digital ulcers, periungual erythema, nail pitting,  nodules, or sclerodactyly.  MUSCULOSKELETAL:  Bilateral upper extremities: Bony hypertrophy of the bilateral 1st carpometacarpal (CMC) joints. The  right shoulder has decreased range of motion with 4/5 strength. No synovitis. Bilateral lower extremities:  No synovitis. Normal range of motion. Normal strength.    SPINE: Decreased cervical range of motion.   PSYCHOLOGICAL: Alert and oriented x3.  Mood and affect are normal.    LABORATORY AND DIAGNOSTIC DATA:  Reviewed in Epic to date.     ASSESSMENT AND PLAN:  This is a 62 year old female with:    1. Positive antinuclear antibody.  No evidence of rheumatological autoimmune disease.        2. Osteoarthritis, bilateral 1st carpometacarpal (CMC) joints.   -     Voltaren gel apply up to four times daily as needed for pain     3. Right shoulder rotator cuff tear.  Followed by orthopedics     4. Cervical spine stenosis. Status post C3-C7 anterior cervical discectomy and fusion (09/19/2023). Doing well and managed by Dr. Flash Quinteros, neurosurgery.   -  Continue gabapentin 300 mg, take 2 capsules three times a day.     5. Osteoarthritis, bilateral carpometacarpal (CMC) joint. Recommend wearing comfort cool thumb splints.     Follow up in 1 year   On 10/14/2024, Anny THOMPSON scribed the services personally performed by Delonte Pritchard DO.    The documentation recorded by the scribe accurately and completely reflects the service(s) I personally performed and the decisions made by me.  .                           No

## 2024-10-17 NOTE — ED PROVIDER NOTE - OBJECTIVE STATEMENT
35 y/o M with h/o sickle cell anemia with b/l avn of hip, pe in 2008 and not on AC p/w low back pain and hip pain like his usual crisis. No fever, chills, cough, sob. No nausea, vomiting. pt states that he ran out of his dilaudid and usually gets dilaudid 4 mg im and po benadryl. Pt is a frequent visitor to many EDs in [facility] area. No

## 2024-11-07 NOTE — ED ADULT NURSE NOTE - CAS DISCH CONDITION
I spoke with him over the phone  We saw him yesterday with a 2-day history of intermittent spasms in crampings involving both legs.  This had been getting better and he was not having any significant pain at the time of his visit yesterday  Today his leg cramps returned again.  This is similar to what he noticed earlier this week.  He has cramping and spasms involving both legs in the quad area.  No leg swelling or numbness.  He has been taking ibuprofen.  I sent in cyclobenzaprine for him to take at bedtime as needed for any cramps or spasms.  We discussed that this can cause drowsiness and he should not work or drive when taking.  If the pain worsens or becomes severe I recommended ED.  Otherwise recommended follow-up with me next week if not improved   Improved

## 2024-11-18 NOTE — ED ADULT TRIAGE NOTE - CCCP TRG CHIEF CMPLNT
Problem: Alteration in Thoughts and Perception  Goal: Agree to be compliant with medication regime, as prescribed and report medication side effects  Description: Interventions:  - Offer appropriate PRN medication and supervise ingestion; conduct AIMS, as needed   Outcome: Progressing  Goal: Complete daily ADLs, including personal hygiene independently, as able  Description: Interventions:  - Observe, teach, and assist patient with ADLS  - Monitor and promote a balance of rest/activity, with adequate nutrition and elimination   Outcome: Progressing     Problem: Depression  Goal: Treatment Goal: Demonstrate behavioral control of depressive symptoms, verbalize feelings of improved mood/affect, and adopt new coping skills prior to discharge  Outcome: Progressing  Goal: Refrain from harming self  Description: Interventions:  - Monitor patient closely, per order   - Supervise medication ingestion, monitor effects and side effects   Outcome: Progressing     Problem: Anxiety  Goal: Anxiety is at manageable level  Description: Interventions:  - Assess and monitor patient's anxiety level.   - Monitor for signs and symptoms (heart palpitations, chest pain, shortness of breath, headaches, nausea, feeling jumpy, restlessness, irritable, apprehensive).   - Collaborate with interdisciplinary team and initiate plan and interventions as ordered.  - [place] patient to unit/surroundings  - Explain treatment plan  - Encourage participation in care  - Encourage verbalization of concerns/fears  - Identify coping mechanisms  - Assist in developing anxiety-reducing skills  - Administer/offer alternative therapies  - Limit or eliminate stimulants  Outcome: Progressing     Problem: Alteration in Orientation  Goal: Interact with staff daily  Description: Interventions:  - Assess and re-assess patient's level of orientation  - Engage patient in 1 on 1 interactions, daily, for a minimum of 15 minutes   - Establish rapport/trust with patient    Outcome: Progressing  Goal: Allow medical examinations, as recommended  Description: Interventions:  - Provide physical/neurological exams and/or referrals, per provider   Outcome: Progressing  Goal: Cooperate with recommended testing/procedures  Description: Interventions:  - Determine need for ancillary testing  - Observe for mental status changes  - Implement falls/precaution protocol   Outcome: Progressing     Problem: Alteration in Thoughts and Perception  Goal: Treatment Goal: Gain control of psychotic behaviors/thinking, reduce/eliminate presenting symptoms and demonstrate improved reality functioning upon discharge  Outcome: Not Progressing  Goal: Recognize dysfunctional thoughts, communicate reality-based thoughts at the time of discharge  Description: Interventions:  - Provide medication and psycho-education to assist patient in compliance and developing insight into his/her illness   Outcome: Not Progressing      back pain general

## 2025-02-14 NOTE — ED CLERICAL - DIVISION
[facility] The patient is Stable - Low risk of patient condition declining or worsening    Shift Goals  Clinical Goals: Hemodynamic stability, monitor cath site  Patient Goals: comfort, pain management  Family Goals: librado    Progress made toward(s) clinical / shift goals:    Problem: Pain - Standard  Goal: Alleviation of pain or a reduction in pain to the patient’s comfort goal  Outcome: Progressing  Note: Assess and monitor for pain. Provide pharmacological and non-pharmacological interventions as appropriate. Re-evaluate and continue to monitor.        Problem: Knowledge Deficit - Standard  Goal: Patient and family/care givers will demonstrate understanding of plan of care, disease process/condition, diagnostic tests and medications  Outcome: Progressing  Note: Discuss and review POC with patient/family. Re-educate as needed.

## 2025-03-03 NOTE — ED PROVIDER NOTE - PHYSICAL EXAMINATION
March 3, 2025     Patient: Manjit Malik   YOB: 2009   Date of Visit: 3/3/2025       To Whom it May Concern:    Manjit Malik was seen in my clinic on 3/3/2025 at 12:00 pm.     Please excuse Manjit Rico for his absence from school on the date listed above to be able to make his appointment.    Sincerely,         Mayelin Goff MD    Medical information is confidential and cannot be disclosed without the written consent of the patient or his representative.       GEN: Well appearing, well nourished, awake, alert, oriented to person, place, time/situation and in no apparent distress.  ENT: Airway patent, Nasal mucosa clear. Mouth with normal mucosa.  EYES: Clear bilaterally.  RESPIRATORY: Breathing comfortably with normal RR.  CARDIAC: Regular rate and rhythm  ABDOMEN: Soft, nontender, +bowel sounds, no rebound, rigidity, or guarding.  MSK: Range of motion is not limited, no deformities noted.  NEURO: Alert and oriented, no focal deficits.  SKIN: Skin normal color for race, warm, dry and intact. No evidence of rash.  PSYCH: Alert and oriented to person, place, time/situation. normal mood and affect. no apparent risk to self or others.

## 2025-03-22 NOTE — ED PROVIDER NOTE - PMH
No Acute Chest Syndrome  2003 and possibly 2008, with intubation, exchange transfusion  Avascular Necrosis of Bone  hips  DVT (deep venous thrombosis)    Personal History of PE (Pulmonary Embolism)  2008, post 6mo coumadin  Sickle Cell Anemia  [place]

## 2025-04-08 NOTE — ED PROVIDER NOTE - CCCP TRG CHIEF CMPLNT
SPECIALTY CARE MANAGEMENT DIABETES PROGRAM    Pt was selected from the Diabetes Candidate list.  Case reviewed.  Pt is under care of endocrinology and is therefore ineligible for the program.  Case Closed.  
back pain general

## 2025-04-09 NOTE — ED PROVIDER NOTE - CPE EDP SKIN NORM
Please Review. Protocol Failed; No Protocol     Requested Prescriptions   Pending Prescriptions Disp Refills    FERROUS SULFATE 325 (65 Fe) MG Oral Tab [Pharmacy Med Name: FERROUS SULFATE 325 MG TABLET] 90 tablet 0     Sig: TAKE ONE TABLET BY MOUTH IN THE MORNING AND ONE TABLET BY MOUTH AT BEFORE BEDTIME.       There is no refill protocol information for this order             
normal...

## 2025-04-28 NOTE — ED PROVIDER NOTE - NS ED ATTENDING STATEMENT MOD
Refill approved as requested.   I have personally performed a face to face diagnostic evaluation on this patient. I have reviewed the ACP note and agree with the history, exam and plan of care, except as noted.

## 2025-05-21 NOTE — ED ADULT NURSE NOTE - NS ED NURSE RECORD ANOTHER VITAL SIGN
Patient is medically cleared for discharge . Pending placement .        05/21/25 2174   Discharge Reassessment   Assessment Type Discharge Planning Reassessment   Did the patient's condition or plan change since previous assessment? No   Discharge Plan discussed with: Patient        Yes

## 2025-07-09 NOTE — ED ADULT TRIAGE NOTE - MEANS OF ARRIVAL
Most recent GFR from 3 years ago. Due for recheck. Pt may also need to be seen by authorizing provider, most recent visit was from 10/2024. Outside of RN standing orders. Routing to PCP for review and refill. Brenda Perez RN     
ambulatory